# Patient Record
Sex: MALE | Race: WHITE | NOT HISPANIC OR LATINO | URBAN - METROPOLITAN AREA
[De-identification: names, ages, dates, MRNs, and addresses within clinical notes are randomized per-mention and may not be internally consistent; named-entity substitution may affect disease eponyms.]

---

## 2023-01-01 ENCOUNTER — EMERGENCY (EMERGENCY)
Facility: HOSPITAL | Age: 68
LOS: 1 days | Discharge: HOME | End: 2023-01-01
Attending: EMERGENCY MEDICINE | Admitting: EMERGENCY MEDICINE
Payer: MEDICAID

## 2023-01-01 ENCOUNTER — INPATIENT (INPATIENT)
Facility: HOSPITAL | Age: 68
LOS: 12 days | Discharge: NURSING, REHAB OR LONG-TERM CARE | End: 2023-12-27
Attending: INTERNAL MEDICINE | Admitting: INTERNAL MEDICINE
Payer: COMMERCIAL

## 2023-01-01 VITALS
HEART RATE: 83 BPM | TEMPERATURE: 98 F | SYSTOLIC BLOOD PRESSURE: 113 MMHG | OXYGEN SATURATION: 100 % | DIASTOLIC BLOOD PRESSURE: 67 MMHG | RESPIRATION RATE: 18 BRPM

## 2023-01-01 VITALS
DIASTOLIC BLOOD PRESSURE: 67 MMHG | HEART RATE: 71 BPM | OXYGEN SATURATION: 99 % | SYSTOLIC BLOOD PRESSURE: 103 MMHG | TEMPERATURE: 99 F | RESPIRATION RATE: 18 BRPM

## 2023-01-01 VITALS
HEART RATE: 86 BPM | DIASTOLIC BLOOD PRESSURE: 64 MMHG | RESPIRATION RATE: 17 BRPM | OXYGEN SATURATION: 100 % | TEMPERATURE: 98 F | SYSTOLIC BLOOD PRESSURE: 107 MMHG

## 2023-01-01 VITALS
HEIGHT: 66 IN | SYSTOLIC BLOOD PRESSURE: 109 MMHG | RESPIRATION RATE: 19 BRPM | TEMPERATURE: 99 F | OXYGEN SATURATION: 95 % | DIASTOLIC BLOOD PRESSURE: 60 MMHG | HEART RATE: 89 BPM

## 2023-01-01 DIAGNOSIS — Z93.1 GASTROSTOMY STATUS: Chronic | ICD-10-CM

## 2023-01-01 DIAGNOSIS — Z29.9 ENCOUNTER FOR PROPHYLACTIC MEASURES, UNSPECIFIED: ICD-10-CM

## 2023-01-01 DIAGNOSIS — A46 ERYSIPELAS: ICD-10-CM

## 2023-01-01 DIAGNOSIS — R45.1 RESTLESSNESS AND AGITATION: ICD-10-CM

## 2023-01-01 DIAGNOSIS — N40.0 BENIGN PROSTATIC HYPERPLASIA WITHOUT LOWER URINARY TRACT SYMPTOMS: ICD-10-CM

## 2023-01-01 DIAGNOSIS — I95.9 HYPOTENSION, UNSPECIFIED: ICD-10-CM

## 2023-01-01 DIAGNOSIS — R13.10 DYSPHAGIA, UNSPECIFIED: ICD-10-CM

## 2023-01-01 DIAGNOSIS — G93.40 ENCEPHALOPATHY, UNSPECIFIED: ICD-10-CM

## 2023-01-01 DIAGNOSIS — I50.9 HEART FAILURE, UNSPECIFIED: ICD-10-CM

## 2023-01-01 LAB
A1C WITH ESTIMATED AVERAGE GLUCOSE RESULT: 4.7 % — SIGNIFICANT CHANGE UP (ref 4–5.6)
A1C WITH ESTIMATED AVERAGE GLUCOSE RESULT: 4.7 % — SIGNIFICANT CHANGE UP (ref 4–5.6)
ALBUMIN SERPL ELPH-MCNC: 3.1 G/DL — LOW (ref 3.3–5)
ALBUMIN SERPL ELPH-MCNC: 3.3 G/DL — SIGNIFICANT CHANGE UP (ref 3.3–5)
ALBUMIN SERPL ELPH-MCNC: 3.4 G/DL — SIGNIFICANT CHANGE UP (ref 3.3–5)
ALBUMIN SERPL ELPH-MCNC: 3.6 G/DL — SIGNIFICANT CHANGE UP (ref 3.3–5)
ALP SERPL-CCNC: 72 U/L — SIGNIFICANT CHANGE UP (ref 40–120)
ALP SERPL-CCNC: 73 U/L — SIGNIFICANT CHANGE UP (ref 40–120)
ALP SERPL-CCNC: 73 U/L — SIGNIFICANT CHANGE UP (ref 40–120)
ALP SERPL-CCNC: 75 U/L — SIGNIFICANT CHANGE UP (ref 40–120)
ALP SERPL-CCNC: 75 U/L — SIGNIFICANT CHANGE UP (ref 40–120)
ALP SERPL-CCNC: 76 U/L — SIGNIFICANT CHANGE UP (ref 40–120)
ALP SERPL-CCNC: 80 U/L — SIGNIFICANT CHANGE UP (ref 40–120)
ALP SERPL-CCNC: 80 U/L — SIGNIFICANT CHANGE UP (ref 40–120)
ALP SERPL-CCNC: 81 U/L — SIGNIFICANT CHANGE UP (ref 40–120)
ALP SERPL-CCNC: 81 U/L — SIGNIFICANT CHANGE UP (ref 40–120)
ALP SERPL-CCNC: 87 U/L — SIGNIFICANT CHANGE UP (ref 40–120)
ALP SERPL-CCNC: 87 U/L — SIGNIFICANT CHANGE UP (ref 40–120)
ALT FLD-CCNC: 12 U/L — SIGNIFICANT CHANGE UP (ref 4–41)
ALT FLD-CCNC: 12 U/L — SIGNIFICANT CHANGE UP (ref 4–41)
ALT FLD-CCNC: 6 U/L — SIGNIFICANT CHANGE UP (ref 4–41)
ALT FLD-CCNC: 7 U/L — SIGNIFICANT CHANGE UP (ref 4–41)
ALT FLD-CCNC: <5 U/L — SIGNIFICANT CHANGE UP (ref 4–41)
ANION GAP SERPL CALC-SCNC: 10 MMOL/L — SIGNIFICANT CHANGE UP (ref 7–14)
ANION GAP SERPL CALC-SCNC: 11 MMOL/L — SIGNIFICANT CHANGE UP (ref 7–14)
ANION GAP SERPL CALC-SCNC: 12 MMOL/L — SIGNIFICANT CHANGE UP (ref 7–14)
ANION GAP SERPL CALC-SCNC: 13 MMOL/L — SIGNIFICANT CHANGE UP (ref 7–14)
ANION GAP SERPL CALC-SCNC: 9 MMOL/L — SIGNIFICANT CHANGE UP (ref 7–14)
APPEARANCE UR: CLEAR — SIGNIFICANT CHANGE UP
APPEARANCE UR: CLEAR — SIGNIFICANT CHANGE UP
AST SERPL-CCNC: 11 U/L — SIGNIFICANT CHANGE UP (ref 4–40)
AST SERPL-CCNC: 11 U/L — SIGNIFICANT CHANGE UP (ref 4–40)
AST SERPL-CCNC: 12 U/L — SIGNIFICANT CHANGE UP (ref 4–40)
AST SERPL-CCNC: 12 U/L — SIGNIFICANT CHANGE UP (ref 4–40)
AST SERPL-CCNC: 13 U/L — SIGNIFICANT CHANGE UP (ref 4–40)
AST SERPL-CCNC: 13 U/L — SIGNIFICANT CHANGE UP (ref 4–40)
AST SERPL-CCNC: 15 U/L — SIGNIFICANT CHANGE UP (ref 4–40)
AST SERPL-CCNC: 15 U/L — SIGNIFICANT CHANGE UP (ref 4–40)
AST SERPL-CCNC: 18 U/L — SIGNIFICANT CHANGE UP (ref 4–40)
AST SERPL-CCNC: 20 U/L — SIGNIFICANT CHANGE UP (ref 4–40)
AST SERPL-CCNC: 25 U/L — SIGNIFICANT CHANGE UP (ref 4–40)
AST SERPL-CCNC: 25 U/L — SIGNIFICANT CHANGE UP (ref 4–40)
B PERT DNA SPEC QL NAA+PROBE: SIGNIFICANT CHANGE UP
B PERT DNA SPEC QL NAA+PROBE: SIGNIFICANT CHANGE UP
B PERT+PARAPERT DNA PNL SPEC NAA+PROBE: SIGNIFICANT CHANGE UP
B PERT+PARAPERT DNA PNL SPEC NAA+PROBE: SIGNIFICANT CHANGE UP
BACTERIA # UR AUTO: NEGATIVE /HPF — SIGNIFICANT CHANGE UP
BACTERIA # UR AUTO: NEGATIVE /HPF — SIGNIFICANT CHANGE UP
BASOPHILS # BLD AUTO: 0.04 K/UL — SIGNIFICANT CHANGE UP (ref 0–0.2)
BASOPHILS # BLD AUTO: 0.04 K/UL — SIGNIFICANT CHANGE UP (ref 0–0.2)
BASOPHILS NFR BLD AUTO: 0.4 % — SIGNIFICANT CHANGE UP (ref 0–2)
BASOPHILS NFR BLD AUTO: 0.4 % — SIGNIFICANT CHANGE UP (ref 0–2)
BILIRUB DIRECT SERPL-MCNC: <0.2 MG/DL — SIGNIFICANT CHANGE UP (ref 0–0.3)
BILIRUB DIRECT SERPL-MCNC: <0.2 MG/DL — SIGNIFICANT CHANGE UP (ref 0–0.3)
BILIRUB INDIRECT FLD-MCNC: >0.1 MG/DL — SIGNIFICANT CHANGE UP (ref 0–1)
BILIRUB INDIRECT FLD-MCNC: >0.1 MG/DL — SIGNIFICANT CHANGE UP (ref 0–1)
BILIRUB SERPL-MCNC: 0.2 MG/DL — SIGNIFICANT CHANGE UP (ref 0.2–1.2)
BILIRUB SERPL-MCNC: 0.2 MG/DL — SIGNIFICANT CHANGE UP (ref 0.2–1.2)
BILIRUB SERPL-MCNC: 0.3 MG/DL — SIGNIFICANT CHANGE UP (ref 0.2–1.2)
BILIRUB SERPL-MCNC: 0.4 MG/DL — SIGNIFICANT CHANGE UP (ref 0.2–1.2)
BILIRUB UR-MCNC: NEGATIVE — SIGNIFICANT CHANGE UP
BILIRUB UR-MCNC: NEGATIVE — SIGNIFICANT CHANGE UP
BORDETELLA PARAPERTUSSIS (RAPRVP): SIGNIFICANT CHANGE UP
BORDETELLA PARAPERTUSSIS (RAPRVP): SIGNIFICANT CHANGE UP
BUN SERPL-MCNC: 10 MG/DL — SIGNIFICANT CHANGE UP (ref 7–23)
BUN SERPL-MCNC: 10 MG/DL — SIGNIFICANT CHANGE UP (ref 7–23)
BUN SERPL-MCNC: 11 MG/DL — SIGNIFICANT CHANGE UP (ref 7–23)
BUN SERPL-MCNC: 11 MG/DL — SIGNIFICANT CHANGE UP (ref 7–23)
BUN SERPL-MCNC: 12 MG/DL — SIGNIFICANT CHANGE UP (ref 7–23)
BUN SERPL-MCNC: 13 MG/DL — SIGNIFICANT CHANGE UP (ref 7–23)
BUN SERPL-MCNC: 15 MG/DL — SIGNIFICANT CHANGE UP (ref 7–23)
BUN SERPL-MCNC: 15 MG/DL — SIGNIFICANT CHANGE UP (ref 7–23)
BUN SERPL-MCNC: 16 MG/DL — SIGNIFICANT CHANGE UP (ref 7–23)
BUN SERPL-MCNC: 16 MG/DL — SIGNIFICANT CHANGE UP (ref 7–23)
BUN SERPL-MCNC: 17 MG/DL — SIGNIFICANT CHANGE UP (ref 7–23)
BUN SERPL-MCNC: 17 MG/DL — SIGNIFICANT CHANGE UP (ref 7–23)
BUN SERPL-MCNC: 9 MG/DL — SIGNIFICANT CHANGE UP (ref 7–23)
C PNEUM DNA SPEC QL NAA+PROBE: SIGNIFICANT CHANGE UP
C PNEUM DNA SPEC QL NAA+PROBE: SIGNIFICANT CHANGE UP
CALCIUM SERPL-MCNC: 8.4 MG/DL — SIGNIFICANT CHANGE UP (ref 8.4–10.5)
CALCIUM SERPL-MCNC: 8.4 MG/DL — SIGNIFICANT CHANGE UP (ref 8.4–10.5)
CALCIUM SERPL-MCNC: 8.5 MG/DL — SIGNIFICANT CHANGE UP (ref 8.4–10.5)
CALCIUM SERPL-MCNC: 8.6 MG/DL — SIGNIFICANT CHANGE UP (ref 8.4–10.5)
CALCIUM SERPL-MCNC: 8.6 MG/DL — SIGNIFICANT CHANGE UP (ref 8.4–10.5)
CALCIUM SERPL-MCNC: 8.7 MG/DL — SIGNIFICANT CHANGE UP (ref 8.4–10.5)
CALCIUM SERPL-MCNC: 8.8 MG/DL — SIGNIFICANT CHANGE UP (ref 8.4–10.5)
CALCIUM SERPL-MCNC: 8.9 MG/DL — SIGNIFICANT CHANGE UP (ref 8.4–10.5)
CALCIUM SERPL-MCNC: 8.9 MG/DL — SIGNIFICANT CHANGE UP (ref 8.4–10.5)
CALCIUM SERPL-MCNC: 9 MG/DL — SIGNIFICANT CHANGE UP (ref 8.4–10.5)
CALCIUM SERPL-MCNC: 9 MG/DL — SIGNIFICANT CHANGE UP (ref 8.4–10.5)
CAST: 1 /LPF — SIGNIFICANT CHANGE UP (ref 0–4)
CAST: 1 /LPF — SIGNIFICANT CHANGE UP (ref 0–4)
CHLORIDE SERPL-SCNC: 101 MMOL/L — SIGNIFICANT CHANGE UP (ref 98–107)
CHLORIDE SERPL-SCNC: 102 MMOL/L — SIGNIFICANT CHANGE UP (ref 98–107)
CHLORIDE SERPL-SCNC: 103 MMOL/L — SIGNIFICANT CHANGE UP (ref 98–107)
CHLORIDE SERPL-SCNC: 104 MMOL/L — SIGNIFICANT CHANGE UP (ref 98–107)
CLOSURE TME COLL+EPINEP BLD: 122 K/UL — LOW (ref 150–400)
CLOSURE TME COLL+EPINEP BLD: 122 K/UL — LOW (ref 150–400)
CLOSURE TME COLL+EPINEP BLD: 128 K/UL — LOW (ref 150–400)
CLOSURE TME COLL+EPINEP BLD: 128 K/UL — LOW (ref 150–400)
CO2 SERPL-SCNC: 21 MMOL/L — LOW (ref 22–31)
CO2 SERPL-SCNC: 21 MMOL/L — LOW (ref 22–31)
CO2 SERPL-SCNC: 22 MMOL/L — SIGNIFICANT CHANGE UP (ref 22–31)
CO2 SERPL-SCNC: 22 MMOL/L — SIGNIFICANT CHANGE UP (ref 22–31)
CO2 SERPL-SCNC: 24 MMOL/L — SIGNIFICANT CHANGE UP (ref 22–31)
CO2 SERPL-SCNC: 24 MMOL/L — SIGNIFICANT CHANGE UP (ref 22–31)
CO2 SERPL-SCNC: 25 MMOL/L — SIGNIFICANT CHANGE UP (ref 22–31)
CO2 SERPL-SCNC: 26 MMOL/L — SIGNIFICANT CHANGE UP (ref 22–31)
CO2 SERPL-SCNC: 27 MMOL/L — SIGNIFICANT CHANGE UP (ref 22–31)
CO2 SERPL-SCNC: 27 MMOL/L — SIGNIFICANT CHANGE UP (ref 22–31)
CO2 SERPL-SCNC: 28 MMOL/L — SIGNIFICANT CHANGE UP (ref 22–31)
CO2 SERPL-SCNC: 29 MMOL/L — SIGNIFICANT CHANGE UP (ref 22–31)
CO2 SERPL-SCNC: 29 MMOL/L — SIGNIFICANT CHANGE UP (ref 22–31)
CO2 SERPL-SCNC: 30 MMOL/L — SIGNIFICANT CHANGE UP (ref 22–31)
CO2 SERPL-SCNC: 30 MMOL/L — SIGNIFICANT CHANGE UP (ref 22–31)
COLOR SPEC: YELLOW — SIGNIFICANT CHANGE UP
COLOR SPEC: YELLOW — SIGNIFICANT CHANGE UP
CREAT SERPL-MCNC: 0.63 MG/DL — SIGNIFICANT CHANGE UP (ref 0.5–1.3)
CREAT SERPL-MCNC: 0.63 MG/DL — SIGNIFICANT CHANGE UP (ref 0.5–1.3)
CREAT SERPL-MCNC: 0.64 MG/DL — SIGNIFICANT CHANGE UP (ref 0.5–1.3)
CREAT SERPL-MCNC: 0.64 MG/DL — SIGNIFICANT CHANGE UP (ref 0.5–1.3)
CREAT SERPL-MCNC: 0.66 MG/DL — SIGNIFICANT CHANGE UP (ref 0.5–1.3)
CREAT SERPL-MCNC: 0.67 MG/DL — SIGNIFICANT CHANGE UP (ref 0.5–1.3)
CREAT SERPL-MCNC: 0.67 MG/DL — SIGNIFICANT CHANGE UP (ref 0.5–1.3)
CREAT SERPL-MCNC: 0.7 MG/DL — SIGNIFICANT CHANGE UP (ref 0.5–1.3)
CREAT SERPL-MCNC: 0.7 MG/DL — SIGNIFICANT CHANGE UP (ref 0.5–1.3)
CREAT SERPL-MCNC: 0.71 MG/DL — SIGNIFICANT CHANGE UP (ref 0.5–1.3)
CREAT SERPL-MCNC: 0.74 MG/DL — SIGNIFICANT CHANGE UP (ref 0.5–1.3)
CREAT SERPL-MCNC: 0.74 MG/DL — SIGNIFICANT CHANGE UP (ref 0.5–1.3)
CREAT SERPL-MCNC: 0.75 MG/DL — SIGNIFICANT CHANGE UP (ref 0.5–1.3)
CREAT SERPL-MCNC: 0.75 MG/DL — SIGNIFICANT CHANGE UP (ref 0.5–1.3)
CREAT SERPL-MCNC: 0.76 MG/DL — SIGNIFICANT CHANGE UP (ref 0.5–1.3)
CREAT SERPL-MCNC: 0.76 MG/DL — SIGNIFICANT CHANGE UP (ref 0.5–1.3)
CREAT SERPL-MCNC: 0.8 MG/DL — SIGNIFICANT CHANGE UP (ref 0.5–1.3)
CREAT SERPL-MCNC: 0.8 MG/DL — SIGNIFICANT CHANGE UP (ref 0.5–1.3)
CULTURE RESULTS: NO GROWTH — SIGNIFICANT CHANGE UP
CULTURE RESULTS: NO GROWTH — SIGNIFICANT CHANGE UP
CULTURE RESULTS: SIGNIFICANT CHANGE UP
DIFF PNL FLD: NEGATIVE — SIGNIFICANT CHANGE UP
DIFF PNL FLD: NEGATIVE — SIGNIFICANT CHANGE UP
EGFR: 100 ML/MIN/1.73M2 — SIGNIFICANT CHANGE UP
EGFR: 102 ML/MIN/1.73M2 — SIGNIFICANT CHANGE UP
EGFR: 103 ML/MIN/1.73M2 — SIGNIFICANT CHANGE UP
EGFR: 103 ML/MIN/1.73M2 — SIGNIFICANT CHANGE UP
EGFR: 104 ML/MIN/1.73M2 — SIGNIFICANT CHANGE UP
EGFR: 104 ML/MIN/1.73M2 — SIGNIFICANT CHANGE UP
EGFR: 96 ML/MIN/1.73M2 — SIGNIFICANT CHANGE UP
EGFR: 96 ML/MIN/1.73M2 — SIGNIFICANT CHANGE UP
EGFR: 98 ML/MIN/1.73M2 — SIGNIFICANT CHANGE UP
EGFR: 99 ML/MIN/1.73M2 — SIGNIFICANT CHANGE UP
EGFR: 99 ML/MIN/1.73M2 — SIGNIFICANT CHANGE UP
EOSINOPHIL # BLD AUTO: 0.14 K/UL — SIGNIFICANT CHANGE UP (ref 0–0.5)
EOSINOPHIL # BLD AUTO: 0.14 K/UL — SIGNIFICANT CHANGE UP (ref 0–0.5)
EOSINOPHIL NFR BLD AUTO: 1.5 % — SIGNIFICANT CHANGE UP (ref 0–6)
EOSINOPHIL NFR BLD AUTO: 1.5 % — SIGNIFICANT CHANGE UP (ref 0–6)
ESTIMATED AVERAGE GLUCOSE: 88 — SIGNIFICANT CHANGE UP
ESTIMATED AVERAGE GLUCOSE: 88 — SIGNIFICANT CHANGE UP
FLUAV SUBTYP SPEC NAA+PROBE: SIGNIFICANT CHANGE UP
FLUAV SUBTYP SPEC NAA+PROBE: SIGNIFICANT CHANGE UP
FLUBV RNA SPEC QL NAA+PROBE: SIGNIFICANT CHANGE UP
FLUBV RNA SPEC QL NAA+PROBE: SIGNIFICANT CHANGE UP
FOLATE SERPL-MCNC: 7.8 NG/ML — SIGNIFICANT CHANGE UP (ref 3.1–17.5)
FOLATE SERPL-MCNC: 7.8 NG/ML — SIGNIFICANT CHANGE UP (ref 3.1–17.5)
GLUCOSE BLDC GLUCOMTR-MCNC: 100 MG/DL — HIGH (ref 70–99)
GLUCOSE BLDC GLUCOMTR-MCNC: 100 MG/DL — HIGH (ref 70–99)
GLUCOSE BLDC GLUCOMTR-MCNC: 104 MG/DL — HIGH (ref 70–99)
GLUCOSE BLDC GLUCOMTR-MCNC: 104 MG/DL — HIGH (ref 70–99)
GLUCOSE BLDC GLUCOMTR-MCNC: 106 MG/DL — HIGH (ref 70–99)
GLUCOSE BLDC GLUCOMTR-MCNC: 106 MG/DL — HIGH (ref 70–99)
GLUCOSE BLDC GLUCOMTR-MCNC: 130 MG/DL — HIGH (ref 70–99)
GLUCOSE BLDC GLUCOMTR-MCNC: 130 MG/DL — HIGH (ref 70–99)
GLUCOSE BLDC GLUCOMTR-MCNC: 82 MG/DL — SIGNIFICANT CHANGE UP (ref 70–99)
GLUCOSE BLDC GLUCOMTR-MCNC: 82 MG/DL — SIGNIFICANT CHANGE UP (ref 70–99)
GLUCOSE BLDC GLUCOMTR-MCNC: 87 MG/DL — SIGNIFICANT CHANGE UP (ref 70–99)
GLUCOSE BLDC GLUCOMTR-MCNC: 87 MG/DL — SIGNIFICANT CHANGE UP (ref 70–99)
GLUCOSE BLDC GLUCOMTR-MCNC: 92 MG/DL — SIGNIFICANT CHANGE UP (ref 70–99)
GLUCOSE BLDC GLUCOMTR-MCNC: 92 MG/DL — SIGNIFICANT CHANGE UP (ref 70–99)
GLUCOSE BLDC GLUCOMTR-MCNC: 93 MG/DL — SIGNIFICANT CHANGE UP (ref 70–99)
GLUCOSE BLDC GLUCOMTR-MCNC: 93 MG/DL — SIGNIFICANT CHANGE UP (ref 70–99)
GLUCOSE SERPL-MCNC: 114 MG/DL — HIGH (ref 70–99)
GLUCOSE SERPL-MCNC: 114 MG/DL — HIGH (ref 70–99)
GLUCOSE SERPL-MCNC: 71 MG/DL — SIGNIFICANT CHANGE UP (ref 70–99)
GLUCOSE SERPL-MCNC: 73 MG/DL — SIGNIFICANT CHANGE UP (ref 70–99)
GLUCOSE SERPL-MCNC: 73 MG/DL — SIGNIFICANT CHANGE UP (ref 70–99)
GLUCOSE SERPL-MCNC: 78 MG/DL — SIGNIFICANT CHANGE UP (ref 70–99)
GLUCOSE SERPL-MCNC: 78 MG/DL — SIGNIFICANT CHANGE UP (ref 70–99)
GLUCOSE SERPL-MCNC: 83 MG/DL — SIGNIFICANT CHANGE UP (ref 70–99)
GLUCOSE SERPL-MCNC: 83 MG/DL — SIGNIFICANT CHANGE UP (ref 70–99)
GLUCOSE SERPL-MCNC: 84 MG/DL — SIGNIFICANT CHANGE UP (ref 70–99)
GLUCOSE SERPL-MCNC: 85 MG/DL — SIGNIFICANT CHANGE UP (ref 70–99)
GLUCOSE SERPL-MCNC: 85 MG/DL — SIGNIFICANT CHANGE UP (ref 70–99)
GLUCOSE SERPL-MCNC: 88 MG/DL — SIGNIFICANT CHANGE UP (ref 70–99)
GLUCOSE SERPL-MCNC: 88 MG/DL — SIGNIFICANT CHANGE UP (ref 70–99)
GLUCOSE SERPL-MCNC: 89 MG/DL — SIGNIFICANT CHANGE UP (ref 70–99)
GLUCOSE SERPL-MCNC: 89 MG/DL — SIGNIFICANT CHANGE UP (ref 70–99)
GLUCOSE UR QL: NEGATIVE MG/DL — SIGNIFICANT CHANGE UP
GLUCOSE UR QL: NEGATIVE MG/DL — SIGNIFICANT CHANGE UP
HADV DNA SPEC QL NAA+PROBE: SIGNIFICANT CHANGE UP
HADV DNA SPEC QL NAA+PROBE: SIGNIFICANT CHANGE UP
HCOV 229E RNA SPEC QL NAA+PROBE: SIGNIFICANT CHANGE UP
HCOV 229E RNA SPEC QL NAA+PROBE: SIGNIFICANT CHANGE UP
HCOV HKU1 RNA SPEC QL NAA+PROBE: SIGNIFICANT CHANGE UP
HCOV HKU1 RNA SPEC QL NAA+PROBE: SIGNIFICANT CHANGE UP
HCOV NL63 RNA SPEC QL NAA+PROBE: SIGNIFICANT CHANGE UP
HCOV NL63 RNA SPEC QL NAA+PROBE: SIGNIFICANT CHANGE UP
HCOV OC43 RNA SPEC QL NAA+PROBE: SIGNIFICANT CHANGE UP
HCOV OC43 RNA SPEC QL NAA+PROBE: SIGNIFICANT CHANGE UP
HCT VFR BLD CALC: 39 % — SIGNIFICANT CHANGE UP (ref 39–50)
HCT VFR BLD CALC: 39 % — SIGNIFICANT CHANGE UP (ref 39–50)
HCT VFR BLD CALC: 40.5 % — SIGNIFICANT CHANGE UP (ref 39–50)
HCT VFR BLD CALC: 40.5 % — SIGNIFICANT CHANGE UP (ref 39–50)
HCT VFR BLD CALC: 40.9 % — SIGNIFICANT CHANGE UP (ref 39–50)
HCT VFR BLD CALC: 40.9 % — SIGNIFICANT CHANGE UP (ref 39–50)
HCT VFR BLD CALC: 41.1 % — SIGNIFICANT CHANGE UP (ref 39–50)
HCT VFR BLD CALC: 41.1 % — SIGNIFICANT CHANGE UP (ref 39–50)
HCT VFR BLD CALC: 42.2 % — SIGNIFICANT CHANGE UP (ref 39–50)
HCT VFR BLD CALC: 42.2 % — SIGNIFICANT CHANGE UP (ref 39–50)
HCT VFR BLD CALC: 42.6 % — SIGNIFICANT CHANGE UP (ref 39–50)
HCT VFR BLD CALC: 42.7 % — SIGNIFICANT CHANGE UP (ref 39–50)
HCT VFR BLD CALC: 42.7 % — SIGNIFICANT CHANGE UP (ref 39–50)
HCT VFR BLD CALC: 44.1 % — SIGNIFICANT CHANGE UP (ref 39–50)
HCT VFR BLD CALC: 44.1 % — SIGNIFICANT CHANGE UP (ref 39–50)
HCT VFR BLD CALC: 44.8 % — SIGNIFICANT CHANGE UP (ref 39–50)
HCT VFR BLD CALC: 44.8 % — SIGNIFICANT CHANGE UP (ref 39–50)
HCT VFR BLD CALC: 45.6 % — SIGNIFICANT CHANGE UP (ref 39–50)
HCT VFR BLD CALC: 45.6 % — SIGNIFICANT CHANGE UP (ref 39–50)
HCT VFR BLD CALC: 45.7 % — SIGNIFICANT CHANGE UP (ref 39–50)
HCT VFR BLD CALC: 45.7 % — SIGNIFICANT CHANGE UP (ref 39–50)
HCT VFR BLD CALC: 46.3 % — SIGNIFICANT CHANGE UP (ref 39–50)
HCT VFR BLD CALC: 46.3 % — SIGNIFICANT CHANGE UP (ref 39–50)
HCV AB S/CO SERPL IA: 0.3 S/CO — SIGNIFICANT CHANGE UP (ref 0–0.99)
HCV AB S/CO SERPL IA: 0.3 S/CO — SIGNIFICANT CHANGE UP (ref 0–0.99)
HCV AB SERPL-IMP: SIGNIFICANT CHANGE UP
HCV AB SERPL-IMP: SIGNIFICANT CHANGE UP
HGB BLD-MCNC: 13.1 G/DL — SIGNIFICANT CHANGE UP (ref 13–17)
HGB BLD-MCNC: 13.1 G/DL — SIGNIFICANT CHANGE UP (ref 13–17)
HGB BLD-MCNC: 13.4 G/DL — SIGNIFICANT CHANGE UP (ref 13–17)
HGB BLD-MCNC: 14 G/DL — SIGNIFICANT CHANGE UP (ref 13–17)
HGB BLD-MCNC: 14.1 G/DL — SIGNIFICANT CHANGE UP (ref 13–17)
HGB BLD-MCNC: 14.1 G/DL — SIGNIFICANT CHANGE UP (ref 13–17)
HGB BLD-MCNC: 14.2 G/DL — SIGNIFICANT CHANGE UP (ref 13–17)
HGB BLD-MCNC: 14.2 G/DL — SIGNIFICANT CHANGE UP (ref 13–17)
HGB BLD-MCNC: 14.3 G/DL — SIGNIFICANT CHANGE UP (ref 13–17)
HGB BLD-MCNC: 14.3 G/DL — SIGNIFICANT CHANGE UP (ref 13–17)
HGB BLD-MCNC: 14.4 G/DL — SIGNIFICANT CHANGE UP (ref 13–17)
HGB BLD-MCNC: 14.4 G/DL — SIGNIFICANT CHANGE UP (ref 13–17)
HGB BLD-MCNC: 14.9 G/DL — SIGNIFICANT CHANGE UP (ref 13–17)
HGB BLD-MCNC: 14.9 G/DL — SIGNIFICANT CHANGE UP (ref 13–17)
HGB BLD-MCNC: 15.1 G/DL — SIGNIFICANT CHANGE UP (ref 13–17)
HGB BLD-MCNC: 15.2 G/DL — SIGNIFICANT CHANGE UP (ref 13–17)
HGB BLD-MCNC: 15.2 G/DL — SIGNIFICANT CHANGE UP (ref 13–17)
HMPV RNA SPEC QL NAA+PROBE: SIGNIFICANT CHANGE UP
HMPV RNA SPEC QL NAA+PROBE: SIGNIFICANT CHANGE UP
HPIV1 RNA SPEC QL NAA+PROBE: SIGNIFICANT CHANGE UP
HPIV1 RNA SPEC QL NAA+PROBE: SIGNIFICANT CHANGE UP
HPIV2 RNA SPEC QL NAA+PROBE: SIGNIFICANT CHANGE UP
HPIV2 RNA SPEC QL NAA+PROBE: SIGNIFICANT CHANGE UP
HPIV3 RNA SPEC QL NAA+PROBE: SIGNIFICANT CHANGE UP
HPIV3 RNA SPEC QL NAA+PROBE: SIGNIFICANT CHANGE UP
HPIV4 RNA SPEC QL NAA+PROBE: SIGNIFICANT CHANGE UP
HPIV4 RNA SPEC QL NAA+PROBE: SIGNIFICANT CHANGE UP
IANC: 6.9 K/UL — SIGNIFICANT CHANGE UP (ref 1.8–7.4)
IANC: 6.9 K/UL — SIGNIFICANT CHANGE UP (ref 1.8–7.4)
IMM GRANULOCYTES NFR BLD AUTO: 0.3 % — SIGNIFICANT CHANGE UP (ref 0–0.9)
IMM GRANULOCYTES NFR BLD AUTO: 0.3 % — SIGNIFICANT CHANGE UP (ref 0–0.9)
KETONES UR-MCNC: NEGATIVE MG/DL — SIGNIFICANT CHANGE UP
KETONES UR-MCNC: NEGATIVE MG/DL — SIGNIFICANT CHANGE UP
LEUKOCYTE ESTERASE UR-ACNC: NEGATIVE — SIGNIFICANT CHANGE UP
LEUKOCYTE ESTERASE UR-ACNC: NEGATIVE — SIGNIFICANT CHANGE UP
LYMPHOCYTES # BLD AUTO: 1.46 K/UL — SIGNIFICANT CHANGE UP (ref 1–3.3)
LYMPHOCYTES # BLD AUTO: 1.46 K/UL — SIGNIFICANT CHANGE UP (ref 1–3.3)
LYMPHOCYTES # BLD AUTO: 15.9 % — SIGNIFICANT CHANGE UP (ref 13–44)
LYMPHOCYTES # BLD AUTO: 15.9 % — SIGNIFICANT CHANGE UP (ref 13–44)
M PNEUMO DNA SPEC QL NAA+PROBE: SIGNIFICANT CHANGE UP
M PNEUMO DNA SPEC QL NAA+PROBE: SIGNIFICANT CHANGE UP
MAGNESIUM SERPL-MCNC: 2 MG/DL — SIGNIFICANT CHANGE UP (ref 1.6–2.6)
MAGNESIUM SERPL-MCNC: 2 MG/DL — SIGNIFICANT CHANGE UP (ref 1.6–2.6)
MAGNESIUM SERPL-MCNC: 2.1 MG/DL — SIGNIFICANT CHANGE UP (ref 1.6–2.6)
MCHC RBC-ENTMCNC: 32.6 GM/DL — SIGNIFICANT CHANGE UP (ref 32–36)
MCHC RBC-ENTMCNC: 32.6 PG — SIGNIFICANT CHANGE UP (ref 27–34)
MCHC RBC-ENTMCNC: 32.6 PG — SIGNIFICANT CHANGE UP (ref 27–34)
MCHC RBC-ENTMCNC: 32.7 GM/DL — SIGNIFICANT CHANGE UP (ref 32–36)
MCHC RBC-ENTMCNC: 32.7 GM/DL — SIGNIFICANT CHANGE UP (ref 32–36)
MCHC RBC-ENTMCNC: 32.8 GM/DL — SIGNIFICANT CHANGE UP (ref 32–36)
MCHC RBC-ENTMCNC: 32.8 GM/DL — SIGNIFICANT CHANGE UP (ref 32–36)
MCHC RBC-ENTMCNC: 32.8 PG — SIGNIFICANT CHANGE UP (ref 27–34)
MCHC RBC-ENTMCNC: 32.9 GM/DL — SIGNIFICANT CHANGE UP (ref 32–36)
MCHC RBC-ENTMCNC: 32.9 GM/DL — SIGNIFICANT CHANGE UP (ref 32–36)
MCHC RBC-ENTMCNC: 32.9 PG — SIGNIFICANT CHANGE UP (ref 27–34)
MCHC RBC-ENTMCNC: 32.9 PG — SIGNIFICANT CHANGE UP (ref 27–34)
MCHC RBC-ENTMCNC: 33 PG — SIGNIFICANT CHANGE UP (ref 27–34)
MCHC RBC-ENTMCNC: 33.1 GM/DL — SIGNIFICANT CHANGE UP (ref 32–36)
MCHC RBC-ENTMCNC: 33.1 PG — SIGNIFICANT CHANGE UP (ref 27–34)
MCHC RBC-ENTMCNC: 33.2 GM/DL — SIGNIFICANT CHANGE UP (ref 32–36)
MCHC RBC-ENTMCNC: 33.2 GM/DL — SIGNIFICANT CHANGE UP (ref 32–36)
MCHC RBC-ENTMCNC: 33.2 PG — SIGNIFICANT CHANGE UP (ref 27–34)
MCHC RBC-ENTMCNC: 33.2 PG — SIGNIFICANT CHANGE UP (ref 27–34)
MCHC RBC-ENTMCNC: 33.3 GM/DL — SIGNIFICANT CHANGE UP (ref 32–36)
MCHC RBC-ENTMCNC: 33.3 GM/DL — SIGNIFICANT CHANGE UP (ref 32–36)
MCHC RBC-ENTMCNC: 33.4 PG — SIGNIFICANT CHANGE UP (ref 27–34)
MCHC RBC-ENTMCNC: 33.5 GM/DL — SIGNIFICANT CHANGE UP (ref 32–36)
MCHC RBC-ENTMCNC: 33.5 GM/DL — SIGNIFICANT CHANGE UP (ref 32–36)
MCHC RBC-ENTMCNC: 33.6 GM/DL — SIGNIFICANT CHANGE UP (ref 32–36)
MCHC RBC-ENTMCNC: 33.6 GM/DL — SIGNIFICANT CHANGE UP (ref 32–36)
MCHC RBC-ENTMCNC: 33.7 GM/DL — SIGNIFICANT CHANGE UP (ref 32–36)
MCHC RBC-ENTMCNC: 33.7 GM/DL — SIGNIFICANT CHANGE UP (ref 32–36)
MCHC RBC-ENTMCNC: 34.5 GM/DL — SIGNIFICANT CHANGE UP (ref 32–36)
MCHC RBC-ENTMCNC: 34.5 GM/DL — SIGNIFICANT CHANGE UP (ref 32–36)
MCV RBC AUTO: 100 FL — SIGNIFICANT CHANGE UP (ref 80–100)
MCV RBC AUTO: 100 FL — SIGNIFICANT CHANGE UP (ref 80–100)
MCV RBC AUTO: 101.5 FL — HIGH (ref 80–100)
MCV RBC AUTO: 101.5 FL — HIGH (ref 80–100)
MCV RBC AUTO: 102.3 FL — HIGH (ref 80–100)
MCV RBC AUTO: 102.3 FL — HIGH (ref 80–100)
MCV RBC AUTO: 102.5 FL — HIGH (ref 80–100)
MCV RBC AUTO: 102.5 FL — HIGH (ref 80–100)
MCV RBC AUTO: 96.9 FL — SIGNIFICANT CHANGE UP (ref 80–100)
MCV RBC AUTO: 96.9 FL — SIGNIFICANT CHANGE UP (ref 80–100)
MCV RBC AUTO: 97.7 FL — SIGNIFICANT CHANGE UP (ref 80–100)
MCV RBC AUTO: 97.7 FL — SIGNIFICANT CHANGE UP (ref 80–100)
MCV RBC AUTO: 97.8 FL — SIGNIFICANT CHANGE UP (ref 80–100)
MCV RBC AUTO: 97.8 FL — SIGNIFICANT CHANGE UP (ref 80–100)
MCV RBC AUTO: 98.4 FL — SIGNIFICANT CHANGE UP (ref 80–100)
MCV RBC AUTO: 98.4 FL — SIGNIFICANT CHANGE UP (ref 80–100)
MCV RBC AUTO: 99.1 FL — SIGNIFICANT CHANGE UP (ref 80–100)
MCV RBC AUTO: 99.5 FL — SIGNIFICANT CHANGE UP (ref 80–100)
MCV RBC AUTO: 99.5 FL — SIGNIFICANT CHANGE UP (ref 80–100)
MCV RBC AUTO: 99.8 FL — SIGNIFICANT CHANGE UP (ref 80–100)
MONOCYTES # BLD AUTO: 0.64 K/UL — SIGNIFICANT CHANGE UP (ref 0–0.9)
MONOCYTES # BLD AUTO: 0.64 K/UL — SIGNIFICANT CHANGE UP (ref 0–0.9)
MONOCYTES NFR BLD AUTO: 6.9 % — SIGNIFICANT CHANGE UP (ref 2–14)
MONOCYTES NFR BLD AUTO: 6.9 % — SIGNIFICANT CHANGE UP (ref 2–14)
NEUTROPHILS # BLD AUTO: 6.9 K/UL — SIGNIFICANT CHANGE UP (ref 1.8–7.4)
NEUTROPHILS # BLD AUTO: 6.9 K/UL — SIGNIFICANT CHANGE UP (ref 1.8–7.4)
NEUTROPHILS NFR BLD AUTO: 75 % — SIGNIFICANT CHANGE UP (ref 43–77)
NEUTROPHILS NFR BLD AUTO: 75 % — SIGNIFICANT CHANGE UP (ref 43–77)
NITRITE UR-MCNC: NEGATIVE — SIGNIFICANT CHANGE UP
NITRITE UR-MCNC: NEGATIVE — SIGNIFICANT CHANGE UP
NRBC # BLD: 0 /100 WBCS — SIGNIFICANT CHANGE UP (ref 0–0)
NRBC # FLD: 0 K/UL — SIGNIFICANT CHANGE UP (ref 0–0)
NRBC # FLD: 0.02 K/UL — HIGH (ref 0–0)
NRBC # FLD: 0.02 K/UL — HIGH (ref 0–0)
PH UR: 5 — SIGNIFICANT CHANGE UP (ref 5–8)
PH UR: 5 — SIGNIFICANT CHANGE UP (ref 5–8)
PHOSPHATE SERPL-MCNC: 3.5 MG/DL — SIGNIFICANT CHANGE UP (ref 2.5–4.5)
PHOSPHATE SERPL-MCNC: 3.8 MG/DL — SIGNIFICANT CHANGE UP (ref 2.5–4.5)
PHOSPHATE SERPL-MCNC: 3.8 MG/DL — SIGNIFICANT CHANGE UP (ref 2.5–4.5)
PHOSPHATE SERPL-MCNC: 4 MG/DL — SIGNIFICANT CHANGE UP (ref 2.5–4.5)
PHOSPHATE SERPL-MCNC: 4 MG/DL — SIGNIFICANT CHANGE UP (ref 2.5–4.5)
PLATELET # BLD AUTO: 118 K/UL — LOW (ref 150–400)
PLATELET # BLD AUTO: 118 K/UL — LOW (ref 150–400)
PLATELET # BLD AUTO: 138 K/UL — LOW (ref 150–400)
PLATELET # BLD AUTO: 138 K/UL — LOW (ref 150–400)
PLATELET # BLD AUTO: 142 K/UL — LOW (ref 150–400)
PLATELET # BLD AUTO: 142 K/UL — LOW (ref 150–400)
PLATELET # BLD AUTO: 146 K/UL — LOW (ref 150–400)
PLATELET # BLD AUTO: 147 K/UL — LOW (ref 150–400)
PLATELET # BLD AUTO: 147 K/UL — LOW (ref 150–400)
PLATELET # BLD AUTO: 151 K/UL — SIGNIFICANT CHANGE UP (ref 150–400)
PLATELET # BLD AUTO: 151 K/UL — SIGNIFICANT CHANGE UP (ref 150–400)
PLATELET # BLD AUTO: 154 K/UL — SIGNIFICANT CHANGE UP (ref 150–400)
PLATELET # BLD AUTO: 154 K/UL — SIGNIFICANT CHANGE UP (ref 150–400)
PLATELET # BLD AUTO: 160 K/UL — SIGNIFICANT CHANGE UP (ref 150–400)
PLATELET # BLD AUTO: 160 K/UL — SIGNIFICANT CHANGE UP (ref 150–400)
PLATELET # BLD AUTO: 164 K/UL — SIGNIFICANT CHANGE UP (ref 150–400)
PLATELET # BLD AUTO: 164 K/UL — SIGNIFICANT CHANGE UP (ref 150–400)
PLATELET # BLD AUTO: 165 K/UL — SIGNIFICANT CHANGE UP (ref 150–400)
PLATELET # BLD AUTO: 165 K/UL — SIGNIFICANT CHANGE UP (ref 150–400)
PLATELET # BLD AUTO: 167 K/UL — SIGNIFICANT CHANGE UP (ref 150–400)
PLATELET # BLD AUTO: 167 K/UL — SIGNIFICANT CHANGE UP (ref 150–400)
PLATELET # BLD AUTO: 73 K/UL — LOW (ref 150–400)
PLATELET # BLD AUTO: 73 K/UL — LOW (ref 150–400)
POTASSIUM SERPL-MCNC: 3.9 MMOL/L — SIGNIFICANT CHANGE UP (ref 3.5–5.3)
POTASSIUM SERPL-MCNC: 3.9 MMOL/L — SIGNIFICANT CHANGE UP (ref 3.5–5.3)
POTASSIUM SERPL-MCNC: 4 MMOL/L — SIGNIFICANT CHANGE UP (ref 3.5–5.3)
POTASSIUM SERPL-MCNC: 4 MMOL/L — SIGNIFICANT CHANGE UP (ref 3.5–5.3)
POTASSIUM SERPL-MCNC: 4.1 MMOL/L — SIGNIFICANT CHANGE UP (ref 3.5–5.3)
POTASSIUM SERPL-MCNC: 4.1 MMOL/L — SIGNIFICANT CHANGE UP (ref 3.5–5.3)
POTASSIUM SERPL-MCNC: 4.3 MMOL/L — SIGNIFICANT CHANGE UP (ref 3.5–5.3)
POTASSIUM SERPL-MCNC: 4.3 MMOL/L — SIGNIFICANT CHANGE UP (ref 3.5–5.3)
POTASSIUM SERPL-MCNC: 4.4 MMOL/L — SIGNIFICANT CHANGE UP (ref 3.5–5.3)
POTASSIUM SERPL-MCNC: 4.6 MMOL/L — SIGNIFICANT CHANGE UP (ref 3.5–5.3)
POTASSIUM SERPL-MCNC: 4.6 MMOL/L — SIGNIFICANT CHANGE UP (ref 3.5–5.3)
POTASSIUM SERPL-MCNC: 4.7 MMOL/L — SIGNIFICANT CHANGE UP (ref 3.5–5.3)
POTASSIUM SERPL-MCNC: 4.8 MMOL/L — SIGNIFICANT CHANGE UP (ref 3.5–5.3)
POTASSIUM SERPL-MCNC: 4.8 MMOL/L — SIGNIFICANT CHANGE UP (ref 3.5–5.3)
POTASSIUM SERPL-MCNC: 5 MMOL/L — SIGNIFICANT CHANGE UP (ref 3.5–5.3)
POTASSIUM SERPL-MCNC: 5 MMOL/L — SIGNIFICANT CHANGE UP (ref 3.5–5.3)
POTASSIUM SERPL-SCNC: 3.9 MMOL/L — SIGNIFICANT CHANGE UP (ref 3.5–5.3)
POTASSIUM SERPL-SCNC: 3.9 MMOL/L — SIGNIFICANT CHANGE UP (ref 3.5–5.3)
POTASSIUM SERPL-SCNC: 4 MMOL/L — SIGNIFICANT CHANGE UP (ref 3.5–5.3)
POTASSIUM SERPL-SCNC: 4 MMOL/L — SIGNIFICANT CHANGE UP (ref 3.5–5.3)
POTASSIUM SERPL-SCNC: 4.1 MMOL/L — SIGNIFICANT CHANGE UP (ref 3.5–5.3)
POTASSIUM SERPL-SCNC: 4.1 MMOL/L — SIGNIFICANT CHANGE UP (ref 3.5–5.3)
POTASSIUM SERPL-SCNC: 4.3 MMOL/L — SIGNIFICANT CHANGE UP (ref 3.5–5.3)
POTASSIUM SERPL-SCNC: 4.3 MMOL/L — SIGNIFICANT CHANGE UP (ref 3.5–5.3)
POTASSIUM SERPL-SCNC: 4.4 MMOL/L — SIGNIFICANT CHANGE UP (ref 3.5–5.3)
POTASSIUM SERPL-SCNC: 4.6 MMOL/L — SIGNIFICANT CHANGE UP (ref 3.5–5.3)
POTASSIUM SERPL-SCNC: 4.6 MMOL/L — SIGNIFICANT CHANGE UP (ref 3.5–5.3)
POTASSIUM SERPL-SCNC: 4.7 MMOL/L — SIGNIFICANT CHANGE UP (ref 3.5–5.3)
POTASSIUM SERPL-SCNC: 4.8 MMOL/L — SIGNIFICANT CHANGE UP (ref 3.5–5.3)
POTASSIUM SERPL-SCNC: 4.8 MMOL/L — SIGNIFICANT CHANGE UP (ref 3.5–5.3)
POTASSIUM SERPL-SCNC: 5 MMOL/L — SIGNIFICANT CHANGE UP (ref 3.5–5.3)
POTASSIUM SERPL-SCNC: 5 MMOL/L — SIGNIFICANT CHANGE UP (ref 3.5–5.3)
PROT SERPL-MCNC: 6.1 G/DL — SIGNIFICANT CHANGE UP (ref 6–8.3)
PROT SERPL-MCNC: 6.1 G/DL — SIGNIFICANT CHANGE UP (ref 6–8.3)
PROT SERPL-MCNC: 6.3 G/DL — SIGNIFICANT CHANGE UP (ref 6–8.3)
PROT SERPL-MCNC: 6.3 G/DL — SIGNIFICANT CHANGE UP (ref 6–8.3)
PROT SERPL-MCNC: 6.5 G/DL — SIGNIFICANT CHANGE UP (ref 6–8.3)
PROT SERPL-MCNC: 6.5 G/DL — SIGNIFICANT CHANGE UP (ref 6–8.3)
PROT SERPL-MCNC: 6.6 G/DL — SIGNIFICANT CHANGE UP (ref 6–8.3)
PROT SERPL-MCNC: 6.6 G/DL — SIGNIFICANT CHANGE UP (ref 6–8.3)
PROT SERPL-MCNC: 6.8 G/DL — SIGNIFICANT CHANGE UP (ref 6–8.3)
PROT SERPL-MCNC: 6.8 G/DL — SIGNIFICANT CHANGE UP (ref 6–8.3)
PROT SERPL-MCNC: 6.9 G/DL — SIGNIFICANT CHANGE UP (ref 6–8.3)
PROT SERPL-MCNC: 6.9 G/DL — SIGNIFICANT CHANGE UP (ref 6–8.3)
PROT SERPL-MCNC: 7 G/DL — SIGNIFICANT CHANGE UP (ref 6–8.3)
PROT SERPL-MCNC: 7 G/DL — SIGNIFICANT CHANGE UP (ref 6–8.3)
PROT SERPL-MCNC: 7.2 G/DL — SIGNIFICANT CHANGE UP (ref 6–8.3)
PROT SERPL-MCNC: 7.4 G/DL — SIGNIFICANT CHANGE UP (ref 6–8.3)
PROT SERPL-MCNC: 7.4 G/DL — SIGNIFICANT CHANGE UP (ref 6–8.3)
PROT UR-MCNC: NEGATIVE MG/DL — SIGNIFICANT CHANGE UP
PROT UR-MCNC: NEGATIVE MG/DL — SIGNIFICANT CHANGE UP
RAPID RVP RESULT: SIGNIFICANT CHANGE UP
RAPID RVP RESULT: SIGNIFICANT CHANGE UP
RBC # BLD: 3.99 M/UL — LOW (ref 4.2–5.8)
RBC # BLD: 3.99 M/UL — LOW (ref 4.2–5.8)
RBC # BLD: 4.05 M/UL — LOW (ref 4.2–5.8)
RBC # BLD: 4.05 M/UL — LOW (ref 4.2–5.8)
RBC # BLD: 4.07 M/UL — LOW (ref 4.2–5.8)
RBC # BLD: 4.07 M/UL — LOW (ref 4.2–5.8)
RBC # BLD: 4.22 M/UL — SIGNIFICANT CHANGE UP (ref 4.2–5.8)
RBC # BLD: 4.22 M/UL — SIGNIFICANT CHANGE UP (ref 4.2–5.8)
RBC # BLD: 4.23 M/UL — SIGNIFICANT CHANGE UP (ref 4.2–5.8)
RBC # BLD: 4.23 M/UL — SIGNIFICANT CHANGE UP (ref 4.2–5.8)
RBC # BLD: 4.3 M/UL — SIGNIFICANT CHANGE UP (ref 4.2–5.8)
RBC # BLD: 4.3 M/UL — SIGNIFICANT CHANGE UP (ref 4.2–5.8)
RBC # BLD: 4.31 M/UL — SIGNIFICANT CHANGE UP (ref 4.2–5.8)
RBC # BLD: 4.33 M/UL — SIGNIFICANT CHANGE UP (ref 4.2–5.8)
RBC # BLD: 4.33 M/UL — SIGNIFICANT CHANGE UP (ref 4.2–5.8)
RBC # BLD: 4.46 M/UL — SIGNIFICANT CHANGE UP (ref 4.2–5.8)
RBC # BLD: 4.46 M/UL — SIGNIFICANT CHANGE UP (ref 4.2–5.8)
RBC # BLD: 4.57 M/UL — SIGNIFICANT CHANGE UP (ref 4.2–5.8)
RBC # BLD: 4.57 M/UL — SIGNIFICANT CHANGE UP (ref 4.2–5.8)
RBC # BLD: 4.58 M/UL — SIGNIFICANT CHANGE UP (ref 4.2–5.8)
RBC # BLD: 4.58 M/UL — SIGNIFICANT CHANGE UP (ref 4.2–5.8)
RBC # BLD: 4.63 M/UL — SIGNIFICANT CHANGE UP (ref 4.2–5.8)
RBC # BLD: 4.63 M/UL — SIGNIFICANT CHANGE UP (ref 4.2–5.8)
RBC # FLD: 12.5 % — SIGNIFICANT CHANGE UP (ref 10.3–14.5)
RBC # FLD: 12.6 % — SIGNIFICANT CHANGE UP (ref 10.3–14.5)
RBC # FLD: 12.7 % — SIGNIFICANT CHANGE UP (ref 10.3–14.5)
RBC # FLD: 12.7 % — SIGNIFICANT CHANGE UP (ref 10.3–14.5)
RBC # FLD: 12.8 % — SIGNIFICANT CHANGE UP (ref 10.3–14.5)
RBC # FLD: 12.9 % — SIGNIFICANT CHANGE UP (ref 10.3–14.5)
RBC # FLD: 12.9 % — SIGNIFICANT CHANGE UP (ref 10.3–14.5)
RBC # FLD: 13.2 % — SIGNIFICANT CHANGE UP (ref 10.3–14.5)
RBC # FLD: 13.3 % — SIGNIFICANT CHANGE UP (ref 10.3–14.5)
RBC # FLD: 13.3 % — SIGNIFICANT CHANGE UP (ref 10.3–14.5)
RBC CASTS # UR COMP ASSIST: 0 /HPF — SIGNIFICANT CHANGE UP (ref 0–4)
RBC CASTS # UR COMP ASSIST: 0 /HPF — SIGNIFICANT CHANGE UP (ref 0–4)
RSV RNA SPEC QL NAA+PROBE: SIGNIFICANT CHANGE UP
RSV RNA SPEC QL NAA+PROBE: SIGNIFICANT CHANGE UP
RV+EV RNA SPEC QL NAA+PROBE: SIGNIFICANT CHANGE UP
RV+EV RNA SPEC QL NAA+PROBE: SIGNIFICANT CHANGE UP
SARS-COV-2 RNA SPEC QL NAA+PROBE: SIGNIFICANT CHANGE UP
SARS-COV-2 RNA SPEC QL NAA+PROBE: SIGNIFICANT CHANGE UP
SODIUM SERPL-SCNC: 136 MMOL/L — SIGNIFICANT CHANGE UP (ref 135–145)
SODIUM SERPL-SCNC: 136 MMOL/L — SIGNIFICANT CHANGE UP (ref 135–145)
SODIUM SERPL-SCNC: 137 MMOL/L — SIGNIFICANT CHANGE UP (ref 135–145)
SODIUM SERPL-SCNC: 137 MMOL/L — SIGNIFICANT CHANGE UP (ref 135–145)
SODIUM SERPL-SCNC: 138 MMOL/L — SIGNIFICANT CHANGE UP (ref 135–145)
SODIUM SERPL-SCNC: 139 MMOL/L — SIGNIFICANT CHANGE UP (ref 135–145)
SODIUM SERPL-SCNC: 140 MMOL/L — SIGNIFICANT CHANGE UP (ref 135–145)
SODIUM SERPL-SCNC: 141 MMOL/L — SIGNIFICANT CHANGE UP (ref 135–145)
SODIUM SERPL-SCNC: 142 MMOL/L — SIGNIFICANT CHANGE UP (ref 135–145)
SODIUM SERPL-SCNC: 142 MMOL/L — SIGNIFICANT CHANGE UP (ref 135–145)
SP GR SPEC: 1.02 — SIGNIFICANT CHANGE UP (ref 1–1.03)
SP GR SPEC: 1.02 — SIGNIFICANT CHANGE UP (ref 1–1.03)
SPECIMEN SOURCE: SIGNIFICANT CHANGE UP
SQUAMOUS # UR AUTO: 0 /HPF — SIGNIFICANT CHANGE UP (ref 0–5)
SQUAMOUS # UR AUTO: 0 /HPF — SIGNIFICANT CHANGE UP (ref 0–5)
T3 SERPL-MCNC: 82 NG/DL — SIGNIFICANT CHANGE UP (ref 80–200)
T3 SERPL-MCNC: 82 NG/DL — SIGNIFICANT CHANGE UP (ref 80–200)
T4 AB SER-ACNC: 6.05 UG/DL — SIGNIFICANT CHANGE UP (ref 5.1–13)
T4 AB SER-ACNC: 6.05 UG/DL — SIGNIFICANT CHANGE UP (ref 5.1–13)
TSH SERPL-MCNC: 2.47 UIU/ML — SIGNIFICANT CHANGE UP (ref 0.27–4.2)
TSH SERPL-MCNC: 2.47 UIU/ML — SIGNIFICANT CHANGE UP (ref 0.27–4.2)
TSH SERPL-MCNC: 4.82 UIU/ML — HIGH (ref 0.27–4.2)
TSH SERPL-MCNC: 4.82 UIU/ML — HIGH (ref 0.27–4.2)
UROBILINOGEN FLD QL: 0.2 MG/DL — SIGNIFICANT CHANGE UP (ref 0.2–1)
UROBILINOGEN FLD QL: 0.2 MG/DL — SIGNIFICANT CHANGE UP (ref 0.2–1)
VALPROATE SERPL-MCNC: 23.9 UG/ML — LOW (ref 50–100)
VALPROATE SERPL-MCNC: 23.9 UG/ML — LOW (ref 50–100)
VALPROATE SERPL-MCNC: 37 UG/ML — LOW (ref 50–100)
VALPROATE SERPL-MCNC: 37 UG/ML — LOW (ref 50–100)
VALPROATE SERPL-MCNC: 41.5 UG/ML — LOW (ref 50–100)
VALPROATE SERPL-MCNC: 41.5 UG/ML — LOW (ref 50–100)
VALPROATE SERPL-MCNC: 45.6 UG/ML — LOW (ref 50–100)
VALPROATE SERPL-MCNC: 45.6 UG/ML — LOW (ref 50–100)
VALPROATE SERPL-MCNC: 51 UG/ML — SIGNIFICANT CHANGE UP (ref 50–100)
VALPROATE SERPL-MCNC: 51 UG/ML — SIGNIFICANT CHANGE UP (ref 50–100)
VIT B1 SERPL-MCNC: 167.3 NMOL/L — SIGNIFICANT CHANGE UP (ref 66.5–200)
VIT B1 SERPL-MCNC: 167.3 NMOL/L — SIGNIFICANT CHANGE UP (ref 66.5–200)
VIT B12 SERPL-MCNC: 797 PG/ML — SIGNIFICANT CHANGE UP (ref 200–900)
VIT B12 SERPL-MCNC: 797 PG/ML — SIGNIFICANT CHANGE UP (ref 200–900)
WBC # BLD: 4.37 K/UL — SIGNIFICANT CHANGE UP (ref 3.8–10.5)
WBC # BLD: 4.37 K/UL — SIGNIFICANT CHANGE UP (ref 3.8–10.5)
WBC # BLD: 5.17 K/UL — SIGNIFICANT CHANGE UP (ref 3.8–10.5)
WBC # BLD: 5.17 K/UL — SIGNIFICANT CHANGE UP (ref 3.8–10.5)
WBC # BLD: 5.52 K/UL — SIGNIFICANT CHANGE UP (ref 3.8–10.5)
WBC # BLD: 5.52 K/UL — SIGNIFICANT CHANGE UP (ref 3.8–10.5)
WBC # BLD: 6.21 K/UL — SIGNIFICANT CHANGE UP (ref 3.8–10.5)
WBC # BLD: 6.21 K/UL — SIGNIFICANT CHANGE UP (ref 3.8–10.5)
WBC # BLD: 6.36 K/UL — SIGNIFICANT CHANGE UP (ref 3.8–10.5)
WBC # BLD: 6.36 K/UL — SIGNIFICANT CHANGE UP (ref 3.8–10.5)
WBC # BLD: 6.52 K/UL — SIGNIFICANT CHANGE UP (ref 3.8–10.5)
WBC # BLD: 6.52 K/UL — SIGNIFICANT CHANGE UP (ref 3.8–10.5)
WBC # BLD: 6.84 K/UL — SIGNIFICANT CHANGE UP (ref 3.8–10.5)
WBC # BLD: 6.84 K/UL — SIGNIFICANT CHANGE UP (ref 3.8–10.5)
WBC # BLD: 7.27 K/UL — SIGNIFICANT CHANGE UP (ref 3.8–10.5)
WBC # BLD: 7.27 K/UL — SIGNIFICANT CHANGE UP (ref 3.8–10.5)
WBC # BLD: 7.41 K/UL — SIGNIFICANT CHANGE UP (ref 3.8–10.5)
WBC # BLD: 7.41 K/UL — SIGNIFICANT CHANGE UP (ref 3.8–10.5)
WBC # BLD: 7.65 K/UL — SIGNIFICANT CHANGE UP (ref 3.8–10.5)
WBC # BLD: 7.65 K/UL — SIGNIFICANT CHANGE UP (ref 3.8–10.5)
WBC # BLD: 7.96 K/UL — SIGNIFICANT CHANGE UP (ref 3.8–10.5)
WBC # BLD: 7.96 K/UL — SIGNIFICANT CHANGE UP (ref 3.8–10.5)
WBC # BLD: 9.04 K/UL — SIGNIFICANT CHANGE UP (ref 3.8–10.5)
WBC # BLD: 9.04 K/UL — SIGNIFICANT CHANGE UP (ref 3.8–10.5)
WBC # BLD: 9.21 K/UL — SIGNIFICANT CHANGE UP (ref 3.8–10.5)
WBC # BLD: 9.21 K/UL — SIGNIFICANT CHANGE UP (ref 3.8–10.5)
WBC # FLD AUTO: 4.37 K/UL — SIGNIFICANT CHANGE UP (ref 3.8–10.5)
WBC # FLD AUTO: 4.37 K/UL — SIGNIFICANT CHANGE UP (ref 3.8–10.5)
WBC # FLD AUTO: 5.17 K/UL — SIGNIFICANT CHANGE UP (ref 3.8–10.5)
WBC # FLD AUTO: 5.17 K/UL — SIGNIFICANT CHANGE UP (ref 3.8–10.5)
WBC # FLD AUTO: 5.52 K/UL — SIGNIFICANT CHANGE UP (ref 3.8–10.5)
WBC # FLD AUTO: 5.52 K/UL — SIGNIFICANT CHANGE UP (ref 3.8–10.5)
WBC # FLD AUTO: 6.21 K/UL — SIGNIFICANT CHANGE UP (ref 3.8–10.5)
WBC # FLD AUTO: 6.21 K/UL — SIGNIFICANT CHANGE UP (ref 3.8–10.5)
WBC # FLD AUTO: 6.36 K/UL — SIGNIFICANT CHANGE UP (ref 3.8–10.5)
WBC # FLD AUTO: 6.36 K/UL — SIGNIFICANT CHANGE UP (ref 3.8–10.5)
WBC # FLD AUTO: 6.52 K/UL — SIGNIFICANT CHANGE UP (ref 3.8–10.5)
WBC # FLD AUTO: 6.52 K/UL — SIGNIFICANT CHANGE UP (ref 3.8–10.5)
WBC # FLD AUTO: 6.84 K/UL — SIGNIFICANT CHANGE UP (ref 3.8–10.5)
WBC # FLD AUTO: 6.84 K/UL — SIGNIFICANT CHANGE UP (ref 3.8–10.5)
WBC # FLD AUTO: 7.27 K/UL — SIGNIFICANT CHANGE UP (ref 3.8–10.5)
WBC # FLD AUTO: 7.27 K/UL — SIGNIFICANT CHANGE UP (ref 3.8–10.5)
WBC # FLD AUTO: 7.41 K/UL — SIGNIFICANT CHANGE UP (ref 3.8–10.5)
WBC # FLD AUTO: 7.41 K/UL — SIGNIFICANT CHANGE UP (ref 3.8–10.5)
WBC # FLD AUTO: 7.65 K/UL — SIGNIFICANT CHANGE UP (ref 3.8–10.5)
WBC # FLD AUTO: 7.65 K/UL — SIGNIFICANT CHANGE UP (ref 3.8–10.5)
WBC # FLD AUTO: 7.96 K/UL — SIGNIFICANT CHANGE UP (ref 3.8–10.5)
WBC # FLD AUTO: 7.96 K/UL — SIGNIFICANT CHANGE UP (ref 3.8–10.5)
WBC # FLD AUTO: 9.04 K/UL — SIGNIFICANT CHANGE UP (ref 3.8–10.5)
WBC # FLD AUTO: 9.04 K/UL — SIGNIFICANT CHANGE UP (ref 3.8–10.5)
WBC # FLD AUTO: 9.21 K/UL — SIGNIFICANT CHANGE UP (ref 3.8–10.5)
WBC # FLD AUTO: 9.21 K/UL — SIGNIFICANT CHANGE UP (ref 3.8–10.5)
WBC UR QL: 0 /HPF — SIGNIFICANT CHANGE UP (ref 0–5)
WBC UR QL: 0 /HPF — SIGNIFICANT CHANGE UP (ref 0–5)

## 2023-01-01 PROCEDURE — 99233 SBSQ HOSP IP/OBS HIGH 50: CPT

## 2023-01-01 PROCEDURE — 99232 SBSQ HOSP IP/OBS MODERATE 35: CPT

## 2023-01-01 PROCEDURE — 99223 1ST HOSP IP/OBS HIGH 75: CPT

## 2023-01-01 PROCEDURE — 90792 PSYCH DIAG EVAL W/MED SRVCS: CPT

## 2023-01-01 PROCEDURE — 99285 EMERGENCY DEPT VISIT HI MDM: CPT | Mod: GC

## 2023-01-01 PROCEDURE — 71045 X-RAY EXAM CHEST 1 VIEW: CPT | Mod: 26

## 2023-01-01 PROCEDURE — 70450 CT HEAD/BRAIN W/O DYE: CPT | Mod: 26

## 2023-01-01 PROCEDURE — 99239 HOSP IP/OBS DSCHRG MGMT >30: CPT

## 2023-01-01 PROCEDURE — 99283 EMERGENCY DEPT VISIT LOW MDM: CPT

## 2023-01-01 PROCEDURE — 70450 CT HEAD/BRAIN W/O DYE: CPT | Mod: 26,MA

## 2023-01-01 RX ORDER — CLONAZEPAM 1 MG
1 TABLET ORAL AT BEDTIME
Refills: 0 | Status: DISCONTINUED | OUTPATIENT
Start: 2023-01-01 | End: 2023-01-01

## 2023-01-01 RX ORDER — HALOPERIDOL DECANOATE 100 MG/ML
2.5 INJECTION INTRAMUSCULAR ONCE
Refills: 0 | Status: COMPLETED | OUTPATIENT
Start: 2023-01-01 | End: 2023-01-01

## 2023-01-01 RX ORDER — DIVALPROEX SODIUM 500 MG/1
1 TABLET, DELAYED RELEASE ORAL
Refills: 0 | DISCHARGE

## 2023-01-01 RX ORDER — IPRATROPIUM/ALBUTEROL SULFATE 18-103MCG
3 AEROSOL WITH ADAPTER (GRAM) INHALATION EVERY 6 HOURS
Refills: 0 | Status: DISCONTINUED | OUTPATIENT
Start: 2023-01-01 | End: 2023-01-01

## 2023-01-01 RX ORDER — DIVALPROEX SODIUM 500 MG/1
125 TABLET, DELAYED RELEASE ORAL
Refills: 0 | Status: DISCONTINUED | OUTPATIENT
Start: 2023-01-01 | End: 2023-01-01

## 2023-01-01 RX ORDER — SENNA PLUS 8.6 MG/1
1 TABLET ORAL
Refills: 0 | DISCHARGE

## 2023-01-01 RX ORDER — CEFAZOLIN SODIUM 1 G
VIAL (EA) INJECTION
Refills: 0 | Status: DISCONTINUED | OUTPATIENT
Start: 2023-01-01 | End: 2023-01-01

## 2023-01-01 RX ORDER — TAMSULOSIN HYDROCHLORIDE 0.4 MG/1
0.4 CAPSULE ORAL AT BEDTIME
Refills: 0 | Status: DISCONTINUED | OUTPATIENT
Start: 2023-01-01 | End: 2023-01-01

## 2023-01-01 RX ORDER — MIDAZOLAM HYDROCHLORIDE 1 MG/ML
5 INJECTION, SOLUTION INTRAMUSCULAR; INTRAVENOUS ONCE
Refills: 0 | Status: DISCONTINUED | OUTPATIENT
Start: 2023-01-01 | End: 2023-01-01

## 2023-01-01 RX ORDER — QUETIAPINE FUMARATE 200 MG/1
25 TABLET, FILM COATED ORAL ONCE
Refills: 0 | Status: COMPLETED | OUTPATIENT
Start: 2023-01-01 | End: 2023-01-01

## 2023-01-01 RX ORDER — SODIUM CHLORIDE 9 MG/ML
1000 INJECTION, SOLUTION INTRAVENOUS
Refills: 0 | Status: DISCONTINUED | OUTPATIENT
Start: 2023-01-01 | End: 2023-01-01

## 2023-01-01 RX ORDER — CLOTRIMAZOLE AND BETAMETHASONE DIPROPIONATE 10; .5 MG/G; MG/G
1 CREAM TOPICAL
Refills: 0 | DISCHARGE

## 2023-01-01 RX ORDER — MIDODRINE HYDROCHLORIDE 2.5 MG/1
10 TABLET ORAL EVERY 8 HOURS
Refills: 0 | Status: DISCONTINUED | OUTPATIENT
Start: 2023-01-01 | End: 2023-01-01

## 2023-01-01 RX ORDER — ENOXAPARIN SODIUM 100 MG/ML
40 INJECTION SUBCUTANEOUS EVERY 24 HOURS
Refills: 0 | Status: DISCONTINUED | OUTPATIENT
Start: 2023-01-01 | End: 2023-01-01

## 2023-01-01 RX ORDER — CLONAZEPAM 1 MG
1 TABLET ORAL
Refills: 0 | DISCHARGE

## 2023-01-01 RX ORDER — OLANZAPINE 15 MG/1
2.5 TABLET, FILM COATED ORAL EVERY 6 HOURS
Refills: 0 | Status: DISCONTINUED | OUTPATIENT
Start: 2023-01-01 | End: 2023-01-01

## 2023-01-01 RX ORDER — ACETAMINOPHEN 500 MG
650 TABLET ORAL EVERY 6 HOURS
Refills: 0 | Status: DISCONTINUED | OUTPATIENT
Start: 2023-01-01 | End: 2023-01-01

## 2023-01-01 RX ORDER — DIPHENHYDRAMINE HCL 50 MG
50 CAPSULE ORAL ONCE
Refills: 0 | Status: COMPLETED | OUTPATIENT
Start: 2023-01-01 | End: 2023-01-01

## 2023-01-01 RX ORDER — IPRATROPIUM/ALBUTEROL SULFATE 18-103MCG
3 AEROSOL WITH ADAPTER (GRAM) INHALATION
Refills: 0 | DISCHARGE

## 2023-01-01 RX ORDER — QUETIAPINE FUMARATE 200 MG/1
1 TABLET, FILM COATED ORAL
Refills: 0 | DISCHARGE

## 2023-01-01 RX ORDER — LANOLIN ALCOHOL/MO/W.PET/CERES
1 CREAM (GRAM) TOPICAL
Refills: 0 | DISCHARGE

## 2023-01-01 RX ORDER — DIVALPROEX SODIUM 500 MG/1
500 TABLET, DELAYED RELEASE ORAL
Refills: 0 | Status: DISCONTINUED | OUTPATIENT
Start: 2023-01-01 | End: 2023-01-01

## 2023-01-01 RX ORDER — ACETAMINOPHEN 500 MG
2 TABLET ORAL
Refills: 0 | DISCHARGE

## 2023-01-01 RX ORDER — LANOLIN ALCOHOL/MO/W.PET/CERES
3 CREAM (GRAM) TOPICAL AT BEDTIME
Refills: 0 | Status: DISCONTINUED | OUTPATIENT
Start: 2023-01-01 | End: 2023-01-01

## 2023-01-01 RX ORDER — QUETIAPINE FUMARATE 200 MG/1
25 TABLET, FILM COATED ORAL ONCE
Refills: 0 | Status: DISCONTINUED | OUTPATIENT
Start: 2023-01-01 | End: 2023-01-01

## 2023-01-01 RX ORDER — LACTULOSE 10 G/15ML
30 SOLUTION ORAL
Refills: 0 | DISCHARGE

## 2023-01-01 RX ORDER — CEFAZOLIN SODIUM 1 G
2000 VIAL (EA) INJECTION EVERY 8 HOURS
Refills: 0 | Status: DISCONTINUED | OUTPATIENT
Start: 2023-01-01 | End: 2023-01-01

## 2023-01-01 RX ORDER — DIVALPROEX SODIUM 500 MG/1
250 TABLET, DELAYED RELEASE ORAL
Refills: 0 | Status: DISCONTINUED | OUTPATIENT
Start: 2023-01-01 | End: 2023-01-01

## 2023-01-01 RX ORDER — VALPROIC ACID (AS SODIUM SALT) 250 MG/5ML
125 SOLUTION, ORAL ORAL
Refills: 0 | Status: DISCONTINUED | OUTPATIENT
Start: 2023-01-01 | End: 2023-01-01

## 2023-01-01 RX ORDER — SENNA PLUS 8.6 MG/1
2 TABLET ORAL AT BEDTIME
Refills: 0 | Status: DISCONTINUED | OUTPATIENT
Start: 2023-01-01 | End: 2023-01-01

## 2023-01-01 RX ORDER — MIDAZOLAM HYDROCHLORIDE 1 MG/ML
2 INJECTION, SOLUTION INTRAMUSCULAR; INTRAVENOUS ONCE
Refills: 0 | Status: DISCONTINUED | OUTPATIENT
Start: 2023-01-01 | End: 2023-01-01

## 2023-01-01 RX ORDER — DIVALPROEX SODIUM 500 MG/1
2 TABLET, DELAYED RELEASE ORAL
Qty: 120 | Refills: 0
Start: 2023-01-01 | End: 2024-01-01

## 2023-01-01 RX ORDER — INFLUENZA VIRUS VACCINE 15; 15; 15; 15 UG/.5ML; UG/.5ML; UG/.5ML; UG/.5ML
0.7 SUSPENSION INTRAMUSCULAR ONCE
Refills: 0 | Status: DISCONTINUED | OUTPATIENT
Start: 2023-01-01 | End: 2023-01-01

## 2023-01-01 RX ORDER — QUETIAPINE FUMARATE 200 MG/1
225 TABLET, FILM COATED ORAL
Refills: 0 | Status: DISCONTINUED | OUTPATIENT
Start: 2023-01-01 | End: 2023-01-01

## 2023-01-01 RX ORDER — OLANZAPINE 15 MG/1
2.5 TABLET, FILM COATED ORAL ONCE
Refills: 0 | Status: COMPLETED | OUTPATIENT
Start: 2023-01-01 | End: 2023-01-01

## 2023-01-01 RX ORDER — LACTULOSE 10 G/15ML
20 SOLUTION ORAL
Refills: 0 | Status: DISCONTINUED | OUTPATIENT
Start: 2023-01-01 | End: 2023-01-01

## 2023-01-01 RX ORDER — CEPHALEXIN 500 MG
500 CAPSULE ORAL EVERY 6 HOURS
Refills: 0 | Status: COMPLETED | OUTPATIENT
Start: 2023-01-01 | End: 2023-01-01

## 2023-01-01 RX ORDER — CLONAZEPAM 1 MG
1 TABLET ORAL
Qty: 0 | Refills: 0 | DISCHARGE
Start: 2023-01-01

## 2023-01-01 RX ORDER — MIDODRINE HYDROCHLORIDE 2.5 MG/1
1 TABLET ORAL
Refills: 0 | DISCHARGE

## 2023-01-01 RX ORDER — TAMSULOSIN HYDROCHLORIDE 0.4 MG/1
1 CAPSULE ORAL
Refills: 0 | DISCHARGE

## 2023-01-01 RX ORDER — CEFAZOLIN SODIUM 1 G
2000 VIAL (EA) INJECTION ONCE
Refills: 0 | Status: COMPLETED | OUTPATIENT
Start: 2023-01-01 | End: 2023-01-01

## 2023-01-01 RX ORDER — DIVALPROEX SODIUM 500 MG/1
1 TABLET, DELAYED RELEASE ORAL
Qty: 30 | Refills: 0
Start: 2023-01-01 | End: 2024-01-01

## 2023-01-01 RX ADMIN — QUETIAPINE FUMARATE 225 MILLIGRAM(S): 200 TABLET, FILM COATED ORAL at 10:55

## 2023-01-01 RX ADMIN — QUETIAPINE FUMARATE 225 MILLIGRAM(S): 200 TABLET, FILM COATED ORAL at 08:59

## 2023-01-01 RX ADMIN — LACTULOSE 20 GRAM(S): 10 SOLUTION ORAL at 18:37

## 2023-01-01 RX ADMIN — Medication 3 MILLILITER(S): at 10:22

## 2023-01-01 RX ADMIN — QUETIAPINE FUMARATE 225 MILLIGRAM(S): 200 TABLET, FILM COATED ORAL at 12:41

## 2023-01-01 RX ADMIN — Medication 1 MILLIGRAM(S): at 21:34

## 2023-01-01 RX ADMIN — LACTULOSE 20 GRAM(S): 10 SOLUTION ORAL at 06:45

## 2023-01-01 RX ADMIN — MIDODRINE HYDROCHLORIDE 10 MILLIGRAM(S): 2.5 TABLET ORAL at 14:03

## 2023-01-01 RX ADMIN — MIDODRINE HYDROCHLORIDE 10 MILLIGRAM(S): 2.5 TABLET ORAL at 22:08

## 2023-01-01 RX ADMIN — QUETIAPINE FUMARATE 225 MILLIGRAM(S): 200 TABLET, FILM COATED ORAL at 18:35

## 2023-01-01 RX ADMIN — MIDODRINE HYDROCHLORIDE 10 MILLIGRAM(S): 2.5 TABLET ORAL at 23:06

## 2023-01-01 RX ADMIN — MIDODRINE HYDROCHLORIDE 10 MILLIGRAM(S): 2.5 TABLET ORAL at 06:01

## 2023-01-01 RX ADMIN — MIDODRINE HYDROCHLORIDE 10 MILLIGRAM(S): 2.5 TABLET ORAL at 14:56

## 2023-01-01 RX ADMIN — DIVALPROEX SODIUM 250 MILLIGRAM(S): 500 TABLET, DELAYED RELEASE ORAL at 21:54

## 2023-01-01 RX ADMIN — Medication 650 MILLIGRAM(S): at 11:21

## 2023-01-01 RX ADMIN — Medication 3 MILLILITER(S): at 23:26

## 2023-01-01 RX ADMIN — Medication 3 MILLILITER(S): at 20:07

## 2023-01-01 RX ADMIN — QUETIAPINE FUMARATE 225 MILLIGRAM(S): 200 TABLET, FILM COATED ORAL at 19:39

## 2023-01-01 RX ADMIN — MIDODRINE HYDROCHLORIDE 10 MILLIGRAM(S): 2.5 TABLET ORAL at 05:14

## 2023-01-01 RX ADMIN — Medication 3 MILLILITER(S): at 10:24

## 2023-01-01 RX ADMIN — DIVALPROEX SODIUM 125 MILLIGRAM(S): 500 TABLET, DELAYED RELEASE ORAL at 14:56

## 2023-01-01 RX ADMIN — Medication 3 MILLILITER(S): at 22:13

## 2023-01-01 RX ADMIN — MIDODRINE HYDROCHLORIDE 10 MILLIGRAM(S): 2.5 TABLET ORAL at 12:32

## 2023-01-01 RX ADMIN — MIDODRINE HYDROCHLORIDE 10 MILLIGRAM(S): 2.5 TABLET ORAL at 21:01

## 2023-01-01 RX ADMIN — MIDODRINE HYDROCHLORIDE 10 MILLIGRAM(S): 2.5 TABLET ORAL at 06:42

## 2023-01-01 RX ADMIN — DIVALPROEX SODIUM 250 MILLIGRAM(S): 500 TABLET, DELAYED RELEASE ORAL at 18:41

## 2023-01-01 RX ADMIN — MIDODRINE HYDROCHLORIDE 10 MILLIGRAM(S): 2.5 TABLET ORAL at 05:18

## 2023-01-01 RX ADMIN — DIVALPROEX SODIUM 125 MILLIGRAM(S): 500 TABLET, DELAYED RELEASE ORAL at 13:01

## 2023-01-01 RX ADMIN — Medication 3 MILLILITER(S): at 16:19

## 2023-01-01 RX ADMIN — Medication 3 MILLILITER(S): at 21:55

## 2023-01-01 RX ADMIN — Medication 1 MILLIGRAM(S): at 23:07

## 2023-01-01 RX ADMIN — HALOPERIDOL DECANOATE 2.5 MILLIGRAM(S): 100 INJECTION INTRAMUSCULAR at 14:54

## 2023-01-01 RX ADMIN — TAMSULOSIN HYDROCHLORIDE 0.4 MILLIGRAM(S): 0.4 CAPSULE ORAL at 22:08

## 2023-01-01 RX ADMIN — Medication 500 MILLIGRAM(S): at 05:13

## 2023-01-01 RX ADMIN — DIVALPROEX SODIUM 500 MILLIGRAM(S): 500 TABLET, DELAYED RELEASE ORAL at 17:33

## 2023-01-01 RX ADMIN — Medication 3 MILLIGRAM(S): at 23:07

## 2023-01-01 RX ADMIN — LACTULOSE 20 GRAM(S): 10 SOLUTION ORAL at 17:36

## 2023-01-01 RX ADMIN — Medication 500 MILLIGRAM(S): at 07:07

## 2023-01-01 RX ADMIN — MIDODRINE HYDROCHLORIDE 10 MILLIGRAM(S): 2.5 TABLET ORAL at 06:57

## 2023-01-01 RX ADMIN — MIDODRINE HYDROCHLORIDE 10 MILLIGRAM(S): 2.5 TABLET ORAL at 06:56

## 2023-01-01 RX ADMIN — OLANZAPINE 2.5 MILLIGRAM(S): 15 TABLET, FILM COATED ORAL at 00:35

## 2023-01-01 RX ADMIN — Medication 3 MILLILITER(S): at 08:37

## 2023-01-01 RX ADMIN — MIDODRINE HYDROCHLORIDE 10 MILLIGRAM(S): 2.5 TABLET ORAL at 06:40

## 2023-01-01 RX ADMIN — MIDODRINE HYDROCHLORIDE 10 MILLIGRAM(S): 2.5 TABLET ORAL at 13:13

## 2023-01-01 RX ADMIN — Medication 100 MILLIGRAM(S): at 14:58

## 2023-01-01 RX ADMIN — Medication 3 MILLILITER(S): at 10:38

## 2023-01-01 RX ADMIN — Medication 500 MILLIGRAM(S): at 18:58

## 2023-01-01 RX ADMIN — QUETIAPINE FUMARATE 225 MILLIGRAM(S): 200 TABLET, FILM COATED ORAL at 18:39

## 2023-01-01 RX ADMIN — Medication 3 MILLILITER(S): at 03:38

## 2023-01-01 RX ADMIN — Medication 650 MILLIGRAM(S): at 10:21

## 2023-01-01 RX ADMIN — DIVALPROEX SODIUM 500 MILLIGRAM(S): 500 TABLET, DELAYED RELEASE ORAL at 19:37

## 2023-01-01 RX ADMIN — ENOXAPARIN SODIUM 40 MILLIGRAM(S): 100 INJECTION SUBCUTANEOUS at 17:12

## 2023-01-01 RX ADMIN — DIVALPROEX SODIUM 125 MILLIGRAM(S): 500 TABLET, DELAYED RELEASE ORAL at 07:58

## 2023-01-01 RX ADMIN — QUETIAPINE FUMARATE 225 MILLIGRAM(S): 200 TABLET, FILM COATED ORAL at 17:13

## 2023-01-01 RX ADMIN — Medication 500 MILLIGRAM(S): at 05:18

## 2023-01-01 RX ADMIN — QUETIAPINE FUMARATE 225 MILLIGRAM(S): 200 TABLET, FILM COATED ORAL at 19:38

## 2023-01-01 RX ADMIN — Medication 3 MILLILITER(S): at 03:23

## 2023-01-01 RX ADMIN — Medication 1 MILLIGRAM(S): at 21:49

## 2023-01-01 RX ADMIN — LACTULOSE 20 GRAM(S): 10 SOLUTION ORAL at 05:18

## 2023-01-01 RX ADMIN — Medication 500 MILLIGRAM(S): at 18:39

## 2023-01-01 RX ADMIN — LACTULOSE 20 GRAM(S): 10 SOLUTION ORAL at 18:58

## 2023-01-01 RX ADMIN — Medication 500 MILLIGRAM(S): at 19:42

## 2023-01-01 RX ADMIN — MIDODRINE HYDROCHLORIDE 10 MILLIGRAM(S): 2.5 TABLET ORAL at 21:50

## 2023-01-01 RX ADMIN — ENOXAPARIN SODIUM 40 MILLIGRAM(S): 100 INJECTION SUBCUTANEOUS at 18:34

## 2023-01-01 RX ADMIN — Medication 500 MILLIGRAM(S): at 05:55

## 2023-01-01 RX ADMIN — LACTULOSE 20 GRAM(S): 10 SOLUTION ORAL at 17:13

## 2023-01-01 RX ADMIN — ENOXAPARIN SODIUM 40 MILLIGRAM(S): 100 INJECTION SUBCUTANEOUS at 16:05

## 2023-01-01 RX ADMIN — ENOXAPARIN SODIUM 40 MILLIGRAM(S): 100 INJECTION SUBCUTANEOUS at 17:37

## 2023-01-01 RX ADMIN — MIDODRINE HYDROCHLORIDE 10 MILLIGRAM(S): 2.5 TABLET ORAL at 06:46

## 2023-01-01 RX ADMIN — Medication 3 MILLILITER(S): at 04:35

## 2023-01-01 RX ADMIN — QUETIAPINE FUMARATE 225 MILLIGRAM(S): 200 TABLET, FILM COATED ORAL at 18:57

## 2023-01-01 RX ADMIN — Medication 500 MILLIGRAM(S): at 23:29

## 2023-01-01 RX ADMIN — Medication 3 MILLILITER(S): at 22:11

## 2023-01-01 RX ADMIN — MIDODRINE HYDROCHLORIDE 10 MILLIGRAM(S): 2.5 TABLET ORAL at 13:18

## 2023-01-01 RX ADMIN — Medication 100 MILLIGRAM(S): at 21:39

## 2023-01-01 RX ADMIN — DIVALPROEX SODIUM 250 MILLIGRAM(S): 500 TABLET, DELAYED RELEASE ORAL at 07:22

## 2023-01-01 RX ADMIN — Medication 500 MILLIGRAM(S): at 00:29

## 2023-01-01 RX ADMIN — Medication 3 MILLIGRAM(S): at 21:34

## 2023-01-01 RX ADMIN — Medication 650 MILLIGRAM(S): at 13:14

## 2023-01-01 RX ADMIN — QUETIAPINE FUMARATE 225 MILLIGRAM(S): 200 TABLET, FILM COATED ORAL at 08:45

## 2023-01-01 RX ADMIN — LACTULOSE 20 GRAM(S): 10 SOLUTION ORAL at 17:33

## 2023-01-01 RX ADMIN — Medication 1 MILLIGRAM(S): at 21:37

## 2023-01-01 RX ADMIN — Medication 500 MILLIGRAM(S): at 12:32

## 2023-01-01 RX ADMIN — DIVALPROEX SODIUM 250 MILLIGRAM(S): 500 TABLET, DELAYED RELEASE ORAL at 09:32

## 2023-01-01 RX ADMIN — Medication 3 MILLIGRAM(S): at 21:50

## 2023-01-01 RX ADMIN — QUETIAPINE FUMARATE 225 MILLIGRAM(S): 200 TABLET, FILM COATED ORAL at 09:34

## 2023-01-01 RX ADMIN — Medication 500 MILLIGRAM(S): at 01:48

## 2023-01-01 RX ADMIN — Medication 3 MILLILITER(S): at 03:06

## 2023-01-01 RX ADMIN — LACTULOSE 20 GRAM(S): 10 SOLUTION ORAL at 05:14

## 2023-01-01 RX ADMIN — QUETIAPINE FUMARATE 225 MILLIGRAM(S): 200 TABLET, FILM COATED ORAL at 06:01

## 2023-01-01 RX ADMIN — Medication 3 MILLIGRAM(S): at 22:05

## 2023-01-01 RX ADMIN — DIVALPROEX SODIUM 250 MILLIGRAM(S): 500 TABLET, DELAYED RELEASE ORAL at 06:11

## 2023-01-01 RX ADMIN — Medication 100 MILLIGRAM(S): at 06:11

## 2023-01-01 RX ADMIN — Medication 3 MILLILITER(S): at 15:43

## 2023-01-01 RX ADMIN — TAMSULOSIN HYDROCHLORIDE 0.4 MILLIGRAM(S): 0.4 CAPSULE ORAL at 21:46

## 2023-01-01 RX ADMIN — OLANZAPINE 2.5 MILLIGRAM(S): 15 TABLET, FILM COATED ORAL at 18:35

## 2023-01-01 RX ADMIN — OLANZAPINE 2.5 MILLIGRAM(S): 15 TABLET, FILM COATED ORAL at 09:44

## 2023-01-01 RX ADMIN — Medication 3 MILLILITER(S): at 11:08

## 2023-01-01 RX ADMIN — TAMSULOSIN HYDROCHLORIDE 0.4 MILLIGRAM(S): 0.4 CAPSULE ORAL at 21:59

## 2023-01-01 RX ADMIN — LACTULOSE 20 GRAM(S): 10 SOLUTION ORAL at 06:41

## 2023-01-01 RX ADMIN — OLANZAPINE 2.5 MILLIGRAM(S): 15 TABLET, FILM COATED ORAL at 14:27

## 2023-01-01 RX ADMIN — MIDODRINE HYDROCHLORIDE 10 MILLIGRAM(S): 2.5 TABLET ORAL at 21:34

## 2023-01-01 RX ADMIN — TAMSULOSIN HYDROCHLORIDE 0.4 MILLIGRAM(S): 0.4 CAPSULE ORAL at 21:26

## 2023-01-01 RX ADMIN — Medication 100 MILLIGRAM(S): at 14:03

## 2023-01-01 RX ADMIN — Medication 3 MILLIGRAM(S): at 21:46

## 2023-01-01 RX ADMIN — LACTULOSE 20 GRAM(S): 10 SOLUTION ORAL at 06:11

## 2023-01-01 RX ADMIN — Medication 3 MILLIGRAM(S): at 21:37

## 2023-01-01 RX ADMIN — TAMSULOSIN HYDROCHLORIDE 0.4 MILLIGRAM(S): 0.4 CAPSULE ORAL at 21:34

## 2023-01-01 RX ADMIN — ENOXAPARIN SODIUM 40 MILLIGRAM(S): 100 INJECTION SUBCUTANEOUS at 19:38

## 2023-01-01 RX ADMIN — OLANZAPINE 2.5 MILLIGRAM(S): 15 TABLET, FILM COATED ORAL at 19:37

## 2023-01-01 RX ADMIN — MIDODRINE HYDROCHLORIDE 10 MILLIGRAM(S): 2.5 TABLET ORAL at 07:05

## 2023-01-01 RX ADMIN — Medication 100 MILLIGRAM(S): at 06:02

## 2023-01-01 RX ADMIN — Medication 1 MILLIGRAM(S): at 22:08

## 2023-01-01 RX ADMIN — LACTULOSE 20 GRAM(S): 10 SOLUTION ORAL at 17:22

## 2023-01-01 RX ADMIN — Medication 3 MILLILITER(S): at 16:47

## 2023-01-01 RX ADMIN — MIDAZOLAM HYDROCHLORIDE 2 MILLIGRAM(S): 1 INJECTION, SOLUTION INTRAMUSCULAR; INTRAVENOUS at 16:53

## 2023-01-01 RX ADMIN — OLANZAPINE 2.5 MILLIGRAM(S): 15 TABLET, FILM COATED ORAL at 00:05

## 2023-01-01 RX ADMIN — Medication 500 MILLIGRAM(S): at 18:35

## 2023-01-01 RX ADMIN — LACTULOSE 20 GRAM(S): 10 SOLUTION ORAL at 18:38

## 2023-01-01 RX ADMIN — MIDODRINE HYDROCHLORIDE 10 MILLIGRAM(S): 2.5 TABLET ORAL at 05:54

## 2023-01-01 RX ADMIN — DIVALPROEX SODIUM 250 MILLIGRAM(S): 500 TABLET, DELAYED RELEASE ORAL at 09:24

## 2023-01-01 RX ADMIN — DIVALPROEX SODIUM 125 MILLIGRAM(S): 500 TABLET, DELAYED RELEASE ORAL at 11:14

## 2023-01-01 RX ADMIN — LACTULOSE 20 GRAM(S): 10 SOLUTION ORAL at 06:02

## 2023-01-01 RX ADMIN — DIVALPROEX SODIUM 250 MILLIGRAM(S): 500 TABLET, DELAYED RELEASE ORAL at 19:18

## 2023-01-01 RX ADMIN — MIDODRINE HYDROCHLORIDE 10 MILLIGRAM(S): 2.5 TABLET ORAL at 13:23

## 2023-01-01 RX ADMIN — Medication 3 MILLILITER(S): at 22:09

## 2023-01-01 RX ADMIN — Medication 3 MILLIGRAM(S): at 22:26

## 2023-01-01 RX ADMIN — Medication 650 MILLIGRAM(S): at 16:50

## 2023-01-01 RX ADMIN — LACTULOSE 20 GRAM(S): 10 SOLUTION ORAL at 17:51

## 2023-01-01 RX ADMIN — OLANZAPINE 2.5 MILLIGRAM(S): 15 TABLET, FILM COATED ORAL at 04:45

## 2023-01-01 RX ADMIN — Medication 500 MILLIGRAM(S): at 13:01

## 2023-01-01 RX ADMIN — DIVALPROEX SODIUM 125 MILLIGRAM(S): 500 TABLET, DELAYED RELEASE ORAL at 13:18

## 2023-01-01 RX ADMIN — MIDODRINE HYDROCHLORIDE 10 MILLIGRAM(S): 2.5 TABLET ORAL at 22:26

## 2023-01-01 RX ADMIN — Medication 3 MILLIGRAM(S): at 21:49

## 2023-01-01 RX ADMIN — DIVALPROEX SODIUM 250 MILLIGRAM(S): 500 TABLET, DELAYED RELEASE ORAL at 05:14

## 2023-01-01 RX ADMIN — DIVALPROEX SODIUM 250 MILLIGRAM(S): 500 TABLET, DELAYED RELEASE ORAL at 08:59

## 2023-01-01 RX ADMIN — QUETIAPINE FUMARATE 25 MILLIGRAM(S): 200 TABLET, FILM COATED ORAL at 02:44

## 2023-01-01 RX ADMIN — MIDODRINE HYDROCHLORIDE 10 MILLIGRAM(S): 2.5 TABLET ORAL at 21:26

## 2023-01-01 RX ADMIN — DIVALPROEX SODIUM 250 MILLIGRAM(S): 500 TABLET, DELAYED RELEASE ORAL at 17:13

## 2023-01-01 RX ADMIN — QUETIAPINE FUMARATE 225 MILLIGRAM(S): 200 TABLET, FILM COATED ORAL at 11:16

## 2023-01-01 RX ADMIN — Medication 1 MILLIGRAM(S): at 21:01

## 2023-01-01 RX ADMIN — MIDODRINE HYDROCHLORIDE 10 MILLIGRAM(S): 2.5 TABLET ORAL at 21:38

## 2023-01-01 RX ADMIN — Medication 500 MILLIGRAM(S): at 02:10

## 2023-01-01 RX ADMIN — DIVALPROEX SODIUM 125 MILLIGRAM(S): 500 TABLET, DELAYED RELEASE ORAL at 13:27

## 2023-01-01 RX ADMIN — Medication 3 MILLILITER(S): at 09:45

## 2023-01-01 RX ADMIN — DIVALPROEX SODIUM 250 MILLIGRAM(S): 500 TABLET, DELAYED RELEASE ORAL at 07:05

## 2023-01-01 RX ADMIN — MIDODRINE HYDROCHLORIDE 10 MILLIGRAM(S): 2.5 TABLET ORAL at 16:00

## 2023-01-01 RX ADMIN — Medication 3 MILLILITER(S): at 00:56

## 2023-01-01 RX ADMIN — Medication 1 MILLIGRAM(S): at 21:26

## 2023-01-01 RX ADMIN — MIDODRINE HYDROCHLORIDE 10 MILLIGRAM(S): 2.5 TABLET ORAL at 14:58

## 2023-01-01 RX ADMIN — LACTULOSE 20 GRAM(S): 10 SOLUTION ORAL at 17:00

## 2023-01-01 RX ADMIN — Medication 3 MILLILITER(S): at 11:22

## 2023-01-01 RX ADMIN — TAMSULOSIN HYDROCHLORIDE 0.4 MILLIGRAM(S): 0.4 CAPSULE ORAL at 21:50

## 2023-01-01 RX ADMIN — LACTULOSE 20 GRAM(S): 10 SOLUTION ORAL at 06:43

## 2023-01-01 RX ADMIN — Medication 1 MILLIGRAM(S): at 21:50

## 2023-01-01 RX ADMIN — TAMSULOSIN HYDROCHLORIDE 0.4 MILLIGRAM(S): 0.4 CAPSULE ORAL at 21:37

## 2023-01-01 RX ADMIN — Medication 3 MILLILITER(S): at 04:25

## 2023-01-01 RX ADMIN — ENOXAPARIN SODIUM 40 MILLIGRAM(S): 100 INJECTION SUBCUTANEOUS at 18:57

## 2023-01-01 RX ADMIN — Medication 650 MILLIGRAM(S): at 16:05

## 2023-01-01 RX ADMIN — Medication 3 MILLILITER(S): at 09:39

## 2023-01-01 RX ADMIN — Medication 3 MILLILITER(S): at 15:20

## 2023-01-01 RX ADMIN — QUETIAPINE FUMARATE 225 MILLIGRAM(S): 200 TABLET, FILM COATED ORAL at 18:40

## 2023-01-01 RX ADMIN — MIDODRINE HYDROCHLORIDE 10 MILLIGRAM(S): 2.5 TABLET ORAL at 07:58

## 2023-01-01 RX ADMIN — Medication 50 MILLIGRAM(S): at 15:26

## 2023-01-01 RX ADMIN — Medication 3 MILLILITER(S): at 15:00

## 2023-01-01 RX ADMIN — MIDODRINE HYDROCHLORIDE 10 MILLIGRAM(S): 2.5 TABLET ORAL at 14:32

## 2023-01-01 RX ADMIN — Medication 650 MILLIGRAM(S): at 22:26

## 2023-01-01 RX ADMIN — Medication 3 MILLILITER(S): at 15:05

## 2023-01-01 RX ADMIN — LACTULOSE 20 GRAM(S): 10 SOLUTION ORAL at 18:08

## 2023-01-01 RX ADMIN — QUETIAPINE FUMARATE 225 MILLIGRAM(S): 200 TABLET, FILM COATED ORAL at 16:05

## 2023-01-01 RX ADMIN — TAMSULOSIN HYDROCHLORIDE 0.4 MILLIGRAM(S): 0.4 CAPSULE ORAL at 22:26

## 2023-01-01 RX ADMIN — QUETIAPINE FUMARATE 225 MILLIGRAM(S): 200 TABLET, FILM COATED ORAL at 17:22

## 2023-01-01 RX ADMIN — LACTULOSE 20 GRAM(S): 10 SOLUTION ORAL at 07:05

## 2023-01-01 RX ADMIN — LACTULOSE 20 GRAM(S): 10 SOLUTION ORAL at 06:56

## 2023-01-01 RX ADMIN — Medication 3 MILLIGRAM(S): at 21:26

## 2023-01-01 RX ADMIN — Medication 3 MILLIGRAM(S): at 21:58

## 2023-01-01 RX ADMIN — OLANZAPINE 2.5 MILLIGRAM(S): 15 TABLET, FILM COATED ORAL at 00:38

## 2023-01-01 RX ADMIN — Medication 650 MILLIGRAM(S): at 14:32

## 2023-01-01 RX ADMIN — MIDODRINE HYDROCHLORIDE 10 MILLIGRAM(S): 2.5 TABLET ORAL at 12:56

## 2023-01-01 RX ADMIN — Medication 3 MILLILITER(S): at 03:44

## 2023-01-01 RX ADMIN — DIVALPROEX SODIUM 250 MILLIGRAM(S): 500 TABLET, DELAYED RELEASE ORAL at 06:40

## 2023-01-01 RX ADMIN — LACTULOSE 20 GRAM(S): 10 SOLUTION ORAL at 05:54

## 2023-01-01 RX ADMIN — ENOXAPARIN SODIUM 40 MILLIGRAM(S): 100 INJECTION SUBCUTANEOUS at 18:38

## 2023-01-01 RX ADMIN — Medication 1 MILLIGRAM(S): at 21:45

## 2023-01-01 RX ADMIN — DIVALPROEX SODIUM 250 MILLIGRAM(S): 500 TABLET, DELAYED RELEASE ORAL at 18:39

## 2023-01-01 RX ADMIN — OLANZAPINE 2.5 MILLIGRAM(S): 15 TABLET, FILM COATED ORAL at 14:49

## 2023-01-01 RX ADMIN — DIVALPROEX SODIUM 250 MILLIGRAM(S): 500 TABLET, DELAYED RELEASE ORAL at 07:44

## 2023-01-01 RX ADMIN — Medication 1 MILLIGRAM(S): at 22:26

## 2023-01-01 RX ADMIN — Medication 2 MILLIGRAM(S): at 15:27

## 2023-01-01 RX ADMIN — Medication 1 MILLIGRAM(S): at 21:59

## 2023-01-01 RX ADMIN — DIVALPROEX SODIUM 250 MILLIGRAM(S): 500 TABLET, DELAYED RELEASE ORAL at 19:41

## 2023-01-01 RX ADMIN — QUETIAPINE FUMARATE 225 MILLIGRAM(S): 200 TABLET, FILM COATED ORAL at 07:23

## 2023-01-01 RX ADMIN — QUETIAPINE FUMARATE 225 MILLIGRAM(S): 200 TABLET, FILM COATED ORAL at 06:41

## 2023-01-01 RX ADMIN — Medication 3 MILLIGRAM(S): at 21:01

## 2023-01-01 RX ADMIN — DIVALPROEX SODIUM 250 MILLIGRAM(S): 500 TABLET, DELAYED RELEASE ORAL at 07:07

## 2023-01-01 RX ADMIN — MIDODRINE HYDROCHLORIDE 10 MILLIGRAM(S): 2.5 TABLET ORAL at 21:49

## 2023-01-01 RX ADMIN — DIVALPROEX SODIUM 250 MILLIGRAM(S): 500 TABLET, DELAYED RELEASE ORAL at 07:46

## 2023-01-01 RX ADMIN — QUETIAPINE FUMARATE 225 MILLIGRAM(S): 200 TABLET, FILM COATED ORAL at 17:45

## 2023-01-01 RX ADMIN — Medication 100 MILLIGRAM(S): at 22:27

## 2023-01-01 RX ADMIN — Medication 500 MILLIGRAM(S): at 07:44

## 2023-01-01 RX ADMIN — Medication 3 MILLILITER(S): at 22:25

## 2023-01-01 RX ADMIN — QUETIAPINE FUMARATE 225 MILLIGRAM(S): 200 TABLET, FILM COATED ORAL at 09:25

## 2023-01-01 RX ADMIN — QUETIAPINE FUMARATE 225 MILLIGRAM(S): 200 TABLET, FILM COATED ORAL at 12:55

## 2023-01-01 RX ADMIN — OLANZAPINE 2.5 MILLIGRAM(S): 15 TABLET, FILM COATED ORAL at 12:19

## 2023-01-01 RX ADMIN — DIVALPROEX SODIUM 250 MILLIGRAM(S): 500 TABLET, DELAYED RELEASE ORAL at 17:00

## 2023-01-01 RX ADMIN — LACTULOSE 20 GRAM(S): 10 SOLUTION ORAL at 18:40

## 2023-01-01 RX ADMIN — Medication 500 MILLIGRAM(S): at 19:18

## 2023-01-01 RX ADMIN — LACTULOSE 20 GRAM(S): 10 SOLUTION ORAL at 19:39

## 2023-01-01 RX ADMIN — TAMSULOSIN HYDROCHLORIDE 0.4 MILLIGRAM(S): 0.4 CAPSULE ORAL at 21:49

## 2023-01-01 RX ADMIN — OLANZAPINE 2.5 MILLIGRAM(S): 15 TABLET, FILM COATED ORAL at 11:18

## 2023-01-01 RX ADMIN — Medication 1 MILLIGRAM(S): at 22:06

## 2023-01-01 RX ADMIN — Medication 3 MILLILITER(S): at 03:16

## 2023-01-01 RX ADMIN — OLANZAPINE 2.5 MILLIGRAM(S): 15 TABLET, FILM COATED ORAL at 21:20

## 2023-01-01 RX ADMIN — Medication 3 MILLILITER(S): at 03:48

## 2023-01-01 RX ADMIN — QUETIAPINE FUMARATE 225 MILLIGRAM(S): 200 TABLET, FILM COATED ORAL at 08:53

## 2023-01-01 RX ADMIN — MIDODRINE HYDROCHLORIDE 10 MILLIGRAM(S): 2.5 TABLET ORAL at 21:46

## 2023-01-01 RX ADMIN — Medication 650 MILLIGRAM(S): at 21:26

## 2023-01-01 RX ADMIN — DIVALPROEX SODIUM 250 MILLIGRAM(S): 500 TABLET, DELAYED RELEASE ORAL at 19:46

## 2023-01-01 RX ADMIN — LACTULOSE 20 GRAM(S): 10 SOLUTION ORAL at 06:42

## 2023-01-01 RX ADMIN — QUETIAPINE FUMARATE 225 MILLIGRAM(S): 200 TABLET, FILM COATED ORAL at 17:33

## 2023-01-01 RX ADMIN — Medication 2 MILLIGRAM(S): at 16:10

## 2023-01-01 RX ADMIN — OLANZAPINE 2.5 MILLIGRAM(S): 15 TABLET, FILM COATED ORAL at 19:12

## 2023-01-01 RX ADMIN — LACTULOSE 20 GRAM(S): 10 SOLUTION ORAL at 07:59

## 2023-01-01 RX ADMIN — HALOPERIDOL DECANOATE 2.5 MILLIGRAM(S): 100 INJECTION INTRAMUSCULAR at 15:45

## 2023-01-01 RX ADMIN — Medication 3 MILLILITER(S): at 09:56

## 2023-01-01 RX ADMIN — DIVALPROEX SODIUM 500 MILLIGRAM(S): 500 TABLET, DELAYED RELEASE ORAL at 17:37

## 2023-01-01 RX ADMIN — ENOXAPARIN SODIUM 40 MILLIGRAM(S): 100 INJECTION SUBCUTANEOUS at 17:00

## 2023-01-01 RX ADMIN — QUETIAPINE FUMARATE 225 MILLIGRAM(S): 200 TABLET, FILM COATED ORAL at 11:13

## 2023-01-01 RX ADMIN — QUETIAPINE FUMARATE 225 MILLIGRAM(S): 200 TABLET, FILM COATED ORAL at 17:01

## 2023-01-01 RX ADMIN — MIDODRINE HYDROCHLORIDE 10 MILLIGRAM(S): 2.5 TABLET ORAL at 06:11

## 2023-01-01 RX ADMIN — Medication 3 MILLIGRAM(S): at 22:08

## 2023-01-01 RX ADMIN — MIDODRINE HYDROCHLORIDE 10 MILLIGRAM(S): 2.5 TABLET ORAL at 13:01

## 2023-01-01 RX ADMIN — MIDODRINE HYDROCHLORIDE 10 MILLIGRAM(S): 2.5 TABLET ORAL at 13:27

## 2023-01-01 RX ADMIN — Medication 3 MILLILITER(S): at 15:48

## 2023-01-01 RX ADMIN — Medication 3 MILLILITER(S): at 21:36

## 2023-01-01 RX ADMIN — Medication 500 MILLIGRAM(S): at 13:18

## 2023-01-01 RX ADMIN — DIVALPROEX SODIUM 125 MILLIGRAM(S): 500 TABLET, DELAYED RELEASE ORAL at 12:56

## 2023-01-01 RX ADMIN — MIDODRINE HYDROCHLORIDE 10 MILLIGRAM(S): 2.5 TABLET ORAL at 22:04

## 2023-01-01 RX ADMIN — DIVALPROEX SODIUM 250 MILLIGRAM(S): 500 TABLET, DELAYED RELEASE ORAL at 18:59

## 2023-01-01 RX ADMIN — MIDODRINE HYDROCHLORIDE 10 MILLIGRAM(S): 2.5 TABLET ORAL at 06:43

## 2023-01-01 RX ADMIN — DIVALPROEX SODIUM 125 MILLIGRAM(S): 500 TABLET, DELAYED RELEASE ORAL at 13:14

## 2023-01-01 RX ADMIN — Medication 500 MILLIGRAM(S): at 00:58

## 2023-01-01 RX ADMIN — DIVALPROEX SODIUM 250 MILLIGRAM(S): 500 TABLET, DELAYED RELEASE ORAL at 06:01

## 2023-01-01 RX ADMIN — DIVALPROEX SODIUM 125 MILLIGRAM(S): 500 TABLET, DELAYED RELEASE ORAL at 12:32

## 2023-01-01 RX ADMIN — Medication 100 MILLIGRAM(S): at 07:59

## 2023-01-01 RX ADMIN — Medication 650 MILLIGRAM(S): at 23:06

## 2023-01-01 RX ADMIN — Medication 500 MILLIGRAM(S): at 13:22

## 2023-01-01 RX ADMIN — TAMSULOSIN HYDROCHLORIDE 0.4 MILLIGRAM(S): 0.4 CAPSULE ORAL at 21:01

## 2023-01-01 RX ADMIN — Medication 3 MILLILITER(S): at 15:47

## 2023-01-01 RX ADMIN — MIDAZOLAM HYDROCHLORIDE 5 MILLIGRAM(S): 1 INJECTION, SOLUTION INTRAMUSCULAR; INTRAVENOUS at 17:46

## 2023-12-14 NOTE — H&P ADULT - HISTORY OF PRESENT ILLNESS
Patient is a 68 year-old male with history of BPH, agitation, ?dementia, dysphagia after CVA now s/p PEG tube, and ?CHF presenting from [facility] for worsening agitation over the past few weeks. Most of HPI obtained from paper chart sent with patient as patient currently received sedatives. Called patient's brother at [phone] who requested to be called another day due to being currently engaged in another activity. Information obtained from his brother prior to call end was that he is unaware of patient having a history of dementia or any psychiatric conditions. Per call with [facility] at [phone], reached the supervisor for further collateral. She describes that patient has been having worsening agitation ever since he arrived, with aggression towards staff and nurses. She is unaware of any dementia or psychosis diagnosis for the patient, but does note that he originally arrived to the facility with Quetiapine and Clonazepam in his medications. At baseline, she does not think he ever answers questions appropriately and is noted to be relatively agitated normally, which worsened recently.     In the ED, patient with vitals T 98.3F, , /66, and O2 saturation of 96% on RA with RR 18. Admission labs relatively unremarkable. CTH exam was degraded by motion artifact. CXR without acute consolidations. Received Benadryl 50mg IV, Haldol 2.5 IVP x2, Ativan 2mg IVP x2, Versed 2mg IVP x1, 5mg IVP x1, and Zyprexa 2.5mg IM in the ED.  Patient is a 68 year-old male with history of BPH, agitation, ?dementia, dysphagia after CVA now s/p PEG tube, and ?CHF presenting from [facility] for worsening agitation over the past few weeks. Most of HPI obtained from paper chart sent with patient as patient currently received sedatives. Called patient's brother at [phone] who requested to be called another day due to being currently engaged in another activity. Information obtained from his brother prior to call end was that he is unaware of patient having a history of dementia or any psychiatric conditions. Per call with [facility] at [phone], reached the supervisor for further collateral. She describes that patient has been having worsening agitation ever since he arrived, with aggression towards staff and nurses. She is unaware of any dementia or psychosis diagnosis for the patient, but does note that he originally arrived to the facility with Quetiapine and Clonazepam in his medications. At baseline, she does not think he ever answers questions appropriately and is noted to be relatively agitated normally, which worsened recently.     In the ED, patient with vitals T 98.3F, , /66, and O2 saturation of 96% on RA with RR 18. Admission labs relatively unremarkable. CTH exam was degraded by motion artifact. CXR without acute consolidations. Received Benadryl 50mg IV, Haldol 2.5 IVP x2, Ativan 2mg IVP x2, Versed 2mg IVP x1, 5mg IVP x1, and Zyprexa 2.5mg IM in the ED.  Patient is a 68 year-old male with history of BPH, agitation, ?dementia, dysphagia after CVA now s/p PEG tube, and ?CHF presenting from [facility] for worsening agitation over the past few weeks. Most of HPI obtained from paper chart sent with patient as patient currently received sedatives. Called patient's brother at [phone] who requested to be called another day due to being currently engaged in another activity. Information obtained from his brother prior to call end was that he is unaware of patient having a history of dementia or any psychiatric conditions. Per call with [facility] at [phone], reached the supervisor for further collateral. She describes that patient has been having worsening agitation ever since he arrived, with aggression towards staff and nurses. She is unaware of any dementia or psychosis diagnosis for the patient, but does note that he originally arrived to the facility with Quetiapine and Clonazepam in his medications. At baseline, she does not think he ever answers questions appropriately and is noted to be relatively agitated normally, which worsened recently. Per ED note, triage notes hypoxia but discussion with nursing at NH and on arrival patient was not hypoxic.     In the ED, patient with vitals T 98.3F, , /66, and O2 saturation of 96% on RA with RR 18. Admission labs relatively unremarkable. CTH exam was degraded by motion artifact. CXR without acute consolidations. Received Benadryl 50mg IV, Haldol 2.5 IVP x2, Ativan 2mg IVP x2, Versed 2mg IVP x1, 5mg IVP x1, and Zyprexa 2.5mg IM in the ED.  Patient is a 68 year-old male with history of BPH, agitation, ?dementia, dysphagia after CVA now s/p PEG tube, and ?CHF presenting from [facility] for worsening agitation over the past few weeks. Most of HPI obtained from paper chart sent with patient as patient currently received sedatives. Called patient's brother at [phone] who requested to be called another day due to being currently engaged in another activity. Information obtained from his brother prior to call end was that he is unaware of patient having a history of dementia or any psychiatric conditions. Per call with [facility] at [phone], reached the supervisor for further collateral. She describes that patient has been having worsening agitation ever since he arrived, with aggression towards staff and nurses. She is unaware of any dementia or psychosis diagnosis for the patient, but does note that he originally arrived to the facility with Quetiapine and Clonazepam in his medications. At baseline, she does not think he ever answers questions appropriately and is noted to be relatively agitated normally, which worsened recently. Per ED note, triage notes hypoxia but discussion with nursing at NH and on arrival patient was not hypoxic.     In the ED, patient with vitals T 98.3F, , /66, and O2 saturation of 96% on RA with RR 18. Admission labs relatively unremarkable. CTH exam was degraded by motion artifact. CXR without acute consolidations. Received Benadryl 50mg IV, Haldol 2.5 IVP x2, Ativan 2mg IVP x2, Versed 2mg IVP x1, 5mg IVP x1, and Zyprexa 2.5mg IM in the ED.

## 2023-12-14 NOTE — ED PROVIDER NOTE - ATTENDING CONTRIBUTION TO CARE
Isak Morgan DO:  patient seen and evaluated with the resident.  I was present for key portions of the History & Physical, and I agree with the Impression & Plan. 69 yo m pmh dementia, pw agitation. Sent from nursing home due to worsening agitation, being aggressive with staff.  There was a concern that patient was hypoxic however upon my evaluation patient has no hypoxia, sustaining good waveform in upper 90s SpO2.  Patient denies all acute complaints.  Denies falls.  Patient arrives HDS well-appearing, afebrile rectally, will check labs, CT head, chest x-ray, likely DC patient home back to nursing home.  Case discussed with nursing home.

## 2023-12-14 NOTE — H&P ADULT - PROBLEM SELECTOR PLAN 8
DVT ppx: Lovenox  Diet: NPO until speech and swallow (though paper-work states he was on regular low sodium diet with thin liquids)    GOC: MOLST form copy from NH with patient signature for full code status signed in 2022.

## 2023-12-14 NOTE — ED ADULT NURSE NOTE - OBJECTIVE STATEMENT
Received patient to room 1 for reported hypoxia. Pt is a&o2 person and time, unknown ambulatory status, sent from NH for hypoxia. Received patient on NRB, O2 on RA at 98% at this time, RR even and unlabored, NSR on monitor. Pt is poor historian, pt yelling for staff but wont verbalize what is wrong. PEG tube noted, clean and intact. Skin intact. Pending orders at this time.

## 2023-12-14 NOTE — H&P ADULT - ASSESSMENT
68 year-old male with history of BPH, agitation, ?dementia, dysphagia after CVA now s/p PEG tube, and ?CHF presenting from [facility] for worsening agitation over the past few weeks, admitted for further work-up and management 68 year-old male with history of BPH, agitation, ?dementia, dysphagia after CVA now s/p PEG tube, and ?CHF presenting from [facility] for worsening agitation over the past few weeks, admitted for further work-up and management

## 2023-12-14 NOTE — H&P ADULT - NSHPLABSRESULTS_GEN_ALL_CORE
LABS:                          14.4   9.21  )-----------( 167      ( 14 Dec 2023 15:00 )             44.1     12-14    140  |  103  |  17  ----------------------------<  84  4.8   |  28  |  0.80    Ca    9.0      14 Dec 2023 15:00    TPro  7.2  /  Alb  3.4  /  TBili  0.4  /  DBili  x   /  AST  20  /  ALT  12  /  AlkPhos  87  12-14    LIVER FUNCTIONS - ( 14 Dec 2023 15:00 )  Alb: 3.4 g/dL / Pro: 7.2 g/dL / ALK PHOS: 87 U/L / ALT: 12 U/L / AST: 20 U/L / GGT: x             Urinalysis Basic - ( 14 Dec 2023 15:00 )    Color: Yellow / Appearance: Clear / S.023 / pH: x  Gluc: 84 mg/dL / Ketone: Negative mg/dL  / Bili: Negative / Urobili: 0.2 mg/dL   Blood: x / Protein: Negative mg/dL / Nitrite: Negative   Leuk Esterase: Negative / RBC: 0 /HPF / WBC 0 /HPF   Sq Epi: x / Non Sq Epi: 0 /HPF / Bacteria: Negative /HPF      IMAGING:      < from: Xray Chest 1 View AP/PA (23 @ 17:23) >    IMPRESSION:  No acute pulmonary disease.  Questionable age indeterminate left rib fractures and old right   clavicular fracture.  --- End of Report ---    < end of copied text >  Appears clear on my read.      < from: CT Head No Cont (23 @ 18:11) >    IMPRESSION:  1.  Nondiagnostic examination despite multiple attempts. The images that   were obtained are severely degraded by motion artifact and lack   incomplete coverage. Consider repeat examination.    --- End of Report ---    < end of copied text >

## 2023-12-14 NOTE — H&P ADULT - PROBLEM SELECTOR PLAN 7
DVT ppx: Lovenox  Diet: NPO until speech and swallow (though paper-work states he was on regular low sodium diet with thin liquids)    GOC: MOLST form copy from NH with patient signature for full code status signed in 2022. - cont tamsulosin

## 2023-12-14 NOTE — ED ADULT NURSE NOTE - CHIEF COMPLAINT QUOTE
pt form 5 [facility] nursing home for low O2. pt 83% on room air. pt combative and aggressive with EMS. pt place don 100% NRB  pt with peg tube. non verbal. pt form 5 [facility] nursing home for low O2. pt 83% on room air. pt combative and aggressive with EMS. pt place don 100% NRB  pt with peg tube. non verbal.

## 2023-12-14 NOTE — H&P ADULT - PROBLEM SELECTOR PLAN 6
- cont tamsulosin Paper-work mentions possible history of chronic systolic CHF though patient not on any GDMT regarding medications list provided, unclear if true PMH  - proBNP 124  - breath sounds appear more from secretions than crackles, no pitting edema nor JVD noted on patient, does not appear volume-overloaded  - f/u collateral with family in AM

## 2023-12-14 NOTE — H&P ADULT - NSICDXPASTMEDICALHX_GEN_ALL_CORE_FT
PAST MEDICAL HISTORY:  Agitation     BPH (benign prostatic hyperplasia)     CVA (cerebrovascular accident)     Dysphagia

## 2023-12-14 NOTE — ED ADULT NURSE REASSESSMENT NOTE - NS ED NURSE REASSESS COMMENT FT1
pt remains restless, medicated by MD orders.  Vitals as noted, RR even and unlabored. Pending ct scan

## 2023-12-14 NOTE — ED PROVIDER NOTE - PROGRESS NOTE DETAILS
Patient on signout from Dr. Morgan.  Patient was agitated, requiring Haldol and Ativan during the day.  Patient still yelling trying to put legs over the railing.  Will give a small dose of Versed and put on a cardiac monitor. Patient trying to sit up, moving legs over the railing.  Attempted CAT scan but patient was moving too much.  Will give an additional dose of Versed while on the cardiac monitor.  Spoke to CT tech. Patient still agitated trying to move.  Spoke to psychiatry recommended Zyprexa 2.5 mg IM.  Will admit

## 2023-12-14 NOTE — ED PROVIDER NOTE - CLINICAL SUMMARY MEDICAL DECISION MAKING FREE TEXT BOX
68. year-old male past medical history dementia coming from nursing home  presents with agitation.  Labs noted spoke with nursing home, verified by nursing reporting patient has been having about 1 month of worsening agitation.  Being combative with staff verbal and physical aggression. contrary to triage note patient was not hypoxic at any time by nursing home or in transport.  Staff did not notice any other concerning symptoms.   Staff would like patient evaluated for causes of aggression. Did not have any fevers cough.  On arrival vital signs stable.  In the room patient 98% on room air.  Moist mucous membranes lungs clear bilaterally regular rate and rhythm abdomen soft nontender no lower extremity swelling or tenderness.  Moving all extremities ANO x 1-2.   Likely progression of dementia.  Will evaluate for electrolyte abnormality versus infectious versus metabolic derangement versus ICH.  Plan Labs EKG chest x-ray UA CT head and reassess.

## 2023-12-14 NOTE — H&P ADULT - PROBLEM SELECTOR PLAN 1
Patient presents with agitation which appears to have been a chronic problem, acutely worsening lately with more aggression than usual, possibly progression of potential dementia with delirium vs. acute encephalopathy with unclear etiology  - agitated in ED now s/p sedatives, current encephalopathy likely due to antipsychotics  - CTH with too much motion degradation for evaluation, will hold off repeat CTH until less agitated  - folate, B12, A1c wnl  - no significant electrolyte abnormalities, RVP negative, CXR appears clear, UA negative  - possibly erysipelas on face, treatment as below  - TSH mildly elevated, f/u free T4   - f/u BCx, UCx Patient presents with agitation which appears to have been a chronic problem, acutely worsening lately with more aggression than usual, likely progression of potential dementia with delirium vs. acute encephalopathy with unclear etiology  - agitated in ED now s/p sedatives, current encephalopathy likely due to antipsychotics  - CTH with too much motion degradation for evaluation, will hold off repeat CTH until less agitated  - folate, B12, A1c wnl  - no significant electrolyte abnormalities, RVP negative, CXR appears clear, UA negative  - possibly erysipelas on face, treatment as below  - TSH mildly elevated, f/u free T4   - f/u BCx, UCx Patient presents with agitation which appears to have been a chronic problem, acutely worsening lately with more aggression than usual, likely progression of potential dementia with delirium vs. acute encephalopathy with unclear etiology  - agitated in ED now s/p sedatives, current encephalopathy likely due to antipsychotics  - CTH with too much motion degradation for evaluation, will hold off repeat CTH until less agitated  - folate, B12, A1c wnl  - no significant electrolyte abnormalities, RVP negative, CXR appears clear, UA negative  - possibly erysipelas on face, treatment as below  - TSH mildly elevated, f/u free T4   - f/u BCx, UCx for infectious work-up

## 2023-12-14 NOTE — H&P ADULT - NSHPSOCIALHISTORY_GEN_ALL_CORE
Per brother, no substance use history  From [facility] Per brother, no substance use history  From [facility]

## 2023-12-14 NOTE — ED ADULT NURSE NOTE - NSFALLHARMRISKINTERV_ED_ALL_ED
Assistance OOB with selected safe patient handling equipment if applicable/Assistance with ambulation/Communicate risk of Fall with Harm to all staff, patient, and family/Monitor gait and stability/Monitor for mental status changes and reorient to person, place, and time, as needed/Move patient closer to nursing station/within visual sight of ED staff/Provide visual cue: red socks, yellow wristband, yellow gown, etc/Reinforce activity limits and safety measures with patient and family/Toileting schedule using arm’s reach rule for commode and bathroom/Use of alarms - bed, stretcher, chair and/or video monitoring/Bed in lowest position, wheels locked, appropriate side rails in place/Call bell, personal items and telephone in reach/Instruct patient to call for assistance before getting out of bed/chair/stretcher/Non-slip footwear applied when patient is off stretcher/[place] to call system/Physically safe environment - no spills, clutter or unnecessary equipment/Purposeful Proactive Rounding/Room/bathroom lighting operational, light cord in reach Assistance OOB with selected safe patient handling equipment if applicable/Assistance with ambulation/Communicate risk of Fall with Harm to all staff, patient, and family/Monitor gait and stability/Monitor for mental status changes and reorient to person, place, and time, as needed/Move patient closer to nursing station/within visual sight of ED staff/Provide visual cue: red socks, yellow wristband, yellow gown, etc/Reinforce activity limits and safety measures with patient and family/Toileting schedule using arm’s reach rule for commode and bathroom/Use of alarms - bed, stretcher, chair and/or video monitoring/Bed in lowest position, wheels locked, appropriate side rails in place/Call bell, personal items and telephone in reach/Instruct patient to call for assistance before getting out of bed/chair/stretcher/Non-slip footwear applied when patient is off stretcher/[place] to call system/Physically safe environment - no spills, clutter or unnecessary equipment/Purposeful Proactive Rounding/Room/bathroom lighting operational, light cord in reach

## 2023-12-14 NOTE — H&P ADULT - NSHPPHYSICALEXAM_GEN_ALL_CORE
VITALS:   T(C): 36.4 (12-14-23 @ 22:23), Max: 37.2 (12-14-23 @ 14:56)  HR: 62 (12-14-23 @ 22:23) (62 - 108)  BP: 112/62 (12-14-23 @ 22:23) (106/65 - 133/66)  RR: 18 (12-14-23 @ 22:23) (17 - 18)  SpO2: 97% (12-14-23 @ 22:23) (96% - 100%)    GENERAL: no acute distress, sedated  EYES: EOMI, conjunctiva clear  ENMT: Notable secretions seen and suctioned with nursing  NECK: supple, no palpable masses  RESPIRATORY: lungs clear to ascultation b/l, unlabored respirations  CARDIOVASCULAR: regular rate and rhythm, normal S1 and S2, no murmurs/rubs/gallops  ABDOMEN: soft, normal bowel sounds, nondistended, nontender, +PEG tube  EXTREMITIES: no joint swelling or tenderness to palpation  PSYCH: sedated  NEUROLOGY: AAOx0, moving all extremities spontaneously  SKIN: Blanching erythematous rash on R hand; anthony complexion of face, no crusty lesions noted VITALS:   T(C): 36.4 (12-14-23 @ 22:23), Max: 37.2 (12-14-23 @ 14:56)  HR: 62 (12-14-23 @ 22:23) (62 - 108)  BP: 112/62 (12-14-23 @ 22:23) (106/65 - 133/66)  RR: 18 (12-14-23 @ 22:23) (17 - 18)  SpO2: 97% (12-14-23 @ 22:23) (96% - 100%)    GENERAL: no acute distress, sedated  EYES: EOMI, conjunctiva clear  ENMT: Notable secretions seen and suctioned with nursing  NECK: supple, no palpable masses  RESPIRATORY: Unlabored respirations, congested breath sounds  CARDIOVASCULAR: regular rate and rhythm, normal S1 and S2, no murmurs/rubs/gallops  ABDOMEN: soft, normal bowel sounds, nondistended, nontender, +PEG tube  EXTREMITIES: no joint swelling or tenderness to palpation  PSYCH: sedated  NEUROLOGY: AAOx0, moving all extremities spontaneously  SKIN: Blanching erythematous rash on R hand; anthony complexion of face, no crusty lesions noted VITALS:   T(C): 36.4 (12-14-23 @ 22:23), Max: 37.2 (12-14-23 @ 14:56)  HR: 62 (12-14-23 @ 22:23) (62 - 108)  BP: 112/62 (12-14-23 @ 22:23) (106/65 - 133/66)  RR: 18 (12-14-23 @ 22:23) (17 - 18)  SpO2: 97% (12-14-23 @ 22:23) (96% - 100%)    GENERAL: no acute distress, sedated  EYES: EOMI, conjunctiva clear  ENMT: Notable secretions seen and suctioned with nursing  NECK: supple, no palpable masses  RESPIRATORY: Unlabored respirations, congested breath sounds  CARDIOVASCULAR: regular rate and rhythm, normal S1 and S2, no murmurs/rubs/gallops  ABDOMEN: soft, normal bowel sounds, nondistended, nontender, +PEG tube with dry dressing  EXTREMITIES: no joint swelling or tenderness to palpation  PSYCH: sedated  NEUROLOGY: AAOx0, moving all extremities spontaneously  SKIN: Blanching erythematous rash on R hand; anthony complexion of face, no crusty lesions noted

## 2023-12-14 NOTE — H&P ADULT - PROBLEM SELECTOR PLAN 4
On midodrine Q8H at Nursing Home  - will cont rehab midodrine, hold SBP>130 History of dysphagia s/p PEG tube, unclear if original etiology from progressive dementia vs. acute CVA (appears to be CVA from paper-work)  - also with notable secretions noted on exam, suctioned at bedside  - likely high aspiration and micro-aspirational risk from his own secretions, CXR clear currently  - on NH paper-work, was on regular low sodium diet with thin liquids  - aspiration precautions  - f/u speech and swallow eval

## 2023-12-14 NOTE — H&P ADULT - PROBLEM SELECTOR PLAN 3
History of dysphagia s/p PEG tube, unclear if original etiology from progressive dementia vs. acute CVA (appears to be CVA from paper-work)  - also with notable secretions noted on exam, suctioned at bedside  - likely high aspiration and micro-aspirational risk from his own secretions, CXR clear currently  - on NH paper-work, was on regular low sodium diet with thin liquids  - aspiration precautions  - f/u speech and swallow eval Presents from nursing home for worsening agitation and aggression to staff. Unclear if worsening dementia vs. mood disorder vs encephalopathy contributing to agitation  - will work up for medical issues as above  - also likely exacerbated by component of delirium, delirium precautions and cont melatonin  - QTc 444 on admission, cont monitoring EKGs for QTc after antipsychotics  - cont with home seroquel 225mg BID  - cont with home depakote 125mg BID  - Zyprexa IM for severe agitation when pt refusing oral medications  - will hold off home Clonazepam for now given Zyprexa administration  - Behavioral health c/s in AM for worsening agitation and medication management

## 2023-12-14 NOTE — H&P ADULT - PROBLEM SELECTOR PLAN 2
Presents from nursing home for worsening agitation and aggression to staff. Unclear if worsening dementia vs. mood disorder vs encephalopathy contributing to agitation  - will work up for medical issues as above  - also likely exacerbated by component of delirium, delirium precautions and cont melatonin  - cont with home seroquel 225mg BID  - cont with home depakote 125mg BID  - Zyprexa IM for severe agitation when pt refusing oral medications  - will hold off home Clonazepam for now given Zyprexa administration  - Behavioral health c/s in AM for worsening agitation and medication management Presents from nursing home for worsening agitation and aggression to staff. Unclear if worsening dementia vs. mood disorder vs encephalopathy contributing to agitation  - will work up for medical issues as above  - also likely exacerbated by component of delirium, delirium precautions and cont melatonin  - QTc 444 on admission, cont monitoring EKGs for QTc after antipsychotics  - cont with home seroquel 225mg BID  - cont with home depakote 125mg BID  - Zyprexa IM for severe agitation when pt refusing oral medications  - will hold off home Clonazepam for now given Zyprexa administration  - Behavioral health c/s in AM for worsening agitation and medication management Patent with facial redness non-sparing nasolabial folds, possibly asymmetric and warm compared to rest of body, possibly new for patient ([facility] supervisor did not notice redness but only sees patient intermittently)  - will start Cefazolin  - assess response to abx Patent with facial redness non-sparing nasolabial folds, possibly asymmetric and warm compared to rest of body, possibly new for patient ([facility] supervisor did not notice redness but only sees patient intermittently)  - will start Cefazolin  - assess response to abx

## 2023-12-14 NOTE — ED ADULT TRIAGE NOTE - CHIEF COMPLAINT QUOTE
pt form 5 [facility] nursing home for low O2. pt 83% on room air. pt combative and aggressive with EMS. pt place don 100% NRB pt form 5 [facility] nursing home for low O2. pt 83% on room air. pt combative and aggressive with EMS. pt place don 100% NRB pt form 5 [facility] nursing home for low O2. pt 83% on room air. pt combative and aggressive with EMS. pt place don 100% NRB  pt with peg tube. non verbal. pt form 5 [facility] nursing home for low O2. pt 83% on room air. pt combative and aggressive with EMS. pt place don 100% NRB  pt with peg tube. non verbal.

## 2023-12-14 NOTE — H&P ADULT - PROBLEM SELECTOR PLAN 5
Paper-work mentions possible history of chronic systolic CHF though patient not on any GDMT regarding medications list provided, unclear if true PMH  - breath sounds appear more from secretions than crackles, no pitting edema nor JVD noted on patient  - f/u proBNP  - f/u TTE Paper-work mentions possible history of chronic systolic CHF though patient not on any GDMT regarding medications list provided, unclear if true PMH  - proBNP 124  - breath sounds appear more from secretions than crackles, no pitting edema nor JVD noted on patient, does not appear volume-overloaded  - f/u collateral with family in AM On midodrine Q8H at Nursing Home  - will cont rehab midodrine, hold SBP>130

## 2023-12-14 NOTE — ED ADULT NURSE REASSESSMENT NOTE - NS ED NURSE REASSESS COMMENT FT1
Report received from day RN, patient A&Ox1 restless in stretcher, PCA remains at bedside for constant observation. Breathing even and unlabored. No s/s of acute distress. Safety maintained Report received from day RN, patient A&Ox1 restless in stretcher, PCA remains at bedside for constant observation. Breathing even and unlabored. No s/s of acute distress. Safety maintained. Report given to Tory GARLAND 1.

## 2023-12-15 NOTE — BH CONSULTATION LIAISON ASSESSMENT NOTE - RISK ASSESSMENT
LOW risk for suicide ( no SI, no SA, no past psych hx, patient is in safe environment of the hospital)

## 2023-12-15 NOTE — PROGRESS NOTE ADULT - PROBLEM SELECTOR PLAN 3
Presents from nursing home for worsening agitation and aggression to staff. Unclear if worsening dementia vs. mood disorder vs encephalopathy contributing to agitation  - will work up for medical issues as above  - also likely exacerbated by component of delirium, delirium precautions and cont melatonin  - QTc 444 on admission, cont monitoring EKGs for QTc after antipsychotics  - cont with home seroquel 225mg BID  - cont with home depakote 125mg BID  - Zyprexa IM for severe agitation when pt refusing oral medications  - will hold off home Clonazepam for now given Zyprexa administration  - Behavioral health c/s in AM for worsening agitation and medication management

## 2023-12-15 NOTE — PROGRESS NOTE ADULT - PROBLEM SELECTOR PLAN 2
Patent with facial redness non-sparing nasolabial folds, possibly asymmetric and warm compared to rest of body, possibly new for patient ([facility] supervisor did not notice redness but only sees patient intermittently)  - will start Cefazolin  - assess response to abx Patent with facial redness non-sparing nasolabial folds, possibly asymmetric and warm compared to rest of body, possibly new for patient ([facility] supervisor did not notice redness but only sees patient intermittently)  - will start Cefazolin  - assess response to abx

## 2023-12-15 NOTE — BH CONSULTATION LIAISON ASSESSMENT NOTE - NSBHATTESTCOMMENTATTENDFT_PSY_A_CORE
Chart reviewed, pt. seen/evaluated with Dr. Mcgill, I agree with the above assessment/plan. Patient alert, grossly oriented, seems restless, confused/inattentive with overall disorganized/perseverative thought process, denies AVH, denies si and hi. Plan as above, will follow

## 2023-12-15 NOTE — PATIENT PROFILE ADULT - FALL HARM RISK - RISK INTERVENTIONS
Assistance OOB with selected safe patient handling equipment/Communicate Fall Risk and Risk Factors to all staff, patient, and family/Discuss with provider need for PT consult/Monitor gait and stability/Provide patient with walking aids - walker, cane, crutches/Reinforce activity limits and safety measures with patient and family/Visual Cue: Yellow wristband/Bed in lowest position, wheels locked, appropriate side rails in place/Call bell, personal items and telephone in reach/Instruct patient to call for assistance before getting out of bed or chair/Non-slip footwear when patient is out of bed/[place] to call system/Physically safe environment - no spills, clutter or unnecessary equipment/Purposeful Proactive Rounding/Room/bathroom lighting operational, light cord in reach Assistance OOB with selected safe patient handling equipment/Communicate Fall Risk and Risk Factors to all staff, patient, and family/Discuss with provider need for PT consult/Monitor gait and stability/Provide patient with walking aids - walker, cane, crutches/Reinforce activity limits and safety measures with patient and family/Visual Cue: Yellow wristband/Bed in lowest position, wheels locked, appropriate side rails in place/Call bell, personal items and telephone in reach/Instruct patient to call for assistance before getting out of bed or chair/Non-slip footwear when patient is out of bed/[place] to call system/Physically safe environment - no spills, clutter or unnecessary equipment/Purposeful Proactive Rounding/Room/bathroom lighting operational, light cord in reach

## 2023-12-15 NOTE — BH CONSULTATION LIAISON ASSESSMENT NOTE - NSBHCHARTREVIEWLAB_PSY_A_CORE FT
14.4   9.21  )-----------( 167      ( 14 Dec 2023 15:00 )             44.1     12-14    140  |  103  |  17  ----------------------------<  84  4.8   |  28  |  0.80    Ca    9.0      14 Dec 2023 15:00    TPro  7.2  /  Alb  3.4  /  TBili  0.4  /  DBili  x   /  AST  20  /  ALT  12  /  AlkPhos  87  12-14

## 2023-12-15 NOTE — BH CONSULTATION LIAISON ASSESSMENT NOTE - NSBHATTESTBILLING_PSY_A_CORE
[id number] diagnostic evaluation with medical services [id number] diagnostic evaluation with medical services

## 2023-12-15 NOTE — PROGRESS NOTE ADULT - PROBLEM SELECTOR PLAN 4
History of dysphagia s/p PEG tube, unclear if original etiology from progressive dementia vs. acute CVA (appears to be CVA from paper-work)  - also with notable secretions noted on exam, suctioned at bedside  - likely high aspiration and micro-aspirational risk from his own secretions, CXR clear currently  - on NH paper-work, was on regular low sodium diet with thin liquids  - aspiration precautions  - f/u speech and swallow eval  - can use PEG for tube feed and meds for now, nutrition consult.

## 2023-12-15 NOTE — PROGRESS NOTE ADULT - ASSESSMENT
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

## 2023-12-15 NOTE — BH CONSULTATION LIAISON ASSESSMENT NOTE - NSBHCHARTREVIEWVS_PSY_A_CORE FT
Vital Signs Last 24 Hrs  T(C): 36.9 (15 Dec 2023 07:46), Max: 37.2 (14 Dec 2023 14:56)  T(F): 98.5 (15 Dec 2023 07:46), Max: 98.9 (14 Dec 2023 14:56)  HR: 105 (15 Dec 2023 08:40) (62 - 108)  BP: 140/78 (15 Dec 2023 08:40) (103/66 - 140/78)  BP(mean): --  RR: 18 (15 Dec 2023 08:40) (17 - 18)  SpO2: 95% (15 Dec 2023 08:40) (95% - 100%)    Parameters below as of 15 Dec 2023 08:40  Patient On (Oxygen Delivery Method): room air

## 2023-12-15 NOTE — SWALLOW BEDSIDE ASSESSMENT ADULT - COMMENTS
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     Patient visited at bedside for clinical swallow evaluation with RN and ACP present. Patient presents as awake and alert, able to follow 1-step directives and make basic wants/needs known. Patient noted with wet/congested breath sounds, cough and wet vocal quality at baseline. 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     Patient visited at bedside for clinical swallow evaluation with RN and ACP present. Patient presents as awake and alert, able to follow 1-step directives and make basic wants/needs known. Patient noted with wet/congested breath sounds, cough and wet vocal quality at baseline.
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
## 2023-12-15 NOTE — PROGRESS NOTE ADULT - PROBLEM SELECTOR PLAN 1
Patient presents with agitation which appears to have been a chronic problem, acutely worsening lately with more aggression than usual, likely progression of potential dementia with delirium vs. acute encephalopathy with unclear etiology  - agitated in ED now s/p sedatives, current encephalopathy likely due to antipsychotics  - CTH with too much motion degradation for evaluation, will hold off repeat CTH until less agitated  - folate, B12, A1c wnl  - no significant electrolyte abnormalities, RVP negative, CXR appears clear, UA negative  - possibly erysipelas on face, treatment as below  - TSH mildly elevated,  free T4 wnl  - f/u BCx, UCx for infectious work-up

## 2023-12-15 NOTE — BH CONSULTATION LIAISON ASSESSMENT NOTE - DESCRIPTION
Consent: The patient's consent was obtained including but not limited to risks of crusting, scabbing, blistering, scarring, darker or lighter pigmentary change, recurrence, incomplete removal and infection. Duration Of Freeze Thaw-Cycle (Seconds): 0 Post-Care Instructions: I reviewed with the patient in detail post-care instructions. Patient is to wear sunprotection, and avoid picking at any of the treated lesions. Pt may apply Vaseline to crusted or scabbing areas. Detail Level: Zone Render Post-Care Instructions In Note?: no lives in [facility] lives in [facility]

## 2023-12-15 NOTE — PROGRESS NOTE ADULT - SUBJECTIVE AND OBJECTIVE BOX
[facility] Division of Hospital Medicine  Manas Samuel MD  In House Pager [phone]    Patient is a 68y old  Male who presents with a chief complaint of Agitation, aggression (14 Dec 2023 20:40)    SUBJECTIVE / OVERNIGHT EVENTS: agitated this morning, received PRN. patient seen at bedside, sedated. eyes open to verbal stimuli.     ROS: unable to obtain due to mental status.     MEDICATIONS  (STANDING):  ceFAZolin   IVPB      ceFAZolin   IVPB 2000 milliGRAM(s) IV Intermittent every 8 hours  divalproex Sprinkle 125 milliGRAM(s) Oral two times a day  enoxaparin Injectable 40 milliGRAM(s) SubCutaneous every 24 hours  influenza  Vaccine (HIGH DOSE) 0.7 milliLiter(s) IntraMuscular once  lactulose Syrup 20 Gram(s) Oral two times a day  melatonin 3 milliGRAM(s) Oral at bedtime  midodrine 10 milliGRAM(s) Oral every 8 hours  QUEtiapine 225 milliGRAM(s) Oral two times a day  tamsulosin 0.4 milliGRAM(s) Oral at bedtime    MEDICATIONS  (PRN):  acetaminophen     Tablet .. 650 milliGRAM(s) Oral every 6 hours PRN Temp greater or equal to 38C (100.4F), Mild Pain (1 - 3)  albuterol/ipratropium for Nebulization 3 milliLiter(s) Nebulizer every 6 hours PRN Shortness of Breath and/or Wheezing  OLANZapine Injectable 2.5 milliGRAM(s) IntraMuscular every 6 hours PRN severe agitation  senna 2 Tablet(s) Oral at bedtime PRN Constipation    CAPILLARY BLOOD GLUCOSE        I&O's Summary      Vital Signs Last 24 Hrs  T(C): 36.9 (15 Dec 2023 07:46), Max: 37.2 (14 Dec 2023 14:56)  T(F): 98.5 (15 Dec 2023 07:46), Max: 98.9 (14 Dec 2023 14:56)  HR: 105 (15 Dec 2023 08:40) (62 - 108)  BP: 140/78 (15 Dec 2023 08:40) (103/66 - 140/78)  BP(mean): --  RR: 18 (15 Dec 2023 08:40) (17 - 18)  SpO2: 95% (15 Dec 2023 08:40) (95% - 100%)    Parameters below as of 15 Dec 2023 08:40  Patient On (Oxygen Delivery Method): room air    Physical Exam:  GENERAL: no apparent distress  CHEST/LUNG: on RA; Clear to auscultation bilaterally; No wheezing; No crackles  HEART: Regular rate and rhythm; S1/S2 wnl; no obvious murmurs  ABDOMEN: Soft, Nontender, Nondistended; Bowel sounds present; +PEG, site is c/d/i.   EXTREMITIES:  2+ Peripheral Pulses, No edema  skin: erythema, keratitic rash over the entire face.     LABS:                        14.4   9.21  )-----------( 167      ( 14 Dec 2023 15:00 )             44.1     12-14    140  |  103  |  17  ----------------------------<  84  4.8   |  28  |  0.80    Ca    9.0      14 Dec 2023 15:00    TPro  7.2  /  Alb  3.4  /  TBili  0.4  /  DBili  x   /  AST  20  /  ALT  12  /  AlkPhos  87  12-14          Urinalysis Basic - ( 14 Dec 2023 15:00 )    Color: Yellow / Appearance: Clear / S.023 / pH: x  Gluc: 84 mg/dL / Ketone: Negative mg/dL  / Bili: Negative / Urobili: 0.2 mg/dL   Blood: x / Protein: Negative mg/dL / Nitrite: Negative   Leuk Esterase: Negative / RBC: 0 /HPF / WBC 0 /HPF   Sq Epi: x / Non Sq Epi: 0 /HPF / Bacteria: Negative /HPF        RADIOLOGY & ADDITIONAL TESTS:  Results Reviewed: Y  Imaging Personally Reviewed: Y  Electrocardiogram Personally Reviewed: Y    COORDINATION OF CARE:  Care Discussed with Consultants/Other Providers [Y/N]: Y  Prior or Outpatient Records Reviewed [Y/N]: Y   [facility] Division of Hospital Medicine  Manas Samuel MD  In House Pager [phone]    Patient is a 68y old  Male who presents with a chief complaint of Agitation, aggression (14 Dec 2023 20:40)    SUBJECTIVE / OVERNIGHT EVENTS: agitated this morning, received PRN. patient seen at bedside, sedated. eyes open to verbal stimuli.     ROS: unable to obtain due to mental status.     MEDICATIONS  (STANDING):  ceFAZolin   IVPB      ceFAZolin   IVPB 2000 milliGRAM(s) IV Intermittent every 8 hours  divalproex Sprinkle 125 milliGRAM(s) Oral two times a day  enoxaparin Injectable 40 milliGRAM(s) SubCutaneous every 24 hours  influenza  Vaccine (HIGH DOSE) 0.7 milliLiter(s) IntraMuscular once  lactulose Syrup 20 Gram(s) Oral two times a day  melatonin 3 milliGRAM(s) Oral at bedtime  midodrine 10 milliGRAM(s) Oral every 8 hours  QUEtiapine 225 milliGRAM(s) Oral two times a day  tamsulosin 0.4 milliGRAM(s) Oral at bedtime    MEDICATIONS  (PRN):  acetaminophen     Tablet .. 650 milliGRAM(s) Oral every 6 hours PRN Temp greater or equal to 38C (100.4F), Mild Pain (1 - 3)  albuterol/ipratropium for Nebulization 3 milliLiter(s) Nebulizer every 6 hours PRN Shortness of Breath and/or Wheezing  OLANZapine Injectable 2.5 milliGRAM(s) IntraMuscular every 6 hours PRN severe agitation  senna 2 Tablet(s) Oral at bedtime PRN Constipation    CAPILLARY BLOOD GLUCOSE        I&O's Summary      Vital Signs Last 24 Hrs  T(C): 36.9 (15 Dec 2023 07:46), Max: 37.2 (14 Dec 2023 14:56)  T(F): 98.5 (15 Dec 2023 07:46), Max: 98.9 (14 Dec 2023 14:56)  HR: 105 (15 Dec 2023 08:40) (62 - 108)  BP: 140/78 (15 Dec 2023 08:40) (103/66 - 140/78)  BP(mean): --  RR: 18 (15 Dec 2023 08:40) (17 - 18)  SpO2: 95% (15 Dec 2023 08:40) (95% - 100%)    Parameters below as of 15 Dec 2023 08:40  Patient On (Oxygen Delivery Method): room air    Physical Exam:  GENERAL: no apparent distress  CHEST/LUNG: on RA; Clear to auscultation bilaterally; No wheezing; No crackles  HEART: Regular rate and rhythm; S1/S2 wnl; no obvious murmurs  ABDOMEN: Soft, Nontender, Nondistended; Bowel sounds present; +PEG, site is c/d/i.   EXTREMITIES:  2+ Peripheral Pulses, No edema  skin: erythema, keratitic rash over the entire face.     LABS:                        14.4   9.21  )-----------( 167      ( 14 Dec 2023 15:00 )             44.1     12-14    140  |  103  |  17  ----------------------------<  84  4.8   |  28  |  0.80    Ca    9.0      14 Dec 2023 15:00    TPro  7.2  /  Alb  3.4  /  TBili  0.4  /  DBili  x   /  AST  20  /  ALT  12  /  AlkPhos  87  12-14          Urinalysis Basic - ( 14 Dec 2023 15:00 )    Color: Yellow / Appearance: Clear / S.023 / pH: x  Gluc: 84 mg/dL / Ketone: Negative mg/dL  / Bili: Negative / Urobili: 0.2 mg/dL   Blood: x / Protein: Negative mg/dL / Nitrite: Negative   Leuk Esterase: Negative / RBC: 0 /HPF / WBC 0 /HPF   Sq Epi: x / Non Sq Epi: 0 /HPF / Bacteria: Negative /HPF        RADIOLOGY & ADDITIONAL TESTS:  Results Reviewed: Y  Imaging Personally Reviewed: Y  Electrocardiogram Personally Reviewed: Y    COORDINATION OF CARE:  Care Discussed with Consultants/Other Providers [Y/N]: Y  Prior or Outpatient Records Reviewed [Y/N]: Y

## 2023-12-15 NOTE — BH CONSULTATION LIAISON ASSESSMENT NOTE - SUMMARY
Patient is a 68 years old male with the past hx of  BPH, agitation, ?dementia, dysphagia after CVA now s/p PEG tube, and ?CHF presenting from [facility] for worsening agitation over the past few weeks. As per Ed note "Per call with [facility] at [phone], reached the supervisor for further collateral. She describes that patient has been having worsening agitation ever since he arrived, with aggression towards staff and nurses. She is unaware of any dementia or psychosis diagnosis for the patient, but does note that he originally arrived to the facility with Quetiapine and Clonazepam in his medications. At baseline, she does not think he ever answers questions appropriately and is noted to be relatively agitated normally, which worsened recently. Per ED note, triage notes hypoxia but discussion with nursing at NH and on arrival patient was not hypoxic."  Impression: patient without past psych hx with recently started agitation and aggressive behavior, executive and memory disfunction, confusion, poor attention span raises concern for delirium vs new case of early dementia.  Recommendations:  -Observation: as per primary team  - MRI brain   - Supportive treatment of delirium: 1) Minimize use of benzodiazepines, opioids, anticholinergics, and other deliriogenic agents whenever possible.  2) Maintain sleep wake cycle.  3) Provide frequent reorientation and redirection.  4) Family member at bedside if possible. 5) Provide corrective lenses/hearing aids if required   - Medications: Continue Quetiapine 225mg BID, Hold if QTC >500,  		START Clonazepam 1 mg at bed time to prevent benzos withdrawal  		INCREASE Depakote from 125 mg BID to 250 mg BID for management of agitation, GERMAINE in 3 days ( hold morning dose prior to blood draw)  -PRN for agitation: Zyprexa 2.5 mg Q 6 hours IM. Can add zyprexa 2.5 mg for refractory agitation, combativeness	. 	 Patient is a 68 years old male with the past hx of  BPH, agitation, ?dementia, dysphagia after CVA now s/p PEG tube, and ?CHF presenting from [facility] for worsening agitation over the past few weeks. As per Ed note "Per call with [facility] at [phone], reached the supervisor for further collateral. She describes that patient has been having worsening agitation ever since he arrived, with aggression towards staff and nurses. She is unaware of any dementia or psychosis diagnosis for the patient, but does note that he originally arrived to the facility with Quetiapine and Clonazepam in his medications. At baseline, she does not think he ever answers questions appropriately and is noted to be relatively agitated normally, which worsened recently. Per ED note, triage notes hypoxia but discussion with nursing at NH and on arrival patient was not hypoxic."  Impression: patient without past psych hx with recently started agitation and aggressive behavior, executive and memory disfunction, confusion, poor attention span raises concern for delirium vs new case of early dementia.  Recommendations:  -Observation: as per primary team  - MRI brain   - Supportive treatment of delirium: 1) Minimize use of benzodiazepines, opioids, anticholinergics, and other deliriogenic agents whenever possible.  2) Maintain sleep wake cycle.  3) Provide frequent reorientation and redirection.  4) Family member at bedside if possible. 5) Provide corrective lenses/hearing aids if required   - Medications: Continue Quetiapine 225mg BID, Hold if QTC >500,  		START Clonazepam 1 mg at bed time to prevent benzos withdrawal  		INCREASE Depakote from 125 mg BID to 250 mg BID for management of agitation, GERMAINE in 3 days ( hold morning dose prior to blood draw)  -PRN for agitation: Zyprexa 2.5 mg Q 6 hours IM. Can add zyprexa 2.5 mg for refractory agitation, combativeness	. 	 Patient is a 68 years old male with the past hx of  BPH, agitation, as per chart ?dementia, dysphagia after CVA now s/p PEG tube, and ?CHF presenting from [facility] for worsening agitation over the past few weeks. Patient received  Benadryl 50mg IV, Haldol 2.5 IVP x2, Ativan 2mg IVP x2, Versed 2mg IVP x1, 5mg IVP x1, and Zyprexa 2.5mg IM for agitation. Psychiatry consulted for agitation/medication management.    Impression: patient without past psych hx with recently started agitation and aggressive behavior, possible executive and memory dysfunction, confusion, poor attention span raises concern for delirium vs r/o new case of early dementia.    Recommendations:  - Observation: as per primary team  - Delirium work up as per team, check TSH, B12, Folate, UA, MRI brain   - Medications:   Continue Quetiapine 225mg BID, Hold if QTC >500,  		Decrease Clonazepam 1 mg at bed time to prevent benzos withdrawal (as per med rec in chart, outpatient dose is Klonopin 1mg BID)  		INCREASE Depakote from 125 mg BID to 250 mg BID for management of agitation, VPA in 3 days ( hold morning dose prior to blood draw)  -PRN for agitation: Zyprexa 2.5 mg Q 6 hours PO/IM. Can give addiotional Zyprexa 2.5 mg for refractory agitation, combativeness (ensure qtc <500)  - Supportive treatment of delirium: 1) Minimize use of benzodiazepines, opioids, anticholinergics, and other deliriogenic agents whenever possible.  2) Maintain sleep wake cycle.  3) Provide frequent reorientation and redirection.  4) Family member at bedside if possible. 5) Provide corrective lenses/hearing aids if required  Patient is a 68 years old male with the past hx of  BPH, agitation, as per chart ?dementia, dysphagia after CVA now s/p PEG tube, and ?CHF presenting from [facility] for worsening agitation over the past few weeks. Patient received  Benadryl 50mg IV, Haldol 2.5 IVP x2, Ativan 2mg IVP x2, Versed 2mg IVP x1, 5mg IVP x1, and Zyprexa 2.5mg IM for agitation. Psychiatry consulted for agitation/medication management.    Impression: patient without past psych hx with recently started agitation and aggressive behavior, possible executive and memory dysfunction, confusion, poor attention span raises concern for delirium vs r/o new case of early dementia.    Recommendations:  - Observation: as per primary team  - Delirium work up as per team, check TSH, B12, Folate, UA, MRI brain   - Medications:   Continue Quetiapine 225mg BID, Hold if QTC >500,  		Decrease Clonazepam 1 mg at bed time to prevent benzos withdrawal (as per med rec in chart, outpatient dose is Klonopin 1mg BID)  		INCREASE Depakote from 125 mg BID to 250 mg BID for management of agitation, VPA in 3 days ( hold morning dose prior to blood draw)  -PRN for agitation: Zyprexa 2.5 mg Q 6 hours PO/IM. Can give addiotional Zyprexa 2.5 mg for refractory agitation, combativeness (ensure qtc <500)  - Supportive treatment of delirium: 1) Minimize use of benzodiazepines, opioids, anticholinergics, and other deliriogenic agents whenever possible.  2) Maintain sleep wake cycle.  3) Provide frequent reorientation and redirection.  4) Family member at bedside if possible. 5) Provide corrective lenses/hearing aids if required  Patient is a 68 years old male with the past hx of  BPH, agitation, as per chart ?dementia, dysphagia after CVA now s/p PEG tube, and ?CHF presenting from [facility] for worsening agitation over the past few weeks. Patient received  Benadryl 50mg IV, Haldol 2.5 IVP x2, Ativan 2mg IVP x2, Versed 2mg IVP x1, 5mg IVP x1, and Zyprexa 2.5mg IM for agitation. Psychiatry consulted for agitation/medication management.    Impression: patient without past psych hx with recently started agitation and aggressive behavior, possible executive and memory dysfunction, confusion, poor attention span raises concern for delirium vs r/o new case of early dementia.    Recommendations:  - Observation: as per primary team  - Delirium work up as per team, check TSH, B12, Folate, UA, MRI brain   - Medications:   Continue Quetiapine 225mg BID, Hold if QTC >500,  		Decrease Clonazepam 1 mg at bed time to prevent benzos withdrawal (as per med rec in chart, outpatient dose is Klonopin 1mg BID)  		INCREASE Depakote from 125 mg BID to 250 mg BID for management of agitation, monitor LFTs and platelets, VPA in 3 days ( hold morning dose prior to blood draw)  -PRN for agitation: Zyprexa 2.5 mg Q 6 hours PO/IM. Can give addiotional Zyprexa 2.5 mg for refractory agitation, combativeness (ensure qtc <500)  - Supportive treatment of delirium: 1) Minimize use of benzodiazepines, opioids, anticholinergics, and other deliriogenic agents whenever possible.  2) Maintain sleep wake cycle.  3) Provide frequent reorientation and redirection.  4) Family member at bedside if possible. 5) Provide corrective lenses/hearing aids if required  Patient is a 68 years old male with the past hx of  BPH, agitation, as per chart ?dementia, dysphagia after CVA now s/p PEG tube, and ?CHF presenting from [facility] for worsening agitation over the past few weeks. Patient received  Benadryl 50mg IV, Haldol 2.5 IVP x2, Ativan 2mg IVP x2, Versed 2mg IVP x1, 5mg IVP x1, and Zyprexa 2.5mg IM for agitation. Psychiatry consulted for agitation/medication management.    Impression: patient without past psych hx with recently started agitation and aggressive behavior, possible executive and memory dysfunction, confusion, poor attention span raises concern for delirium vs r/o new case of early dementia.    Recommendations:  - Observation: as per primary team  - Delirium work up as per team, check TSH, B12, Folate, UA, MRI brain   - Medications:   Continue Quetiapine 225mg BID, Hold if QTC >500,  		Decrease Clonazepam 1 mg at bed time to prevent benzos withdrawal (as per med rec in chart, outpatient dose is Klonopin 1mg BID)  		INCREASE Depakote from 125 mg BID to 250 mg BID for management of agitation, monitor LFTs and platelets, VPA in 3 days ( hold morning dose prior to blood draw)  -PRN for agitation: Zyprexa 2.5 mg Q 6 hours PO/IM. Can give addiotional Zyprexa 2.5 mg for refractory agitation, combativeness (ensure qtc <500)  - Supportive treatment of delirium: 1) Minimize use of benzodiazepines, opioids, anticholinergics, and other deliriogenic agents whenever possible.  2) Maintain sleep wake cycle.  3) Provide frequent reorientation and redirection.  4) Family member at bedside if possible. 5) Provide corrective lenses/hearing aids if required

## 2023-12-16 NOTE — DIETITIAN INITIAL EVALUATION ADULT - PERTINENT MEDS FT
MEDICATIONS  (STANDING):  albuterol/ipratropium for Nebulization 3 milliLiter(s) Nebulizer every 6 hours  ceFAZolin   IVPB      ceFAZolin   IVPB 2000 milliGRAM(s) IV Intermittent every 8 hours  clonazePAM  Tablet 1 milliGRAM(s) Oral at bedtime  dextrose 5% + sodium chloride 0.9%. 1000 milliLiter(s) (75 mL/Hr) IV Continuous <Continuous>  divalproex Sprinkle 250 milliGRAM(s) Oral two times a day  enoxaparin Injectable 40 milliGRAM(s) SubCutaneous every 24 hours  influenza  Vaccine (HIGH DOSE) 0.7 milliLiter(s) IntraMuscular once  lactulose Syrup 20 Gram(s) Oral two times a day  melatonin 3 milliGRAM(s) Oral at bedtime  midodrine 10 milliGRAM(s) Oral every 8 hours  QUEtiapine 225 milliGRAM(s) Oral two times a day  tamsulosin 0.4 milliGRAM(s) Oral at bedtime    MEDICATIONS  (PRN):  acetaminophen     Tablet .. 650 milliGRAM(s) Oral every 6 hours PRN Temp greater or equal to 38C (100.4F), Mild Pain (1 - 3)  OLANZapine Injectable 2.5 milliGRAM(s) IntraMuscular every 6 hours PRN severe agitation  senna 2 Tablet(s) Oral at bedtime PRN Constipation

## 2023-12-16 NOTE — DIETITIAN INITIAL EVALUATION ADULT - MALNUTRITION
Severe protein calorie malnutrition in context of chronic illness  Moderate protein calorie malnutrition in context of chronic illness

## 2023-12-16 NOTE — DIETITIAN INITIAL EVALUATION ADULT - NS FNS DIET ORDER
Diet, NPO with Tube Feed:   Tube Feeding Modality: Gastro-Jejunostomy  Jevity 1.5 Reid (JEVITY1.5RTH)  Total Volume for 24 Hours (mL): 1422  Bolus  Total Volume of Bolus (mL):  237  Total # of Feeds: 5  Tube Feed Frequency: Every 4 hours   Tube Feed Start Time: 18:00  Bolus Feed Rate (mL per Hour): 237   Bolus Feed Duration (in Hours): 0  Bolus Feed Instructions:  Fedding time at: 6AM, 10AM, 2PM, 6PM, 10PM  Free Water Flush  Bolus   Total Volume per Flush (mL): 150   Frequency: Every 8 Hours (12-15-23 @ 12:36)

## 2023-12-16 NOTE — DIETITIAN INITIAL EVALUATION ADULT - NAME AND PHONE
Kamala Espinal, DANTE #[id number], also available on [employer].  Kamala Espinal, DANTE #[id number], also available on [employer].

## 2023-12-16 NOTE — DIETITIAN INITIAL EVALUATION ADULT - PROBLEM SELECTOR PLAN 6
Paper-work mentions possible history of chronic systolic CHF though patient not on any GDMT regarding medications list provided, unclear if true PMH  - proBNP 124  - breath sounds appear more from secretions than crackles, no pitting edema nor JVD noted on patient, does not appear volume-overloaded  - f/u collateral with family in AM

## 2023-12-16 NOTE — DIETITIAN INITIAL EVALUATION ADULT - ORAL INTAKE PTA/DIET HISTORY
Pt AxOx1 with limited nutrition information obtained. Pt reported he was able to tolerate food orally PTA. [facility] As pre [facility]hab documentation, pt noted was on RUBEN ground with thin liquids. Pt was supplemented with LPS 4x daily (400kcal, 60gm pro) PTA. Pt noted with PMH of dysphagia, s/p PEG placement.

## 2023-12-16 NOTE — DIETITIAN INITIAL EVALUATION ADULT - PROBLEM SELECTOR PLAN 4
History of dysphagia s/p PEG tube, unclear if original etiology from progressive dementia vs. acute CVA (appears to be CVA from paper-work)  - also with notable secretions noted on exam, suctioned at bedside  - likely high aspiration and micro-aspirational risk from his own secretions, CXR clear currently  - on NH paper-work, was on regular low sodium diet with thin liquids  - aspiration precautions  - f/u speech and swallow eval

## 2023-12-16 NOTE — DIETITIAN INITIAL EVALUATION ADULT - ADD RECOMMEND
1) Recommend increase TF bolus to 6x daily with 237ml/each time, total volume 1422ml/day This TF regimen provides total 2133kcal,  91gm pro, 1081ml of free water.  2) Recommend continue monitor TF tolerance, and monitor BMs. Cont. to provide bowel regimen as needed.   3) RD to remain available for further nutritional interventions as indicated.  1) When medically feasible, recommend Jevity 1.5 bolus feeding via GJ tube 237ml/each feeding x 6 times to provided total volume 1422ml/day This TF regimen provides total 2133kcal,  91gm pro, 1081ml of free water.  2) Recommend continue monitor TF tolerance, and monitor BMs. Cont. to provide bowel regimen as needed.   3) Recommend when medically feasible consider reconsult SLP for swallow Eval.   4) RD to remain available for further nutritional interventions as indicated.

## 2023-12-16 NOTE — BH CONSULTATION LIAISON PROGRESS NOTE - NSBHASSESSMENTFT_PSY_ALL_CORE
Patient is a 68 years old male with the past hx of  BPH, agitation, as per chart ?dementia, dysphagia after CVA now s/p PEG tube, and ?CHF presenting from [facility] for worsening agitation over the past few weeks. Patient received  Benadryl 50mg IV, Haldol 2.5 IVP x2, Ativan 2mg IVP x2, Versed 2mg IVP x1, 5mg IVP x1, and Zyprexa 2.5mg IM for agitation. Psychiatry consulted for agitation/medication management.    Impression: patient without past psych hx with recently started agitation and aggressive behavior, possible executive and memory dysfunction, confusion, poor attention span raises concern for delirium vs r/o new case of early dementia.    12/16 Pt today irritable and uncooperative on exam, limiting evaluation. Altho pt refuses to participate in orientation qs, he appears confused given his persistent requests for PO breakfast despite being reminded of PEG. Req'd 2x PRN yd, none sine 1800 yd, no significant agitation overnight.    Recommendations:  - Observation: as per primary team  - Delirium work up as per team, check TSH, B12, Folate, UA, MRI brain   - Medications:   Continue Quetiapine 225mg BID, Hold if QTC >500,  		continue Clonazepam 1 mg at bed time to prevent benzos withdrawal (as per med rec in chart, outpatient dose is Klonopin 1mg BID)  		continue Depakote 250 mg BID for management of agitation, monitor LFTs and platelets, VPA in 2 days ( hold morning dose prior to blood draw)  -PRN for agitation: Zyprexa 2.5 mg Q 6 hours PO/IM. Can give addiotional Zyprexa 2.5 mg for refractory agitation, combativeness (ensure qtc <500)  - Supportive treatment of delirium: 1) Minimize use of benzodiazepines, opioids, anticholinergics, and other deliriogenic agents whenever possible.  2) Maintain sleep wake cycle.  3) Provide frequent reorientation and redirection.  4) Family member at bedside if possible. 5) Provide corrective lenses/hearing aids if required   Patient is a 68 years old male with the past hx of  BPH, agitation, as per chart ?dementia, dysphagia after CVA now s/p PEG tube, and ?CHF presenting from [facility] for worsening agitation over the past few weeks. Patient received  Benadryl 50mg IV, Haldol 2.5 IVP x2, Ativan 2mg IVP x2, Versed 2mg IVP x1, 5mg IVP x1, and Zyprexa 2.5mg IM for agitation. Psychiatry consulted for agitation/medication management.    Impression: patient without past psych hx with recently started agitation and aggressive behavior, possible executive and memory dysfunction, confusion, poor attention span raises concern for delirium vs r/o new case of early dementia.    12/16 Pt today irritable and uncooperative on exam, limiting evaluation. Altho pt refuses to participate in orientation qs, he appears confused given his persistent requests for PO breakfast despite being reminded of PEG. Req'd 2x PRN yd, none sine 1800 yd, no significant agitation overnight.    Recommendations:  - Observation: as per primary team  - Delirium work up as per team, check TSH, B12, Folate, UA, MRI brain   - Medications:   Continue Quetiapine 225mg BID, Hold if QTC >500,  		continue Clonazepam 1 mg at bed time to prevent benzos withdrawal (as per med rec in chart, outpatient dose is Klonopin 1mg BID)  		continue Depakote 250 mg BID for management of agitation, monitor LFTs and platelets, VPA in 2 days ( hold morning dose prior to blood draw)  -PRN for agitation: Zyprexa 2.5 mg Q 6 hours PO/IM. Can give addiotional Zyprexa 2.5 mg for refractory agitation, combativeness (ensure qtc <500)  - Supportive treatment of delirium: 1) Minimize use of benzodiazepines, opioids, anticholinergics, and other deliriogenic agents whenever possible.  2) Maintain sleep wake cycle.  3) Provide frequent reorientation and redirection.  4) Family member at bedside if possible. 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 Patient received  Benadryl 50mg IV, Haldol 2.5 IVP x2, Ativan 2mg IVP x2, Versed 2mg IVP x1, 5mg IVP x1, and Zyprexa 2.5mg IM for agitation. Psychiatry consulted for agitation/medication management.    Impression: patient without past psych hx with recently started agitation and aggressive behavior, possible executive and memory dysfunction, confusion, poor attention span raises concern for delirium vs r/o new case of early dementia.    12/16 Pt today irritable and uncooperative on exam, limiting evaluation. Although pt refuses to participate in orientation questions, he appears confused given his persistent requests for PO breakfast despite being reminded of PEG. Req'd 2x PRN yesterday, no significant agitation overnight.    Recommendations:  - Observation: as per primary team  - Delirium work up as per team, check TSH, B12, Folate, UA, MRI brain   - Medications:   Continue Quetiapine 225mg BID, Hold if QTC >500,  		continue Clonazepam 1 mg at bed time to prevent benzos withdrawal (as per med rec in chart, outpatient dose is Klonopin 1mg BID)  		continue Depakote 250 mg BID for management of agitation, monitor LFTs and platelets, VPA level in 2 days ( hold morning dose prior to blood draw)  -PRN for agitation: Zyprexa 2.5 mg Q 6 hours PO/IM. Can give addiotional Zyprexa 2.5 mg for refractory agitation, combativeness (ensure qtc <500)    - Supportive treatment of delirium: 1) Minimize use of benzodiazepines, opioids, anticholinergics, and other deliriogenic agents whenever possible.  2) Maintain sleep wake cycle.  3) Provide frequent reorientation and redirection.  4) Family member at bedside if possible. 5) Provide corrective lenses/hearing aids if required   Patient is a 68 years old male with the past hx of  BPH, agitation, as per chart ?dementia, dysphagia after CVA now s/p PEG tube, and ?CHF presenting from [facility] for worsening agitation over the past few weeks. Patient received  Benadryl 50mg IV, Haldol 2.5 IVP x2, Ativan 2mg IVP x2, Versed 2mg IVP x1, 5mg IVP x1, and Zyprexa 2.5mg IM for agitation. Psychiatry consulted for agitation/medication management.    Impression: patient without past psych hx with recently started agitation and aggressive behavior, possible executive and memory dysfunction, confusion, poor attention span raises concern for delirium vs r/o new case of early dementia.    12/16 Pt today irritable and uncooperative on exam, limiting evaluation. Although pt refuses to participate in orientation questions, he appears confused given his persistent requests for PO breakfast despite being reminded of PEG. Req'd 2x PRN yesterday, no significant agitation overnight.    Recommendations:  - Observation: as per primary team  - Delirium work up as per team, check TSH, B12, Folate, UA, MRI brain   - Medications:   Continue Quetiapine 225mg BID, Hold if QTC >500,  		continue Clonazepam 1 mg at bed time to prevent benzos withdrawal (as per med rec in chart, outpatient dose is Klonopin 1mg BID)  		continue Depakote 250 mg BID for management of agitation, monitor LFTs and platelets, VPA level in 2 days ( hold morning dose prior to blood draw)  -PRN for agitation: Zyprexa 2.5 mg Q 6 hours PO/IM. Can give addiotional Zyprexa 2.5 mg for refractory agitation, combativeness (ensure qtc <500)    - Supportive treatment of delirium: 1) Minimize use of benzodiazepines, opioids, anticholinergics, and other deliriogenic agents whenever possible.  2) Maintain sleep wake cycle.  3) Provide frequent reorientation and redirection.  4) Family member at bedside if possible. 5) Provide corrective lenses/hearing aids if required

## 2023-12-16 NOTE — BH CONSULTATION LIAISON PROGRESS NOTE - NSBHFUPINTERVALHXFT_PSY_A_CORE
Increased depakote to 250 BID. Required PRN Zyprexa 2.5mg IM 12/15 at 0900 and 1800 for agitation.    Chart reviewed. Met w patient. Patient is irritable and uncooperative. He refuses to answer any psych-related questions including refusal to answer orientation, mood, sleep, AVH, SHIIP. He asks when he will return to NH. Requests breakfast and becomes irritable when informed that he requires tube feeding and states writer is "full of baloney" and demonstrates playing "the tiniest violin" and dismisses writer. He remains in bed throughout interview w/o physical agitation or disorganized behaviors.    Per discussion w 1:1, pt has presented similarly irritable throughout shift. When being washed pt was verbally irritable, not physically aggressive, and multiple times attempted to step out of bed during bathing. Increased depakote to 250 BID. Required PRN Zyprexa 2.5mg IM 12/15 at 9:44 and 18:35 for agitation.    Chart reviewed. Met w patient. Patient is irritable and uncooperative. He refuses to answer any psych-related questions including refusal to answer orientation, mood, sleep, AVH, SHIIP. He asks when he will return to NH. Requests breakfast and becomes irritable when informed that he requires tube feeding and states writer is "full of baloney" and demonstrates playing "the tiniest violin" and dismisses writer. He remains in bed throughout interview w/o physical agitation or disorganized behaviors.    Per discussion w 1:1, pt has presented similarly irritable throughout shift. When being washed pt was verbally irritable, not physically aggressive, and multiple times attempted to step out of bed during bathing.

## 2023-12-16 NOTE — PROGRESS NOTE ADULT - PROBLEM SELECTOR PLAN 3
Presents from nursing home for worsening agitation and aggression to staff. Unclear if worsening dementia vs. mood disorder vs encephalopathy contributing to agitation  - will work up for medical issues as above  - also likely exacerbated by component of delirium, delirium precautions and cont melatonin  - QTc 444 on admission, cont monitoring EKGs for QTc after antipsychotics  - cont with home seroquel 225mg BID  - cont with home depakote 125mg BID  - Zyprexa IM for severe agitation when pt refusing oral medications  - will hold off home Clonazepam for now given Zyprexa administration  - Behavioral health c/s in AM for worsening agitation and medication management Presents from nursing home for worsening agitation and aggression to staff. Unclear if worsening dementia vs. mood disorder vs encephalopathy contributing to agitation  - will work up for medical issues as above  - also likely exacerbated by component of delirium, delirium precautions and cont melatonin  - QTc 444 on admission, cont monitoring EKGs for QTc after antipsychotics  - cont with home seroquel 225mg BID  - cont with home depakote 125mg BID  - Zyprexa IM for severe agitation when pt refusing oral medications  - will hold off home Clonazepam for now given Zyprexa administration  - Behavioral health input appreciated.

## 2023-12-16 NOTE — DIETITIAN INITIAL EVALUATION ADULT - PROBLEM SELECTOR PLAN 2
Patent with facial redness non-sparing nasolabial folds, possibly asymmetric and warm compared to rest of body, possibly new for patient ([facility] supervisor did not notice redness but only sees patient intermittently)  - will start Cefazolin  - assess response to abx Patent with facial redness non-sparing nasolabial folds, possibly asymmetric and warm compared to rest of body, possibly new for patient ([facility] supervisor did not notice redness but only sees patient intermittently)  - will start Cefazolin  - assess response to abx

## 2023-12-16 NOTE — PROGRESS NOTE ADULT - PROBLEM SELECTOR PLAN 4
History of dysphagia s/p PEG tube, unclear if original etiology from progressive dementia vs. acute CVA (appears to be CVA from paper-work)  - also with notable secretions noted on exam, suctioned at bedside  - likely high aspiration and micro-aspirational risk from his own secretions, CXR clear currently  - on NH paper-work, was on regular low sodium diet with thin liquids  - aspiration precautions  - f/u speech and swallow eval - unable to participate in eval, and there was gurgling during evaluation.   - can use PEG for tube feed and meds for now, nutrition consult. History of dysphagia s/p PEG tube, unclear if original etiology from progressive dementia vs. acute CVA (appears to be CVA from paper-work)  - also with notable secretions noted on exam, suctioned at bedside  - likely high aspiration and micro-aspirational risk from his own secretions, CXR clear currently  - on NH paper-work, was on regular low sodium diet with thin liquids  - aspiration precautions  - f/u speech and swallow eval - unable to participate in eval, and there was gurgling during evaluation.   - can use PEG for tube feed and meds for now, nutrition consult.  - re-evaluate with SLP given improvement in his mental status.

## 2023-12-16 NOTE — DIETITIAN NUTRITION RISK NOTIFICATION - TREATMENT: THE FOLLOWING DIET HAS BEEN RECOMMENDED
Diet, NPO with Tube Feed:   Tube Feeding Modality: Gastro-Jejunostomy  Jevity 1.5 Reid (JEVITY1.5RTH)  Total Volume for 24 Hours (mL): 1422  Bolus  Total Volume of Bolus (mL):  237  Total # of Feeds: 5  Tube Feed Frequency: Every 4 hours   Tube Feed Start Time: 18:00  Bolus Feed Rate (mL per Hour): 237   Bolus Feed Duration (in Hours): 0  Bolus Feed Instructions:  Fedding time at: 6AM, 10AM, 2PM, 6PM, 10PM  Free Water Flush  Bolus   Total Volume per Flush (mL): 150   Frequency: Every 8 Hours (12-15-23 @ 12:36) [Active]       DISPLAY PLAN FREE TEXT

## 2023-12-16 NOTE — PROGRESS NOTE ADULT - SUBJECTIVE AND OBJECTIVE BOX
[facility]  Manas Samuel MD  In House Pager [phone]    Patient is a 68y old  Male who presents with a chief complaint of Agitation, aggression (15 Dec 2023 11:56)    SUBJECTIVE / OVERNIGHT EVENTS: no acute events. received PRN meds afternoon for agitation.     ROS: Denied Fever, Chill, CP, SOB, Abd pain, N/V/D, LE swelling or pain.     MEDICATIONS  (STANDING):  albuterol/ipratropium for Nebulization 3 milliLiter(s) Nebulizer every 6 hours  ceFAZolin   IVPB      ceFAZolin   IVPB 2000 milliGRAM(s) IV Intermittent every 8 hours  clonazePAM  Tablet 1 milliGRAM(s) Oral at bedtime  dextrose 5% + sodium chloride 0.9%. 1000 milliLiter(s) (75 mL/Hr) IV Continuous <Continuous>  divalproex Sprinkle 250 milliGRAM(s) Oral two times a day  enoxaparin Injectable 40 milliGRAM(s) SubCutaneous every 24 hours  influenza  Vaccine (HIGH DOSE) 0.7 milliLiter(s) IntraMuscular once  lactulose Syrup 20 Gram(s) Oral two times a day  melatonin 3 milliGRAM(s) Oral at bedtime  midodrine 10 milliGRAM(s) Oral every 8 hours  QUEtiapine 225 milliGRAM(s) Oral two times a day  tamsulosin 0.4 milliGRAM(s) Oral at bedtime    MEDICATIONS  (PRN):  acetaminophen     Tablet .. 650 milliGRAM(s) Oral every 6 hours PRN Temp greater or equal to 38C (100.4F), Mild Pain (1 - 3)  OLANZapine Injectable 2.5 milliGRAM(s) IntraMuscular every 6 hours PRN severe agitation  senna 2 Tablet(s) Oral at bedtime PRN Constipation    CAPILLARY BLOOD GLUCOSE      POCT Blood Glucose.: 87 mg/dL (16 Dec 2023 06:47)  POCT Blood Glucose.: 104 mg/dL (16 Dec 2023 00:56)  POCT Blood Glucose.: 100 mg/dL (15 Dec 2023 18:12)  POCT Blood Glucose.: 93 mg/dL (15 Dec 2023 12:42)    I&O's Summary    15 Dec 2023 07:01  -  16 Dec 2023 07:00  --------------------------------------------------------  IN: 237 mL / OUT: 0 mL / NET: 237 mL        Vital Signs Last 24 Hrs  T(C): 36.6 (16 Dec 2023 05:57), Max: 37.1 (15 Dec 2023 17:30)  T(F): 97.8 (16 Dec 2023 05:57), Max: 98.7 (15 Dec 2023 17:30)  HR: 81 (16 Dec 2023 05:57) (72 - 109)  BP: 112/53 (16 Dec 2023 05:57) (95/64 - 140/78)  BP(mean): --  RR: 18 (16 Dec 2023 05:57) (17 - 20)  SpO2: 95% (16 Dec 2023 05:57) (95% - 98%)    Parameters below as of 16 Dec 2023 05:57  Patient On (Oxygen Delivery Method): room air    Physical Exam:  GENERAL: no apparent distress  CHEST/LUNG: on RA; Clear to auscultation bilaterally; No wheezing; No crackles  HEART: Regular rate and rhythm; S1/S2 wnl; no obvious murmurs  ABDOMEN: Soft, ?tender on palpation, no guarding; Nondistended; Bowel sounds present  EXTREMITIES:  2+ Peripheral Pulses, No edema  PSYCH: normal affect, calm demeanor    LABS:                        14.0   9.04  )-----------( 151      ( 16 Dec 2023 03:05 )             42.6     12-16    141  |  104  |  13  ----------------------------<  114<H>  4.4   |  27  |  0.75    Ca    8.8      16 Dec 2023 03:05  Phos  3.5     12-16  Mg     2.10     12-16    TPro  7.2  /  Alb  3.4  /  TBili  0.4  /  DBili  x   /  AST  20  /  ALT  12  /  AlkPhos  87  12-14          Urinalysis Basic - ( 16 Dec 2023 03:05 )    Color: x / Appearance: x / SG: x / pH: x  Gluc: 114 mg/dL / Ketone: x  / Bili: x / Urobili: x   Blood: x / Protein: x / Nitrite: x   Leuk Esterase: x / RBC: x / WBC x   Sq Epi: x / Non Sq Epi: x / Bacteria: x        Culture - Blood (collected 14 Dec 2023 23:45)  Source: .Blood Blood-Peripheral  Preliminary Report (16 Dec 2023 05:01):    No growth at 24 hours    Culture - Blood (collected 14 Dec 2023 23:30)  Source: .Blood Blood-Venous  Preliminary Report (16 Dec 2023 05:01):    No growth at 24 hours    Culture - Urine (collected 14 Dec 2023 15:00)  Source: Catheterized Catheterized  Final Report (15 Dec 2023 19:28):    No growth      RADIOLOGY & ADDITIONAL TESTS:  Results Reviewed: Y  Imaging Personally Reviewed: Y  Electrocardiogram Personally Reviewed: Y    COORDINATION OF CARE:  Care Discussed with Consultants/Other Providers [Y/N]: Y  Prior or Outpatient Records Reviewed [Y/N]: Y   [facility]  Manas Samuel MD  In House Pager [phone]    Patient is a 68y old  Male who presents with a chief complaint of Agitation, aggression (15 Dec 2023 11:56)    SUBJECTIVE / OVERNIGHT EVENTS: no acute events. received PRN meds afternoon for agitation.     ROS: Denied Fever, Chill, CP, SOB, Abd pain, N/V/D, LE swelling or pain.     MEDICATIONS  (STANDING):  albuterol/ipratropium for Nebulization 3 milliLiter(s) Nebulizer every 6 hours  ceFAZolin   IVPB      ceFAZolin   IVPB 2000 milliGRAM(s) IV Intermittent every 8 hours  clonazePAM  Tablet 1 milliGRAM(s) Oral at bedtime  dextrose 5% + sodium chloride 0.9%. 1000 milliLiter(s) (75 mL/Hr) IV Continuous <Continuous>  divalproex Sprinkle 250 milliGRAM(s) Oral two times a day  enoxaparin Injectable 40 milliGRAM(s) SubCutaneous every 24 hours  influenza  Vaccine (HIGH DOSE) 0.7 milliLiter(s) IntraMuscular once  lactulose Syrup 20 Gram(s) Oral two times a day  melatonin 3 milliGRAM(s) Oral at bedtime  midodrine 10 milliGRAM(s) Oral every 8 hours  QUEtiapine 225 milliGRAM(s) Oral two times a day  tamsulosin 0.4 milliGRAM(s) Oral at bedtime    MEDICATIONS  (PRN):  acetaminophen     Tablet .. 650 milliGRAM(s) Oral every 6 hours PRN Temp greater or equal to 38C (100.4F), Mild Pain (1 - 3)  OLANZapine Injectable 2.5 milliGRAM(s) IntraMuscular every 6 hours PRN severe agitation  senna 2 Tablet(s) Oral at bedtime PRN Constipation    CAPILLARY BLOOD GLUCOSE      POCT Blood Glucose.: 87 mg/dL (16 Dec 2023 06:47)  POCT Blood Glucose.: 104 mg/dL (16 Dec 2023 00:56)  POCT Blood Glucose.: 100 mg/dL (15 Dec 2023 18:12)  POCT Blood Glucose.: 93 mg/dL (15 Dec 2023 12:42)    I&O's Summary    15 Dec 2023 07:01  -  16 Dec 2023 07:00  --------------------------------------------------------  IN: 237 mL / OUT: 0 mL / NET: 237 mL        Vital Signs Last 24 Hrs  T(C): 36.6 (16 Dec 2023 05:57), Max: 37.1 (15 Dec 2023 17:30)  T(F): 97.8 (16 Dec 2023 05:57), Max: 98.7 (15 Dec 2023 17:30)  HR: 81 (16 Dec 2023 05:57) (72 - 109)  BP: 112/53 (16 Dec 2023 05:57) (95/64 - 140/78)  BP(mean): --  RR: 18 (16 Dec 2023 05:57) (17 - 20)  SpO2: 95% (16 Dec 2023 05:57) (95% - 98%)    Parameters below as of 16 Dec 2023 05:57  Patient On (Oxygen Delivery Method): room air    Physical Exam:  GENERAL: no apparent distress  CHEST/LUNG: on RA; Clear to auscultation bilaterally; No wheezing; No crackles  HEART: Regular rate and rhythm; S1/S2 wnl; no obvious murmurs  ABDOMEN: Soft, ?tender on palpation, no guarding; Nondistended; Bowel sounds present  EXTREMITIES:  2+ Peripheral Pulses, No edema  PSYCH: normal affect, calm demeanor    LABS:                        14.0   9.04  )-----------( 151      ( 16 Dec 2023 03:05 )             42.6     12-16    141  |  104  |  13  ----------------------------<  114<H>  4.4   |  27  |  0.75    Ca    8.8      16 Dec 2023 03:05  Phos  3.5     12-16  Mg     2.10     12-16    TPro  7.2  /  Alb  3.4  /  TBili  0.4  /  DBili  x   /  AST  20  /  ALT  12  /  AlkPhos  87  12-14          Urinalysis Basic - ( 16 Dec 2023 03:05 )    Color: x / Appearance: x / SG: x / pH: x  Gluc: 114 mg/dL / Ketone: x  / Bili: x / Urobili: x   Blood: x / Protein: x / Nitrite: x   Leuk Esterase: x / RBC: x / WBC x   Sq Epi: x / Non Sq Epi: x / Bacteria: x        Culture - Blood (collected 14 Dec 2023 23:45)  Source: .Blood Blood-Peripheral  Preliminary Report (16 Dec 2023 05:01):    No growth at 24 hours    Culture - Blood (collected 14 Dec 2023 23:30)  Source: .Blood Blood-Venous  Preliminary Report (16 Dec 2023 05:01):    No growth at 24 hours    Culture - Urine (collected 14 Dec 2023 15:00)  Source: Catheterized Catheterized  Final Report (15 Dec 2023 19:28):    No growth      RADIOLOGY & ADDITIONAL TESTS:  Results Reviewed: Y  Imaging Personally Reviewed: Y  Electrocardiogram Personally Reviewed: Y    COORDINATION OF CARE:  Care Discussed with Consultants/Other Providers [Y/N]: Y  Prior or Outpatient Records Reviewed [Y/N]: Y   [facility] Division of Hospital Medicine  Manas Samuel MD  In House Pager [phone]    Patient is a 68y old  Male who presents with a chief complaint of Agitation, aggression (15 Dec 2023 11:56)    SUBJECTIVE / OVERNIGHT EVENTS: no acute events. received PRN meds afternoon for agitation. seen this morning, he appears to be confused and asking how he got to the hospital. His last recognition was being at Rehab. He is asking for food as he was eating at Rehab.     ROS: Denied Fever, Chill, CP, SOB, Abd pain, N/V/D, LE swelling or pain.     MEDICATIONS  (STANDING):  albuterol/ipratropium for Nebulization 3 milliLiter(s) Nebulizer every 6 hours  ceFAZolin   IVPB      ceFAZolin   IVPB 2000 milliGRAM(s) IV Intermittent every 8 hours  clonazePAM  Tablet 1 milliGRAM(s) Oral at bedtime  dextrose 5% + sodium chloride 0.9%. 1000 milliLiter(s) (75 mL/Hr) IV Continuous <Continuous>  divalproex Sprinkle 250 milliGRAM(s) Oral two times a day  enoxaparin Injectable 40 milliGRAM(s) SubCutaneous every 24 hours  influenza  Vaccine (HIGH DOSE) 0.7 milliLiter(s) IntraMuscular once  lactulose Syrup 20 Gram(s) Oral two times a day  melatonin 3 milliGRAM(s) Oral at bedtime  midodrine 10 milliGRAM(s) Oral every 8 hours  QUEtiapine 225 milliGRAM(s) Oral two times a day  tamsulosin 0.4 milliGRAM(s) Oral at bedtime    MEDICATIONS  (PRN):  acetaminophen     Tablet .. 650 milliGRAM(s) Oral every 6 hours PRN Temp greater or equal to 38C (100.4F), Mild Pain (1 - 3)  OLANZapine Injectable 2.5 milliGRAM(s) IntraMuscular every 6 hours PRN severe agitation  senna 2 Tablet(s) Oral at bedtime PRN Constipation    CAPILLARY BLOOD GLUCOSE      POCT Blood Glucose.: 87 mg/dL (16 Dec 2023 06:47)  POCT Blood Glucose.: 104 mg/dL (16 Dec 2023 00:56)  POCT Blood Glucose.: 100 mg/dL (15 Dec 2023 18:12)  POCT Blood Glucose.: 93 mg/dL (15 Dec 2023 12:42)    I&O's Summary    15 Dec 2023 07:01  -  16 Dec 2023 07:00  --------------------------------------------------------  IN: 237 mL / OUT: 0 mL / NET: 237 mL        Vital Signs Last 24 Hrs  T(C): 36.6 (16 Dec 2023 05:57), Max: 37.1 (15 Dec 2023 17:30)  T(F): 97.8 (16 Dec 2023 05:57), Max: 98.7 (15 Dec 2023 17:30)  HR: 81 (16 Dec 2023 05:57) (72 - 109)  BP: 112/53 (16 Dec 2023 05:57) (95/64 - 140/78)  BP(mean): --  RR: 18 (16 Dec 2023 05:57) (17 - 20)  SpO2: 95% (16 Dec 2023 05:57) (95% - 98%)    Parameters below as of 16 Dec 2023 05:57  Patient On (Oxygen Delivery Method): room air    Physical Exam:  GENERAL: no apparent distress  CHEST/LUNG: on RA; Clear to auscultation bilaterally; No wheezing; No crackles  HEART: Regular rate and rhythm; S1/S2 wnl; no obvious murmurs  ABDOMEN: Soft, non-tender; no guarding; Nondistended; Bowel sounds present; +PEG.   EXTREMITIES:  2+ Peripheral Pulses, No edema  PSYCH: normal affect, calm demeanor    LABS:                        14.0   9.04  )-----------( 151      ( 16 Dec 2023 03:05 )             42.6     12-16    141  |  104  |  13  ----------------------------<  114<H>  4.4   |  27  |  0.75    Ca    8.8      16 Dec 2023 03:05  Phos  3.5     12-16  Mg     2.10     12-16    TPro  7.2  /  Alb  3.4  /  TBili  0.4  /  DBili  x   /  AST  20  /  ALT  12  /  AlkPhos  87  12-14          Urinalysis Basic - ( 16 Dec 2023 03:05 )    Color: x / Appearance: x / SG: x / pH: x  Gluc: 114 mg/dL / Ketone: x  / Bili: x / Urobili: x   Blood: x / Protein: x / Nitrite: x   Leuk Esterase: x / RBC: x / WBC x   Sq Epi: x / Non Sq Epi: x / Bacteria: x        Culture - Blood (collected 14 Dec 2023 23:45)  Source: .Blood Blood-Peripheral  Preliminary Report (16 Dec 2023 05:01):    No growth at 24 hours    Culture - Blood (collected 14 Dec 2023 23:30)  Source: .Blood Blood-Venous  Preliminary Report (16 Dec 2023 05:01):    No growth at 24 hours    Culture - Urine (collected 14 Dec 2023 15:00)  Source: Catheterized Catheterized  Final Report (15 Dec 2023 19:28):    No growth      RADIOLOGY & ADDITIONAL TESTS:  Results Reviewed: Y  Imaging Personally Reviewed: Y  Electrocardiogram Personally Reviewed: Y    COORDINATION OF CARE:  Care Discussed with Consultants/Other Providers [Y/N]: Y  Prior or Outpatient Records Reviewed [Y/N]: Y   [facility] Division of Hospital Medicine  Manas Samuel MD  In House Pager [phone]    Patient is a 68y old  Male who presents with a chief complaint of Agitation, aggression (15 Dec 2023 11:56)    SUBJECTIVE / OVERNIGHT EVENTS: no acute events. received PRN meds afternoon for agitation. seen this morning, he appears to be confused and asking how he got to the hospital. His last recognition was being at Rehab. He is asking for food as he was eating at Rehab.     ROS: Denied Fever, Chill, CP, SOB, Abd pain, N/V/D, LE swelling or pain.     MEDICATIONS  (STANDING):  albuterol/ipratropium for Nebulization 3 milliLiter(s) Nebulizer every 6 hours  ceFAZolin   IVPB      ceFAZolin   IVPB 2000 milliGRAM(s) IV Intermittent every 8 hours  clonazePAM  Tablet 1 milliGRAM(s) Oral at bedtime  dextrose 5% + sodium chloride 0.9%. 1000 milliLiter(s) (75 mL/Hr) IV Continuous <Continuous>  divalproex Sprinkle 250 milliGRAM(s) Oral two times a day  enoxaparin Injectable 40 milliGRAM(s) SubCutaneous every 24 hours  influenza  Vaccine (HIGH DOSE) 0.7 milliLiter(s) IntraMuscular once  lactulose Syrup 20 Gram(s) Oral two times a day  melatonin 3 milliGRAM(s) Oral at bedtime  midodrine 10 milliGRAM(s) Oral every 8 hours  QUEtiapine 225 milliGRAM(s) Oral two times a day  tamsulosin 0.4 milliGRAM(s) Oral at bedtime    MEDICATIONS  (PRN):  acetaminophen     Tablet .. 650 milliGRAM(s) Oral every 6 hours PRN Temp greater or equal to 38C (100.4F), Mild Pain (1 - 3)  OLANZapine Injectable 2.5 milliGRAM(s) IntraMuscular every 6 hours PRN severe agitation  senna 2 Tablet(s) Oral at bedtime PRN Constipation    CAPILLARY BLOOD GLUCOSE      POCT Blood Glucose.: 87 mg/dL (16 Dec 2023 06:47)  POCT Blood Glucose.: 104 mg/dL (16 Dec 2023 00:56)  POCT Blood Glucose.: 100 mg/dL (15 Dec 2023 18:12)  POCT Blood Glucose.: 93 mg/dL (15 Dec 2023 12:42)    I&O's Summary    15 Dec 2023 07:01  -  16 Dec 2023 07:00  --------------------------------------------------------  IN: 237 mL / OUT: 0 mL / NET: 237 mL        Vital Signs Last 24 Hrs  T(C): 36.6 (16 Dec 2023 05:57), Max: 37.1 (15 Dec 2023 17:30)  T(F): 97.8 (16 Dec 2023 05:57), Max: 98.7 (15 Dec 2023 17:30)  HR: 81 (16 Dec 2023 05:57) (72 - 109)  BP: 112/53 (16 Dec 2023 05:57) (95/64 - 140/78)  BP(mean): --  RR: 18 (16 Dec 2023 05:57) (17 - 20)  SpO2: 95% (16 Dec 2023 05:57) (95% - 98%)    Parameters below as of 16 Dec 2023 05:57  Patient On (Oxygen Delivery Method): room air    Physical Exam:  GENERAL: no apparent distress  CHEST/LUNG: on RA; Clear to auscultation bilaterally; No wheezing; No crackles  HEART: Regular rate and rhythm; S1/S2 wnl; no obvious murmurs  ABDOMEN: Soft, non-tender; no guarding; Nondistended; Bowel sounds present; +PEG.   EXTREMITIES:  2+ Peripheral Pulses, No edema  PSYCH: normal affect, calm demeanor    LABS:                        14.0   9.04  )-----------( 151      ( 16 Dec 2023 03:05 )             42.6     12-16    141  |  104  |  13  ----------------------------<  114<H>  4.4   |  27  |  0.75    Ca    8.8      16 Dec 2023 03:05  Phos  3.5     12-16  Mg     2.10     12-16    TPro  7.2  /  Alb  3.4  /  TBili  0.4  /  DBili  x   /  AST  20  /  ALT  12  /  AlkPhos  87  12-14          Urinalysis Basic - ( 16 Dec 2023 03:05 )    Color: x / Appearance: x / SG: x / pH: x  Gluc: 114 mg/dL / Ketone: x  / Bili: x / Urobili: x   Blood: x / Protein: x / Nitrite: x   Leuk Esterase: x / RBC: x / WBC x   Sq Epi: x / Non Sq Epi: x / Bacteria: x        Culture - Blood (collected 14 Dec 2023 23:45)  Source: .Blood Blood-Peripheral  Preliminary Report (16 Dec 2023 05:01):    No growth at 24 hours    Culture - Blood (collected 14 Dec 2023 23:30)  Source: .Blood Blood-Venous  Preliminary Report (16 Dec 2023 05:01):    No growth at 24 hours    Culture - Urine (collected 14 Dec 2023 15:00)  Source: Catheterized Catheterized  Final Report (15 Dec 2023 19:28):    No growth      RADIOLOGY & ADDITIONAL TESTS:  Results Reviewed: Y  Imaging Personally Reviewed: Y  Electrocardiogram Personally Reviewed: Y    COORDINATION OF CARE:  Care Discussed with Consultants/Other Providers [Y/N]: Y  Prior or Outpatient Records Reviewed [Y/N]: Y

## 2023-12-16 NOTE — PROGRESS NOTE ADULT - PROBLEM SELECTOR PLAN 2
Patent with facial redness non-sparing nasolabial folds, possibly asymmetric and warm compared to rest of body, possibly new for patient ([facility] [place] [place] supervisor did not notice redness but only sees patient intermittently)  - will start Cefazolin  - assess response to abx Patent with facial redness non-sparing nasolabial folds, possibly asymmetric and warm compared to rest of body, possibly new for patient ([facility] supervisor did not notice redness but only sees patient intermittently)  - will start Cefazolin  - assess response to abx Patent with facial redness non-sparing nasolabial folds, possibly asymmetric and warm compared to rest of body, possibly new for patient ([facility] supervisor did not notice redness but only sees patient intermittently)  -c/w Cefazolin  - facial rash seems to be improving. Patent with facial redness non-sparing nasolabial folds, possibly asymmetric and warm compared to rest of body, possibly new for patient ([facility] supervisor did not notice redness but only sees patient intermittently)  -c/w Cefazolin  - facial rash seems to be improving.

## 2023-12-16 NOTE — PROGRESS NOTE ADULT - PROBLEM SELECTOR PLAN 1
Patient presents with agitation which appears to have been a chronic problem, acutely worsening lately with more aggression than usual, likely progression of potential dementia with delirium vs. acute encephalopathy with unclear etiology  - agitated in ED now s/p sedatives, current encephalopathy likely due to antipsychotics  - CTH with too much motion degradation for evaluation, will hold off repeat CTH until less agitated  - folate, B12, A1c wnl  - no significant electrolyte abnormalities, RVP negative, CXR appears clear, UA negative  - possibly erysipelas on face, treatment as below  - TSH mildly elevated,  free T4 wnl  - f/u BCx, UCx for infectious work-up - cultures negative thus far. Patient presents with agitation which appears to have been a chronic problem, acutely worsening lately with more aggression than usual, likely progression of potential dementia with delirium vs. acute encephalopathy with unclear etiology  - agitated in ED now s/p sedatives, current encephalopathy likely due to antipsychotics  - CTH with too much motion degradation for evaluation, will hold off repeat CTH until less agitated  - folate, B12, A1c wnl  - no significant electrolyte abnormalities, RVP negative, CXR appears clear, UA negative  - possibly erysipelas on face, treatment as below  - TSH mildly elevated,  free T4 wnl  - f/u BCx, UCx for infectious work-up - cultures negative thus far.  - appears to have improved now. ?dehydration ?infection.   - bedside Dysphagia screen, place on puree diet if patient passess. SLP re-eval.

## 2023-12-16 NOTE — DIETITIAN INITIAL EVALUATION ADULT - OTHER INFO
Per chart review, Patient is a 68 year-old male with history of BPH, agitation, ?dementia, dysphagia after CVA now s/p PEG tube, and ?CHF presenting from [facility] for worsening agitation over the past few weeks.    Patient seen at bedside, appears to be confused. Patient requesting food at time of visit. States " I want eat, I can eat food w/o any problems". Pt noted s/p Swallow eval on 12/15 with recommendation of short term non-oral means of nutrition. Pt adm with agitation, noted BH is currently following. Patient noted with GJ tube, currently on bolus feeding with Jevity 1.5 Reid via JG tube 237ml x 5 times daily to provide total volume of 1185ml. PCA provided collateral who reports pt is tolerating TF. Denies any GI distress (nausea/vomiting/diarrhea/constipation.) Last BM today as per pt. Bowel regimen in use. As per diet order in EMR pt noted with total volume of 1422ml/24 hrs. Clarified with RN that pt is currently receiving 5 feeds (1185ml/day) daily instead of 6 feeds (1422ml/day). Recommend increase TF bolus to 6x daily with 237ml/each time. This TF regimen provides total 2133kcal,  91gm pro, 1081ml of free water, which is meeting pt's upper nutrition needs (30kcal/kg of CBW). IV fluid provided for hydration. RD to remain available for further nutritional interventions as indicated.  Per chart review, Patient is a 68 year-old male with history of BPH, agitation, ?dementia, dysphagia after CVA now s/p PEG tube, and ?CHF presenting from [facility] for worsening agitation over the past few weeks.    Patient seen at bedside, appears to be confused. Patient requesting food at time of visit. States " I want eat, I can eat food w/o any problems". Pt noted s/p Swallow eval on 12/15 with recommendation of short term non-oral means of nutrition. Pt adm with agitation, noted BH is currently following. Patient noted with GJ tube, currently on bolus feeding with Jevity 1.5 Reid via JG tube 237ml x 5 times daily to provide total volume of 1185ml. PCA provided collateral who reports pt is tolerating TF. Denies any GI distress (nausea/vomiting/diarrhea/constipation.) Last BM today as per pt. Bowel regimen in use. As per diet order in EMR pt noted with total volume of 1422ml/24 hrs. Clarified with RN that pt is currently receiving 5 feeds (1185ml/day) daily instead of 6 feeds (1422ml/day). Recommend increase TF bolus to 6x daily with 237ml/each time. This TF regimen provides total 2133kcal,  91gm pro, 1081ml of free water, which is meeting pt's upper nutrition needs (30kcal/kg of CBW). IV fluid provided for hydration. RD to remain available for further nutritional interventions as indicated.  Per chart review, Patient is a 68 year-old male with history of BPH, agitation, ?dementia, dysphagia after CVA now s/p PEG tube, and ?CHF presenting from [facility] for worsening agitation over the past few weeks.    Patient seen at bedside, appears to be confused. Patient requesting food at time of visit. Explained to the patient that currently he's receiving nutrient completed formula via feeding tube as a sole nutrition source. Patient states " I want food. I can eat w/o any problems". Pt noted s/p Swallow eval on 12/15 with recommendation of short term non-oral means of nutrition. Recommend when medically feasible consider reconsult SLP for swallow Eval. Pt adm with agitation, noted BH is currently following. Patient noted with GJ tube, currently on bolus feeding with Jevity 1.5 Reid via JG tube 237ml x 5 times daily to provide total volume of 1185ml. PCA provided collateral who reports pt is tolerating TF. Denies any GI distress (nausea/vomiting/diarrhea/constipation.) Last BM today as per pt. Bowel regimen in use. As per diet order in EMR pt noted with total volume of 1422ml/24 hrs. Clarified with RN that pt is currently receiving 5 feeds (1185ml/day) daily instead of 6 feeds (1422ml/day). Recommend increase TF bolus to 6x daily with 237ml/each time. This TF regimen provides total 2133kcal,  91gm pro, 1081ml of free water, which is meeting pt's upper nutrition needs (30kcal/kg of CBW). IV fluid provided for hydration. RD to remain available for further nutritional interventions as indicated.  Per chart review, Patient is a 68 year-old male with history of BPH, agitation, ?dementia, dysphagia after CVA now s/p PEG tube, and ?CHF presenting from [facility] for worsening agitation over the past few weeks.    Patient seen at bedside, appears to be confused. Patient requesting food at time of visit. Explained to the patient that currently he's receiving nutrient completed formula via feeding tube as a sole nutrition source. Patient states " I want food. I can eat w/o any problems". Pt noted s/p Swallow eval on 12/15 with recommendation of short term non-oral means of nutrition. Recommend when medically feasible consider reconsult SLP for swallow Eval. Pt adm with agitation, noted BH is currently following. Patient noted with GJ tube, currently on bolus feeding with Jevity 1.5 Reid via JG tube 237ml x 5 times daily to provide total volume of 1185ml. PCA provided collateral who reports pt is tolerating TF. Denies any GI distress (nausea/vomiting/diarrhea/constipation.) Last BM today as per pt. Bowel regimen in use. As per diet order in EMR pt noted with total volume of 1422ml/24 hrs. Clarified with RN that pt is currently receiving 5 feeds (1185ml/day) daily instead of 6 feeds (1422ml/day). Recommend increase TF bolus to 6x daily with 237ml/each time. This TF regimen provides total 2133kcal,  91gm pro, 1081ml of free water, which is meeting pt's upper nutrition needs (30kcal/kg of CBW). IV fluid provided for hydration. RD to remain available for further nutritional interventions as indicated.

## 2023-12-16 NOTE — PROGRESS NOTE ADULT - ASSESSMENT
68 year-old male with history of BPH, agitation, ?dementia, dysphagia after CVA now s/p PEG tube, and ?CHF presenting from [facility] for worsening agitation over the past few weeks, admitted for further work-up and management 68 year-old male with history of BPH, agitation, ?dementia, dysphagia after CVA now s/p PEG tube, and ?CHF presenting from [facility] for worsening agitation over the past few weeks, admitted for further work-up and management

## 2023-12-16 NOTE — DIETITIAN INITIAL EVALUATION ADULT - PROBLEM SELECTOR PLAN 1
Patient presents with agitation which appears to have been a chronic problem, acutely worsening lately with more aggression than usual, likely progression of potential dementia with delirium vs. acute encephalopathy with unclear etiology  - agitated in ED now s/p sedatives, current encephalopathy likely due to antipsychotics  - CTH with too much motion degradation for evaluation, will hold off repeat CTH until less agitated  - folate, B12, A1c wnl  - no significant electrolyte abnormalities, RVP negative, CXR appears clear, UA negative  - possibly erysipelas on face, treatment as below  - TSH mildly elevated, f/u free T4   - f/u BCx, UCx for infectious work-up

## 2023-12-16 NOTE — DIETITIAN INITIAL EVALUATION ADULT - PERTINENT LABORATORY DATA
12-16    141  |  104  |  13  ----------------------------<  114<H>  4.4   |  27  |  0.75    Ca    8.8      16 Dec 2023 03:05  Phos  3.5     12-16  Mg     2.10     12-16    TPro  7.2  /  Alb  3.4  /  TBili  0.4  /  DBili  x   /  AST  20  /  ALT  12  /  AlkPhos  87  12-14  POCT Blood Glucose.: 106 mg/dL (12-16-23 @ 13:08)  A1C with Estimated Average Glucose Result: 4.7 % (12-14-23 @ 23:30)

## 2023-12-16 NOTE — DIETITIAN INITIAL EVALUATION ADULT - NSFNSGIIOFT_GEN_A_CORE
12-15-23 @ 07:01  -  12-16-23 @ 07:00  --------------------------------------------------------  OUT:  Total OUT: 0 mL    Total NET: 237 mL

## 2023-12-16 NOTE — DIETITIAN INITIAL EVALUATION ADULT - BODY MASS INDEX
[FreeTextEntry1] : Here for annual physical. Overall feeling well. [de-identified] : Overall feeling well.\par Requests flu vaccine.\par Doesn't smoke.  Social alcohol.\par Exercises sporadically.\par Last colonoscopy about 10 years ago. 25

## 2023-12-17 NOTE — DISCHARGE NOTE PROVIDER - NSDCCPCAREPLAN_GEN_ALL_CORE_FT
PRINCIPAL DISCHARGE DIAGNOSIS  Diagnosis: Agitation  Assessment and Plan of Treatment: due to delirium now improved. d/c back to [facility].      SECONDARY DISCHARGE DIAGNOSES  Diagnosis: Erysipelas  Assessment and Plan of Treatment: treated with antibiotics with improvement. will complete total 7days course.     PRINCIPAL DISCHARGE DIAGNOSIS  Diagnosis: Agitation  Assessment and Plan of Treatment: due to delirium now improved. d/c back to [facility].      SECONDARY DISCHARGE DIAGNOSES  Diagnosis: Erysipelas  Assessment and Plan of Treatment: treated with antibiotics with improvement. will complete total 7days course.     PRINCIPAL DISCHARGE DIAGNOSIS  Diagnosis: Agitation  Assessment and Plan of Treatment: You were seen by psychiatry, your medications were adjusted. Please take as prescribed.      SECONDARY DISCHARGE DIAGNOSES  Diagnosis: Erysipelas  Assessment and Plan of Treatment: treated with antibiotics with improvement. will complete total 7days course.     PRINCIPAL DISCHARGE DIAGNOSIS  Diagnosis: Agitation  Assessment and Plan of Treatment: You were seen by psychiatry, your medications were adjusted. Please take as prescribed.      SECONDARY DISCHARGE DIAGNOSES  Diagnosis: Erysipelas  Assessment and Plan of Treatment: treated with antibiotics with improvement.  completed total 7days course.

## 2023-12-17 NOTE — PROGRESS NOTE ADULT - PROBLEM SELECTOR PLAN 1
-- DO NOT REPLY / DO NOT REPLY ALL --  -- Message is from the [facility]--    COVID-19 Universal Screening: Negative    General Patient Message      Reason for Call: pt will like to schedule appointment for his 2nd shingles shot please contact pt regarding appointment     Caller Information       Type Contact Phone    04/30/2020 03:42 PM Phone (Incoming) Francisco Spencer (Self) [phone] (H)          Alternative phone number:n/a     Turnaround time given to caller:   \"This message will be sent to [state Provider's name]. The clinical team will fulfill your request as soon as they review your message when the office opens tomorrow.\"     Patient presents with agitation which appears to have been a chronic problem, acutely worsening lately with more aggression than usual, likely progression of potential dementia with delirium vs. acute encephalopathy with unclear etiology  - agitated in ED now s/p sedatives, current encephalopathy likely due to antipsychotics  - CTH with too much motion degradation for evaluation, will hold off repeat CTH until less agitated  - folate, B12, A1c wnl  - no significant electrolyte abnormalities, RVP negative, CXR appears clear, UA negative  - possibly erysipelas on face, treatment as below  - TSH mildly elevated,  free T4 wnl  - f/u BCx, UCx for infectious work-up - cultures negative thus far.  - appears to have improved now. ?dehydration ?infection.   - bedside Dysphagia screen, place on puree diet if patient passess. SLP re-eval.

## 2023-12-17 NOTE — DISCHARGE NOTE PROVIDER - DETAILS OF MALNUTRITION DIAGNOSIS/DIAGNOSES
This patient has been assessed with a concern for Malnutrition and was treated during this hospitalization for the following Nutrition diagnosis/diagnoses:     -  12/16/2023: Moderate protein-calorie malnutrition

## 2023-12-17 NOTE — SWALLOW BEDSIDE ASSESSMENT ADULT - SWALLOW EVAL: DIAGNOSIS
Patient noted with wet/gurgly vocal quality at baseline. SLP cued patient to utilize throat clear/hard cough to clear secretions, however unsuccessful. Patient noted with thick sticky secretions on posterior pharyngeal wall. RN attempted oral suctioning with minimal return of secretions and wet vocal quality remained. Discussed with ACP at bedside. PO trials deferred at this time given poor secretion management which may be indicative of diminished swallow function and places patient at an increased risk of aspiration.
1- Functional stage for puree and thin liquids marked by adequate oral containment, adequate bolus manipulation, adequate mastication, adequate anterior to posterior transfer, adequate oral clearance noted. 2- Functional pharyngeal stage for puree and thin liquids marked by initiation of pharyngeal swallow trigger and hyolaryngeal excursion upon palpation without evidence of impaired airway protection.

## 2023-12-17 NOTE — DISCHARGE NOTE PROVIDER - NSDCMRMEDTOKEN_GEN_ALL_CORE_FT
acetaminophen 325 mg oral tablet: 2 tab(s) orally every 6 hours as needed for Pain Through PEG, give prior to dressing change  clonazePAM 1 mg oral tablet: 1 tab(s) orally 2 times a day At 9AM and 5PM  clotrimazole-betamethasone dipropionate 1%-0.05% topical cream: Apply topically to affected area 2 times a day Apply to affected area (Face, back, perineum) BID  Depakote Sprinkles 125 mg oral delayed release capsule: 1 cap(s) orally 2 times a day  DuoNeb 0.5 mg-2.5 mg/3 mL inhalation solution: 3 milliliter(s) by nebulizer every 6 hours as needed for  shortness of breath and/or wheezing  lactulose 10 g/15 mL oral syrup: 30 milliliter(s) orally 2 times a day Through PEG tube  melatonin 10 mg oral capsule: 1 cap(s) orally once a day (at bedtime) Through PEG tube  midodrine 10 mg oral tablet: 1 tab(s) orally every 8 hours Through PEG tube (6AM, 2PM, 10PM). Hold for SBP&gt;130  QUEtiapine 200 mg oral tablet: 1 tab(s) orally 2 times a day At 9AM and 5PM  QUEtiapine 25 mg oral tablet: 1 tab(s) orally 2 times a day Give together with 200 mg tablet for 225mg dose.  Senna 8.6 mg oral tablet: 1 tab(s) orally 2 times a day Through PEG tube  tamsulosin 0.4 mg oral capsule: 1 cap(s) orally once a day (at bedtime) Through PEG tube   acetaminophen 325 mg oral tablet: 2 tab(s) orally every 6 hours as needed for Pain Through PEG, give prior to dressing change  clonazePAM 1 mg oral tablet: 1 tab(s) orally once a day (at bedtime)  Depakote Sprinkles 125 mg oral delayed release capsule: 1 cap(s) orally once a day AT 1400HRS  Depakote Sprinkles 125 mg oral delayed release capsule: 2 cap(s) orally 2 times a day AT 0800 (8AM) HRS AND 2000 HRS (8PM)  DuoNeb 0.5 mg-2.5 mg/3 mL inhalation solution: 3 milliliter(s) by nebulizer every 6 hours as needed for  shortness of breath and/or wheezing  lactulose 10 g/15 mL oral syrup: 30 milliliter(s) orally 2 times a day Through PEG tube  melatonin 10 mg oral capsule: 1 cap(s) orally once a day (at bedtime) Through PEG tube  midodrine 10 mg oral tablet: 1 tab(s) orally every 8 hours Through PEG tube (6AM, 2PM, 10PM). Hold for SBP&gt;130  QUEtiapine 200 mg oral tablet: 1 tab(s) orally 2 times a day At 9AM and 5PM  QUEtiapine 25 mg oral tablet: 1 tab(s) orally 2 times a day Give together with 200 mg tablet for 225mg dose.  Senna 8.6 mg oral tablet: 1 tab(s) orally 2 times a day Through PEG tube  tamsulosin 0.4 mg oral capsule: 1 cap(s) orally once a day (at bedtime) Through PEG tube

## 2023-12-17 NOTE — SWALLOW BEDSIDE ASSESSMENT ADULT - SWALLOW EVAL: RECOMMENDED DIET
1. Continue NPO with consideration for short-term non-oral means of nutrition/hydration
Continue Puree with Thin Liquids

## 2023-12-17 NOTE — DISCHARGE NOTE PROVIDER - HOSPITAL COURSE
68 year-old male with history of BPH, agitation, ?dementia, dysphagia after CVA now s/p PEG tube, and ?CHF presenting from [facility] for worsening agitation over the past few weeks, admitted for further work-up and management. Mental status improved spontaneously. receivied antibiotics for erysipelas of the face, seems to responded to treatment, will complete course. PT eval and pending discharge back to Rehab. 68 year-old male with history of BPH, agitation, ?dementia, dysphagia after CVA now s/p PEG tube, and ?CHF presenting from [facility] for worsening agitation over the past few weeks, admitted for further work-up and management. Mental status improved spontaneously. receivied antibiotics for erysipelas of the face, seems to responded to treatment, will complete course. PT eval and pending discharge back to Rehab. 68 year-old male with history of BPH, agitation, ?dementia, dysphagia after CVA now s/p PEG tube, and ?CHF presenting from [facility] for worsening agitation over the past few weeks, admitted for further work-up and management. Mental status improved spontaneously. Received antibiotics for erysipelas of the face, seems to responded to treatment, will complete course. BH was consulted - adjusted medications. PT eval and pending discharge back to Rehab. 68 year-old male with history of BPH, agitation, ?dementia, dysphagia after CVA now s/p PEG tube, and ?CHF presenting from [facility] for worsening agitation over the past few weeks, admitted for further work-up and management. Mental status improved spontaneously. Received antibiotics for erysipelas of the face, seems to responded to treatment, will complete course. BH was consulted - adjusted medications. PT eval and pending discharge back to Rehab. 68 year-old male with history of BPH, agitation, dementia, dysphagia after CVA now s/p PEG tube, and CHF presenting from [facility] for worsening agitation over the past few weeks, admitted for further work-up and management. CTH showed no acute pathology. Infectious w/u CXR, UA negative. Completed antibiotics for erysipelas of the face. BH was consulted - adjusted medications (depakote dose increased). Pt's behavior much improved, no prn medication required >48hr prior to discharge.     Pt is  discharged back to NH in stable condition 68 year-old male with history of BPH, agitation, dementia, dysphagia after CVA now s/p PEG tube, and CHF presenting from [facility] for worsening agitation over the past few weeks, admitted for further work-up and management. CTH showed no acute pathology. Infectious w/u CXR, UA negative. Completed antibiotics for erysipelas of the face. BH was consulted - adjusted medications (depakote dose increased). Pt's behavior much improved, no prn medication required >48hr prior to discharge.     Pt is  discharged back to NH in stable condition

## 2023-12-17 NOTE — PROGRESS NOTE ADULT - ASSESSMENT
68 year-old male with history of BPH, agitation, ?dementia, dysphagia after CVA now s/p PEG tube, and ?CHF presenting from [facility] for worsening agitation over the past few weeks, admitted for further work-up and management. Mental status improved spontaneously.  68 year-old male with history of BPH, agitation, ?dementia, dysphagia after CVA now s/p PEG tube, and ?CHF presenting from [facility] for worsening agitation over the past few weeks, admitted for further work-up and management. Mental status improved spontaneously.

## 2023-12-17 NOTE — PROGRESS NOTE ADULT - SUBJECTIVE AND OBJECTIVE BOX
[facility] Division of Hospital Medicine  Manas Samuel MD  In House Pager [phone]    Patient is a 68y old  Male who presents with a chief complaint of Restlessness and agitation     (16 Dec 2023 09:43)    SUBJECTIVE / OVERNIGHT EVENTS: no acute events. no complaints tolerating puree diet.     ROS: Denied Fever, Chill, CP, SOB, Abd pain, N/V/D, LE swelling or pain.     MEDICATIONS  (STANDING):  albuterol/ipratropium for Nebulization 3 milliLiter(s) Nebulizer every 6 hours  ceFAZolin   IVPB      ceFAZolin   IVPB 2000 milliGRAM(s) IV Intermittent every 8 hours  clonazePAM  Tablet 1 milliGRAM(s) Oral at bedtime  dextrose 5% + sodium chloride 0.9%. 1000 milliLiter(s) (75 mL/Hr) IV Continuous <Continuous>  divalproex Sprinkle 250 milliGRAM(s) Oral two times a day  enoxaparin Injectable 40 milliGRAM(s) SubCutaneous every 24 hours  influenza  Vaccine (HIGH DOSE) 0.7 milliLiter(s) IntraMuscular once  lactulose Syrup 20 Gram(s) Oral two times a day  melatonin 3 milliGRAM(s) Oral at bedtime  midodrine 10 milliGRAM(s) Oral every 8 hours  QUEtiapine 225 milliGRAM(s) Oral two times a day  tamsulosin 0.4 milliGRAM(s) Oral at bedtime    MEDICATIONS  (PRN):  acetaminophen     Tablet .. 650 milliGRAM(s) Oral every 6 hours PRN Temp greater or equal to 38C (100.4F), Mild Pain (1 - 3)  OLANZapine Injectable 2.5 milliGRAM(s) IntraMuscular every 6 hours PRN severe agitation  senna 2 Tablet(s) Oral at bedtime PRN Constipation    CAPILLARY BLOOD GLUCOSE      POCT Blood Glucose.: 82 mg/dL (17 Dec 2023 06:01)  POCT Blood Glucose.: 92 mg/dL (17 Dec 2023 00:22)  POCT Blood Glucose.: 130 mg/dL (16 Dec 2023 18:39)  POCT Blood Glucose.: 106 mg/dL (16 Dec 2023 13:08)    I&O's Summary      Vital Signs Last 24 Hrs  T(C): 36.9 (17 Dec 2023 05:35), Max: 36.9 (16 Dec 2023 16:52)  T(F): 98.4 (17 Dec 2023 05:35), Max: 98.5 (16 Dec 2023 16:52)  HR: 78 (17 Dec 2023 05:35) (71 - 97)  BP: 96/58 (17 Dec 2023 05:35) (95/61 - 111/65)  BP(mean): --  RR: 17 (17 Dec 2023 05:35) (17 - 18)  SpO2: 95% (17 Dec 2023 05:35) (95% - 100%)    Parameters below as of 17 Dec 2023 05:35  Patient On (Oxygen Delivery Method): room air    GENERAL: no apparent distress  CHEST/LUNG: on RA; Clear to auscultation bilaterally; No wheezing; No crackles  HEART: Regular rate and rhythm; S1/S2 wnl; no obvious murmurs  ABDOMEN: Soft, non-tender; no guarding; Nondistended; Bowel sounds present; +PEG.   EXTREMITIES:  2+ Peripheral Pulses, No edema  PSYCH: normal affect, calm demeanor    LABS:                        13.4   7.27  )-----------( 154      ( 17 Dec 2023 05:35 )             40.5     12-17    142  |  104  |  9   ----------------------------<  83  4.4   |  26  |  0.64    Ca    8.8      17 Dec 2023 05:35  Phos  3.8     12-17  Mg     2.10     12-17            Urinalysis Basic - ( 17 Dec 2023 05:35 )    Color: x / Appearance: x / SG: x / pH: x  Gluc: 83 mg/dL / Ketone: x  / Bili: x / Urobili: x   Blood: x / Protein: x / Nitrite: x   Leuk Esterase: x / RBC: x / WBC x   Sq Epi: x / Non Sq Epi: x / Bacteria: x        Culture - Blood (collected 14 Dec 2023 23:45)  Source: .Blood Blood-Peripheral  Preliminary Report (17 Dec 2023 05:01):    No growth at 48 Hours    Culture - Blood (collected 14 Dec 2023 23:30)  Source: .Blood Blood-Venous  Preliminary Report (17 Dec 2023 05:01):    No growth at 48 Hours    Culture - Urine (collected 14 Dec 2023 15:00)  Source: Catheterized Catheterized  Final Report (15 Dec 2023 19:28):    No growth      RADIOLOGY & ADDITIONAL TESTS:  Results Reviewed: Y  Imaging Personally Reviewed: Y  Electrocardiogram Personally Reviewed: Y    COORDINATION OF CARE:  Care Discussed with Consultants/Other Providers [Y/N]: Y  Prior or Outpatient Records Reviewed [Y/N]: Y   [facility] Division of Hospital Medicine  Manas Samuel MD  In House Pager [phone]    Patient is a 68y old  Male who presents with a chief complaint of Restlessness and agitation     (16 Dec 2023 09:43)    SUBJECTIVE / OVERNIGHT EVENTS: no acute events. no complaints tolerating puree diet.     ROS: Denied Fever, Chill, CP, SOB, Abd pain, N/V/D, LE swelling or pain.     MEDICATIONS  (STANDING):  albuterol/ipratropium for Nebulization 3 milliLiter(s) Nebulizer every 6 hours  ceFAZolin   IVPB      ceFAZolin   IVPB 2000 milliGRAM(s) IV Intermittent every 8 hours  clonazePAM  Tablet 1 milliGRAM(s) Oral at bedtime  dextrose 5% + sodium chloride 0.9%. 1000 milliLiter(s) (75 mL/Hr) IV Continuous <Continuous>  divalproex Sprinkle 250 milliGRAM(s) Oral two times a day  enoxaparin Injectable 40 milliGRAM(s) SubCutaneous every 24 hours  influenza  Vaccine (HIGH DOSE) 0.7 milliLiter(s) IntraMuscular once  lactulose Syrup 20 Gram(s) Oral two times a day  melatonin 3 milliGRAM(s) Oral at bedtime  midodrine 10 milliGRAM(s) Oral every 8 hours  QUEtiapine 225 milliGRAM(s) Oral two times a day  tamsulosin 0.4 milliGRAM(s) Oral at bedtime    MEDICATIONS  (PRN):  acetaminophen     Tablet .. 650 milliGRAM(s) Oral every 6 hours PRN Temp greater or equal to 38C (100.4F), Mild Pain (1 - 3)  OLANZapine Injectable 2.5 milliGRAM(s) IntraMuscular every 6 hours PRN severe agitation  senna 2 Tablet(s) Oral at bedtime PRN Constipation    CAPILLARY BLOOD GLUCOSE      POCT Blood Glucose.: 82 mg/dL (17 Dec 2023 06:01)  POCT Blood Glucose.: 92 mg/dL (17 Dec 2023 00:22)  POCT Blood Glucose.: 130 mg/dL (16 Dec 2023 18:39)  POCT Blood Glucose.: 106 mg/dL (16 Dec 2023 13:08)    I&O's Summary      Vital Signs Last 24 Hrs  T(C): 36.9 (17 Dec 2023 05:35), Max: 36.9 (16 Dec 2023 16:52)  T(F): 98.4 (17 Dec 2023 05:35), Max: 98.5 (16 Dec 2023 16:52)  HR: 78 (17 Dec 2023 05:35) (71 - 97)  BP: 96/58 (17 Dec 2023 05:35) (95/61 - 111/65)  BP(mean): --  RR: 17 (17 Dec 2023 05:35) (17 - 18)  SpO2: 95% (17 Dec 2023 05:35) (95% - 100%)    Parameters below as of 17 Dec 2023 05:35  Patient On (Oxygen Delivery Method): room air    GENERAL: no apparent distress  CHEST/LUNG: on RA; Clear to auscultation bilaterally; No wheezing; No crackles  HEART: Regular rate and rhythm; S1/S2 wnl; no obvious murmurs  ABDOMEN: Soft, non-tender; no guarding; Nondistended; Bowel sounds present; +PEG.   EXTREMITIES:  2+ Peripheral Pulses, No edema  PSYCH: normal affect, calm demeanor    LABS:                        13.4   7.27  )-----------( 154      ( 17 Dec 2023 05:35 )             40.5     12-17    142  |  104  |  9   ----------------------------<  83  4.4   |  26  |  0.64    Ca    8.8      17 Dec 2023 05:35  Phos  3.8     12-17  Mg     2.10     12-17            Urinalysis Basic - ( 17 Dec 2023 05:35 )    Color: x / Appearance: x / SG: x / pH: x  Gluc: 83 mg/dL / Ketone: x  / Bili: x / Urobili: x   Blood: x / Protein: x / Nitrite: x   Leuk Esterase: x / RBC: x / WBC x   Sq Epi: x / Non Sq Epi: x / Bacteria: x        Culture - Blood (collected 14 Dec 2023 23:45)  Source: .Blood Blood-Peripheral  Preliminary Report (17 Dec 2023 05:01):    No growth at 48 Hours    Culture - Blood (collected 14 Dec 2023 23:30)  Source: .Blood Blood-Venous  Preliminary Report (17 Dec 2023 05:01):    No growth at 48 Hours    Culture - Urine (collected 14 Dec 2023 15:00)  Source: Catheterized Catheterized  Final Report (15 Dec 2023 19:28):    No growth      RADIOLOGY & ADDITIONAL TESTS:  Results Reviewed: Y  Imaging Personally Reviewed: Y  Electrocardiogram Personally Reviewed: Y    COORDINATION OF CARE:  Care Discussed with Consultants/Other Providers [Y/N]: Y  Prior or Outpatient Records Reviewed [Y/N]: Y

## 2023-12-17 NOTE — PROGRESS NOTE ADULT - NSPROGADDITIONALINFOA_GEN_ALL_CORE
per chart review from NH, patient carries a diagnosis of schizophrenia, asperger, bipolar d/o. awaiting psych eval.
- given improvement in mental status with abx, suspecting encephalopathy due to infection/erysipelas. Will plan for return to rehab.

## 2023-12-17 NOTE — SWALLOW BEDSIDE ASSESSMENT ADULT - ADDITIONAL RECOMMENDATIONS
2. This service to follow-up as schedule permits to determine candidacy for oral intake. 3. Medical team further advised to reconsult this department with any change in medical status and/or as patient becomes medically optimized.
This department to follow up as schedule permits to assess for diet tolerance. Medical team further advised to reconsult if there is a change in medical status and/or observed change in patient's tolerance of recommended PO diet.

## 2023-12-17 NOTE — PROGRESS NOTE ADULT - PROBLEM SELECTOR PLAN 4
History of dysphagia s/p PEG tube, unclear if original etiology from progressive dementia vs. acute CVA (appears to be CVA from paper-work)  - also with notable secretions noted on exam, suctioned at bedside  - likely high aspiration and micro-aspirational risk from his own secretions, CXR clear currently  - on NH paper-work, was on regular low sodium diet with thin liquids  - aspiration precautions  - f/u speech and swallow eval - unable to participate in eval, and there was gurgling during evaluation.   - can use PEG for tube feed and meds for now, nutrition consult.  - re-evaluate with SLP given improvement in his mental status.

## 2023-12-17 NOTE — SWALLOW BEDSIDE ASSESSMENT ADULT - ASR SWALLOW LINGUAL MOBILITY
within functional limits Complex Repair And Double Advancement Flap Text: The defect edges were debeveled with a #15 scalpel blade.  The primary defect was closed partially with a complex linear closure.  Given the location of the remaining defect, shape of the defect and the proximity to free margins a double advancement flap was deemed most appropriate for complete closure of the defect.  Using a sterile surgical marker, an appropriate advancement flap was drawn incorporating the defect and placing the expected incisions within the relaxed skin tension lines where possible.    The area thus outlined was incised deep to adipose tissue with a #15 scalpel blade.  The skin margins were undermined to an appropriate distance in all directions utilizing iris scissors.

## 2023-12-17 NOTE — SWALLOW BEDSIDE ASSESSMENT ADULT - ASR SWALLOW RECOMMEND DIAG
Objective testing is NOT indicated at this time given patient is not medically optimized for PO trials
Objective testing not warranted at this time given no overt signs of impaired airway protection and CXR 12/14 without indication for infection/pulmonary disease

## 2023-12-17 NOTE — SWALLOW BEDSIDE ASSESSMENT ADULT - SWALLOW EVAL: RECOMMENDED FEEDING/EATING TECHNIQUES
Swallowing Guidelines: FEED WHEN ALERT STATE ONLY; Seated Upright during Mealtimes; Slow Pacing; Allow for Swallow Prior to Next Presentation; Maintain Adequate Oral Hygiene

## 2023-12-17 NOTE — PROGRESS NOTE ADULT - PROBLEM SELECTOR PLAN 3
Presents from nursing home for worsening agitation and aggression to staff. Unclear if worsening dementia vs. mood disorder vs encephalopathy contributing to agitation  - will work up for medical issues as above  - also likely exacerbated by component of delirium, delirium precautions and cont melatonin  - QTc 444 on admission, cont monitoring EKGs for QTc after antipsychotics  - cont with home seroquel 225mg BID  - cont with home depakote 125mg BID  - Zyprexa IM for severe agitation when pt refusing oral medications  - will hold off home Clonazepam for now given Zyprexa administration  - Behavioral health input appreciated.

## 2023-12-17 NOTE — PROGRESS NOTE ADULT - TIME BILLING
- Ordering, reviewing, and interpreting labs, testing, and imaging.  - Independently obtaining a review of systems and performing a physical exam  - Reviewing prior hospitalization and where necessary, outpatient records.  - Reviewing consultant recommendations/communicating with consultants  - Counselling and educating patient and family regarding interpretation of aforementioned items and plan of care.

## 2023-12-17 NOTE — PROGRESS NOTE ADULT - PROBLEM SELECTOR PLAN 2
Patent with facial redness non-sparing nasolabial folds, possibly asymmetric and warm compared to rest of body, possibly new for patient ([facility] supervisor did not notice redness but only sees patient intermittently)  -c/w Cefazolin complete 7 days course total.   - facial rash seems to be improving. Patent with facial redness non-sparing nasolabial folds, possibly asymmetric and warm compared to rest of body, possibly new for patient ([facility] supervisor did not notice redness but only sees patient intermittently)  -c/w Cefazolin complete 7 days course total.   - facial rash seems to be improving.

## 2023-12-18 NOTE — BH CONSULTATION LIAISON PROGRESS NOTE - NSBHFUPINTERVALHXFT_PSY_A_CORE
Chart, labs, imagine reviewed. Case discussed with the primary team. Patient seen at bedside.  Required PRN Zyprexa 2.5mg IM 12/16 once, and 12/17 once    Patient is calm and cooperative. He reported that he does not feel comfortable because he needs to be changed. He presented with good attention, however, not fully oriented. Patient said that he is located "at [facility]" and the date was " December 31". There are some perseverations in his speech and increased tone in his muscles, no cog-wheel. Denied auditory or visual hallucinations. Able to follow commands.     Chart, labs, imagine reviewed. Case discussed with the primary team. Patient seen at bedside.  Required PRN Zyprexa 2.5mg IM 12/16 once, and 12/17 once    Patient is calm and cooperative. He reported that he does not feel comfortable because he needs to be changed. He presented with good attention, however, not fully oriented. Patient said that he is located "at [facility]" and the date was " December 31". There are some perseverations in his speech and increased tone in his muscles, no cog-wheel. Denied auditory or visual hallucinations. Able to follow commands.

## 2023-12-18 NOTE — PHYSICAL THERAPY INITIAL EVALUATION ADULT - PATIENT/FAMILY/SIGNIFICANT OTHER GOALS STATEMENT, PT EVAL
[FreeTextEntry1] : 58 yo female with h/o LCIS\par due for MRI SEP 2021\par plan mg/sono MAR 2022\par We reviewed risk reduction strategies including maintaining a BMI <25, limiting red meat intake and alcoholic beverages to 3 per week and exercise (150 min/ week low intensity or 75 min/week high intensity). And maintaining a normal vitamin D level.\par \par saw Dr. Karimi JUN 2019 and HRT was not advised\par f/u me 6 months \par She knows to call or return sooner should any concerns or questions arise.\par 
pt did not state goals at this time.

## 2023-12-18 NOTE — PHYSICAL THERAPY INITIAL EVALUATION ADULT - ACTIVE RANGE OF MOTION EXAMINATION, REHAB EVAL
lulu. upper extremity Active ROM was WNL (within normal limits)/bilateral lower extremity Active ROM was WNL (within normal limits)

## 2023-12-18 NOTE — PHYSICAL THERAPY INITIAL EVALUATION ADULT - PERTINENT HX OF CURRENT PROBLEM, REHAB EVAL
Patient is a 81 year old male, PMH stated below, presents with agitation/aggression; now s/p PEG tube placement.

## 2023-12-18 NOTE — PHYSICAL THERAPY INITIAL EVALUATION ADULT - ADDITIONAL COMMENTS
Pt admitted from [facility]. Pt states he required assistance with ambulation, getting out of bed, and performing ADLs. Pt ambulated with rolling walker at baseline.     Pt left semisupine in bed in NAD, all lines intact, call bell in reach, bed alarm on, and NAREN Vickers aware. Pt admitted from [facility]. Pt states he required assistance with ambulation, getting out of bed, and performing ADLs. Pt ambulated with rolling walker at baseline.     Pt left semisupine in bed in NAD, all lines intact, call bell in reach, bed alarm on, and NAREN Vickers aware.

## 2023-12-18 NOTE — PROGRESS NOTE ADULT - PROBLEM SELECTOR PLAN 4
history of dysphagia s/p PEG tube, unclear if original etiology from progressive dementia vs. acute CVA (appears to be CVA from paper-work)  - also with notable secretions noted on exam, suctioned at bedside  - likely high aspiration and micro-aspirational risk from his own secretions, CXR clear currently  - on NH paper-work, was on regular low sodium diet with thin liquids  - appreciate SLP - pureed diet as tolerated   - aspiration precautions

## 2023-12-18 NOTE — PHYSICAL THERAPY INITIAL EVALUATION ADULT - LEVEL OF INDEPENDENCE: SIT/STAND, REHAB EVAL
To be assessed: Pt deferred sit to stand transfer secondary to discomfort at PEG tube site; pt requested to return to semisupine position. NAREN Vickers made aware.

## 2023-12-18 NOTE — PROGRESS NOTE ADULT - PROBLEM SELECTOR PLAN 3
Presents from nursing home for worsening agitation and aggression to staff. Unclear if worsening dementia vs. mood disorder vs encephalopathy contributing to agitation  - also likely exacerbated by component of delirium, delirium precautions and cont melatonin  - QTc 444 on admission, cont monitoring EKGs for QTc after antipsychotics  - appreciate BH: Seroquel 225mg BID, Depakote 250mg BID + 125mg in afternoon, Klonopin 1mg qhs

## 2023-12-18 NOTE — PROGRESS NOTE ADULT - PROBLEM SELECTOR PLAN 2
Patent with facial redness non-sparing nasolabial folds, possibly asymmetric and warm compared to rest of body, possibly new for patient ([facility] supervisor did not notice redness but only sees patient intermittently)  - s/p cefazolin, c/w Keflex for total 7 day course Patent with facial redness non-sparing nasolabial folds, possibly asymmetric and warm compared to rest of body, possibly new for patient ([facility] supervisor did not notice redness but only sees patient intermittently)  - s/p cefazolin, c/w Keflex for total 7 day course

## 2023-12-18 NOTE — BH CONSULTATION LIAISON PROGRESS NOTE - NSBHATTESTBILLING_PSY_A_CORE
40331-[id number] OBS or IP - moderate complexity OR 35-49 mins [id number] OBS or IP - moderate complexity OR 35-49 mins

## 2023-12-18 NOTE — BH CONSULTATION LIAISON PROGRESS NOTE - NSBHASSESSMENTFT_PSY_ALL_CORE
Patient is a 68 years old male with the past hx of  BPH, agitation, as per chart ?dementia, dysphagia after CVA now s/p PEG tube, and ?CHF presenting from [facility] for worsening agitation over the past few weeks. Patient received  Benadryl 50mg IV, Haldol 2.5 IVP x2, Ativan 2mg IVP x2, Versed 2mg IVP x1, 5mg IVP x1, and Zyprexa 2.5mg IM for agitation. Psychiatry consulted for agitation/medication management.    Impression: patient without past psych hx with recently started agitation and aggressive behavior, possible executive and memory dysfunction, confusion, poor attention span raises concern for delirium vs r/o new case of early dementia/ consequences of TBI.    12/16 Pt today irritable and uncooperative on exam, limiting evaluation. Although pt refuses to participate in orientation questions, he appears confused given his persistent requests for PO breakfast despite being reminded of PEG. Req'd 2x PRN yesterday, no significant agitation overnight.  12/18: Patient receiving less PRNs: once on 12/16 and once 12/17. Currently calm, cooperative with the interview.    Recommendations:  - Observation: as per primary team  - Delirium work up as per team, check TSH- increased, 4.82, , Folate- 1.0, UA-wnl, MRI brain not done, CT head: There are periventricular and subcortical white matter hypodensities, which are nonspecific and likely represent microvascular ischemic changes.  - Medications:   Continue Quetiapine 225mg BID, Hold if QTC >500,  		continue Clonazepam 1 mg at bed time to prevent benzos withdrawal (as per med rec in chart, outpatient dose is Klonopin 1mg BID)  		INCREASE Depakote 250 mg  at 8 am, 125 mg at 2 pm and 250 mg at 8 pm for management of agitation, monitor LFTs and platelets, VPA level in 2 days ( hold morning dose prior to blood draw)  -PRN for agitation: Zyprexa 2.5 mg Q 6 hours PO/IM. Can give additional Zyprexa 2.5 mg for refractory agitation, combativeness (ensure qtc <500)    - Supportive treatment of delirium: 1) Minimize use of benzodiazepines, opioids, anticholinergics, and other deliriogenic agents whenever possible.  2) Maintain sleep wake cycle.  3) Provide frequent reorientation and redirection.  4) Family member at bedside if possible. 5) Provide corrective lenses/hearing aids if required   Patient is a 68 years old male with the past hx of  BPH, agitation, as per chart ?dementia, dysphagia after CVA now s/p PEG tube, and ?CHF presenting from [facility] for worsening agitation over the past few weeks. Patient received  Benadryl 50mg IV, Haldol 2.5 IVP x2, Ativan 2mg IVP x2, Versed 2mg IVP x1, 5mg IVP x1, and Zyprexa 2.5mg IM for agitation. Psychiatry consulted for agitation/medication management.    Impression: patient without past psych hx with recently started agitation and aggressive behavior, possible executive and memory dysfunction, confusion, poor attention span raises concern for delirium vs r/o new case of early dementia/ consequences of TBI.    12/16 Pt today irritable and uncooperative on exam, limiting evaluation. Although pt refuses to participate in orientation questions, he appears confused given his persistent requests for PO breakfast despite being reminded of PEG. Req'd 2x PRN yesterday, no significant agitation overnight.  12/18: Patient receiving less PRNs: once on 12/16 and once 12/17. Currently calm, cooperative with the interview.    Recommendations:  - Observation: as per primary team  - Delirium work up as per team, check TSH- increased, 4.82, , Folate- 1.0, UA-wnl, MRI brain not done, CT head: There are periventricular and subcortical white matter hypodensities, which are nonspecific and likely represent microvascular ischemic changes.  - Medications:   Continue Quetiapine 225mg BID, Hold if QTC >500,  		continue Clonazepam 1 mg at bed time to prevent benzos withdrawal (as per med rec in chart, outpatient dose is Klonopin 1mg BID)  		INCREASE Depakote 250 mg  at 8 am, 125 mg at 2 pm and 250 mg at 8 pm for management of agitation, monitor LFTs and platelets, VPA level in 2 days ( hold morning dose prior to blood draw)  -PRN for agitation: Zyprexa 2.5 mg Q 6 hours PO/IM. Can give additional Zyprexa 2.5 mg for refractory agitation, combativeness (ensure qtc <500)    - Supportive treatment of delirium: 1) Minimize use of benzodiazepines, opioids, anticholinergics, and other deliriogenic agents whenever possible.  2) Maintain sleep wake cycle.  3) Provide frequent reorientation and redirection.  4) Family member at bedside if possible. 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 Patient received  Benadryl 50mg IV, Haldol 2.5 IVP x2, Ativan 2mg IVP x2, Versed 2mg IVP x1, 5mg IVP x1, and Zyprexa 2.5mg IM for agitation. Psychiatry consulted for agitation/medication management.    Impression: patient without past psych hx with recently started agitation and aggressive behavior, possible executive and memory dysfunction, confusion, poor attention span raises concern for delirium vs r/o new case of early dementia/ consequences of TBI.    12/16 Pt today irritable and uncooperative on exam, limiting evaluation. Although pt refuses to participate in orientation questions, he appears confused given his persistent requests for PO breakfast despite being reminded of PEG. Req'd 2x PRN yesterday, no significant agitation overnight.  12/18: Patient receiving less PRNs: once on 12/16 and once 12/17. Currently calm, cooperative with the interview.    Recommendations:  - Observation: as per primary team  - Delirium work up as per team, check TSH- increased, 4.82, , Folate- 1.0, UA-wnl, MRI brain not done, CT head: There are periventricular and subcortical white matter hypodensities, which are nonspecific and likely represent microvascular ischemic changes.  - Follow up with TSH level, repeat CMP, Consider MRI brain.  - Medications:   Continue Quetiapine 225mg BID, Hold if QTC >500,  		continue Clonazepam 1 mg at bed time to prevent benzos withdrawal (as per med rec in chart, outpatient dose is Klonopin 1mg BID)  		INCREASE Depakote 250 mg  at 8 am, 125 mg at 2 pm and 250 mg at 8 pm for management of agitation, monitor LFTs and platelets, VPA level in 2 days ( hold morning dose prior to blood draw)  -PRN for agitation: Zyprexa 2.5 mg Q 6 hours PO/IM. Can give additional Zyprexa 2.5 mg for refractory agitation, combativeness (ensure qtc <500)    - Supportive treatment of delirium: 1) Minimize use of benzodiazepines, opioids, anticholinergics, and other deliriogenic agents whenever possible.  2) Maintain sleep wake cycle.  3) Provide frequent reorientation and redirection.  4) Family member at bedside if possible. 5) Provide corrective lenses/hearing aids if required   Patient is a 68 years old male with the past hx of  BPH, agitation, as per chart ?dementia, dysphagia after CVA now s/p PEG tube, and ?CHF presenting from [facility] for worsening agitation over the past few weeks. Patient received  Benadryl 50mg IV, Haldol 2.5 IVP x2, Ativan 2mg IVP x2, Versed 2mg IVP x1, 5mg IVP x1, and Zyprexa 2.5mg IM for agitation. Psychiatry consulted for agitation/medication management.    Impression: patient without past psych hx with recently started agitation and aggressive behavior, possible executive and memory dysfunction, confusion, poor attention span raises concern for delirium vs r/o new case of early dementia/ consequences of TBI.    12/16 Pt today irritable and uncooperative on exam, limiting evaluation. Although pt refuses to participate in orientation questions, he appears confused given his persistent requests for PO breakfast despite being reminded of PEG. Req'd 2x PRN yesterday, no significant agitation overnight.  12/18: Patient receiving less PRNs: once on 12/16 and once 12/17. Currently calm, cooperative with the interview.    Recommendations:  - Observation: as per primary team  - Delirium work up as per team, check TSH- increased, 4.82, , Folate- 1.0, UA-wnl, MRI brain not done, CT head: There are periventricular and subcortical white matter hypodensities, which are nonspecific and likely represent microvascular ischemic changes.  - Follow up with TSH level, repeat CMP, Consider MRI brain.  - Medications:   Continue Quetiapine 225mg BID, Hold if QTC >500,  		continue Clonazepam 1 mg at bed time to prevent benzos withdrawal (as per med rec in chart, outpatient dose is Klonopin 1mg BID)  		INCREASE Depakote 250 mg  at 8 am, 125 mg at 2 pm and 250 mg at 8 pm for management of agitation, monitor LFTs and platelets, VPA level in 2 days ( hold morning dose prior to blood draw)  -PRN for agitation: Zyprexa 2.5 mg Q 6 hours PO/IM. Can give additional Zyprexa 2.5 mg for refractory agitation, combativeness (ensure qtc <500)    - Supportive treatment of delirium: 1) Minimize use of benzodiazepines, opioids, anticholinergics, and other deliriogenic agents whenever possible.  2) Maintain sleep wake cycle.  3) Provide frequent reorientation and redirection.  4) Family member at bedside if possible. 5) Provide corrective lenses/hearing aids if required   Patient is a 68 years old male with the past hx of  BPH, agitation, as per chart ?dementia, dysphagia after CVA now s/p PEG tube, and ?CHF presenting from [facility] for worsening agitation over the past few weeks. Patient received  Benadryl 50mg IV, Haldol 2.5 IVP x2, Ativan 2mg IVP x2, Versed 2mg IVP x1, 5mg IVP x1, and Zyprexa 2.5mg IM for agitation. Psychiatry consulted for agitation/medication management.    Impression: patient without past psych hx with recently started agitation and aggressive behavior, possible executive and memory dysfunction, confusion, poor attention span raises concern for delirium vs r/o new case of early dementia/ consequences of TBI.    12/16 Pt today irritable and uncooperative on exam, limiting evaluation. Although pt refuses to participate in orientation questions, he appears confused given his persistent requests for PO breakfast despite being reminded of PEG. Req'd 2x PRN yesterday, no significant agitation overnight.  12/18: Patient receiving less PRNs: once on 12/16 and once 12/17. Currently calm, cooperative with the interview.    Recommendations:  - Observation: as per primary team; likely needs enhanced  - Delirium work up as per team, check TSH- increased, 4.82, , Folate- 1.0, UA-wnl, MRI brain not done, CT head: There are periventricular and subcortical white matter hypodensities, which are nonspecific and likely represent microvascular ischemic changes.  - Follow up with TSH level, repeat CMP, Consider MRI brain.  - Medications:   Continue Quetiapine 225mg BID, Hold if QTC >500,  		continue Clonazepam 1 mg at bed time to prevent benzos withdrawal (as per med rec in chart, outpatient dose is Klonopin 1mg BID)  		INCREASE Depakote 250 mg  at 8 am, 125 mg at 2 pm and 250 mg at 8 pm for management of agitation, monitor LFTs and platelets, VPA level in 2 days ( hold morning dose prior to blood draw)  -PRN for agitation: Zyprexa 2.5 mg Q 6 hours PO/IM. Can give additional Zyprexa 2.5 mg for refractory agitation, combativeness (ensure qtc <500)    - Supportive treatment of delirium: 1) Minimize use of benzodiazepines, opioids, anticholinergics, and other deliriogenic agents whenever possible.  2) Maintain sleep wake cycle.  3) Provide frequent reorientation and redirection.  4) Family member at bedside if possible. 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 Patient received  Benadryl 50mg IV, Haldol 2.5 IVP x2, Ativan 2mg IVP x2, Versed 2mg IVP x1, 5mg IVP x1, and Zyprexa 2.5mg IM for agitation. Psychiatry consulted for agitation/medication management.    Impression: patient without past psych hx with recently started agitation and aggressive behavior, possible executive and memory dysfunction, confusion, poor attention span raises concern for delirium vs r/o new case of early dementia/ consequences of TBI.    12/16 Pt today irritable and uncooperative on exam, limiting evaluation. Although pt refuses to participate in orientation questions, he appears confused given his persistent requests for PO breakfast despite being reminded of PEG. Req'd 2x PRN yesterday, no significant agitation overnight.  12/18: Patient receiving less PRNs: once on 12/16 and once 12/17. Currently calm, cooperative with the interview.    Recommendations:  - Observation: as per primary team; likely needs enhanced  - Delirium work up as per team, check TSH- increased, 4.82, , Folate- 1.0, UA-wnl, MRI brain not done, CT head: There are periventricular and subcortical white matter hypodensities, which are nonspecific and likely represent microvascular ischemic changes.  - Follow up with TSH level, repeat CMP, Consider MRI brain.  - Medications:   Continue Quetiapine 225mg BID, Hold if QTC >500,  		continue Clonazepam 1 mg at bed time to prevent benzos withdrawal (as per med rec in chart, outpatient dose is Klonopin 1mg BID)  		INCREASE Depakote 250 mg  at 8 am, 125 mg at 2 pm and 250 mg at 8 pm for management of agitation, monitor LFTs and platelets, VPA level in 2 days ( hold morning dose prior to blood draw)  -PRN for agitation: Zyprexa 2.5 mg Q 6 hours PO/IM. Can give additional Zyprexa 2.5 mg for refractory agitation, combativeness (ensure qtc <500)    - Supportive treatment of delirium: 1) Minimize use of benzodiazepines, opioids, anticholinergics, and other deliriogenic agents whenever possible.  2) Maintain sleep wake cycle.  3) Provide frequent reorientation and redirection.  4) Family member at bedside if possible. 5) Provide corrective lenses/hearing aids if required

## 2023-12-18 NOTE — PROGRESS NOTE ADULT - PROBLEM SELECTOR PLAN 8
DVT ppx: lovenox  DIET: Pureed per SLP  DISPO: NH  GOC: MOLST form copy from NH with patient signature for full code status signed in 2022

## 2023-12-18 NOTE — PROGRESS NOTE ADULT - PROBLEM SELECTOR PLAN 1
Patient presents with agitation which appears to have been a chronic problem, acutely worsening lately with more aggression than usual, likely progression of potential dementia with delirium vs. acute encephalopathy with unclear etiology, now improved   - agitated in ED now s/p sedatives, current encephalopathy likely due to antipsychotics  - CTH with too much motion degradation for evaluation, will hold off repeat CTH until less agitated  - no significant electrolyte abnormalities, RVP negative, CXR appears clear, UA negative, BCx NTD,   - TSH mildly elevated, free T4 wnl, folate wnl  - possibly erysipelas on face, treatment as below

## 2023-12-18 NOTE — PROGRESS NOTE ADULT - SUBJECTIVE AND OBJECTIVE BOX
Kendal Watt MD  [facility] Division of Hospital Medicine  Pager [phone] (M-F 8AM-5PM)  Other Times: [id number]    Patient is a 68y old  Male who presents with a chief complaint of Agitation, aggression (17 Dec 2023 12:01)    SUBJECTIVE / OVERNIGHT EVENTS: no acute events overnight     MEDICATIONS  (STANDING):  albuterol/ipratropium for Nebulization 3 milliLiter(s) Nebulizer every 6 hours  cephalexin 500 milliGRAM(s) Oral every 6 hours  clonazePAM  Tablet 1 milliGRAM(s) Oral at bedtime  dextrose 5% + sodium chloride 0.9%. 1000 milliLiter(s) (75 mL/Hr) IV Continuous <Continuous>  divalproex Sprinkle 125 milliGRAM(s) Oral <User Schedule>  divalproex Sprinkle 250 milliGRAM(s) Oral <User Schedule>  enoxaparin Injectable 40 milliGRAM(s) SubCutaneous every 24 hours  influenza  Vaccine (HIGH DOSE) 0.7 milliLiter(s) IntraMuscular once  lactulose Syrup 20 Gram(s) Oral two times a day  melatonin 3 milliGRAM(s) Oral at bedtime  midodrine 10 milliGRAM(s) Oral every 8 hours  QUEtiapine 225 milliGRAM(s) Oral two times a day  tamsulosin 0.4 milliGRAM(s) Oral at bedtime    MEDICATIONS  (PRN):  acetaminophen     Tablet .. 650 milliGRAM(s) Oral every 6 hours PRN Temp greater or equal to 38C (100.4F), Mild Pain (1 - 3)  OLANZapine Injectable 2.5 milliGRAM(s) IntraMuscular every 6 hours PRN severe agitation  senna 2 Tablet(s) Oral at bedtime PRN Constipation      PHYSICAL EXAM:  Vital Signs Last 24 Hrs  T(C): 36.4 (18 Dec 2023 10:20), Max: 37 (18 Dec 2023 05:10)  T(F): 97.6 (18 Dec 2023 10:20), Max: 98.6 (18 Dec 2023 05:10)  HR: 59 (18 Dec 2023 10:20) (59 - 88)  BP: 98/62 (18 Dec 2023 10:20) (94/57 - 108/65)  BP(mean): --  RR: 17 (18 Dec 2023 10:20) (17 - 19)  SpO2: 100% (18 Dec 2023 10:20) (95% - 100%)    Parameters below as of 18 Dec 2023 10:20  Patient On (Oxygen Delivery Method): room air        CONSTITUTIONAL: NAD, well-developed, well-groomed  RESPIRATORY: Normal respiratory effort; lungs are clear to auscultation bilaterally  CARDIOVASCULAR: Regular rate and rhythm, normal S1 and S2, no murmur/rub/gallop; No lower extremity edema  GASTROINTESTINAL: Nontender to palpation, normoactive bowel sounds, no rebound/guarding; No hepatosplenomegaly  MUSCULOSKELETAL:  no clubbing or cyanosis of digits; no joint swelling or tenderness to palpation  NEUROLOGY: non-focal; no gross sensory deficits   PSYCH: A+O to person, place, and time; affect appropriate  SKIN: No rashes; warm     LABS:                        13.4   5.52  )-----------( 160      ( 18 Dec 2023 06:58 )             41.1     12-18    140  |  103  |  9   ----------------------------<  84  3.9   |  26  |  0.71    Ca    8.7      18 Dec 2023 06:58  Phos  3.5     12-18  Mg     2.00     12-18            Urinalysis Basic - ( 18 Dec 2023 06:58 )    Color: x / Appearance: x / SG: x / pH: x  Gluc: 84 mg/dL / Ketone: x  / Bili: x / Urobili: x   Blood: x / Protein: x / Nitrite: x   Leuk Esterase: x / RBC: x / WBC x   Sq Epi: x / Non Sq Epi: x / Bacteria: x          RADIOLOGY & ADDITIONAL TESTS:  Results Reviewed:   Imaging Personally Reviewed:  Electrocardiogram Personally Reviewed:    COORDINATION OF CARE:  Care Discussed with Consultants/Other Providers [Y/N]:  Prior or Outpatient Records Reviewed [Y/N]:   Kendal Watt MD  [facility] Division of Hospital Medicine  Pager [phone] (M-F 8AM-5PM)  Other Times: [id number]    Patient is a 68y old  Male who presents with a chief complaint of Agitation, aggression (17 Dec 2023 12:01)    SUBJECTIVE / OVERNIGHT EVENTS: no acute events overnight     MEDICATIONS  (STANDING):  albuterol/ipratropium for Nebulization 3 milliLiter(s) Nebulizer every 6 hours  cephalexin 500 milliGRAM(s) Oral every 6 hours  clonazePAM  Tablet 1 milliGRAM(s) Oral at bedtime  dextrose 5% + sodium chloride 0.9%. 1000 milliLiter(s) (75 mL/Hr) IV Continuous <Continuous>  divalproex Sprinkle 125 milliGRAM(s) Oral <User Schedule>  divalproex Sprinkle 250 milliGRAM(s) Oral <User Schedule>  enoxaparin Injectable 40 milliGRAM(s) SubCutaneous every 24 hours  influenza  Vaccine (HIGH DOSE) 0.7 milliLiter(s) IntraMuscular once  lactulose Syrup 20 Gram(s) Oral two times a day  melatonin 3 milliGRAM(s) Oral at bedtime  midodrine 10 milliGRAM(s) Oral every 8 hours  QUEtiapine 225 milliGRAM(s) Oral two times a day  tamsulosin 0.4 milliGRAM(s) Oral at bedtime    MEDICATIONS  (PRN):  acetaminophen     Tablet .. 650 milliGRAM(s) Oral every 6 hours PRN Temp greater or equal to 38C (100.4F), Mild Pain (1 - 3)  OLANZapine Injectable 2.5 milliGRAM(s) IntraMuscular every 6 hours PRN severe agitation  senna 2 Tablet(s) Oral at bedtime PRN Constipation      PHYSICAL EXAM:  Vital Signs Last 24 Hrs  T(C): 36.4 (18 Dec 2023 10:20), Max: 37 (18 Dec 2023 05:10)  T(F): 97.6 (18 Dec 2023 10:20), Max: 98.6 (18 Dec 2023 05:10)  HR: 59 (18 Dec 2023 10:20) (59 - 88)  BP: 98/62 (18 Dec 2023 10:20) (94/57 - 108/65)  BP(mean): --  RR: 17 (18 Dec 2023 10:20) (17 - 19)  SpO2: 100% (18 Dec 2023 10:20) (95% - 100%)    Parameters below as of 18 Dec 2023 10:20  Patient On (Oxygen Delivery Method): room air        CONSTITUTIONAL: NAD, well-developed, well-groomed  RESPIRATORY: Normal respiratory effort; lungs are clear to auscultation bilaterally  CARDIOVASCULAR: Regular rate and rhythm, normal S1 and S2, no murmur/rub/gallop; No lower extremity edema  GASTROINTESTINAL: Nontender to palpation, normoactive bowel sounds, no rebound/guarding; No hepatosplenomegaly  MUSCULOSKELETAL:  no clubbing or cyanosis of digits; no joint swelling or tenderness to palpation  NEUROLOGY: non-focal; no gross sensory deficits   PSYCH: A+O to person, place, and time; affect appropriate  SKIN: No rashes; warm     LABS:                        13.4   5.52  )-----------( 160      ( 18 Dec 2023 06:58 )             41.1     12-18    140  |  103  |  9   ----------------------------<  84  3.9   |  26  |  0.71    Ca    8.7      18 Dec 2023 06:58  Phos  3.5     12-18  Mg     2.00     12-18            Urinalysis Basic - ( 18 Dec 2023 06:58 )    Color: x / Appearance: x / SG: x / pH: x  Gluc: 84 mg/dL / Ketone: x  / Bili: x / Urobili: x   Blood: x / Protein: x / Nitrite: x   Leuk Esterase: x / RBC: x / WBC x   Sq Epi: x / Non Sq Epi: x / Bacteria: x          RADIOLOGY & ADDITIONAL TESTS:  Results Reviewed:   Imaging Personally Reviewed:  Electrocardiogram Personally Reviewed:    COORDINATION OF CARE:  Care Discussed with Consultants/Other Providers [Y/N]:  Prior or Outpatient Records Reviewed [Y/N]:

## 2023-12-18 NOTE — PROGRESS NOTE ADULT - ASSESSMENT
68M with hx of ?dementia, CVA c/b dysphagia s/p PEG (still tolerates pureed diet), ?CHF, BPH, agitation sent from NH for worsening agitation.

## 2023-12-18 NOTE — PHYSICAL THERAPY INITIAL EVALUATION ADULT - GENERAL OBSERVATIONS, REHAB EVAL
Pt encountered in semi-supine position in NAD, all lines intact, a&ox1, SPO2: 98%, and NAREN Vickers aware.

## 2023-12-19 NOTE — BH CONSULTATION LIAISON PROGRESS NOTE - NSBHATTESTBILLING_PSY_A_CORE
[id number] OBS or IP - moderate complexity OR 35-49 mins [id number] OBS or IP - moderate complexity OR 35-49 mins

## 2023-12-19 NOTE — PROGRESS NOTE ADULT - PROBLEM SELECTOR PLAN 2
Patent with facial redness non-sparing nasolabial folds, possibly asymmetric and warm compared to rest of body, possibly new for patient ([facility] supervisor did not notice redness but only sees patient intermittently)  - s/p cefazolin, c/w Keflex for total 7 day course

## 2023-12-19 NOTE — PROGRESS NOTE ADULT - SUBJECTIVE AND OBJECTIVE BOX
Kendal Watt MD  [facility] Division of Hospital Medicine  Pager [phone] (M-F 8AM-5PM)  Other Times: [id number]    Patient is a 68y old  Male who presents with a chief complaint of Agitation, aggression (18 Dec 2023 14:50)    SUBJECTIVE / OVERNIGHT EVENTS: no acute events overnight     MEDICATIONS  (STANDING):  albuterol/ipratropium for Nebulization 3 milliLiter(s) Nebulizer every 6 hours  cephalexin 500 milliGRAM(s) Oral every 6 hours  clonazePAM  Tablet 1 milliGRAM(s) Oral at bedtime  divalproex Sprinkle 250 milliGRAM(s) Oral <User Schedule>  divalproex Sprinkle 125 milliGRAM(s) Oral <User Schedule>  enoxaparin Injectable 40 milliGRAM(s) SubCutaneous every 24 hours  influenza  Vaccine (HIGH DOSE) 0.7 milliLiter(s) IntraMuscular once  lactulose Syrup 20 Gram(s) Oral two times a day  melatonin 3 milliGRAM(s) Oral at bedtime  midodrine 10 milliGRAM(s) Oral every 8 hours  QUEtiapine 225 milliGRAM(s) Oral two times a day  tamsulosin 0.4 milliGRAM(s) Oral at bedtime    MEDICATIONS  (PRN):  acetaminophen     Tablet .. 650 milliGRAM(s) Oral every 6 hours PRN Temp greater or equal to 38C (100.4F), Mild Pain (1 - 3)  OLANZapine Injectable 2.5 milliGRAM(s) IntraMuscular every 6 hours PRN severe agitation  senna 2 Tablet(s) Oral at bedtime PRN Constipation      PHYSICAL EXAM:  Vital Signs Last 24 Hrs  T(C): 36.5 (19 Dec 2023 10:13), Max: 36.6 (19 Dec 2023 05:10)  T(F): 97.7 (19 Dec 2023 10:13), Max: 97.8 (19 Dec 2023 05:10)  HR: 85 (19 Dec 2023 10:13) (77 - 100)  BP: 98/60 (19 Dec 2023 10:13) (98/60 - 116/58)  BP(mean): --  RR: 18 (19 Dec 2023 10:13) (17 - 18)  SpO2: 98% (19 Dec 2023 10:13) (96% - 99%)    Parameters below as of 19 Dec 2023 10:13  Patient On (Oxygen Delivery Method): room air        CONSTITUTIONAL: NAD, well-developed, well-groomed  RESPIRATORY: Normal respiratory effort; lungs are clear to auscultation bilaterally  CARDIOVASCULAR: Regular rate and rhythm, normal S1 and S2, no murmur/rub/gallop; No lower extremity edema  GASTROINTESTINAL: Nontender to palpation, normoactive bowel sounds, no rebound/guarding; No hepatosplenomegaly  MUSCULOSKELETAL:  no clubbing or cyanosis of digits; no joint swelling or tenderness to palpation  NEUROLOGY: non-focal; no gross sensory deficits   PSYCH: A+O to person, place, and time; affect appropriate  SKIN: No rashes; warm     LABS:                        14.0   4.37  )-----------( 142      ( 19 Dec 2023 05:30 )             42.2     12-19    138  |  101  |  9   ----------------------------<  84  4.1   |  28  |  0.67    Ca    8.7      19 Dec 2023 05:30  Phos  3.5     12-18  Mg     2.00     12-18    TPro  7.0  /  Alb  3.4  /  TBili  0.4  /  DBili  x   /  AST  25  /  ALT  7   /  AlkPhos  76  12-19          Urinalysis Basic - ( 19 Dec 2023 05:30 )    Color: x / Appearance: x / SG: x / pH: x  Gluc: 84 mg/dL / Ketone: x  / Bili: x / Urobili: x   Blood: x / Protein: x / Nitrite: x   Leuk Esterase: x / RBC: x / WBC x   Sq Epi: x / Non Sq Epi: x / Bacteria: x          RADIOLOGY & ADDITIONAL TESTS:  Results Reviewed:   Imaging Personally Reviewed:  Electrocardiogram Personally Reviewed:    COORDINATION OF CARE:  Care Discussed with Consultants/Other Providers [Y/N]:  Prior or Outpatient Records Reviewed [Y/N]:   Kendal Watt MD  [facility] Division of Hospital Medicine  Pager [phone] (M-F 8AM-5PM)  Other Times: [id number]    Patient is a 68y old  Male who presents with a chief complaint of Agitation, aggression (18 Dec 2023 14:50)    SUBJECTIVE / OVERNIGHT EVENTS: no acute events overnight     MEDICATIONS  (STANDING):  albuterol/ipratropium for Nebulization 3 milliLiter(s) Nebulizer every 6 hours  cephalexin 500 milliGRAM(s) Oral every 6 hours  clonazePAM  Tablet 1 milliGRAM(s) Oral at bedtime  divalproex Sprinkle 250 milliGRAM(s) Oral <User Schedule>  divalproex Sprinkle 125 milliGRAM(s) Oral <User Schedule>  enoxaparin Injectable 40 milliGRAM(s) SubCutaneous every 24 hours  influenza  Vaccine (HIGH DOSE) 0.7 milliLiter(s) IntraMuscular once  lactulose Syrup 20 Gram(s) Oral two times a day  melatonin 3 milliGRAM(s) Oral at bedtime  midodrine 10 milliGRAM(s) Oral every 8 hours  QUEtiapine 225 milliGRAM(s) Oral two times a day  tamsulosin 0.4 milliGRAM(s) Oral at bedtime    MEDICATIONS  (PRN):  acetaminophen     Tablet .. 650 milliGRAM(s) Oral every 6 hours PRN Temp greater or equal to 38C (100.4F), Mild Pain (1 - 3)  OLANZapine Injectable 2.5 milliGRAM(s) IntraMuscular every 6 hours PRN severe agitation  senna 2 Tablet(s) Oral at bedtime PRN Constipation      PHYSICAL EXAM:  Vital Signs Last 24 Hrs  T(C): 36.5 (19 Dec 2023 10:13), Max: 36.6 (19 Dec 2023 05:10)  T(F): 97.7 (19 Dec 2023 10:13), Max: 97.8 (19 Dec 2023 05:10)  HR: 85 (19 Dec 2023 10:13) (77 - 100)  BP: 98/60 (19 Dec 2023 10:13) (98/60 - 116/58)  BP(mean): --  RR: 18 (19 Dec 2023 10:13) (17 - 18)  SpO2: 98% (19 Dec 2023 10:13) (96% - 99%)    Parameters below as of 19 Dec 2023 10:13  Patient On (Oxygen Delivery Method): room air        CONSTITUTIONAL: NAD, well-developed, well-groomed  RESPIRATORY: Normal respiratory effort; lungs are clear to auscultation bilaterally  CARDIOVASCULAR: Regular rate and rhythm, normal S1 and S2, no murmur/rub/gallop; No lower extremity edema  GASTROINTESTINAL: Nontender to palpation, normoactive bowel sounds, no rebound/guarding; No hepatosplenomegaly  MUSCULOSKELETAL:  no clubbing or cyanosis of digits; no joint swelling or tenderness to palpation  NEUROLOGY: non-focal; no gross sensory deficits   PSYCH: A+O to person, place, and time; affect appropriate  SKIN: No rashes; warm     LABS:                        14.0   4.37  )-----------( 142      ( 19 Dec 2023 05:30 )             42.2     12-19    138  |  101  |  9   ----------------------------<  84  4.1   |  28  |  0.67    Ca    8.7      19 Dec 2023 05:30  Phos  3.5     12-18  Mg     2.00     12-18    TPro  7.0  /  Alb  3.4  /  TBili  0.4  /  DBili  x   /  AST  25  /  ALT  7   /  AlkPhos  76  12-19          Urinalysis Basic - ( 19 Dec 2023 05:30 )    Color: x / Appearance: x / SG: x / pH: x  Gluc: 84 mg/dL / Ketone: x  / Bili: x / Urobili: x   Blood: x / Protein: x / Nitrite: x   Leuk Esterase: x / RBC: x / WBC x   Sq Epi: x / Non Sq Epi: x / Bacteria: x          RADIOLOGY & ADDITIONAL TESTS:  Results Reviewed:   Imaging Personally Reviewed:  Electrocardiogram Personally Reviewed:    COORDINATION OF CARE:  Care Discussed with Consultants/Other Providers [Y/N]:  Prior or Outpatient Records Reviewed [Y/N]:

## 2023-12-19 NOTE — BH CONSULTATION LIAISON PROGRESS NOTE - NSBHFUPINTERVALHXFT_PSY_A_CORE
Chart, labs, imagine reviewed. Case discussed with the primary team. Patient seen at bedside. Received 3 PRNs: on 12/18 at 2:30 pm, and on 12/19 at 4:45 am and 2:45 pm.  Patient was disoriented in time and place. He said that " it looks like an office". Patient denied suicidal or homicidal ideations, intent or a plan. Denied auditory or visual hallucinations. Said he was hungry and was interested in eating his food.  GERMAINE 23 on 12/17

## 2023-12-19 NOTE — BH CONSULTATION LIAISON PROGRESS NOTE - NSBHASSESSMENTFT_PSY_ALL_CORE
Patient is a 68 years old male with the past hx of  BPH, agitation, as per chart ?dementia, dysphagia after CVA now s/p PEG tube, and ?CHF presenting from [facility] for worsening agitation over the past few weeks. Patient received  Benadryl 50mg IV, Haldol 2.5 IVP x2, Ativan 2mg IVP x2, Versed 2mg IVP x1, 5mg IVP x1, and Zyprexa 2.5mg IM for agitation. Psychiatry consulted for agitation/medication management.    Impression: patient without past psych hx with recently started agitation and aggressive behavior, possible executive and memory dysfunction, confusion, poor attention span raises concern for delirium vs r/o new case of early dementia/ consequences of TBI.    12/16 Pt today irritable and uncooperative on exam, limiting evaluation. Although pt refuses to participate in orientation questions, he appears confused given his persistent requests for PO breakfast despite being reminded of PEG. Req'd 2x PRN yesterday, no significant agitation overnight.  12/18: Patient receiving less PRNs: once on 12/16 and once 12/17. Currently calm, cooperative with the interview.  12/19: received 3 PRNs since yesterday. Currently calm, no safety issues.    Recommendations:  - Observation: as per primary team; likely needs enhanced  - Delirium work up as per team, check TSH- increased, 4.82, , Folate- 1.0, UA-wnl, MRI brain not done, CT head: There are periventricular and subcortical white matter hypodensities, which are nonspecific and likely represent microvascular ischemic changes.  - Follow up with TSH level, repeat CMP, Consider MRI brain.  - Medications:   Continue Quetiapine 225mg BID, Hold if QTC >500,  		continue Clonazepam 1 mg at bed time to prevent benzos withdrawal (as per med rec in chart, outpatient dose is Klonopin 1mg BID)  		continue Depakote 250 mg  at 8 am, 125 mg at 2 pm and 250 mg at 8 pm for management of agitation, monitor LFTs and platelets, VPA level in 2 days ( hold morning dose prior to blood draw)  -PRN for agitation: Zyprexa 2.5 mg Q 6 hours PO/IM. Can give additional Zyprexa 2.5 mg for refractory agitation, combativeness (ensure qtc <500)    - Supportive treatment of delirium: 1) Minimize use of benzodiazepines, opioids, anticholinergics, and other deliriogenic agents whenever possible.  2) Maintain sleep wake cycle.  3) Provide frequent reorientation and redirection.  4) Family member at bedside if possible. 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 Patient received  Benadryl 50mg IV, Haldol 2.5 IVP x2, Ativan 2mg IVP x2, Versed 2mg IVP x1, 5mg IVP x1, and Zyprexa 2.5mg IM for agitation. Psychiatry consulted for agitation/medication management.    Impression: patient without past psych hx with recently started agitation and aggressive behavior, possible executive and memory dysfunction, confusion, poor attention span raises concern for delirium vs r/o new case of early dementia/ consequences of TBI.    12/16 Pt today irritable and uncooperative on exam, limiting evaluation. Although pt refuses to participate in orientation questions, he appears confused given his persistent requests for PO breakfast despite being reminded of PEG. Req'd 2x PRN yesterday, no significant agitation overnight.  12/18: Patient receiving less PRNs: once on 12/16 and once 12/17. Currently calm, cooperative with the interview.  12/19: received 3 PRNs since yesterday. Currently calm, no safety issues.    Recommendations:  - Observation: as per primary team; likely needs enhanced  - Delirium work up as per team, check TSH- increased, 4.82, , Folate- 1.0, UA-wnl, MRI brain not done, CT head: There are periventricular and subcortical white matter hypodensities, which are nonspecific and likely represent microvascular ischemic changes.  - Follow up with TSH level, repeat CMP, Consider MRI brain.  - Medications:   Continue Quetiapine 225mg BID, Hold if QTC >500,  		continue Clonazepam 1 mg at bed time to prevent benzos withdrawal (as per med rec in chart, outpatient dose is Klonopin 1mg BID)  		continue Depakote 250 mg  at 8 am, 125 mg at 2 pm and 250 mg at 8 pm for management of agitation, monitor LFTs and platelets, VPA level in 2 days ( hold morning dose prior to blood draw)  -PRN for agitation: Zyprexa 2.5 mg Q 6 hours PO/IM. Can give additional Zyprexa 2.5 mg for refractory agitation, combativeness (ensure qtc <500)    - Supportive treatment of delirium: 1) Minimize use of benzodiazepines, opioids, anticholinergics, and other deliriogenic agents whenever possible.  2) Maintain sleep wake cycle.  3) Provide frequent reorientation and redirection.  4) Family member at bedside if possible. 5) Provide corrective lenses/hearing aids if required

## 2023-12-20 NOTE — BH CONSULTATION LIAISON PROGRESS NOTE - NSBHMSEPERCEPT_PSY_A_CORE
Unable to assess
No abnormalities/Unable to assess

## 2023-12-20 NOTE — BH CONSULTATION LIAISON PROGRESS NOTE - NSBHFUPINTERVALHXFT_PSY_A_CORE
Chart, labs, imagine reviewed. Case discussed with the primary team. Patient seen at bedside. No PRNs since last assessment.  Patient was disoriented in time and place. He is perseverating on being at " [facility]". Think that he has been here for 2 years and he is not sure why. Patient denied suicidal or homicidal ideations, intent or a plan. Denied auditory or visual hallucinations.  GERMAINE 23 on 12/17 Chart, labs, imagine reviewed. Case discussed with the primary team. Patient seen at bedside. No PRNs since last assessment.  Patient was disoriented in time and place. He is perseverating on being at " [facility]". Think that he has been here for 2 years and he is not sure why. Patient denied suicidal or homicidal ideations, intent or a plan. Denied auditory or visual hallucinations.  GERMAINE 23 on 12/17

## 2023-12-20 NOTE — BH CONSULTATION LIAISON PROGRESS NOTE - NSBHASSESSMENTFT_PSY_ALL_CORE
Patient is a 68 years old male with the past hx of  BPH, agitation, as per chart ?dementia, dysphagia after CVA now s/p PEG tube, and ?CHF presenting from [facility] for worsening agitation over the past few weeks. Patient received  Benadryl 50mg IV, Haldol 2.5 IVP x2, Ativan 2mg IVP x2, Versed 2mg IVP x1, 5mg IVP x1, and Zyprexa 2.5mg IM for agitation. Psychiatry consulted for agitation/medication management.    Impression: patient without past psych hx with recently started agitation and aggressive behavior, possible executive and memory dysfunction, confusion, poor attention span raises concern for delirium vs r/o new case of early dementia/ consequences of TBI.    12/16 Pt today irritable and uncooperative on exam, limiting evaluation. Although pt refuses to participate in orientation questions, he appears confused given his persistent requests for PO breakfast despite being reminded of PEG. Req'd 2x PRN yesterday, no significant agitation overnight.  12/18: Patient receiving less PRNs: once on 12/16 and once 12/17. Currently calm, cooperative with the interview.  12/19: received 3 PRNs since yesterday. Currently calm, no safety issues.  12/20: No PRNs since yesterday. Calm on assessment, disoriented in time and place.    Recommendations:  - Observation: as per primary team; likely needs enhanced  - Delirium work up as per team, check TSH- increased, 4.82, , Folate- 1.0, UA-wnl, MRI brain not done, CT head: There are periventricular and subcortical white matter hypodensities, which are nonspecific and likely represent microvascular ischemic changes.  - Follow up with TSH level, repeat CMP, Consider MRI brain.  - Medications:   Continue Quetiapine 225mg BID, Hold if QTC >500,  		continue Clonazepam 1 mg at bed time to prevent benzos withdrawal (as per med rec in chart, outpatient dose is Klonopin 1mg BID)  		continue Depakote 250 mg  at 8 am, 125 mg at 2 pm and 250 mg at 8 pm for management of agitation, monitor LFTs and platelets, VPA level in 2 days ( hold morning dose prior to blood draw)  -PRN for agitation: Zyprexa 2.5 mg Q 6 hours PO/IM. Can give additional Zyprexa 2.5 mg for refractory agitation, combativeness (ensure qtc <500)    - Supportive treatment of delirium: 1) Minimize use of benzodiazepines, opioids, anticholinergics, and other deliriogenic agents whenever possible.  2) Maintain sleep wake cycle.  3) Provide frequent reorientation and redirection.  4) Family member at bedside if possible. 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 Patient received  Benadryl 50mg IV, Haldol 2.5 IVP x2, Ativan 2mg IVP x2, Versed 2mg IVP x1, 5mg IVP x1, and Zyprexa 2.5mg IM for agitation. Psychiatry consulted for agitation/medication management.    Impression: patient without past psych hx with recently started agitation and aggressive behavior, possible executive and memory dysfunction, confusion, poor attention span raises concern for delirium vs r/o new case of early dementia/ consequences of TBI.    12/16 Pt today irritable and uncooperative on exam, limiting evaluation. Although pt refuses to participate in orientation questions, he appears confused given his persistent requests for PO breakfast despite being reminded of PEG. Req'd 2x PRN yesterday, no significant agitation overnight.  12/18: Patient receiving less PRNs: once on 12/16 and once 12/17. Currently calm, cooperative with the interview.  12/19: received 3 PRNs since yesterday. Currently calm, no safety issues.  12/20: No PRNs since yesterday. Calm on assessment, disoriented in time and place.    Recommendations:  - Observation: as per primary team; likely needs enhanced  - Delirium work up as per team, check TSH- increased, 4.82, , Folate- 1.0, UA-wnl, MRI brain not done, CT head: There are periventricular and subcortical white matter hypodensities, which are nonspecific and likely represent microvascular ischemic changes.  - Follow up with TSH level, repeat CMP, Consider MRI brain.  - Medications:   Continue Quetiapine 225mg BID, Hold if QTC >500,  		continue Clonazepam 1 mg at bed time to prevent benzos withdrawal (as per med rec in chart, outpatient dose is Klonopin 1mg BID)  		continue Depakote 250 mg  at 8 am, 125 mg at 2 pm and 250 mg at 8 pm for management of agitation, monitor LFTs and platelets, VPA level in 2 days ( hold morning dose prior to blood draw)  -PRN for agitation: Zyprexa 2.5 mg Q 6 hours PO/IM. Can give additional Zyprexa 2.5 mg for refractory agitation, combativeness (ensure qtc <500)    - Supportive treatment of delirium: 1) Minimize use of benzodiazepines, opioids, anticholinergics, and other deliriogenic agents whenever possible.  2) Maintain sleep wake cycle.  3) Provide frequent reorientation and redirection.  4) Family member at bedside if possible. 5) Provide corrective lenses/hearing aids if required

## 2023-12-20 NOTE — BH CONSULTATION LIAISON PROGRESS NOTE - NSBHMSESPABN_PSY_A_CORE
Impaired articulation
Decreased productivity/Impaired articulation

## 2023-12-20 NOTE — BH CONSULTATION LIAISON PROGRESS NOTE - NSBHATTESTCOMMENTATTENDFT_PSY_A_CORE
Chart reviewed. I agree with Dr. Mcgill's history, MSE, A/P.  Patient is alert, responsive. Does not know where he is. Is paranoid. Denies SI/HI.  Increased tone bilaterally. Possible posturing, though no layla catalepsy.    Agree with increasing VPA by adding afternoon dose per above.  Please check CMP as will need to monitor albumin as patient is on VPA and VPA level will need to be adjusted for low albumin.    AVOID HALDOL
Chart reviewed. Discussed with fellow. Seen separately. Was oddly-related, confused, asking for nurse but then vague in his reasoning. Confused. Agree with above assessment/recs. Will continue to follow. 
Chart reviewed. Pt discussed with fellow. Seen separately in AM. Was awake but disoriented. Denied distress/pain, but was confused/limited in insight. No foal neuro deficits. Agree with above assessment/recs. Will continue to follow as needed.

## 2023-12-20 NOTE — BH CONSULTATION LIAISON PROGRESS NOTE - NSBHCHARTREVIEWLAB_PSY_A_CORE FT
13.4   5.52  )-----------( 160      ( 18 Dec 2023 06:58 )             41.1     12-18    140  |  103  |  9   ----------------------------<  84  3.9   |  26  |  0.71    Ca    8.7      18 Dec 2023 06:58  Phos  3.5     12-18  Mg     2.00     12-18    
                          14.0   4.37  )-----------( 142      ( 19 Dec 2023 05:30 )             42.2     12-19    138  |  101  |  9   ----------------------------<  84  4.1   |  28  |  0.67    Ca    8.7      19 Dec 2023 05:30  Phos  3.5     12-18  Mg     2.00     12-18    TPro  7.0  /  Alb  3.4  /  TBili  0.4  /  DBili  x   /  AST  25  /  ALT  7   /  AlkPhos  76  12-19  
12-16    141  |  104  |  13  ----------------------------<  114<H>  4.4   |  27  |  0.75    Ca    8.8      16 Dec 2023 03:05  Phos  3.5     12-16  Mg     2.10     12-16    TPro  7.2  /  Alb  3.4  /  TBili  0.4  /  DBili  x   /  AST  20  /  ALT  12  /  AlkPhos  87  12-14                          14.0   9.04  )-----------( 151      ( 16 Dec 2023 03:05 )             42.6   
                          14.0   4.37  )-----------( 142      ( 19 Dec 2023 05:30 )             42.2     12-19    138  |  101  |  9   ----------------------------<  84  4.1   |  28  |  0.67    Ca    8.7      19 Dec 2023 05:30  Phos  3.5     12-18  Mg     2.00     12-18    TPro  7.0  /  Alb  3.4  /  TBili  0.4  /  DBili  x   /  AST  25  /  ALT  7   /  AlkPhos  76  12-19

## 2023-12-20 NOTE — PROGRESS NOTE ADULT - SUBJECTIVE AND OBJECTIVE BOX
Kendal Watt MD  [facility] Division of Hospital Medicine  Pager [phone] (M-F 8AM-5PM)  Other Times: [id number]    Patient is a 68y old  Male who presents with a chief complaint of Agitation, aggression (19 Dec 2023 15:16)    SUBJECTIVE / OVERNIGHT EVENTS: no acute events overnight     MEDICATIONS  (STANDING):  albuterol/ipratropium for Nebulization 3 milliLiter(s) Nebulizer every 6 hours  cephalexin 500 milliGRAM(s) Oral every 6 hours  clonazePAM  Tablet 1 milliGRAM(s) Oral at bedtime  divalproex Sprinkle 250 milliGRAM(s) Oral <User Schedule>  divalproex Sprinkle 125 milliGRAM(s) Oral <User Schedule>  enoxaparin Injectable 40 milliGRAM(s) SubCutaneous every 24 hours  influenza  Vaccine (HIGH DOSE) 0.7 milliLiter(s) IntraMuscular once  lactulose Syrup 20 Gram(s) Oral two times a day  melatonin 3 milliGRAM(s) Oral at bedtime  midodrine 10 milliGRAM(s) Oral every 8 hours  QUEtiapine 225 milliGRAM(s) Oral two times a day  tamsulosin 0.4 milliGRAM(s) Oral at bedtime    MEDICATIONS  (PRN):  acetaminophen     Tablet .. 650 milliGRAM(s) Oral every 6 hours PRN Temp greater or equal to 38C (100.4F), Mild Pain (1 - 3)  OLANZapine Injectable 2.5 milliGRAM(s) IntraMuscular every 6 hours PRN severe agitation  senna 2 Tablet(s) Oral at bedtime PRN Constipation      PHYSICAL EXAM:  Vital Signs Last 24 Hrs  T(C): 36.4 (20 Dec 2023 05:51), Max: 36.7 (19 Dec 2023 19:02)  T(F): 97.5 (20 Dec 2023 05:51), Max: 98 (19 Dec 2023 19:02)  HR: 82 (20 Dec 2023 09:39) (70 - 90)  BP: 95/54 (20 Dec 2023 05:51) (95/54 - 115/70)  BP(mean): --  RR: 18 (20 Dec 2023 05:51) (18 - 18)  SpO2: 100% (20 Dec 2023 09:39) (93% - 100%)    Parameters below as of 20 Dec 2023 05:51  Patient On (Oxygen Delivery Method): room air        CONSTITUTIONAL: NAD, well-developed, well-groomed  RESPIRATORY: Normal respiratory effort; lungs are clear to auscultation bilaterally  CARDIOVASCULAR: Regular rate and rhythm, normal S1 and S2, no murmur/rub/gallop; No lower extremity edema  GASTROINTESTINAL: Nontender to palpation, normoactive bowel sounds, no rebound/guarding; No hepatosplenomegaly  MUSCULOSKELETAL:  no clubbing or cyanosis of digits; no joint swelling or tenderness to palpation  NEUROLOGY: non-focal; no gross sensory deficits   PSYCH: A+O to person, place, and time; affect appropriate  SKIN: No rashes; warm     LABS:                        15.1   6.36  )-----------( 164      ( 20 Dec 2023 13:23 )             44.8     12-19    138  |  101  |  9   ----------------------------<  84  4.1   |  28  |  0.67    Ca    8.7      19 Dec 2023 05:30    TPro  7.0  /  Alb  3.4  /  TBili  0.4  /  DBili  x   /  AST  25  /  ALT  7   /  AlkPhos  76  12-19          Urinalysis Basic - ( 19 Dec 2023 05:30 )    Color: x / Appearance: x / SG: x / pH: x  Gluc: 84 mg/dL / Ketone: x  / Bili: x / Urobili: x   Blood: x / Protein: x / Nitrite: x   Leuk Esterase: x / RBC: x / WBC x   Sq Epi: x / Non Sq Epi: x / Bacteria: x          RADIOLOGY & ADDITIONAL TESTS:  Results Reviewed:   Imaging Personally Reviewed:  Electrocardiogram Personally Reviewed:    COORDINATION OF CARE:  Care Discussed with Consultants/Other Providers [Y/N]:  Prior or Outpatient Records Reviewed [Y/N]:   Kendal Watt MD  [facility] Division of Hospital Medicine  Pager [phone] (M-F 8AM-5PM)  Other Times: [id number]    Patient is a 68y old  Male who presents with a chief complaint of Agitation, aggression (19 Dec 2023 15:16)    SUBJECTIVE / OVERNIGHT EVENTS: no acute events overnight     MEDICATIONS  (STANDING):  albuterol/ipratropium for Nebulization 3 milliLiter(s) Nebulizer every 6 hours  cephalexin 500 milliGRAM(s) Oral every 6 hours  clonazePAM  Tablet 1 milliGRAM(s) Oral at bedtime  divalproex Sprinkle 250 milliGRAM(s) Oral <User Schedule>  divalproex Sprinkle 125 milliGRAM(s) Oral <User Schedule>  enoxaparin Injectable 40 milliGRAM(s) SubCutaneous every 24 hours  influenza  Vaccine (HIGH DOSE) 0.7 milliLiter(s) IntraMuscular once  lactulose Syrup 20 Gram(s) Oral two times a day  melatonin 3 milliGRAM(s) Oral at bedtime  midodrine 10 milliGRAM(s) Oral every 8 hours  QUEtiapine 225 milliGRAM(s) Oral two times a day  tamsulosin 0.4 milliGRAM(s) Oral at bedtime    MEDICATIONS  (PRN):  acetaminophen     Tablet .. 650 milliGRAM(s) Oral every 6 hours PRN Temp greater or equal to 38C (100.4F), Mild Pain (1 - 3)  OLANZapine Injectable 2.5 milliGRAM(s) IntraMuscular every 6 hours PRN severe agitation  senna 2 Tablet(s) Oral at bedtime PRN Constipation      PHYSICAL EXAM:  Vital Signs Last 24 Hrs  T(C): 36.4 (20 Dec 2023 05:51), Max: 36.7 (19 Dec 2023 19:02)  T(F): 97.5 (20 Dec 2023 05:51), Max: 98 (19 Dec 2023 19:02)  HR: 82 (20 Dec 2023 09:39) (70 - 90)  BP: 95/54 (20 Dec 2023 05:51) (95/54 - 115/70)  BP(mean): --  RR: 18 (20 Dec 2023 05:51) (18 - 18)  SpO2: 100% (20 Dec 2023 09:39) (93% - 100%)    Parameters below as of 20 Dec 2023 05:51  Patient On (Oxygen Delivery Method): room air        CONSTITUTIONAL: NAD, well-developed, well-groomed  RESPIRATORY: Normal respiratory effort; lungs are clear to auscultation bilaterally  CARDIOVASCULAR: Regular rate and rhythm, normal S1 and S2, no murmur/rub/gallop; No lower extremity edema  GASTROINTESTINAL: Nontender to palpation, normoactive bowel sounds, no rebound/guarding; No hepatosplenomegaly  MUSCULOSKELETAL:  no clubbing or cyanosis of digits; no joint swelling or tenderness to palpation  NEUROLOGY: non-focal; no gross sensory deficits   PSYCH: A+O to person, place, and time; affect appropriate  SKIN: No rashes; warm     LABS:                        15.1   6.36  )-----------( 164      ( 20 Dec 2023 13:23 )             44.8     12-19    138  |  101  |  9   ----------------------------<  84  4.1   |  28  |  0.67    Ca    8.7      19 Dec 2023 05:30    TPro  7.0  /  Alb  3.4  /  TBili  0.4  /  DBili  x   /  AST  25  /  ALT  7   /  AlkPhos  76  12-19          Urinalysis Basic - ( 19 Dec 2023 05:30 )    Color: x / Appearance: x / SG: x / pH: x  Gluc: 84 mg/dL / Ketone: x  / Bili: x / Urobili: x   Blood: x / Protein: x / Nitrite: x   Leuk Esterase: x / RBC: x / WBC x   Sq Epi: x / Non Sq Epi: x / Bacteria: x          RADIOLOGY & ADDITIONAL TESTS:  Results Reviewed:   Imaging Personally Reviewed:  Electrocardiogram Personally Reviewed:    COORDINATION OF CARE:  Care Discussed with Consultants/Other Providers [Y/N]:  Prior or Outpatient Records Reviewed [Y/N]:

## 2023-12-20 NOTE — PROGRESS NOTE ADULT - PROBLEM SELECTOR PLAN 2
Patent with facial redness non-sparing nasolabial folds, possibly asymmetric and warm compared to rest of body, possibly new for patient ([facility] supervisor did not notice redness but only sees patient intermittently)  - s/p cefazolin, c/w Keflex for total 7 day course Patent with facial redness non-sparing nasolabial folds, possibly asymmetric and warm compared to rest of body, possibly new for patient ([facility] supervisor did not notice redness but only sees patient intermittently)  - s/p cefazolin, c/w Keflex for total 7 day course

## 2023-12-20 NOTE — BH CONSULTATION LIAISON PROGRESS NOTE - NSBHATTESTBILLING_PSY_A_CORE
29476-[id number] OBS or IP - moderate complexity OR 35-49 mins [id number] OBS or IP - moderate complexity OR 35-49 mins

## 2023-12-20 NOTE — BH CONSULTATION LIAISON PROGRESS NOTE - NSBHPSYCHOLCOGABN_PSY_A_CORE
disoriented to time/disoriented to place

## 2023-12-21 NOTE — PROGRESS NOTE ADULT - SUBJECTIVE AND OBJECTIVE BOX
Kendal Watt MD  [facility] Division of Hospital Medicine  Pager [phone] (M-F 8AM-5PM)  Other Times: [id number]    Patient is a 68y old  Male who presents with a chief complaint of Agitation, aggression (20 Dec 2023 14:24)    SUBJECTIVE / OVERNIGHT EVENTS: no acute events overnight     MEDICATIONS  (STANDING):  albuterol/ipratropium for Nebulization 3 milliLiter(s) Nebulizer every 6 hours  cephalexin 500 milliGRAM(s) Oral every 6 hours  clonazePAM  Tablet 1 milliGRAM(s) Oral at bedtime  divalproex Sprinkle 250 milliGRAM(s) Oral <User Schedule>  divalproex Sprinkle 125 milliGRAM(s) Oral <User Schedule>  enoxaparin Injectable 40 milliGRAM(s) SubCutaneous every 24 hours  influenza  Vaccine (HIGH DOSE) 0.7 milliLiter(s) IntraMuscular once  lactulose Syrup 20 Gram(s) Oral two times a day  melatonin 3 milliGRAM(s) Oral at bedtime  midodrine 10 milliGRAM(s) Oral every 8 hours  QUEtiapine 225 milliGRAM(s) Oral two times a day  tamsulosin 0.4 milliGRAM(s) Oral at bedtime    MEDICATIONS  (PRN):  acetaminophen     Tablet .. 650 milliGRAM(s) Oral every 6 hours PRN Temp greater or equal to 38C (100.4F), Mild Pain (1 - 3)  OLANZapine Injectable 2.5 milliGRAM(s) IntraMuscular every 6 hours PRN severe agitation  senna 2 Tablet(s) Oral at bedtime PRN Constipation      PHYSICAL EXAM:  Vital Signs Last 24 Hrs  T(C): 36.3 (21 Dec 2023 06:37), Max: 36.6 (20 Dec 2023 12:55)  T(F): 97.4 (21 Dec 2023 06:37), Max: 97.9 (20 Dec 2023 12:55)  HR: 72 (21 Dec 2023 06:37) (71 - 90)  BP: 98/60 (21 Dec 2023 06:37) (94/57 - 113/64)  BP(mean): --  RR: 17 (21 Dec 2023 06:37) (17 - 18)  SpO2: 95% (21 Dec 2023 06:37) (95% - 100%)    Parameters below as of 21 Dec 2023 06:37  Patient On (Oxygen Delivery Method): room air        CONSTITUTIONAL: NAD, well-developed, well-groomed  RESPIRATORY: Normal respiratory effort; lungs are clear to auscultation bilaterally  CARDIOVASCULAR: Regular rate and rhythm, normal S1 and S2, no murmur/rub/gallop; No lower extremity edema  GASTROINTESTINAL: Nontender to palpation, normoactive bowel sounds, no rebound/guarding; No hepatosplenomegaly  MUSCULOSKELETAL:  no clubbing or cyanosis of digits; no joint swelling or tenderness to palpation  NEUROLOGY: non-focal; no gross sensory deficits   PSYCH: A+O to person, place, and time; affect appropriate  SKIN: No rashes; warm     LABS:                        15.1   6.36  )-----------( 164      ( 20 Dec 2023 13:23 )             44.8     12-20    139  |  101  |  10  ----------------------------<  89  4.7   |  25  |  0.66    Ca    8.8      20 Dec 2023 13:23    TPro  7.4  /  Alb  3.6  /  TBili  0.4  /  DBili  x   /  AST  15  /  ALT  7   /  AlkPhos  81  12-20          Urinalysis Basic - ( 20 Dec 2023 13:23 )    Color: x / Appearance: x / SG: x / pH: x  Gluc: 89 mg/dL / Ketone: x  / Bili: x / Urobili: x   Blood: x / Protein: x / Nitrite: x   Leuk Esterase: x / RBC: x / WBC x   Sq Epi: x / Non Sq Epi: x / Bacteria: x          RADIOLOGY & ADDITIONAL TESTS:  Results Reviewed:   Imaging Personally Reviewed:  Electrocardiogram Personally Reviewed:    COORDINATION OF CARE:  Care Discussed with Consultants/Other Providers [Y/N]:  Prior or Outpatient Records Reviewed [Y/N]:   Kendal Watt MD  [facility] Division of Hospital Medicine  Pager [phone] (M-F 8AM-5PM)  Other Times: [id number]    Patient is a 68y old  Male who presents with a chief complaint of Agitation, aggression (20 Dec 2023 14:24)    SUBJECTIVE / OVERNIGHT EVENTS: no acute events overnight     MEDICATIONS  (STANDING):  albuterol/ipratropium for Nebulization 3 milliLiter(s) Nebulizer every 6 hours  cephalexin 500 milliGRAM(s) Oral every 6 hours  clonazePAM  Tablet 1 milliGRAM(s) Oral at bedtime  divalproex Sprinkle 250 milliGRAM(s) Oral <User Schedule>  divalproex Sprinkle 125 milliGRAM(s) Oral <User Schedule>  enoxaparin Injectable 40 milliGRAM(s) SubCutaneous every 24 hours  influenza  Vaccine (HIGH DOSE) 0.7 milliLiter(s) IntraMuscular once  lactulose Syrup 20 Gram(s) Oral two times a day  melatonin 3 milliGRAM(s) Oral at bedtime  midodrine 10 milliGRAM(s) Oral every 8 hours  QUEtiapine 225 milliGRAM(s) Oral two times a day  tamsulosin 0.4 milliGRAM(s) Oral at bedtime    MEDICATIONS  (PRN):  acetaminophen     Tablet .. 650 milliGRAM(s) Oral every 6 hours PRN Temp greater or equal to 38C (100.4F), Mild Pain (1 - 3)  OLANZapine Injectable 2.5 milliGRAM(s) IntraMuscular every 6 hours PRN severe agitation  senna 2 Tablet(s) Oral at bedtime PRN Constipation      PHYSICAL EXAM:  Vital Signs Last 24 Hrs  T(C): 36.3 (21 Dec 2023 06:37), Max: 36.6 (20 Dec 2023 12:55)  T(F): 97.4 (21 Dec 2023 06:37), Max: 97.9 (20 Dec 2023 12:55)  HR: 72 (21 Dec 2023 06:37) (71 - 90)  BP: 98/60 (21 Dec 2023 06:37) (94/57 - 113/64)  BP(mean): --  RR: 17 (21 Dec 2023 06:37) (17 - 18)  SpO2: 95% (21 Dec 2023 06:37) (95% - 100%)    Parameters below as of 21 Dec 2023 06:37  Patient On (Oxygen Delivery Method): room air        CONSTITUTIONAL: NAD, well-developed, well-groomed  RESPIRATORY: Normal respiratory effort; lungs are clear to auscultation bilaterally  CARDIOVASCULAR: Regular rate and rhythm, normal S1 and S2, no murmur/rub/gallop; No lower extremity edema  GASTROINTESTINAL: Nontender to palpation, normoactive bowel sounds, no rebound/guarding; No hepatosplenomegaly  MUSCULOSKELETAL:  no clubbing or cyanosis of digits; no joint swelling or tenderness to palpation  NEUROLOGY: non-focal; no gross sensory deficits   PSYCH: A+O to person, place, and time; affect appropriate  SKIN: No rashes; warm     LABS:                        15.1   6.36  )-----------( 164      ( 20 Dec 2023 13:23 )             44.8     12-20    139  |  101  |  10  ----------------------------<  89  4.7   |  25  |  0.66    Ca    8.8      20 Dec 2023 13:23    TPro  7.4  /  Alb  3.6  /  TBili  0.4  /  DBili  x   /  AST  15  /  ALT  7   /  AlkPhos  81  12-20          Urinalysis Basic - ( 20 Dec 2023 13:23 )    Color: x / Appearance: x / SG: x / pH: x  Gluc: 89 mg/dL / Ketone: x  / Bili: x / Urobili: x   Blood: x / Protein: x / Nitrite: x   Leuk Esterase: x / RBC: x / WBC x   Sq Epi: x / Non Sq Epi: x / Bacteria: x          RADIOLOGY & ADDITIONAL TESTS:  Results Reviewed:   Imaging Personally Reviewed:  Electrocardiogram Personally Reviewed:    COORDINATION OF CARE:  Care Discussed with Consultants/Other Providers [Y/N]:  Prior or Outpatient Records Reviewed [Y/N]:

## 2023-12-21 NOTE — PROGRESS NOTE ADULT - PROBLEM SELECTOR PLAN 3
Presents from nursing home for worsening agitation and aggression to staff. Unclear if worsening dementia vs. mood disorder vs encephalopathy contributing to agitation  - also likely exacerbated by component of delirium, delirium precautions and cont melatonin  - QTc 444 on admission, cont monitoring EKGs for QTc after antipsychotics  - appreciate BH: Seroquel 225mg BID, Depakote 250mg BID + 125mg in afternoon, Klonopin 1mg qhs Presents from nursing home for worsening agitation and aggression to staff. Unclear if worsening dementia vs. mood disorder vs encephalopathy contributing to agitation  - also likely exacerbated by component of delirium, delirium precautions and cont melatonin  - QTc 444 on admission, cont monitoring EKGs for QTc after antipsychotics  - appreciate BH: Seroquel 225mg BID, Depakote 250mg BID + 125mg in afternoon, Klonopin 1mg qhs  - VPA level low this morning - d/w psych re: dose adjustment

## 2023-12-21 NOTE — PROGRESS NOTE ADULT - PROBLEM SELECTOR PLAN 2
Patent with facial redness non-sparing nasolabial folds, possibly asymmetric and warm compared to rest of body, possibly new for patient ([facility] supervisor did not notice redness but only sees patient intermittently)  - s/p cefazolin, c/w Keflex for total 7 day course Patent with facial redness non-sparing nasolabial folds, possibly asymmetric and warm compared to rest of body, possibly new for patient ([facility] supervisor did not notice redness but only sees patient intermittently)  - s/p cefazolin, c/w Keflex for total 7 day course

## 2023-12-22 NOTE — BH CONSULTATION LIAISON PROGRESS NOTE - NSBHATTESTBILLING_PSY_A_CORE
[id number]-Rbekpiwnrt OBS or IP - moderate complexity OR 35-49 mins [id number]-Rmicoojzra OBS or IP - moderate complexity OR 35-49 mins

## 2023-12-22 NOTE — BH CONSULTATION LIAISON PROGRESS NOTE - MSE UNSTRUCTURED FT
On exam, pt is awake, alert, oriented to hospital but calls it [place]; thinks its "end ov November, almost December" and unaware of why he came to hospital. Despite this confusion, feels calm, denies distress, anxiety, panic, depression, SI, HI, or AVH. Poor insight. No focal neuro deficits noted.  On exam, pt is awake, alert, oriented to hospital but calls it [place]; thinks its "end ov November, almost December" and unaware of why he came to hospital. Despite this confusion, feels calm, denies distress, anxiety, panic, depression, SI, HI, or AVH. Poor insight. No focal neuro deficits noted.

## 2023-12-22 NOTE — PROGRESS NOTE ADULT - PROBLEM SELECTOR PLAN 2
Patent with facial redness non-sparing nasolabial folds, possibly asymmetric and warm compared to rest of body, possibly new for patient ([facility] supervisor did not notice redness but only sees patient intermittently)  - s/p cefazolin, c/w Keflex for total 7 day course

## 2023-12-22 NOTE — PROGRESS NOTE ADULT - PROBLEM SELECTOR PLAN 3
Presents from nursing home for worsening agitation and aggression to staff. Unclear if worsening dementia vs. mood disorder vs encephalopathy contributing to agitation  - also likely exacerbated by component of delirium, delirium precautions and cont melatonin  - QTc 444 on admission, cont monitoring EKGs for QTc after antipsychotics  - appreciate BH: Seroquel 225mg BID, Depakote 250mg BID + 125mg in afternoon, Klonopin 1mg qhs  - VPA level low this morning - d/w psych re: dose adjustment Presents from nursing home for worsening agitation and aggression to staff. Unclear if worsening dementia vs. mood disorder vs encephalopathy contributing to agitation  - also likely exacerbated by component of delirium, delirium precautions and cont melatonin  - QTc 444 on admission, cont monitoring EKGs for QTc after antipsychotics  - appreciate BH: Seroquel 225mg BID, Depakote 250mg AM + 125mg in afternoon + 500mg qhs, Klonopin 1mg qhs  - per psych no c/i to discharge, can trend CBC/CMP while admitted

## 2023-12-22 NOTE — PROGRESS NOTE ADULT - PROBLEM SELECTOR PLAN 8
DVT ppx: lovenox  DIET: Pureed per SLP  DISPO: NH  GOC: MOLST form copy from NH with patient signature for full code status signed in 2022 DVT ppx: lovenox  DIET: Pureed per SLP  DISPO: NH. 37 mins spent dc planning.   GOC: MOLST form copy from NH with patient signature for full code status signed in 2022

## 2023-12-22 NOTE — BH CONSULTATION LIAISON PROGRESS NOTE - NSBHFUPINTERVALHXFT_PSY_A_CORE
Chart reviewed. One Zyprexa PRN overnight. GERMAINE low. Discussed case with primary team MD.   On exam, pt is awake, alert, oriented to hospital but calls it [place]; thinks its "end ov November, almost December" and unaware of why he came to hospital. Despite this confusion, feels calm, denies distress, anxiety, panic, depression, SI, HI, or AVH. Poor insight. No focal neuro deficits noted.  Chart reviewed. One Zyprexa PRN overnight. GERMAINE low. Discussed case with primary team MD.   On exam, pt is awake, alert, oriented to hospital but calls it [place]; thinks its "end ov November, almost December" and unaware of why he came to hospital. Despite this confusion, feels calm, denies distress, anxiety, panic, depression, SI, HI, or AVH. Poor insight. No focal neuro deficits noted.

## 2023-12-22 NOTE — PROGRESS NOTE ADULT - SUBJECTIVE AND OBJECTIVE BOX
Kendal Watt MD  [facility] Division of Hospital Medicine  Pager [phone] (M-F 8AM-5PM)  Other Times: [phone]    Patient is a 68y old  Male who presents with a chief complaint of Agitation, aggression (21 Dec 2023 08:06    SUBJECTIVE / OVERNIGHT EVENTS: no acute events overnight     MEDICATIONS  (STANDING):  albuterol/ipratropium for Nebulization 3 milliLiter(s) Nebulizer every 6 hours  clonazePAM  Tablet 1 milliGRAM(s) Oral at bedtime  divalproex Sprinkle 125 milliGRAM(s) Oral <User Schedule>  divalproex Sprinkle 250 milliGRAM(s) Oral <User Schedule>  enoxaparin Injectable 40 milliGRAM(s) SubCutaneous every 24 hours  influenza  Vaccine (HIGH DOSE) 0.7 milliLiter(s) IntraMuscular once  lactulose Syrup 20 Gram(s) Oral two times a day  melatonin 3 milliGRAM(s) Oral at bedtime  midodrine 10 milliGRAM(s) Oral every 8 hours  QUEtiapine 225 milliGRAM(s) Oral two times a day  tamsulosin 0.4 milliGRAM(s) Oral at bedtime    MEDICATIONS  (PRN):  acetaminophen     Tablet .. 650 milliGRAM(s) Oral every 6 hours PRN Temp greater or equal to 38C (100.4F), Mild Pain (1 - 3)  OLANZapine Injectable 2.5 milliGRAM(s) IntraMuscular every 6 hours PRN severe agitation  senna 2 Tablet(s) Oral at bedtime PRN Constipation      PHYSICAL EXAM:  Vital Signs Last 24 Hrs  T(C): 36.3 (21 Dec 2023 23:00), Max: 37.1 (21 Dec 2023 09:40)  T(F): 97.4 (21 Dec 2023 23:00), Max: 98.8 (21 Dec 2023 09:40)  HR: 81 (21 Dec 2023 23:00) (81 - 104)  BP: 107/62 (21 Dec 2023 23:00) (101/62 - 107/62)  BP(mean): --  RR: 18 (21 Dec 2023 23:00) (18 - 18)  SpO2: 96% (21 Dec 2023 23:00) (95% - 100%)    Parameters below as of 21 Dec 2023 23:00  Patient On (Oxygen Delivery Method): room air        CONSTITUTIONAL: NAD, well-developed, well-groomed  RESPIRATORY: Normal respiratory effort; lungs are clear to auscultation bilaterally  CARDIOVASCULAR: Regular rate and rhythm, normal S1 and S2, no murmur/rub/gallop; No lower extremity edema  GASTROINTESTINAL: Nontender to palpation, normoactive bowel sounds, no rebound/guarding; No hepatosplenomegaly  MUSCULOSKELETAL:  no clubbing or cyanosis of digits; no joint swelling or tenderness to palpation  NEUROLOGY: non-focal; no gross sensory deficits   PSYCH: calm; affect appropriate  SKIN: No rashes; warm     LABS:                        14.3   6.21  )-----------( 146      ( 22 Dec 2023 03:09 )             42.7     12-22    138  |  101  |  12  ----------------------------<  78  4.3   |  24  |  0.66    Ca    8.4      22 Dec 2023 03:09  Phos  4.0     12-21  Mg     2.10     12-21    TPro  6.1  /  Alb  3.3  /  TBili  0.4  /  DBili  x   /  AST  18  /  ALT  <5  /  AlkPhos  72  12-22          Urinalysis Basic - ( 22 Dec 2023 03:09 )    Color: x / Appearance: x / SG: x / pH: x  Gluc: 78 mg/dL / Ketone: x  / Bili: x / Urobili: x   Blood: x / Protein: x / Nitrite: x   Leuk Esterase: x / RBC: x / WBC x   Sq Epi: x / Non Sq Epi: x / Bacteria: x          RADIOLOGY & ADDITIONAL TESTS:  Results Reviewed:   Imaging Personally Reviewed:  Electrocardiogram Personally Reviewed:    COORDINATION OF CARE:  Care Discussed with Consultants/Other Providers [Y/N]:  Prior or Outpatient Records Reviewed [Y/N]:   Kendal Watt MD  [facility] Division of Hospital Medicine  Pager [phone] (M-F 8AM-5PM)  Other Times: [phone]    Patient is a 68y old  Male who presents with a chief complaint of Agitation, aggression (21 Dec 2023 08:06    SUBJECTIVE / OVERNIGHT EVENTS: no acute events overnight     MEDICATIONS  (STANDING):  albuterol/ipratropium for Nebulization 3 milliLiter(s) Nebulizer every 6 hours  clonazePAM  Tablet 1 milliGRAM(s) Oral at bedtime  divalproex Sprinkle 125 milliGRAM(s) Oral <User Schedule>  divalproex Sprinkle 250 milliGRAM(s) Oral <User Schedule>  enoxaparin Injectable 40 milliGRAM(s) SubCutaneous every 24 hours  influenza  Vaccine (HIGH DOSE) 0.7 milliLiter(s) IntraMuscular once  lactulose Syrup 20 Gram(s) Oral two times a day  melatonin 3 milliGRAM(s) Oral at bedtime  midodrine 10 milliGRAM(s) Oral every 8 hours  QUEtiapine 225 milliGRAM(s) Oral two times a day  tamsulosin 0.4 milliGRAM(s) Oral at bedtime    MEDICATIONS  (PRN):  acetaminophen     Tablet .. 650 milliGRAM(s) Oral every 6 hours PRN Temp greater or equal to 38C (100.4F), Mild Pain (1 - 3)  OLANZapine Injectable 2.5 milliGRAM(s) IntraMuscular every 6 hours PRN severe agitation  senna 2 Tablet(s) Oral at bedtime PRN Constipation      PHYSICAL EXAM:  Vital Signs Last 24 Hrs  T(C): 36.3 (21 Dec 2023 23:00), Max: 37.1 (21 Dec 2023 09:40)  T(F): 97.4 (21 Dec 2023 23:00), Max: 98.8 (21 Dec 2023 09:40)  HR: 81 (21 Dec 2023 23:00) (81 - 104)  BP: 107/62 (21 Dec 2023 23:00) (101/62 - 107/62)  BP(mean): --  RR: 18 (21 Dec 2023 23:00) (18 - 18)  SpO2: 96% (21 Dec 2023 23:00) (95% - 100%)    Parameters below as of 21 Dec 2023 23:00  Patient On (Oxygen Delivery Method): room air        CONSTITUTIONAL: NAD, well-developed, well-groomed  RESPIRATORY: Normal respiratory effort; lungs are clear to auscultation bilaterally  CARDIOVASCULAR: Regular rate and rhythm, normal S1 and S2, no murmur/rub/gallop; No lower extremity edema  GASTROINTESTINAL: Nontender to palpation, normoactive bowel sounds, no rebound/guarding; No hepatosplenomegaly  MUSCULOSKELETAL:  no clubbing or cyanosis of digits; no joint swelling or tenderness to palpation  NEUROLOGY: non-focal; no gross sensory deficits   PSYCH: calm; affect appropriate  SKIN: No rashes; warm     LABS:                        14.3   6.21  )-----------( 146      ( 22 Dec 2023 03:09 )             42.7     12-22    138  |  101  |  12  ----------------------------<  78  4.3   |  24  |  0.66    Ca    8.4      22 Dec 2023 03:09  Phos  4.0     12-21  Mg     2.10     12-21    TPro  6.1  /  Alb  3.3  /  TBili  0.4  /  DBili  x   /  AST  18  /  ALT  <5  /  AlkPhos  72  12-22          Urinalysis Basic - ( 22 Dec 2023 03:09 )    Color: x / Appearance: x / SG: x / pH: x  Gluc: 78 mg/dL / Ketone: x  / Bili: x / Urobili: x   Blood: x / Protein: x / Nitrite: x   Leuk Esterase: x / RBC: x / WBC x   Sq Epi: x / Non Sq Epi: x / Bacteria: x          RADIOLOGY & ADDITIONAL TESTS:  Results Reviewed:   Imaging Personally Reviewed:  Electrocardiogram Personally Reviewed:    COORDINATION OF CARE:  Care Discussed with Consultants/Other Providers [Y/N]:  Prior or Outpatient Records Reviewed [Y/N]:

## 2023-12-22 NOTE — BH CONSULTATION LIAISON PROGRESS NOTE - NSBHASSESSMENTFT_PSY_ALL_CORE
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 Patient received  Benadryl 50mg IV, Haldol 2.5 IVP x2, Ativan 2mg IVP x2, Versed 2mg IVP x1, 5mg IVP x1, and Zyprexa 2.5mg IM for agitation. Psychiatry consulted for agitation/medication management.    Impression: patient without past psych hx with recently started agitation and aggressive behavior, possible executive and memory dysfunction, confusion, poor attention span raises concern for delirium vs r/o new case of early dementia/ consequences of TBI.    12/16 Pt today irritable and uncooperative on exam, limiting evaluation. Although pt refuses to participate in orientation questions, he appears confused given his persistent requests for PO breakfast despite being reminded of PEG. Req'd 2x PRN yesterday, no significant agitation overnight.  12/18: Patient receiving less PRNs: once on 12/16 and once 12/17. Currently calm, cooperative with the interview.  12/19: received 3 PRNs since yesterday. Currently calm, no safety issues.  12/20: No PRNs since yesterday. Calm on assessment, disoriented in time and place.  12/22: Zyprexa PRN overnight. Tolerating Depakote, with low level. Disoriented to time/place but calm.     Recommendations:  - Enhanced observation  - Medications:   Continue Quetiapine 225mg BID, Hold if QTC >500,  		continue Clonazepam 1 mg at bed time to prevent benzos withdrawal (as per med rec in chart, outpatient dose is Klonopin 1mg BID)  		INCREASE Depakote to 250 mg  at 8 am, 125 mg at 2 pm and 500 mg at 8 pm for management of agitation/impulsivity  -PRN for agitation: Zyprexa 2.5 mg Q 6 hours PO/IM. Can give additional Zyprexa 2.5 mg for refractory agitation, combativeness (ensure qtc <500)    - Supportive treatment of delirium: 1) Minimize use of benzodiazepines, opioids, anticholinergics, and other deliriogenic agents whenever possible.  2) Maintain sleep wake cycle.  3) Provide frequent reorientation and redirection.  4) Family member at bedside if possible. 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 Patient received  Benadryl 50mg IV, Haldol 2.5 IVP x2, Ativan 2mg IVP x2, Versed 2mg IVP x1, 5mg IVP x1, and Zyprexa 2.5mg IM for agitation. Psychiatry consulted for agitation/medication management.    Impression: patient without past psych hx with recently started agitation and aggressive behavior, possible executive and memory dysfunction, confusion, poor attention span raises concern for delirium vs r/o new case of early dementia/ consequences of TBI.    12/16 Pt today irritable and uncooperative on exam, limiting evaluation. Although pt refuses to participate in orientation questions, he appears confused given his persistent requests for PO breakfast despite being reminded of PEG. Req'd 2x PRN yesterday, no significant agitation overnight.  12/18: Patient receiving less PRNs: once on 12/16 and once 12/17. Currently calm, cooperative with the interview.  12/19: received 3 PRNs since yesterday. Currently calm, no safety issues.  12/20: No PRNs since yesterday. Calm on assessment, disoriented in time and place.  12/22: Zyprexa PRN overnight. Tolerating Depakote, with low level. Disoriented to time/place but calm.     Recommendations:  - Enhanced observation  - Medications:   Continue Quetiapine 225mg BID, Hold if QTC >500,  		continue Clonazepam 1 mg at bed time to prevent benzos withdrawal (as per med rec in chart, outpatient dose is Klonopin 1mg BID)  		INCREASE Depakote to 250 mg  at 8 am, 125 mg at 2 pm and 500 mg at 8 pm for management of agitation/impulsivity  -PRN for agitation: Zyprexa 2.5 mg Q 6 hours PO/IM. Can give additional Zyprexa 2.5 mg for refractory agitation, combativeness (ensure qtc <500)    - Supportive treatment of delirium: 1) Minimize use of benzodiazepines, opioids, anticholinergics, and other deliriogenic agents whenever possible.  2) Maintain sleep wake cycle.  3) Provide frequent reorientation and redirection.  4) Family member at bedside if possible. 5) Provide corrective lenses/hearing aids if required    Dispo: NH/rehab; no role for inpt psych

## 2023-12-23 NOTE — PROGRESS NOTE ADULT - PROBLEM SELECTOR PLAN 8
DVT ppx: lovenox  DIET: Pureed per SLP  DISPO: NH.   GOC: MOLST form copy from NH with patient signature for full code status signed in 2022

## 2023-12-23 NOTE — PROGRESS NOTE ADULT - PROBLEM SELECTOR PLAN 3
Presents from nursing home for worsening agitation and aggression to staff. Unclear if worsening dementia vs. mood disorder vs encephalopathy contributing to agitation  - also likely exacerbated by component of delirium, delirium precautions and cont melatonin  - QTc 444 on admission, cont monitoring EKGs for QTc after antipsychotics  - appreciate BH: Seroquel 225mg BID, Depakote 250mg AM + 125mg in afternoon + 500mg qhs, Klonopin 1mg qhs  - monitor CMP/CBC on depakote - platelets slight downtrend today will monitor

## 2023-12-23 NOTE — PROGRESS NOTE ADULT - SUBJECTIVE AND OBJECTIVE BOX
Kendal Watt MD  [facility] Division of Hospital Medicine  Pager [phone] (M-F 8AM-5PM)  Other Times: [phone]    Patient is a 68y old  Male who presents with a chief complaint of Agitation, aggression (22 Dec 2023 08:01)     SUBJECTIVE / OVERNIGHT EVENTS: no acute events overnight     MEDICATIONS  (STANDING):  albuterol/ipratropium for Nebulization 3 milliLiter(s) Nebulizer every 6 hours  clonazePAM  Tablet 1 milliGRAM(s) Oral at bedtime  divalproex Sprinkle 250 milliGRAM(s) Oral <User Schedule>  divalproex Sprinkle 125 milliGRAM(s) Oral <User Schedule>  divalproex Sprinkle 500 milliGRAM(s) Oral <User Schedule>  influenza  Vaccine (HIGH DOSE) 0.7 milliLiter(s) IntraMuscular once  lactulose Syrup 20 Gram(s) Oral two times a day  melatonin 3 milliGRAM(s) Oral at bedtime  midodrine 10 milliGRAM(s) Oral every 8 hours  QUEtiapine 225 milliGRAM(s) Oral two times a day  tamsulosin 0.4 milliGRAM(s) Oral at bedtime    MEDICATIONS  (PRN):  acetaminophen     Tablet .. 650 milliGRAM(s) Oral every 6 hours PRN Temp greater or equal to 38C (100.4F), Mild Pain (1 - 3)  OLANZapine Injectable 2.5 milliGRAM(s) IntraMuscular every 6 hours PRN severe agitation  senna 2 Tablet(s) Oral at bedtime PRN Constipation    PHYSICAL EXAM:  Vital Signs Last 24 Hrs  T(C): 36.8 (23 Dec 2023 10:21), Max: 37.1 (22 Dec 2023 18:06)  T(F): 98.2 (23 Dec 2023 10:21), Max: 98.7 (22 Dec 2023 18:06)  HR: 75 (23 Dec 2023 10:21) (65 - 96)  BP: 130/55 (23 Dec 2023 10:21) (96/51 - 130/55)  RR: 19 (23 Dec 2023 10:21) (17 - 19)  SpO2: 95% (23 Dec 2023 10:21) (95% - 99%)    Parameters below as of 23 Dec 2023 10:21  Patient On (Oxygen Delivery Method): room air    CONSTITUTIONAL: NAD, well-developed, well-groomed  RESPIRATORY: Normal respiratory effort; lungs are clear to auscultation bilaterally  CARDIOVASCULAR: Regular rate and rhythm, normal S1 and S2, no murmur/rub/gallop; No lower extremity edema  GASTROINTESTINAL: Nontender to palpation, normoactive bowel sounds, no rebound/guarding; No hepatosplenomegaly  MUSCULOSKELETAL:  no clubbing or cyanosis of digits; no joint swelling or tenderness to palpation  NEUROLOGY: non-focal; no gross sensory deficits   PSYCH: calm   SKIN: No rashes; warm     LABS:                        14.1   6.84  )-----------( 147      ( 23 Dec 2023 09:42 )             40.9     12-23    139  |  102  |  11  ----------------------------<  71  4.7   |  25  |  0.63    Ca    8.6      23 Dec 2023 08:02    TPro  6.9  /  Alb  3.3  /  TBili  0.3  /  DBili  x   /  AST  18  /  ALT  <5  /  AlkPhos  73  12-23          Urinalysis Basic - ( 23 Dec 2023 08:02 )    Color: x / Appearance: x / SG: x / pH: x  Gluc: 71 mg/dL / Ketone: x  / Bili: x / Urobili: x   Blood: x / Protein: x / Nitrite: x   Leuk Esterase: x / RBC: x / WBC x   Sq Epi: x / Non Sq Epi: x / Bacteria: x          RADIOLOGY & ADDITIONAL TESTS:  Results Reviewed:   Imaging Personally Reviewed:  Electrocardiogram Personally Reviewed:    COORDINATION OF CARE:  Care Discussed with Consultants/Other Providers [Y/N]:  Prior or Outpatient Records Reviewed [Y/N]:   Kendal Watt MD  [facility] Division of Hospital Medicine  Pager [phone] (M-F 8AM-5PM)  Other Times: [phone]    Patient is a 68y old  Male who presents with a chief complaint of Agitation, aggression (22 Dec 2023 08:01)     SUBJECTIVE / OVERNIGHT EVENTS: no acute events overnight     MEDICATIONS  (STANDING):  albuterol/ipratropium for Nebulization 3 milliLiter(s) Nebulizer every 6 hours  clonazePAM  Tablet 1 milliGRAM(s) Oral at bedtime  divalproex Sprinkle 250 milliGRAM(s) Oral <User Schedule>  divalproex Sprinkle 125 milliGRAM(s) Oral <User Schedule>  divalproex Sprinkle 500 milliGRAM(s) Oral <User Schedule>  influenza  Vaccine (HIGH DOSE) 0.7 milliLiter(s) IntraMuscular once  lactulose Syrup 20 Gram(s) Oral two times a day  melatonin 3 milliGRAM(s) Oral at bedtime  midodrine 10 milliGRAM(s) Oral every 8 hours  QUEtiapine 225 milliGRAM(s) Oral two times a day  tamsulosin 0.4 milliGRAM(s) Oral at bedtime    MEDICATIONS  (PRN):  acetaminophen     Tablet .. 650 milliGRAM(s) Oral every 6 hours PRN Temp greater or equal to 38C (100.4F), Mild Pain (1 - 3)  OLANZapine Injectable 2.5 milliGRAM(s) IntraMuscular every 6 hours PRN severe agitation  senna 2 Tablet(s) Oral at bedtime PRN Constipation    PHYSICAL EXAM:  Vital Signs Last 24 Hrs  T(C): 36.8 (23 Dec 2023 10:21), Max: 37.1 (22 Dec 2023 18:06)  T(F): 98.2 (23 Dec 2023 10:21), Max: 98.7 (22 Dec 2023 18:06)  HR: 75 (23 Dec 2023 10:21) (65 - 96)  BP: 130/55 (23 Dec 2023 10:21) (96/51 - 130/55)  RR: 19 (23 Dec 2023 10:21) (17 - 19)  SpO2: 95% (23 Dec 2023 10:21) (95% - 99%)    Parameters below as of 23 Dec 2023 10:21  Patient On (Oxygen Delivery Method): room air    CONSTITUTIONAL: NAD, well-developed, well-groomed  RESPIRATORY: Normal respiratory effort; lungs are clear to auscultation bilaterally  CARDIOVASCULAR: Regular rate and rhythm, normal S1 and S2, no murmur/rub/gallop; No lower extremity edema  GASTROINTESTINAL: Nontender to palpation, normoactive bowel sounds, no rebound/guarding; No hepatosplenomegaly  MUSCULOSKELETAL:  no clubbing or cyanosis of digits; no joint swelling or tenderness to palpation  NEUROLOGY: non-focal; no gross sensory deficits   PSYCH: calm   SKIN: No rashes; warm     LABS:                        14.1   6.84  )-----------( 147      ( 23 Dec 2023 09:42 )             40.9     12-23    139  |  102  |  11  ----------------------------<  71  4.7   |  25  |  0.63    Ca    8.6      23 Dec 2023 08:02    TPro  6.9  /  Alb  3.3  /  TBili  0.3  /  DBili  x   /  AST  18  /  ALT  <5  /  AlkPhos  73  12-23          Urinalysis Basic - ( 23 Dec 2023 08:02 )    Color: x / Appearance: x / SG: x / pH: x  Gluc: 71 mg/dL / Ketone: x  / Bili: x / Urobili: x   Blood: x / Protein: x / Nitrite: x   Leuk Esterase: x / RBC: x / WBC x   Sq Epi: x / Non Sq Epi: x / Bacteria: x          RADIOLOGY & ADDITIONAL TESTS:  Results Reviewed:   Imaging Personally Reviewed:  Electrocardiogram Personally Reviewed:    COORDINATION OF CARE:  Care Discussed with Consultants/Other Providers [Y/N]:  Prior or Outpatient Records Reviewed [Y/N]:

## 2023-12-23 NOTE — PROGRESS NOTE ADULT - PROBLEM SELECTOR PLAN 2
Patent with facial redness non-sparing nasolabial folds, possibly asymmetric and warm compared to rest of body, possibly new for patient ([facility] supervisor did not notice redness but only sees patient intermittently)  - s/p cefazolin then Keflex for total 7 day course Patent with facial redness non-sparing nasolabial folds, possibly asymmetric and warm compared to rest of body, possibly new for patient ([facility] supervisor did not notice redness but only sees patient intermittently)  - s/p cefazolin then Keflex for total 7 day course

## 2023-12-24 NOTE — PROGRESS NOTE ADULT - PROBLEM SELECTOR PLAN 2
Patent with facial redness non-sparing nasolabial folds, possibly asymmetric and warm compared to rest of body, possibly new for patient ([facility] supervisor did not notice redness but only sees patient intermittently)  - s/p cefazolin then Keflex for total 7 day course Patent with facial redness non-sparing nasolabial folds, possibly asymmetric and warm compared to rest of body, possibly new for patient ([facility] supervisor did not notice redness but only sees patient intermittently)  - s/p cefazolin then Keflex for total 7 day course

## 2023-12-24 NOTE — PROGRESS NOTE ADULT - SUBJECTIVE AND OBJECTIVE BOX
Kendal Watt MD  [facility] Division of Hospital Medicine  Pager [phone] (M-F 8AM-5PM)  Other Times: [phone]    Patient is a 68y old  Male who presents with a chief complaint of Agitation, aggression (23 Dec 2023 14:02)    SUBJECTIVE / OVERNIGHT EVENTS: no acute events overnight     MEDICATIONS  (STANDING):  albuterol/ipratropium for Nebulization 3 milliLiter(s) Nebulizer every 6 hours  clonazePAM  Tablet 1 milliGRAM(s) Oral at bedtime  divalproex Sprinkle 125 milliGRAM(s) Oral <User Schedule>  divalproex Sprinkle 250 milliGRAM(s) Oral <User Schedule>  divalproex Sprinkle 500 milliGRAM(s) Oral <User Schedule>  influenza  Vaccine (HIGH DOSE) 0.7 milliLiter(s) IntraMuscular once  lactulose Syrup 20 Gram(s) Oral two times a day  melatonin 3 milliGRAM(s) Oral at bedtime  midodrine 10 milliGRAM(s) Oral every 8 hours  QUEtiapine 225 milliGRAM(s) Oral two times a day  tamsulosin 0.4 milliGRAM(s) Oral at bedtime    MEDICATIONS  (PRN):  acetaminophen     Tablet .. 650 milliGRAM(s) Oral every 6 hours PRN Temp greater or equal to 38C (100.4F), Mild Pain (1 - 3)  OLANZapine Injectable 2.5 milliGRAM(s) IntraMuscular every 6 hours PRN severe agitation  senna 2 Tablet(s) Oral at bedtime PRN Constipation      PHYSICAL EXAM:  Vital Signs Last 24 Hrs  T(C): 36.8 (24 Dec 2023 10:00), Max: 36.9 (24 Dec 2023 06:56)  T(F): 98.3 (24 Dec 2023 10:00), Max: 98.5 (24 Dec 2023 06:56)  HR: 77 (24 Dec 2023 10:38) (76 - 99)  BP: 114/62 (24 Dec 2023 10:00) (104/56 - 117/74)  BP(mean): --  RR: 17 (24 Dec 2023 10:00) (17 - 18)  SpO2: 98% (24 Dec 2023 10:38) (96% - 100%)    Parameters below as of 24 Dec 2023 10:38  Patient On (Oxygen Delivery Method): room air        CONSTITUTIONAL: NAD, well-developed, well-groomed  RESPIRATORY: Normal respiratory effort; lungs are clear to auscultation bilaterally  CARDIOVASCULAR: Regular rate and rhythm, normal S1 and S2, no murmur/rub/gallop; No lower extremity edema  GASTROINTESTINAL: Nontender to palpation, normoactive bowel sounds, no rebound/guarding; No hepatosplenomegaly  MUSCULOSKELETAL:  no clubbing or cyanosis of digits; no joint swelling or tenderness to palpation  NEUROLOGY: non-focal; no gross sensory deficits   PSYCH: A+O to person, place, and time; affect appropriate  SKIN: No rashes; warm     LABS:                        15.1   5.17  )-----------( 146      ( 24 Dec 2023 07:40 )             45.6     12-24    141  |  102  |  12  ----------------------------<  85  4.4   |  30  |  0.71    Ca    8.9      24 Dec 2023 07:40    TPro  7.2  /  Alb  3.6  /  TBili  0.3  /  DBili  x   /  AST  13  /  ALT  <5  /  AlkPhos  80  12-24          Urinalysis Basic - ( 24 Dec 2023 07:40 )    Color: x / Appearance: x / SG: x / pH: x  Gluc: 85 mg/dL / Ketone: x  / Bili: x / Urobili: x   Blood: x / Protein: x / Nitrite: x   Leuk Esterase: x / RBC: x / WBC x   Sq Epi: x / Non Sq Epi: x / Bacteria: x          RADIOLOGY & ADDITIONAL TESTS:  Results Reviewed:   Imaging Personally Reviewed:  Electrocardiogram Personally Reviewed:    COORDINATION OF CARE:  Care Discussed with Consultants/Other Providers [Y/N]:  Prior or Outpatient Records Reviewed [Y/N]:   Kendal Watt MD  [facility] Division of Hospital Medicine  Pager [phone] (M-F 8AM-5PM)  Other Times: [phone]    Patient is a 68y old  Male who presents with a chief complaint of Agitation, aggression (23 Dec 2023 14:02)    SUBJECTIVE / OVERNIGHT EVENTS: no acute events overnight     MEDICATIONS  (STANDING):  albuterol/ipratropium for Nebulization 3 milliLiter(s) Nebulizer every 6 hours  clonazePAM  Tablet 1 milliGRAM(s) Oral at bedtime  divalproex Sprinkle 125 milliGRAM(s) Oral <User Schedule>  divalproex Sprinkle 250 milliGRAM(s) Oral <User Schedule>  divalproex Sprinkle 500 milliGRAM(s) Oral <User Schedule>  influenza  Vaccine (HIGH DOSE) 0.7 milliLiter(s) IntraMuscular once  lactulose Syrup 20 Gram(s) Oral two times a day  melatonin 3 milliGRAM(s) Oral at bedtime  midodrine 10 milliGRAM(s) Oral every 8 hours  QUEtiapine 225 milliGRAM(s) Oral two times a day  tamsulosin 0.4 milliGRAM(s) Oral at bedtime    MEDICATIONS  (PRN):  acetaminophen     Tablet .. 650 milliGRAM(s) Oral every 6 hours PRN Temp greater or equal to 38C (100.4F), Mild Pain (1 - 3)  OLANZapine Injectable 2.5 milliGRAM(s) IntraMuscular every 6 hours PRN severe agitation  senna 2 Tablet(s) Oral at bedtime PRN Constipation      PHYSICAL EXAM:  Vital Signs Last 24 Hrs  T(C): 36.8 (24 Dec 2023 10:00), Max: 36.9 (24 Dec 2023 06:56)  T(F): 98.3 (24 Dec 2023 10:00), Max: 98.5 (24 Dec 2023 06:56)  HR: 77 (24 Dec 2023 10:38) (76 - 99)  BP: 114/62 (24 Dec 2023 10:00) (104/56 - 117/74)  BP(mean): --  RR: 17 (24 Dec 2023 10:00) (17 - 18)  SpO2: 98% (24 Dec 2023 10:38) (96% - 100%)    Parameters below as of 24 Dec 2023 10:38  Patient On (Oxygen Delivery Method): room air        CONSTITUTIONAL: NAD, well-developed, well-groomed  RESPIRATORY: Normal respiratory effort; lungs are clear to auscultation bilaterally  CARDIOVASCULAR: Regular rate and rhythm, normal S1 and S2, no murmur/rub/gallop; No lower extremity edema  GASTROINTESTINAL: Nontender to palpation, normoactive bowel sounds, no rebound/guarding; No hepatosplenomegaly  MUSCULOSKELETAL:  no clubbing or cyanosis of digits; no joint swelling or tenderness to palpation  NEUROLOGY: non-focal; no gross sensory deficits   PSYCH: A+O to person, place, and time; affect appropriate  SKIN: No rashes; warm     LABS:                        15.1   5.17  )-----------( 146      ( 24 Dec 2023 07:40 )             45.6     12-24    141  |  102  |  12  ----------------------------<  85  4.4   |  30  |  0.71    Ca    8.9      24 Dec 2023 07:40    TPro  7.2  /  Alb  3.6  /  TBili  0.3  /  DBili  x   /  AST  13  /  ALT  <5  /  AlkPhos  80  12-24          Urinalysis Basic - ( 24 Dec 2023 07:40 )    Color: x / Appearance: x / SG: x / pH: x  Gluc: 85 mg/dL / Ketone: x  / Bili: x / Urobili: x   Blood: x / Protein: x / Nitrite: x   Leuk Esterase: x / RBC: x / WBC x   Sq Epi: x / Non Sq Epi: x / Bacteria: x          RADIOLOGY & ADDITIONAL TESTS:  Results Reviewed:   Imaging Personally Reviewed:  Electrocardiogram Personally Reviewed:    COORDINATION OF CARE:  Care Discussed with Consultants/Other Providers [Y/N]:  Prior or Outpatient Records Reviewed [Y/N]:

## 2023-12-24 NOTE — PROGRESS NOTE ADULT - PROBLEM SELECTOR PLAN 3
Presents from nursing home for worsening agitation and aggression to staff. Unclear if worsening dementia vs. mood disorder vs encephalopathy contributing to agitation  - also likely exacerbated by component of delirium, delirium precautions and cont melatonin  - QTc 444 on admission, cont monitoring EKGs for QTc after antipsychotics  - appreciate BH: Seroquel 225mg BID, Depakote 250mg AM + 125mg in afternoon + 500mg qhs, Klonopin 1mg qhs  - monitor CMP/CBC on depakote - platelets ~146 - will trend for now

## 2023-12-25 NOTE — PROGRESS NOTE ADULT - PROBLEM SELECTOR PLAN 8
DVT ppx: lovenox  DIET: Pureed per SLP  DISPO: NH.   GOC: MOLST form copy from NH with patient signature for full code status signed in 2022 show DVT ppx: holding given downtrend in platelets  DIET: Pureed per SLP  DISPO: NH.   GOC: MOLST form copy from NH with patient signature for full code status signed in 2022

## 2023-12-25 NOTE — PROGRESS NOTE ADULT - PROBLEM SELECTOR PLAN 7
- cont tamsulosin
chronic  - c/w tamsulosin
- cont tamsulosin
chronic  - c/w tamsulosin
- cont tamsulosin

## 2023-12-25 NOTE — PROGRESS NOTE ADULT - PROBLEM SELECTOR PLAN 6
paper-work mentions possible history of chronic systolic CHF though patient not on any GDMT regarding medications list provided, unclear if true PMH  - breath sounds appear more from secretions than crackles, no pitting edema nor JVD noted on patient, does not appear volume-overloaded  - continue to monitor
Paper-work mentions possible history of chronic systolic CHF though patient not on any GDMT regarding medications list provided, unclear if true PMH  - proBNP 124  - breath sounds appear more from secretions than crackles, no pitting edema nor JVD noted on patient, does not appear volume-overloaded  - f/u collateral with family in AM
paper-work mentions possible history of chronic systolic CHF though patient not on any GDMT regarding medications list provided, unclear if true PMH  - breath sounds appear more from secretions than crackles, no pitting edema nor JVD noted on patient, does not appear volume-overloaded  - continue to monitor
Paper-work mentions possible history of chronic systolic CHF though patient not on any GDMT regarding medications list provided, unclear if true PMH  - proBNP 124  - breath sounds appear more from secretions than crackles, no pitting edema nor JVD noted on patient, does not appear volume-overloaded  - f/u collateral with family in AM
paper-work mentions possible history of chronic systolic CHF though patient not on any GDMT regarding medications list provided, unclear if true PMH  - breath sounds appear more from secretions than crackles, no pitting edema nor JVD noted on patient, does not appear volume-overloaded  - continue to monitor
paper-work mentions possible history of chronic systolic CHF though patient not on any GDMT regarding medications list provided, unclear if true PMH  - breath sounds appear more from secretions than crackles, no pitting edema nor JVD noted on patient, does not appear volume-overloaded  - continue to monitor
Paper-work mentions possible history of chronic systolic CHF though patient not on any GDMT regarding medications list provided, unclear if true PMH  - proBNP 124  - breath sounds appear more from secretions than crackles, no pitting edema nor JVD noted on patient, does not appear volume-overloaded  - f/u collateral with family in AM

## 2023-12-25 NOTE — PROGRESS NOTE ADULT - PROBLEM SELECTOR PROBLEM 3
Agitation

## 2023-12-25 NOTE — PROGRESS NOTE ADULT - PROBLEM SELECTOR PLAN 3
Presents from nursing home for worsening agitation and aggression to staff. Unclear if worsening dementia vs. mood disorder vs encephalopathy contributing to agitation  - also likely exacerbated by component of delirium, delirium precautions and cont melatonin  - QTc 444 on admission, cont monitoring EKGs for QTc after antipsychotics  - appreciate BH: Seroquel 225mg BID, Klonopin 1mg qhs  - platelets downtrend today to 118 - d/w Dr. Marin - c/w Depakote 250mg 8AM, 125mg 2PM, decrease to 250mg at 8PM (from 500) - follow up with psychiatry tomorrow for further adjustments  - monitor platelets/LFTs on depakote

## 2023-12-25 NOTE — PROGRESS NOTE ADULT - PROBLEM SELECTOR PLAN 2
Patent with facial redness non-sparing nasolabial folds, possibly asymmetric and warm compared to rest of body, possibly new for patient ([facility] supervisor did not notice redness but only sees patient intermittently)  - s/p cefazolin then Keflex for total 7 day course Patent with facial redness non-sparing nasolabial folds, possibly asymmetric and warm compared to rest of body, possibly new for patient ([facility] supervisor did not notice redness but only sees patient intermittently)  - s/p cefazolin then Keflex for total 7 day course

## 2023-12-25 NOTE — PROGRESS NOTE ADULT - REASON FOR ADMISSION
Agitation, aggression

## 2023-12-25 NOTE — PROGRESS NOTE ADULT - PROBLEM SELECTOR PROBLEM 4
Dysphagia

## 2023-12-25 NOTE — PROGRESS NOTE ADULT - PROBLEM SELECTOR PLAN 5
BP stable  - c/w home midodrine   - monitor vital signs
On midodrine Q8H at Nursing Home  - will cont rehab midodrine, hold SBP>130
BP stable  - c/w home midodrine   - monitor vital signs

## 2023-12-25 NOTE — PROGRESS NOTE ADULT - NUTRITIONAL ASSESSMENT
This patient has been assessed with a concern for Malnutrition and has been determined to have a diagnosis/diagnoses of Moderate protein-calorie malnutrition.    This patient is being managed with:   Diet Pureed-  Entered: Dec 16 2023  5:43PM  

## 2023-12-25 NOTE — PROGRESS NOTE ADULT - SUBJECTIVE AND OBJECTIVE BOX
Kendal Watt MD  [facility] Division of Hospital Medicine  Pager [phone] (M-F 8AM-5PM)  Other Times: [phone]    Patient is a 68y old  Male who presents with a chief complaint of Agitation, aggression (24 Dec 2023 13:27)    SUBJECTIVE / OVERNIGHT EVENTS: no acute events overnight     MEDICATIONS  (STANDING):  albuterol/ipratropium for Nebulization 3 milliLiter(s) Nebulizer every 6 hours  clonazePAM  Tablet 1 milliGRAM(s) Oral at bedtime  divalproex Sprinkle 125 milliGRAM(s) Oral <User Schedule>  divalproex Sprinkle 250 milliGRAM(s) Oral <User Schedule>  influenza  Vaccine (HIGH DOSE) 0.7 milliLiter(s) IntraMuscular once  lactulose Syrup 20 Gram(s) Oral two times a day  melatonin 3 milliGRAM(s) Oral at bedtime  midodrine 10 milliGRAM(s) Oral every 8 hours  QUEtiapine 225 milliGRAM(s) Oral two times a day  tamsulosin 0.4 milliGRAM(s) Oral at bedtime    MEDICATIONS  (PRN):  acetaminophen     Tablet .. 650 milliGRAM(s) Oral every 6 hours PRN Temp greater or equal to 38C (100.4F), Mild Pain (1 - 3)  OLANZapine Injectable 2.5 milliGRAM(s) IntraMuscular every 6 hours PRN severe agitation  senna 2 Tablet(s) Oral at bedtime PRN Constipation      PHYSICAL EXAM:  Vital Signs Last 24 Hrs  T(C): 36.3 (25 Dec 2023 10:42), Max: 36.8 (24 Dec 2023 20:53)  T(F): 97.4 (25 Dec 2023 10:42), Max: 98.3 (24 Dec 2023 20:53)  HR: 95 (25 Dec 2023 10:42) (86 - 95)  BP: 102/52 (25 Dec 2023 10:42) (100/60 - 105/61)  RR: 17 (25 Dec 2023 10:42) (17 - 18)  SpO2: 100% (25 Dec 2023 10:42) (95% - 100%)    Parameters below as of 25 Dec 2023 10:42  Patient On (Oxygen Delivery Method): room air    CONSTITUTIONAL: NAD, well-developed, well-groomed  RESPIRATORY: Normal respiratory effort; lungs are clear to auscultation bilaterally  CARDIOVASCULAR: Regular rate and rhythm, normal S1 and S2, no murmur/rub/gallop; No lower extremity edema  GASTROINTESTINAL: Nontender to palpation, normoactive bowel sounds, no rebound/guarding; No hepatosplenomegaly  MUSCULOSKELETAL:  no clubbing or cyanosis of digits; no joint swelling or tenderness to palpation  NEUROLOGY: non-focal; no gross sensory deficits   PSYCH: calm affect appropriate   SKIN: No rashes; warm     LABS:                        14.2   7.41  )-----------( 118      ( 25 Dec 2023 03:50 )             42.6     12-25    137  |  104  |  16  ----------------------------<  71  5.0   |  22  |  0.70    Ca    8.5      25 Dec 2023 03:50    TPro  6.8  /  Alb  3.1<L>  /  TBili  0.3  /  DBili  x   /  AST  20  /  ALT  7   /  AlkPhos  76  12-25          Urinalysis Basic - ( 25 Dec 2023 03:50 )    Color: x / Appearance: x / SG: x / pH: x  Gluc: 71 mg/dL / Ketone: x  / Bili: x / Urobili: x   Blood: x / Protein: x / Nitrite: x   Leuk Esterase: x / RBC: x / WBC x   Sq Epi: x / Non Sq Epi: x / Bacteria: x          RADIOLOGY & ADDITIONAL TESTS:  Results Reviewed:   Imaging Personally Reviewed:  Electrocardiogram Personally Reviewed:    COORDINATION OF CARE:  Care Discussed with Consultants/Other Providers [Y/N]:  Prior or Outpatient Records Reviewed [Y/N]:   Kendal Watt MD  [facility] Division of Hospital Medicine  Pager [phone] (M-F 8AM-5PM)  Other Times: [phone]    Patient is a 68y old  Male who presents with a chief complaint of Agitation, aggression (24 Dec 2023 13:27)    SUBJECTIVE / OVERNIGHT EVENTS: no acute events overnight     MEDICATIONS  (STANDING):  albuterol/ipratropium for Nebulization 3 milliLiter(s) Nebulizer every 6 hours  clonazePAM  Tablet 1 milliGRAM(s) Oral at bedtime  divalproex Sprinkle 125 milliGRAM(s) Oral <User Schedule>  divalproex Sprinkle 250 milliGRAM(s) Oral <User Schedule>  influenza  Vaccine (HIGH DOSE) 0.7 milliLiter(s) IntraMuscular once  lactulose Syrup 20 Gram(s) Oral two times a day  melatonin 3 milliGRAM(s) Oral at bedtime  midodrine 10 milliGRAM(s) Oral every 8 hours  QUEtiapine 225 milliGRAM(s) Oral two times a day  tamsulosin 0.4 milliGRAM(s) Oral at bedtime    MEDICATIONS  (PRN):  acetaminophen     Tablet .. 650 milliGRAM(s) Oral every 6 hours PRN Temp greater or equal to 38C (100.4F), Mild Pain (1 - 3)  OLANZapine Injectable 2.5 milliGRAM(s) IntraMuscular every 6 hours PRN severe agitation  senna 2 Tablet(s) Oral at bedtime PRN Constipation      PHYSICAL EXAM:  Vital Signs Last 24 Hrs  T(C): 36.3 (25 Dec 2023 10:42), Max: 36.8 (24 Dec 2023 20:53)  T(F): 97.4 (25 Dec 2023 10:42), Max: 98.3 (24 Dec 2023 20:53)  HR: 95 (25 Dec 2023 10:42) (86 - 95)  BP: 102/52 (25 Dec 2023 10:42) (100/60 - 105/61)  RR: 17 (25 Dec 2023 10:42) (17 - 18)  SpO2: 100% (25 Dec 2023 10:42) (95% - 100%)    Parameters below as of 25 Dec 2023 10:42  Patient On (Oxygen Delivery Method): room air    CONSTITUTIONAL: NAD, well-developed, well-groomed  RESPIRATORY: Normal respiratory effort; lungs are clear to auscultation bilaterally  CARDIOVASCULAR: Regular rate and rhythm, normal S1 and S2, no murmur/rub/gallop; No lower extremity edema  GASTROINTESTINAL: Nontender to palpation, normoactive bowel sounds, no rebound/guarding; No hepatosplenomegaly  MUSCULOSKELETAL:  no clubbing or cyanosis of digits; no joint swelling or tenderness to palpation  NEUROLOGY: non-focal; no gross sensory deficits   PSYCH: calm affect appropriate   SKIN: No rashes; warm     LABS:                        14.2   7.41  )-----------( 118      ( 25 Dec 2023 03:50 )             42.6     12-25    137  |  104  |  16  ----------------------------<  71  5.0   |  22  |  0.70    Ca    8.5      25 Dec 2023 03:50    TPro  6.8  /  Alb  3.1<L>  /  TBili  0.3  /  DBili  x   /  AST  20  /  ALT  7   /  AlkPhos  76  12-25          Urinalysis Basic - ( 25 Dec 2023 03:50 )    Color: x / Appearance: x / SG: x / pH: x  Gluc: 71 mg/dL / Ketone: x  / Bili: x / Urobili: x   Blood: x / Protein: x / Nitrite: x   Leuk Esterase: x / RBC: x / WBC x   Sq Epi: x / Non Sq Epi: x / Bacteria: x          RADIOLOGY & ADDITIONAL TESTS:  Results Reviewed:   Imaging Personally Reviewed:  Electrocardiogram Personally Reviewed:    COORDINATION OF CARE:  Care Discussed with Consultants/Other Providers [Y/N]:  Prior or Outpatient Records Reviewed [Y/N]:

## 2023-12-26 NOTE — BH CONSULTATION LIAISON PROGRESS NOTE - NSICDXBHPRIMARYDX_PSY_ALL_CORE
Delirium due to another medical condition   F05  

## 2023-12-26 NOTE — BH CONSULTATION LIAISON PROGRESS NOTE - NSBHFUPINTERVALCCFT_PSY_A_CORE
"I'm so-so"
"I'm fine"
"you're full of [employer] [place]!"
"I need a nurse"
"you're full of [employer] [place]!"
"I feel sick"

## 2023-12-26 NOTE — BH CONSULTATION LIAISON PROGRESS NOTE - NSBHPTASSESSDT_PSY_A_CORE
22-Dec-2023 13:07
26-Dec-2023 10:56
16-Dec-2023 09:39
18-Dec-2023 11:54
20-Dec-2023 15:55
19-Dec-2023 14:59

## 2023-12-26 NOTE — DISCHARGE NOTE NURSING/CASE MANAGEMENT/SOCIAL WORK - NSDCPEFALRISK_GEN_ALL_CORE
For information on Fall & Injury Prevention, visit: [email or web address] OR  [email or web address] OR  https://www.cdc.gov/steadi/patient.html For information on Fall & Injury Prevention, visit: [email or web address] OR  [email or web address] OR  https://www.cdc.gov/steadi/patient.html

## 2023-12-26 NOTE — BH CONSULTATION LIAISON PROGRESS NOTE - NSBHFUPREASONCONS_PSY_A_CORE
agitation/psychosis
delirium
delirium/agitation/med management
delirium/agitation/med management
agitation
agitation

## 2023-12-26 NOTE — BH CONSULTATION LIAISON PROGRESS NOTE - NSBHCONSFOLLOWNEEDS_PSY_ALL_CORE
No psychiatric contraindications to discharge
No psychiatric contraindications to discharge
Needs further psych safety assessment prior to discharge

## 2023-12-26 NOTE — DISCHARGE NOTE NURSING/CASE MANAGEMENT/SOCIAL WORK - PATIENT PORTAL LINK FT
You can access the FollowMyHealth Patient Portal offered by [facility] by registering at the following website: [email or web address]. By joining [employer]’s FollowMyHealth portal, you will also be able to view your health information using other applications (apps) compatible with our system. You can access the FollowMyHealth Patient Portal offered by [facility] by registering at the following website: [email or web address]. By joining [employer]’s FollowMyHealth portal, you will also be able to view your health information using other applications (apps) compatible with our system.

## 2023-12-26 NOTE — BH CONSULTATION LIAISON PROGRESS NOTE - NSBHATTESTTYPEVISIT_PSY_A_CORE
Attending Only
Attending Only
Resident/Fellow with telephonic supervision
Attending with Resident/Fellow/Student

## 2023-12-26 NOTE — BH CONSULTATION LIAISON PROGRESS NOTE - NSBHASSESSMENTFT_PSY_ALL_CORE
Patient is a 68 years old male with the past hx of  BPH, agitation, as per chart ?dementia, dysphagia after CVA now s/p PEG tube, and ?CHF presenting from [facility] for worsening agitation over the past few weeks. Patient received  Benadryl 50mg IV, Haldol 2.5 IVP x2, Ativan 2mg IVP x2, Versed 2mg IVP x1, 5mg IVP x1, and Zyprexa 2.5mg IM for agitation. Psychiatry consulted for agitation/medication management.    Impression: patient without past psych hx with recently started agitation and aggressive behavior, possible executive and memory dysfunction, confusion, poor attention span raises concern for delirium vs r/o new case of early dementia/ consequences of TBI.    12/16 Pt today irritable and uncooperative on exam, limiting evaluation. Although pt refuses to participate in orientation questions, he appears confused given his persistent requests for PO breakfast despite being reminded of PEG. Req'd 2x PRN yesterday, no significant agitation overnight.  12/18: Patient receiving less PRNs: once on 12/16 and once 12/17. Currently calm, cooperative with the interview.  12/19: received 3 PRNs since yesterday. Currently calm, no safety issues.  12/20: No PRNs since yesterday. Calm on assessment, disoriented in time and place.  12/22: Zyprexa PRN overnight. Tolerating Depakote, with low level. Disoriented to time/place but calm.   12/26: calm, no  recent PRNs. Depakote decreased slightly due to low platelets, and the latter are rebounding. Exam appropriate.    Recommendations:  - Enhanced observation for impulsivity/falls.  - C/W Quetiapine 225mg BID  - C/W Clonazepam 1 mg at bed time to prevent benzos withdrawal (as per med rec in chart, outpatient dose is Klonopin 1mg BID)  - C/W Depakote 250 mg  at 8 am, 125 mg at 2 pm and 250 mg at 8 pm for management of agitation/impulsivity  -PRN for agitation: Zyprexa 2.5 mg Q 6 hours PO/IM. Can give additional Zyprexa 2.5 mg for refractory agitation, combativeness (ensure qtc <500)  - Supportive treatment of delirium: 1) Minimize use of benzodiazepines, opioids, anticholinergics, and other deliriogenic agents whenever possible.  2) Maintain sleep wake cycle.  3) Provide frequent reorientation and redirection.  4) Family member at bedside if possible. 5) Provide corrective lenses/hearing aids if required  Dispo: NH/rehab; no role for inpt psych  CL psych will sign off given lack of behavioral acuity. 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 Patient received  Benadryl 50mg IV, Haldol 2.5 IVP x2, Ativan 2mg IVP x2, Versed 2mg IVP x1, 5mg IVP x1, and Zyprexa 2.5mg IM for agitation. Psychiatry consulted for agitation/medication management.    Impression: patient without past psych hx with recently started agitation and aggressive behavior, possible executive and memory dysfunction, confusion, poor attention span raises concern for delirium vs r/o new case of early dementia/ consequences of TBI.    12/16 Pt today irritable and uncooperative on exam, limiting evaluation. Although pt refuses to participate in orientation questions, he appears confused given his persistent requests for PO breakfast despite being reminded of PEG. Req'd 2x PRN yesterday, no significant agitation overnight.  12/18: Patient receiving less PRNs: once on 12/16 and once 12/17. Currently calm, cooperative with the interview.  12/19: received 3 PRNs since yesterday. Currently calm, no safety issues.  12/20: No PRNs since yesterday. Calm on assessment, disoriented in time and place.  12/22: Zyprexa PRN overnight. Tolerating Depakote, with low level. Disoriented to time/place but calm.   12/26: calm, no  recent PRNs. Depakote decreased slightly due to low platelets, and the latter are rebounding. Exam appropriate.    Recommendations:  - Enhanced observation for impulsivity/falls.  - C/W Quetiapine 225mg BID  - C/W Clonazepam 1 mg at bed time to prevent benzos withdrawal (as per med rec in chart, outpatient dose is Klonopin 1mg BID)  - C/W Depakote 250 mg  at 8 am, 125 mg at 2 pm and 250 mg at 8 pm for management of agitation/impulsivity  -PRN for agitation: Zyprexa 2.5 mg Q 6 hours PO/IM. Can give additional Zyprexa 2.5 mg for refractory agitation, combativeness (ensure qtc <500)  - Supportive treatment of delirium: 1) Minimize use of benzodiazepines, opioids, anticholinergics, and other deliriogenic agents whenever possible.  2) Maintain sleep wake cycle.  3) Provide frequent reorientation and redirection.  4) Family member at bedside if possible. 5) Provide corrective lenses/hearing aids if required  Dispo: NH/rehab; no role for inpt psych  CL psych will sign off given lack of behavioral acuity. Please call us as/if needed

## 2023-12-26 NOTE — BH CONSULTATION LIAISON PROGRESS NOTE - NSBHCHARTREVIEWVS_PSY_A_CORE FT
Vital Signs Last 24 Hrs  T(C): 36.6 (16 Dec 2023 05:57), Max: 37.1 (15 Dec 2023 17:30)  T(F): 97.8 (16 Dec 2023 05:57), Max: 98.7 (15 Dec 2023 17:30)  HR: 81 (16 Dec 2023 05:57) (72 - 109)  BP: 112/53 (16 Dec 2023 05:57) (95/64 - 120/50)  BP(mean): --  RR: 18 (16 Dec 2023 05:57) (17 - 20)  SpO2: 95% (16 Dec 2023 05:57) (95% - 98%)    Parameters below as of 16 Dec 2023 05:57  Patient On (Oxygen Delivery Method): room air    
Vital Signs Last 24 Hrs  T(C): 36.7 (22 Dec 2023 11:32), Max: 36.7 (21 Dec 2023 16:39)  T(F): 98 (22 Dec 2023 11:32), Max: 98.1 (21 Dec 2023 16:39)  HR: 88 (22 Dec 2023 11:32) (71 - 104)  BP: 95/65 (22 Dec 2023 11:32) (95/63 - 107/62)  BP(mean): --  RR: 18 (22 Dec 2023 11:32) (18 - 18)  SpO2: 98% (22 Dec 2023 11:32) (95% - 100%)    Parameters below as of 22 Dec 2023 11:32  Patient On (Oxygen Delivery Method): room air    
Vital Signs Last 24 Hrs  T(C): 36.3 (26 Dec 2023 09:37), Max: 36.4 (25 Dec 2023 17:42)  T(F): 97.3 (26 Dec 2023 09:37), Max: 97.6 (25 Dec 2023 17:42)  HR: 67 (26 Dec 2023 10:24) (67 - 97)  BP: 108/64 (26 Dec 2023 09:37) (106/53 - 122/85)  BP(mean): --  RR: 8 (26 Dec 2023 09:37) (8 - 18)  SpO2: 95% (26 Dec 2023 10:24) (95% - 100%)    Parameters below as of 26 Dec 2023 10:24  Patient On (Oxygen Delivery Method): room air    
Vital Signs Last 24 Hrs  T(C): 36.4 (18 Dec 2023 10:20), Max: 37 (18 Dec 2023 05:10)  T(F): 97.6 (18 Dec 2023 10:20), Max: 98.6 (18 Dec 2023 05:10)  HR: 59 (18 Dec 2023 10:20) (59 - 95)  BP: 98/62 (18 Dec 2023 10:20) (94/57 - 108/65)  BP(mean): --  RR: 17 (18 Dec 2023 10:20) (17 - 19)  SpO2: 100% (18 Dec 2023 10:20) (95% - 100%)    Parameters below as of 18 Dec 2023 10:20  Patient On (Oxygen Delivery Method): room air    
Vital Signs Last 24 Hrs  T(C): 36.5 (19 Dec 2023 10:13), Max: 36.6 (19 Dec 2023 05:10)  T(F): 97.7 (19 Dec 2023 10:13), Max: 97.8 (19 Dec 2023 05:10)  HR: 85 (19 Dec 2023 10:13) (77 - 100)  BP: 98/60 (19 Dec 2023 10:13) (98/60 - 116/58)  BP(mean): --  RR: 18 (19 Dec 2023 10:13) (17 - 18)  SpO2: 98% (19 Dec 2023 10:13) (96% - 99%)    Parameters below as of 19 Dec 2023 10:13  Patient On (Oxygen Delivery Method): room air    
Vital Signs Last 24 Hrs  T(C): 36.4 (20 Dec 2023 05:51), Max: 36.7 (19 Dec 2023 19:02)  T(F): 97.5 (20 Dec 2023 05:51), Max: 98 (19 Dec 2023 19:02)  HR: 82 (20 Dec 2023 09:39) (70 - 90)  BP: 95/54 (20 Dec 2023 05:51) (95/54 - 115/70)  BP(mean): --  RR: 18 (20 Dec 2023 05:51) (18 - 18)  SpO2: 100% (20 Dec 2023 09:39) (93% - 100%)    Parameters below as of 20 Dec 2023 05:51  Patient On (Oxygen Delivery Method): room air

## 2023-12-26 NOTE — CHART NOTE - NSCHARTNOTEFT_GEN_A_CORE
Brief Psych CL Note:  Received call from hospitalist. Patient's platelets are decreasing. Will therefore recommend lowering evening dose of VPA back to 250mg HS.  Please continue to monitor platelets, as well as LFTS, albumin, and VPA level.  Please call Psych CL to follow up on Tuesday.
pt seen and examined. vitals, labs, psych consult note reviewed. pt is stable for discharge back to NH. total time spent on dc 42min

## 2023-12-26 NOTE — BH CONSULTATION LIAISON PROGRESS NOTE - CURRENT MEDICATION
MEDICATIONS  (STANDING):  albuterol/ipratropium for Nebulization 3 milliLiter(s) Nebulizer every 6 hours  clonazePAM  Tablet 1 milliGRAM(s) Oral at bedtime  divalproex Sprinkle 500 milliGRAM(s) Oral <User Schedule>  divalproex Sprinkle 125 milliGRAM(s) Oral <User Schedule>  enoxaparin Injectable 40 milliGRAM(s) SubCutaneous every 24 hours  influenza  Vaccine (HIGH DOSE) 0.7 milliLiter(s) IntraMuscular once  lactulose Syrup 20 Gram(s) Oral two times a day  melatonin 3 milliGRAM(s) Oral at bedtime  midodrine 10 milliGRAM(s) Oral every 8 hours  QUEtiapine 225 milliGRAM(s) Oral two times a day  tamsulosin 0.4 milliGRAM(s) Oral at bedtime    MEDICATIONS  (PRN):  acetaminophen     Tablet .. 650 milliGRAM(s) Oral every 6 hours PRN Temp greater or equal to 38C (100.4F), Mild Pain (1 - 3)  OLANZapine Injectable 2.5 milliGRAM(s) IntraMuscular every 6 hours PRN severe agitation  senna 2 Tablet(s) Oral at bedtime PRN Constipation  
MEDICATIONS  (STANDING):  albuterol/ipratropium for Nebulization 3 milliLiter(s) Nebulizer every 6 hours  cephalexin 500 milliGRAM(s) Oral every 6 hours  clonazePAM  Tablet 1 milliGRAM(s) Oral at bedtime  divalproex Sprinkle 250 milliGRAM(s) Oral <User Schedule>  divalproex Sprinkle 125 milliGRAM(s) Oral <User Schedule>  enoxaparin Injectable 40 milliGRAM(s) SubCutaneous every 24 hours  influenza  Vaccine (HIGH DOSE) 0.7 milliLiter(s) IntraMuscular once  lactulose Syrup 20 Gram(s) Oral two times a day  melatonin 3 milliGRAM(s) Oral at bedtime  midodrine 10 milliGRAM(s) Oral every 8 hours  QUEtiapine 225 milliGRAM(s) Oral two times a day  tamsulosin 0.4 milliGRAM(s) Oral at bedtime    MEDICATIONS  (PRN):  acetaminophen     Tablet .. 650 milliGRAM(s) Oral every 6 hours PRN Temp greater or equal to 38C (100.4F), Mild Pain (1 - 3)  OLANZapine Injectable 2.5 milliGRAM(s) IntraMuscular every 6 hours PRN severe agitation  senna 2 Tablet(s) Oral at bedtime PRN Constipation  
MEDICATIONS  (STANDING):  albuterol/ipratropium for Nebulization 3 milliLiter(s) Nebulizer every 6 hours  clonazePAM  Tablet 1 milliGRAM(s) Oral at bedtime  divalproex Sprinkle 125 milliGRAM(s) Oral <User Schedule>  divalproex Sprinkle 250 milliGRAM(s) Oral <User Schedule>  influenza  Vaccine (HIGH DOSE) 0.7 milliLiter(s) IntraMuscular once  lactulose Syrup 20 Gram(s) Oral two times a day  melatonin 3 milliGRAM(s) Oral at bedtime  midodrine 10 milliGRAM(s) Oral every 8 hours  QUEtiapine 225 milliGRAM(s) Oral two times a day  tamsulosin 0.4 milliGRAM(s) Oral at bedtime    MEDICATIONS  (PRN):  acetaminophen     Tablet .. 650 milliGRAM(s) Oral every 6 hours PRN Temp greater or equal to 38C (100.4F), Mild Pain (1 - 3)  OLANZapine Injectable 2.5 milliGRAM(s) IntraMuscular every 6 hours PRN severe agitation  senna 2 Tablet(s) Oral at bedtime PRN Constipation  
MEDICATIONS  (STANDING):  albuterol/ipratropium for Nebulization 3 milliLiter(s) Nebulizer every 6 hours  cephalexin 500 milliGRAM(s) Oral every 6 hours  clonazePAM  Tablet 1 milliGRAM(s) Oral at bedtime  divalproex Sprinkle 250 milliGRAM(s) Oral <User Schedule>  divalproex Sprinkle 125 milliGRAM(s) Oral <User Schedule>  enoxaparin Injectable 40 milliGRAM(s) SubCutaneous every 24 hours  influenza  Vaccine (HIGH DOSE) 0.7 milliLiter(s) IntraMuscular once  lactulose Syrup 20 Gram(s) Oral two times a day  melatonin 3 milliGRAM(s) Oral at bedtime  midodrine 10 milliGRAM(s) Oral every 8 hours  QUEtiapine 225 milliGRAM(s) Oral two times a day  tamsulosin 0.4 milliGRAM(s) Oral at bedtime    MEDICATIONS  (PRN):  acetaminophen     Tablet .. 650 milliGRAM(s) Oral every 6 hours PRN Temp greater or equal to 38C (100.4F), Mild Pain (1 - 3)  OLANZapine Injectable 2.5 milliGRAM(s) IntraMuscular every 6 hours PRN severe agitation  senna 2 Tablet(s) Oral at bedtime PRN Constipation  
MEDICATIONS  (STANDING):  albuterol/ipratropium for Nebulization 3 milliLiter(s) Nebulizer every 6 hours  ceFAZolin   IVPB      ceFAZolin   IVPB 2000 milliGRAM(s) IV Intermittent every 8 hours  clonazePAM  Tablet 1 milliGRAM(s) Oral at bedtime  dextrose 5% + sodium chloride 0.9%. 1000 milliLiter(s) (75 mL/Hr) IV Continuous <Continuous>  divalproex Sprinkle 250 milliGRAM(s) Oral two times a day  enoxaparin Injectable 40 milliGRAM(s) SubCutaneous every 24 hours  influenza  Vaccine (HIGH DOSE) 0.7 milliLiter(s) IntraMuscular once  lactulose Syrup 20 Gram(s) Oral two times a day  melatonin 3 milliGRAM(s) Oral at bedtime  midodrine 10 milliGRAM(s) Oral every 8 hours  QUEtiapine 225 milliGRAM(s) Oral two times a day  tamsulosin 0.4 milliGRAM(s) Oral at bedtime    MEDICATIONS  (PRN):  acetaminophen     Tablet .. 650 milliGRAM(s) Oral every 6 hours PRN Temp greater or equal to 38C (100.4F), Mild Pain (1 - 3)  OLANZapine Injectable 2.5 milliGRAM(s) IntraMuscular every 6 hours PRN severe agitation  senna 2 Tablet(s) Oral at bedtime PRN Constipation  
MEDICATIONS  (STANDING):  albuterol/ipratropium for Nebulization 3 milliLiter(s) Nebulizer every 6 hours  cephalexin 500 milliGRAM(s) Oral every 6 hours  clonazePAM  Tablet 1 milliGRAM(s) Oral at bedtime  dextrose 5% + sodium chloride 0.9%. 1000 milliLiter(s) (75 mL/Hr) IV Continuous <Continuous>  divalproex Sprinkle 250 milliGRAM(s) Oral two times a day  enoxaparin Injectable 40 milliGRAM(s) SubCutaneous every 24 hours  influenza  Vaccine (HIGH DOSE) 0.7 milliLiter(s) IntraMuscular once  lactulose Syrup 20 Gram(s) Oral two times a day  melatonin 3 milliGRAM(s) Oral at bedtime  midodrine 10 milliGRAM(s) Oral every 8 hours  QUEtiapine 225 milliGRAM(s) Oral two times a day  tamsulosin 0.4 milliGRAM(s) Oral at bedtime    MEDICATIONS  (PRN):  acetaminophen     Tablet .. 650 milliGRAM(s) Oral every 6 hours PRN Temp greater or equal to 38C (100.4F), Mild Pain (1 - 3)  OLANZapine Injectable 2.5 milliGRAM(s) IntraMuscular every 6 hours PRN severe agitation  senna 2 Tablet(s) Oral at bedtime PRN Constipation

## 2023-12-26 NOTE — BH CONSULTATION LIAISON PROGRESS NOTE - NSBHFUPINTERVALHXFT_PSY_A_CORE
Chart reviewed. Discussed with primary team.  On exam, he was slowly removing/placing on rebreather as well as rocking slowly back and forth, though was behaviorally controlled. AA ox 2-3, calm. Denied distress, SI, HI, depression, anxiety/panic, safety concerns, or AVH. Reports he has an appetite. Not sedated. Insight fair.

## 2023-12-26 NOTE — BH CONSULTATION LIAISON PROGRESS NOTE - NSBHATTESTBILLING_PSY_A_CORE
[id number] OBS or IP - moderate complexity OR 35-49 mins [id number] OBS or IP - moderate complexity OR 35-49 mins

## 2023-12-26 NOTE — BH CONSULTATION LIAISON PROGRESS NOTE - MSE UNSTRUCTURED FT
On exam, he was slowly removing/placing on rebreather as well as rocking slowly back and forth, though was behaviorally controlled. AA ox 2-3, calm. Denied distress, SI, HI, depression, anxiety/panic, safety concerns, or AVH. Reports he has an appetite. Not sedated. Insight fair.

## 2023-12-28 NOTE — ED ADULT NURSE NOTE - NSFALLRISKINTERV_ED_ALL_ED
Assistance OOB with selected safe patient handling equipment if applicable/Assistance with ambulation/Communicate fall risk and risk factors to all staff, patient, and family/Monitor gait and stability/Provide patient with walking aids/Provide visual cue: yellow wristband, yellow gown, etc/Reinforce activity limits and safety measures with patient and family/Call bell, personal items and telephone in reach/Instruct patient to call for assistance before getting out of bed/chair/stretcher/Non-slip footwear applied when patient is off stretcher/[place] to call system/Physically safe environment - no spills, clutter or unnecessary equipment/Purposeful Proactive Rounding/Room/bathroom lighting operational, light cord in reach Assistance OOB with selected safe patient handling equipment if applicable/Assistance with ambulation/Communicate fall risk and risk factors to all staff, patient, and family/Monitor gait and stability/Provide patient with walking aids/Provide visual cue: yellow wristband, yellow gown, etc/Reinforce activity limits and safety measures with patient and family/Call bell, personal items and telephone in reach/Instruct patient to call for assistance before getting out of bed/chair/stretcher/Non-slip footwear applied when patient is off stretcher/[place] to call system/Physically safe environment - no spills, clutter or unnecessary equipment/Purposeful Proactive Rounding/Room/bathroom lighting operational, light cord in reach

## 2023-12-28 NOTE — ED ADULT TRIAGE NOTE - CHIEF COMPLAINT QUOTE
Pt arrives 5 [facility]. as per EMT" See here few days ago for agitation as per staff he was laying on floor , uncooperative shouting." Pt awake, alert, Pt st" I got framed, my stomach hurts." pt uncooperative with triage questions.  hx Schizo , bipolar, hypoxia. Pt arrives 5 [facility]. as per EMT" See here few days ago for agitation as per staff he was laying on floor , uncooperative shouting." Pt awake, alert, Pt st" I got framed, my stomach hurts." pt uncooperative with triage questions.  hx Schizo , bipolar, hypoxia.

## 2023-12-28 NOTE — PROVIDER CONTACT NOTE (OTHER) - ASSESSMENT
pt is cleared to return back to [facility] [phone]. ROMULO spoke with Romero SNEED/Nilesh, he accepted pt back to the same room. ROMULO arranged [facility] [phone]. Trip# [id number]. P/U 12;30. MAS Auth# [id number]. Pt has Medicaid ID (KIA #)[id number]. No further ROMULO services needed for this visit. pt is cleared to return back to [facility] [phone]. ROMULO spoke with Romero SNEED/Nilesh, he accepted pt back to the same room. ROMULO arranged [facility] [phone]. Trip# [id number]. P/U 12;30. MAS Auth# [id number]. Pt has Medicaid ID (KIA #)[id number]. No further ROMULO services needed for this visit.

## 2023-12-28 NOTE — ED PROVIDER NOTE - CLINICAL SUMMARY MEDICAL DECISION MAKING FREE TEXT BOX
67 yo M presenting from a SNF for agitation recently discharged after admission for similar concerns. Pt recently admitted for this, not currently agitated. No abd pain currently. w/ attempt to get collateral to determine if pt needs admission or can return to his facility.

## 2023-12-28 NOTE — ED PROVIDER NOTE - OBJECTIVE STATEMENT
67 yo M presenting from a SNF for agitation recently discharged after admission for similar concerns. Pt states he feels well he was frustrated because they don't allow him to walk. He denies abdominal pain, states he has chronic/intermittent abdominal cramping but is currently well. attempted to call facility for collateral, they have not called back. 67 yo M presenting from a SNF for agitation recently discharged after admission for similar concerns. Pt states he feels well he was frustrated because they don't allow him to walk. He denies abdominal pain, states he has chronic/intermittent abdominal cramping but is currently well. attempted to call facility for collateral, they have not called back.    Attendinyo male presents with agitation at nursing home.  states that staff there is not letting him walk.  here patient is cooperative and not combative.

## 2023-12-28 NOTE — ED PROVIDER NOTE - PATIENT PORTAL LINK FT
You can access the FollowMyHealth Patient Portal offered by [facility] by registering at the following website: [email or web address]. By joining [facility]’s FollowMyHealth portal, you will also be able to view your health information using other applications (apps) compatible with our system. You can access the FollowMyHealth Patient Portal offered by [facility] by registering at the following website: [email or web address]. By joining [facility]’s FollowMyHealth portal, you will also be able to view your health information using other applications (apps) compatible with our system.

## 2023-12-28 NOTE — ED ADULT NURSE NOTE - CHIEF COMPLAINT QUOTE
Pt arrives 5 [facility]. as per EMT" See here few days ago for agitation as per staff he was laying on floor , uncooperative shouting." Pt awake, alert, Pt st" I got framed, my stomach hurts." pt uncooperative with triage questions.  hx Schizo , bipolar, hypoxia. Pt arrives 5 [facility]. as per EMT" See here few days ago for agitation as per staff he was laying on floor , uncooperative shouting." Pt awake, alert, Pt st" I got framed, my stomach hurts." pt uncooperative with triage questions.  hx Schizo , bipolar, hypoxia.

## 2023-12-28 NOTE — ED PROVIDER NOTE - PHYSICAL EXAMINATION
General: chronically ill-appearing, no acute distress  Head: Atraumatic, normocephalic  Eyes: EOM grossly in tact, no scleral icterus, no discharge  Neurology: A&Ox 2  Respiratory: normal respiratory effort  CV: Extremities warm and well perfused  Abdominal: Soft, non-distended  Extremities: No edema, no deformities  Skin: warm and dry. No rashes

## 2023-12-28 NOTE — ED PROVIDER NOTE - PROGRESS NOTE DETAILS
Paul, PGY-3, EM: s/w the facility, they are going to call back as their nursing supervisor is not answering. Kayley Vidal, , contacted the nursing home.  they will take pt back if he is calm and not combative.  pickup arranged for 12:30am.

## 2023-12-28 NOTE — ED ADULT NURSE NOTE - OBJECTIVE STATEMENT
Pt arrives to room 3. Pt is A and Ox4. Hx: PEG tube, BPH. Pt arrives to the ED s/p an argument at nursing home. Pt states, "I am not sure why I am here I was just arguing with someone". Pt states that he has been having abdominal pain for, "as long as I can remember" Pt has a PEG tube, tube looks clean, dry, and intact. Pt arrives to room 3. Pt is A and Ox4. Hx: PEG tube, BPH. Pt arrives to the ED s/p an argument at nursing home. Pt states, "I am not sure why I am here I was just arguing with someone". Pt states that he has been having abdominal pain for, "as long as I can remember." Pt has a PEG tube, tube looks clean, dry, and intact. Pt has incontinence at times, pt wearing diaper. Airway is patent, respirations are even and unlabored. VS as noted in the flow sheet. Pt denies GI/ abnormalities. Awaiting provider orders. Plan of care ongoing, safety maintained.

## 2023-12-29 PROBLEM — N40.0 BENIGN PROSTATIC HYPERPLASIA WITHOUT LOWER URINARY TRACT SYMPTOMS: Chronic | Status: ACTIVE | Noted: 2023-01-01

## 2023-12-29 PROBLEM — I63.9 CEREBRAL INFARCTION, UNSPECIFIED: Chronic | Status: ACTIVE | Noted: 2023-01-01

## 2023-12-29 PROBLEM — R13.10 DYSPHAGIA, UNSPECIFIED: Chronic | Status: ACTIVE | Noted: 2023-01-01

## 2023-12-29 PROBLEM — R45.1 RESTLESSNESS AND AGITATION: Chronic | Status: ACTIVE | Noted: 2023-01-01

## 2024-01-01 ENCOUNTER — INPATIENT (INPATIENT)
Facility: HOSPITAL | Age: 69
LOS: 32 days | End: 2024-02-07
Attending: INTERNAL MEDICINE | Admitting: INTERNAL MEDICINE
Payer: MEDICAID

## 2024-01-01 VITALS
HEART RATE: 89 BPM | DIASTOLIC BLOOD PRESSURE: 52 MMHG | TEMPERATURE: 98 F | OXYGEN SATURATION: 90 % | SYSTOLIC BLOOD PRESSURE: 88 MMHG | HEIGHT: 66 IN | RESPIRATION RATE: 16 BRPM

## 2024-01-01 VITALS — OXYGEN SATURATION: 99 %

## 2024-01-01 DIAGNOSIS — J18.9 PNEUMONIA, UNSPECIFIED ORGANISM: ICD-10-CM

## 2024-01-01 DIAGNOSIS — Z71.89 OTHER SPECIFIED COUNSELING: ICD-10-CM

## 2024-01-01 DIAGNOSIS — R53.2 FUNCTIONAL QUADRIPLEGIA: ICD-10-CM

## 2024-01-01 DIAGNOSIS — Z87.438 PERSONAL HISTORY OF OTHER DISEASES OF MALE GENITAL ORGANS: ICD-10-CM

## 2024-01-01 DIAGNOSIS — Z29.9 ENCOUNTER FOR PROPHYLACTIC MEASURES, UNSPECIFIED: ICD-10-CM

## 2024-01-01 DIAGNOSIS — R45.1 RESTLESSNESS AND AGITATION: ICD-10-CM

## 2024-01-01 DIAGNOSIS — Z51.5 ENCOUNTER FOR PALLIATIVE CARE: ICD-10-CM

## 2024-01-01 DIAGNOSIS — A41.9 SEPSIS, UNSPECIFIED ORGANISM: ICD-10-CM

## 2024-01-01 DIAGNOSIS — R50.9 FEVER, UNSPECIFIED: ICD-10-CM

## 2024-01-01 DIAGNOSIS — R09.02 HYPOXEMIA: ICD-10-CM

## 2024-01-01 DIAGNOSIS — G93.49 OTHER ENCEPHALOPATHY: ICD-10-CM

## 2024-01-01 DIAGNOSIS — J96.01 ACUTE RESPIRATORY FAILURE WITH HYPOXIA: ICD-10-CM

## 2024-01-01 DIAGNOSIS — R06.00 DYSPNEA, UNSPECIFIED: ICD-10-CM

## 2024-01-01 DIAGNOSIS — I95.9 HYPOTENSION, UNSPECIFIED: ICD-10-CM

## 2024-01-01 DIAGNOSIS — Z93.1 GASTROSTOMY STATUS: Chronic | ICD-10-CM

## 2024-01-01 DIAGNOSIS — K59.00 CONSTIPATION, UNSPECIFIED: ICD-10-CM

## 2024-01-01 LAB
-  AMPICILLIN/SULBACTAM: SIGNIFICANT CHANGE UP
-  AMPICILLIN/SULBACTAM: SIGNIFICANT CHANGE UP
-  CEFAZOLIN: SIGNIFICANT CHANGE UP
-  CEFAZOLIN: SIGNIFICANT CHANGE UP
-  CLINDAMYCIN: SIGNIFICANT CHANGE UP
-  CLINDAMYCIN: SIGNIFICANT CHANGE UP
-  ERYTHROMYCIN: SIGNIFICANT CHANGE UP
-  ERYTHROMYCIN: SIGNIFICANT CHANGE UP
-  GENTAMICIN: SIGNIFICANT CHANGE UP
-  GENTAMICIN: SIGNIFICANT CHANGE UP
-  LINEZOLID: SIGNIFICANT CHANGE UP
-  LINEZOLID: SIGNIFICANT CHANGE UP
-  OXACILLIN: SIGNIFICANT CHANGE UP
-  OXACILLIN: SIGNIFICANT CHANGE UP
-  PENICILLIN: SIGNIFICANT CHANGE UP
-  PENICILLIN: SIGNIFICANT CHANGE UP
-  RIFAMPIN: SIGNIFICANT CHANGE UP
-  RIFAMPIN: SIGNIFICANT CHANGE UP
-  TETRACYCLINE: SIGNIFICANT CHANGE UP
-  TETRACYCLINE: SIGNIFICANT CHANGE UP
-  TRIMETHOPRIM/SULFAMETHOXAZOLE: SIGNIFICANT CHANGE UP
-  TRIMETHOPRIM/SULFAMETHOXAZOLE: SIGNIFICANT CHANGE UP
-  VANCOMYCIN: SIGNIFICANT CHANGE UP
-  VANCOMYCIN: SIGNIFICANT CHANGE UP
ALBUMIN SERPL ELPH-MCNC: 2.4 G/DL — LOW (ref 3.3–5)
ALBUMIN SERPL ELPH-MCNC: 2.4 G/DL — LOW (ref 3.3–5)
ALBUMIN SERPL ELPH-MCNC: 2.5 G/DL — LOW (ref 3.3–5)
ALBUMIN SERPL ELPH-MCNC: 2.6 G/DL — LOW (ref 3.3–5)
ALBUMIN SERPL ELPH-MCNC: 2.7 G/DL — LOW (ref 3.3–5)
ALBUMIN SERPL ELPH-MCNC: 2.8 G/DL — LOW (ref 3.3–5)
ALBUMIN SERPL ELPH-MCNC: 2.9 G/DL — LOW (ref 3.3–5)
ALBUMIN SERPL ELPH-MCNC: 3 G/DL — LOW (ref 3.3–5)
ALBUMIN SERPL ELPH-MCNC: 3.2 G/DL — LOW (ref 3.3–5)
ALBUMIN SERPL ELPH-MCNC: 3.3 G/DL — SIGNIFICANT CHANGE UP (ref 3.3–5)
ALP SERPL-CCNC: 28 U/L — LOW (ref 40–120)
ALP SERPL-CCNC: 30 U/L — LOW (ref 40–120)
ALP SERPL-CCNC: 33 U/L — LOW (ref 40–120)
ALP SERPL-CCNC: 33 U/L — LOW (ref 40–120)
ALP SERPL-CCNC: 34 U/L — LOW (ref 40–120)
ALP SERPL-CCNC: 36 U/L — LOW (ref 40–120)
ALP SERPL-CCNC: 39 U/L — LOW (ref 40–120)
ALP SERPL-CCNC: 39 U/L — LOW (ref 40–120)
ALP SERPL-CCNC: 41 U/L — SIGNIFICANT CHANGE UP (ref 40–120)
ALP SERPL-CCNC: 41 U/L — SIGNIFICANT CHANGE UP (ref 40–120)
ALP SERPL-CCNC: 43 U/L — SIGNIFICANT CHANGE UP (ref 40–120)
ALP SERPL-CCNC: 44 U/L — SIGNIFICANT CHANGE UP (ref 40–120)
ALP SERPL-CCNC: 45 U/L — SIGNIFICANT CHANGE UP (ref 40–120)
ALP SERPL-CCNC: 46 U/L — SIGNIFICANT CHANGE UP (ref 40–120)
ALP SERPL-CCNC: 48 U/L — SIGNIFICANT CHANGE UP (ref 40–120)
ALP SERPL-CCNC: 51 U/L — SIGNIFICANT CHANGE UP (ref 40–120)
ALP SERPL-CCNC: 51 U/L — SIGNIFICANT CHANGE UP (ref 40–120)
ALP SERPL-CCNC: 61 U/L — SIGNIFICANT CHANGE UP (ref 40–120)
ALP SERPL-CCNC: 65 U/L — SIGNIFICANT CHANGE UP (ref 40–120)
ALT FLD-CCNC: 14 U/L — SIGNIFICANT CHANGE UP (ref 4–41)
ALT FLD-CCNC: 14 U/L — SIGNIFICANT CHANGE UP (ref 4–41)
ALT FLD-CCNC: 5 U/L — SIGNIFICANT CHANGE UP (ref 4–41)
ALT FLD-CCNC: 6 U/L — SIGNIFICANT CHANGE UP (ref 4–41)
ALT FLD-CCNC: 6 U/L — SIGNIFICANT CHANGE UP (ref 4–41)
ALT FLD-CCNC: 7 U/L — SIGNIFICANT CHANGE UP (ref 4–41)
ALT FLD-CCNC: 8 U/L — SIGNIFICANT CHANGE UP (ref 4–41)
ALT FLD-CCNC: 9 U/L — SIGNIFICANT CHANGE UP (ref 4–41)
ALT FLD-CCNC: 9 U/L — SIGNIFICANT CHANGE UP (ref 4–41)
ALT FLD-CCNC: <5 U/L — SIGNIFICANT CHANGE UP (ref 4–41)
ANION GAP SERPL CALC-SCNC: 10 MMOL/L — SIGNIFICANT CHANGE UP (ref 7–14)
ANION GAP SERPL CALC-SCNC: 5 MMOL/L — LOW (ref 7–14)
ANION GAP SERPL CALC-SCNC: 6 MMOL/L — LOW (ref 7–14)
ANION GAP SERPL CALC-SCNC: 7 MMOL/L — SIGNIFICANT CHANGE UP (ref 7–14)
ANION GAP SERPL CALC-SCNC: 8 MMOL/L — SIGNIFICANT CHANGE UP (ref 7–14)
ANION GAP SERPL CALC-SCNC: 9 MMOL/L — SIGNIFICANT CHANGE UP (ref 7–14)
ANISOCYTOSIS BLD QL: SLIGHT — SIGNIFICANT CHANGE UP
APPEARANCE UR: ABNORMAL
APPEARANCE UR: CLEAR — SIGNIFICANT CHANGE UP
APPEARANCE UR: CLEAR — SIGNIFICANT CHANGE UP
APTT BLD: 29.3 SEC — SIGNIFICANT CHANGE UP (ref 24.5–35.6)
APTT BLD: 31.4 SEC — SIGNIFICANT CHANGE UP (ref 24.5–35.6)
APTT BLD: 31.4 SEC — SIGNIFICANT CHANGE UP (ref 24.5–35.6)
APTT BLD: 32.3 SEC — SIGNIFICANT CHANGE UP (ref 24.5–35.6)
APTT BLD: 32.3 SEC — SIGNIFICANT CHANGE UP (ref 24.5–35.6)
APTT BLD: 32.4 SEC — SIGNIFICANT CHANGE UP (ref 24.5–35.6)
APTT BLD: 32.5 SEC — SIGNIFICANT CHANGE UP (ref 24.5–35.6)
APTT BLD: 33.4 SEC — SIGNIFICANT CHANGE UP (ref 24.5–35.6)
APTT BLD: 33.4 SEC — SIGNIFICANT CHANGE UP (ref 24.5–35.6)
APTT BLD: 34 SEC — SIGNIFICANT CHANGE UP (ref 24.5–35.6)
APTT BLD: 34 SEC — SIGNIFICANT CHANGE UP (ref 24.5–35.6)
APTT BLD: 35.1 SEC — SIGNIFICANT CHANGE UP (ref 24.5–35.6)
APTT BLD: 35.1 SEC — SIGNIFICANT CHANGE UP (ref 24.5–35.6)
APTT BLD: 36.4 SEC — HIGH (ref 24.5–35.6)
AST SERPL-CCNC: 10 U/L — SIGNIFICANT CHANGE UP (ref 4–40)
AST SERPL-CCNC: 11 U/L — SIGNIFICANT CHANGE UP (ref 4–40)
AST SERPL-CCNC: 13 U/L — SIGNIFICANT CHANGE UP (ref 4–40)
AST SERPL-CCNC: 14 U/L — SIGNIFICANT CHANGE UP (ref 4–40)
AST SERPL-CCNC: 15 U/L — SIGNIFICANT CHANGE UP (ref 4–40)
AST SERPL-CCNC: 16 U/L — SIGNIFICANT CHANGE UP (ref 4–40)
AST SERPL-CCNC: 35 U/L — SIGNIFICANT CHANGE UP (ref 4–40)
AST SERPL-CCNC: 35 U/L — SIGNIFICANT CHANGE UP (ref 4–40)
AST SERPL-CCNC: 9 U/L — SIGNIFICANT CHANGE UP (ref 4–40)
AST SERPL-CCNC: 9 U/L — SIGNIFICANT CHANGE UP (ref 4–40)
B PERT DNA SPEC QL NAA+PROBE: SIGNIFICANT CHANGE UP
B PERT+PARAPERT DNA PNL SPEC NAA+PROBE: SIGNIFICANT CHANGE UP
BACTERIA # UR AUTO: NEGATIVE /HPF — SIGNIFICANT CHANGE UP
BASE EXCESS BLDA CALC-SCNC: 6.7 MMOL/L — HIGH (ref -2–3)
BASE EXCESS BLDA CALC-SCNC: 6.7 MMOL/L — HIGH (ref -2–3)
BASE EXCESS BLDV CALC-SCNC: 10.7 MMOL/L — HIGH (ref -2–3)
BASE EXCESS BLDV CALC-SCNC: 11.1 MMOL/L — HIGH (ref -2–3)
BASE EXCESS BLDV CALC-SCNC: 12.2 MMOL/L — HIGH (ref -2–3)
BASE EXCESS BLDV CALC-SCNC: 12.2 MMOL/L — HIGH (ref -2–3)
BASE EXCESS BLDV CALC-SCNC: 13.6 MMOL/L — HIGH (ref -2–3)
BASE EXCESS BLDV CALC-SCNC: 7.6 MMOL/L — HIGH (ref -2–3)
BASE EXCESS BLDV CALC-SCNC: 8.5 MMOL/L — HIGH (ref -2–3)
BASE EXCESS BLDV CALC-SCNC: 8.5 MMOL/L — HIGH (ref -2–3)
BASE EXCESS BLDV CALC-SCNC: 9.1 MMOL/L — HIGH (ref -2–3)
BASE EXCESS BLDV CALC-SCNC: 9.1 MMOL/L — HIGH (ref -2–3)
BASE EXCESS BLDV CALC-SCNC: 9.7 MMOL/L — HIGH (ref -2–3)
BASOPHILS # BLD AUTO: 0 K/UL — SIGNIFICANT CHANGE UP (ref 0–0.2)
BASOPHILS # BLD AUTO: 0.03 K/UL — SIGNIFICANT CHANGE UP (ref 0–0.2)
BASOPHILS # BLD AUTO: 0.03 K/UL — SIGNIFICANT CHANGE UP (ref 0–0.2)
BASOPHILS # BLD AUTO: 0.04 K/UL — SIGNIFICANT CHANGE UP (ref 0–0.2)
BASOPHILS # BLD AUTO: 0.06 K/UL — SIGNIFICANT CHANGE UP (ref 0–0.2)
BASOPHILS # BLD AUTO: 0.07 K/UL — SIGNIFICANT CHANGE UP (ref 0–0.2)
BASOPHILS # BLD AUTO: 0.33 K/UL — HIGH (ref 0–0.2)
BASOPHILS NFR BLD AUTO: 0 % — SIGNIFICANT CHANGE UP (ref 0–2)
BASOPHILS NFR BLD AUTO: 0.2 % — SIGNIFICANT CHANGE UP (ref 0–2)
BASOPHILS NFR BLD AUTO: 0.3 % — SIGNIFICANT CHANGE UP (ref 0–2)
BASOPHILS NFR BLD AUTO: 0.3 % — SIGNIFICANT CHANGE UP (ref 0–2)
BASOPHILS NFR BLD AUTO: 0.4 % — SIGNIFICANT CHANGE UP (ref 0–2)
BASOPHILS NFR BLD AUTO: 0.4 % — SIGNIFICANT CHANGE UP (ref 0–2)
BASOPHILS NFR BLD AUTO: 0.5 % — SIGNIFICANT CHANGE UP (ref 0–2)
BASOPHILS NFR BLD AUTO: 0.5 % — SIGNIFICANT CHANGE UP (ref 0–2)
BASOPHILS NFR BLD AUTO: 0.6 % — SIGNIFICANT CHANGE UP (ref 0–2)
BASOPHILS NFR BLD AUTO: 0.6 % — SIGNIFICANT CHANGE UP (ref 0–2)
BASOPHILS NFR BLD AUTO: 0.7 % — SIGNIFICANT CHANGE UP (ref 0–2)
BASOPHILS NFR BLD AUTO: 0.7 % — SIGNIFICANT CHANGE UP (ref 0–2)
BASOPHILS NFR BLD AUTO: 0.8 % — SIGNIFICANT CHANGE UP (ref 0–2)
BASOPHILS NFR BLD AUTO: 1 % — SIGNIFICANT CHANGE UP (ref 0–2)
BASOPHILS NFR BLD AUTO: 1 % — SIGNIFICANT CHANGE UP (ref 0–2)
BILIRUB DIRECT SERPL-MCNC: <0.2 MG/DL — SIGNIFICANT CHANGE UP (ref 0–0.3)
BILIRUB DIRECT SERPL-MCNC: <0.2 MG/DL — SIGNIFICANT CHANGE UP (ref 0–0.3)
BILIRUB INDIRECT FLD-MCNC: >0.1 MG/DL — SIGNIFICANT CHANGE UP (ref 0–1)
BILIRUB INDIRECT FLD-MCNC: >0.2 MG/DL — SIGNIFICANT CHANGE UP (ref 0–1)
BILIRUB SERPL-MCNC: 0.2 MG/DL — SIGNIFICANT CHANGE UP (ref 0.2–1.2)
BILIRUB SERPL-MCNC: 0.3 MG/DL — SIGNIFICANT CHANGE UP (ref 0.2–1.2)
BILIRUB SERPL-MCNC: 0.4 MG/DL — SIGNIFICANT CHANGE UP (ref 0.2–1.2)
BILIRUB SERPL-MCNC: 0.5 MG/DL — SIGNIFICANT CHANGE UP (ref 0.2–1.2)
BILIRUB SERPL-MCNC: 0.7 MG/DL — SIGNIFICANT CHANGE UP (ref 0.2–1.2)
BILIRUB SERPL-MCNC: <0.2 MG/DL — SIGNIFICANT CHANGE UP (ref 0.2–1.2)
BILIRUB UR-MCNC: ABNORMAL
BILIRUB UR-MCNC: NEGATIVE — SIGNIFICANT CHANGE UP
BLD GP AB SCN SERPL QL: NEGATIVE — SIGNIFICANT CHANGE UP
BLOOD GAS ARTERIAL - LYTES,HGB,ICA,LACT RESULT: SIGNIFICANT CHANGE UP
BLOOD GAS ARTERIAL COMPREHENSIVE RESULT: SIGNIFICANT CHANGE UP
BLOOD GAS VENOUS COMPREHENSIVE RESULT: SIGNIFICANT CHANGE UP
BORDETELLA PARAPERTUSSIS (RAPRVP): SIGNIFICANT CHANGE UP
BUN SERPL-MCNC: 10 MG/DL — SIGNIFICANT CHANGE UP (ref 7–23)
BUN SERPL-MCNC: 11 MG/DL — SIGNIFICANT CHANGE UP (ref 7–23)
BUN SERPL-MCNC: 12 MG/DL — SIGNIFICANT CHANGE UP (ref 7–23)
BUN SERPL-MCNC: 12 MG/DL — SIGNIFICANT CHANGE UP (ref 7–23)
BUN SERPL-MCNC: 14 MG/DL — SIGNIFICANT CHANGE UP (ref 7–23)
BUN SERPL-MCNC: 15 MG/DL — SIGNIFICANT CHANGE UP (ref 7–23)
BUN SERPL-MCNC: 16 MG/DL — SIGNIFICANT CHANGE UP (ref 7–23)
BUN SERPL-MCNC: 16 MG/DL — SIGNIFICANT CHANGE UP (ref 7–23)
BUN SERPL-MCNC: 18 MG/DL — SIGNIFICANT CHANGE UP (ref 7–23)
BUN SERPL-MCNC: 20 MG/DL — SIGNIFICANT CHANGE UP (ref 7–23)
BUN SERPL-MCNC: 23 MG/DL — SIGNIFICANT CHANGE UP (ref 7–23)
BUN SERPL-MCNC: 24 MG/DL — HIGH (ref 7–23)
BUN SERPL-MCNC: 8 MG/DL — SIGNIFICANT CHANGE UP (ref 7–23)
BUN SERPL-MCNC: 9 MG/DL — SIGNIFICANT CHANGE UP (ref 7–23)
C PNEUM DNA SPEC QL NAA+PROBE: SIGNIFICANT CHANGE UP
CALCIUM SERPL-MCNC: 7.8 MG/DL — LOW (ref 8.4–10.5)
CALCIUM SERPL-MCNC: 7.8 MG/DL — LOW (ref 8.4–10.5)
CALCIUM SERPL-MCNC: 8 MG/DL — LOW (ref 8.4–10.5)
CALCIUM SERPL-MCNC: 8.1 MG/DL — LOW (ref 8.4–10.5)
CALCIUM SERPL-MCNC: 8.2 MG/DL — LOW (ref 8.4–10.5)
CALCIUM SERPL-MCNC: 8.3 MG/DL — LOW (ref 8.4–10.5)
CALCIUM SERPL-MCNC: 8.4 MG/DL — SIGNIFICANT CHANGE UP (ref 8.4–10.5)
CALCIUM SERPL-MCNC: 8.5 MG/DL — SIGNIFICANT CHANGE UP (ref 8.4–10.5)
CALCIUM SERPL-MCNC: 8.6 MG/DL — SIGNIFICANT CHANGE UP (ref 8.4–10.5)
CALCIUM SERPL-MCNC: 8.7 MG/DL — SIGNIFICANT CHANGE UP (ref 8.4–10.5)
CALCIUM SERPL-MCNC: 8.8 MG/DL — SIGNIFICANT CHANGE UP (ref 8.4–10.5)
CAST: 1 /LPF — SIGNIFICANT CHANGE UP (ref 0–4)
CHLORIDE BLDV-SCNC: 102 MMOL/L — SIGNIFICANT CHANGE UP (ref 96–108)
CHLORIDE BLDV-SCNC: 103 MMOL/L — SIGNIFICANT CHANGE UP (ref 96–108)
CHLORIDE BLDV-SCNC: 103 MMOL/L — SIGNIFICANT CHANGE UP (ref 96–108)
CHLORIDE BLDV-SCNC: 105 MMOL/L — SIGNIFICANT CHANGE UP (ref 96–108)
CHLORIDE BLDV-SCNC: 105 MMOL/L — SIGNIFICANT CHANGE UP (ref 96–108)
CHLORIDE BLDV-SCNC: 106 MMOL/L — SIGNIFICANT CHANGE UP (ref 96–108)
CHLORIDE BLDV-SCNC: 106 MMOL/L — SIGNIFICANT CHANGE UP (ref 96–108)
CHLORIDE BLDV-SCNC: 108 MMOL/L — SIGNIFICANT CHANGE UP (ref 96–108)
CHLORIDE BLDV-SCNC: 108 MMOL/L — SIGNIFICANT CHANGE UP (ref 96–108)
CHLORIDE SERPL-SCNC: 100 MMOL/L — SIGNIFICANT CHANGE UP (ref 98–107)
CHLORIDE SERPL-SCNC: 101 MMOL/L — SIGNIFICANT CHANGE UP (ref 98–107)
CHLORIDE SERPL-SCNC: 102 MMOL/L — SIGNIFICANT CHANGE UP (ref 98–107)
CHLORIDE SERPL-SCNC: 103 MMOL/L — SIGNIFICANT CHANGE UP (ref 98–107)
CHLORIDE SERPL-SCNC: 104 MMOL/L — SIGNIFICANT CHANGE UP (ref 98–107)
CHLORIDE SERPL-SCNC: 105 MMOL/L — SIGNIFICANT CHANGE UP (ref 98–107)
CHLORIDE SERPL-SCNC: 106 MMOL/L — SIGNIFICANT CHANGE UP (ref 98–107)
CHLORIDE SERPL-SCNC: 106 MMOL/L — SIGNIFICANT CHANGE UP (ref 98–107)
CHLORIDE SERPL-SCNC: 108 MMOL/L — HIGH (ref 98–107)
CO2 BLDA-SCNC: 35 MMOL/L — HIGH (ref 19–24)
CO2 BLDA-SCNC: 35 MMOL/L — HIGH (ref 19–24)
CO2 BLDV-SCNC: 36.2 MMOL/L — HIGH (ref 22–26)
CO2 BLDV-SCNC: 36.4 MMOL/L — HIGH (ref 22–26)
CO2 BLDV-SCNC: 37 MMOL/L — HIGH (ref 22–26)
CO2 BLDV-SCNC: 37 MMOL/L — HIGH (ref 22–26)
CO2 BLDV-SCNC: 37.2 MMOL/L — HIGH (ref 22–26)
CO2 BLDV-SCNC: 40.2 MMOL/L — HIGH (ref 22–26)
CO2 BLDV-SCNC: 40.2 MMOL/L — HIGH (ref 22–26)
CO2 BLDV-SCNC: 41.6 MMOL/L — HIGH (ref 22–26)
CO2 BLDV-SCNC: 42.3 MMOL/L — HIGH (ref 22–26)
CO2 BLDV-SCNC: 42.3 MMOL/L — HIGH (ref 22–26)
CO2 BLDV-SCNC: 44.1 MMOL/L — HIGH (ref 22–26)
CO2 SERPL-SCNC: 27 MMOL/L — SIGNIFICANT CHANGE UP (ref 22–31)
CO2 SERPL-SCNC: 29 MMOL/L — SIGNIFICANT CHANGE UP (ref 22–31)
CO2 SERPL-SCNC: 29 MMOL/L — SIGNIFICANT CHANGE UP (ref 22–31)
CO2 SERPL-SCNC: 30 MMOL/L — SIGNIFICANT CHANGE UP (ref 22–31)
CO2 SERPL-SCNC: 31 MMOL/L — SIGNIFICANT CHANGE UP (ref 22–31)
CO2 SERPL-SCNC: 32 MMOL/L — HIGH (ref 22–31)
CO2 SERPL-SCNC: 33 MMOL/L — HIGH (ref 22–31)
CO2 SERPL-SCNC: 33 MMOL/L — HIGH (ref 22–31)
CO2 SERPL-SCNC: 34 MMOL/L — HIGH (ref 22–31)
CO2 SERPL-SCNC: 35 MMOL/L — HIGH (ref 22–31)
CO2 SERPL-SCNC: 35 MMOL/L — HIGH (ref 22–31)
CO2 SERPL-SCNC: 36 MMOL/L — HIGH (ref 22–31)
CO2 SERPL-SCNC: 36 MMOL/L — HIGH (ref 22–31)
COLOR SPEC: SIGNIFICANT CHANGE UP
COLOR SPEC: YELLOW — SIGNIFICANT CHANGE UP
CREAT SERPL-MCNC: 0.61 MG/DL — SIGNIFICANT CHANGE UP (ref 0.5–1.3)
CREAT SERPL-MCNC: 0.61 MG/DL — SIGNIFICANT CHANGE UP (ref 0.5–1.3)
CREAT SERPL-MCNC: 0.62 MG/DL — SIGNIFICANT CHANGE UP (ref 0.5–1.3)
CREAT SERPL-MCNC: 0.63 MG/DL — SIGNIFICANT CHANGE UP (ref 0.5–1.3)
CREAT SERPL-MCNC: 0.64 MG/DL — SIGNIFICANT CHANGE UP (ref 0.5–1.3)
CREAT SERPL-MCNC: 0.65 MG/DL — SIGNIFICANT CHANGE UP (ref 0.5–1.3)
CREAT SERPL-MCNC: 0.67 MG/DL — SIGNIFICANT CHANGE UP (ref 0.5–1.3)
CREAT SERPL-MCNC: 0.68 MG/DL — SIGNIFICANT CHANGE UP (ref 0.5–1.3)
CREAT SERPL-MCNC: 0.69 MG/DL — SIGNIFICANT CHANGE UP (ref 0.5–1.3)
CREAT SERPL-MCNC: 0.69 MG/DL — SIGNIFICANT CHANGE UP (ref 0.5–1.3)
CREAT SERPL-MCNC: 0.7 MG/DL — SIGNIFICANT CHANGE UP (ref 0.5–1.3)
CREAT SERPL-MCNC: 0.71 MG/DL — SIGNIFICANT CHANGE UP (ref 0.5–1.3)
CREAT SERPL-MCNC: 0.73 MG/DL — SIGNIFICANT CHANGE UP (ref 0.5–1.3)
CREAT SERPL-MCNC: 0.74 MG/DL — SIGNIFICANT CHANGE UP (ref 0.5–1.3)
CREAT SERPL-MCNC: 0.74 MG/DL — SIGNIFICANT CHANGE UP (ref 0.5–1.3)
CREAT SERPL-MCNC: 0.75 MG/DL — SIGNIFICANT CHANGE UP (ref 0.5–1.3)
CREAT SERPL-MCNC: 0.76 MG/DL — SIGNIFICANT CHANGE UP (ref 0.5–1.3)
CREAT SERPL-MCNC: 0.77 MG/DL — SIGNIFICANT CHANGE UP (ref 0.5–1.3)
CREAT SERPL-MCNC: 0.77 MG/DL — SIGNIFICANT CHANGE UP (ref 0.5–1.3)
CREAT SERPL-MCNC: 0.8 MG/DL — SIGNIFICANT CHANGE UP (ref 0.5–1.3)
CREAT SERPL-MCNC: 0.85 MG/DL — SIGNIFICANT CHANGE UP (ref 0.5–1.3)
CREAT SERPL-MCNC: 0.95 MG/DL — SIGNIFICANT CHANGE UP (ref 0.5–1.3)
CULTURE RESULTS: ABNORMAL
CULTURE RESULTS: SIGNIFICANT CHANGE UP
D DIMER BLD IA.RAPID-MCNC: 979 NG/ML DDU — HIGH
D DIMER BLD IA.RAPID-MCNC: 979 NG/ML DDU — HIGH
DIFF PNL FLD: ABNORMAL
DIFF PNL FLD: NEGATIVE — SIGNIFICANT CHANGE UP
DIFF PNL FLD: NEGATIVE — SIGNIFICANT CHANGE UP
EGFR: 100 ML/MIN/1.73M2 — SIGNIFICANT CHANGE UP
EGFR: 101 ML/MIN/1.73M2 — SIGNIFICANT CHANGE UP
EGFR: 102 ML/MIN/1.73M2 — SIGNIFICANT CHANGE UP
EGFR: 103 ML/MIN/1.73M2 — SIGNIFICANT CHANGE UP
EGFR: 104 ML/MIN/1.73M2 — SIGNIFICANT CHANGE UP
EGFR: 105 ML/MIN/1.73M2 — SIGNIFICANT CHANGE UP
EGFR: 105 ML/MIN/1.73M2 — SIGNIFICANT CHANGE UP
EGFR: 87 ML/MIN/1.73M2 — SIGNIFICANT CHANGE UP
EGFR: 95 ML/MIN/1.73M2 — SIGNIFICANT CHANGE UP
EGFR: 96 ML/MIN/1.73M2 — SIGNIFICANT CHANGE UP
EGFR: 98 ML/MIN/1.73M2 — SIGNIFICANT CHANGE UP
EGFR: 99 ML/MIN/1.73M2 — SIGNIFICANT CHANGE UP
EOSINOPHIL # BLD AUTO: 0 K/UL — SIGNIFICANT CHANGE UP (ref 0–0.5)
EOSINOPHIL # BLD AUTO: 0.01 K/UL — SIGNIFICANT CHANGE UP (ref 0–0.5)
EOSINOPHIL # BLD AUTO: 0.05 K/UL — SIGNIFICANT CHANGE UP (ref 0–0.5)
EOSINOPHIL # BLD AUTO: 0.1 K/UL — SIGNIFICANT CHANGE UP (ref 0–0.5)
EOSINOPHIL # BLD AUTO: 0.14 K/UL — SIGNIFICANT CHANGE UP (ref 0–0.5)
EOSINOPHIL # BLD AUTO: 0.16 K/UL — SIGNIFICANT CHANGE UP (ref 0–0.5)
EOSINOPHIL # BLD AUTO: 0.17 K/UL — SIGNIFICANT CHANGE UP (ref 0–0.5)
EOSINOPHIL # BLD AUTO: 0.19 K/UL — SIGNIFICANT CHANGE UP (ref 0–0.5)
EOSINOPHIL # BLD AUTO: 0.23 K/UL — SIGNIFICANT CHANGE UP (ref 0–0.5)
EOSINOPHIL # BLD AUTO: 0.25 K/UL — SIGNIFICANT CHANGE UP (ref 0–0.5)
EOSINOPHIL # BLD AUTO: 0.25 K/UL — SIGNIFICANT CHANGE UP (ref 0–0.5)
EOSINOPHIL # BLD AUTO: 0.26 K/UL — SIGNIFICANT CHANGE UP (ref 0–0.5)
EOSINOPHIL # BLD AUTO: 0.39 K/UL — SIGNIFICANT CHANGE UP (ref 0–0.5)
EOSINOPHIL NFR BLD AUTO: 0 % — SIGNIFICANT CHANGE UP (ref 0–6)
EOSINOPHIL NFR BLD AUTO: 0.1 % — SIGNIFICANT CHANGE UP (ref 0–6)
EOSINOPHIL NFR BLD AUTO: 0.3 % — SIGNIFICANT CHANGE UP (ref 0–6)
EOSINOPHIL NFR BLD AUTO: 0.9 % — SIGNIFICANT CHANGE UP (ref 0–6)
EOSINOPHIL NFR BLD AUTO: 1.1 % — SIGNIFICANT CHANGE UP (ref 0–6)
EOSINOPHIL NFR BLD AUTO: 1.1 % — SIGNIFICANT CHANGE UP (ref 0–6)
EOSINOPHIL NFR BLD AUTO: 1.2 % — SIGNIFICANT CHANGE UP (ref 0–6)
EOSINOPHIL NFR BLD AUTO: 1.6 % — SIGNIFICANT CHANGE UP (ref 0–6)
EOSINOPHIL NFR BLD AUTO: 1.7 % — SIGNIFICANT CHANGE UP (ref 0–6)
EOSINOPHIL NFR BLD AUTO: 2 % — SIGNIFICANT CHANGE UP (ref 0–6)
EOSINOPHIL NFR BLD AUTO: 2.2 % — SIGNIFICANT CHANGE UP (ref 0–6)
EOSINOPHIL NFR BLD AUTO: 2.4 % — SIGNIFICANT CHANGE UP (ref 0–6)
EOSINOPHIL NFR BLD AUTO: 2.6 % — SIGNIFICANT CHANGE UP (ref 0–6)
EOSINOPHIL NFR BLD AUTO: 3 % — SIGNIFICANT CHANGE UP (ref 0–6)
EOSINOPHIL NFR BLD AUTO: 3.7 % — SIGNIFICANT CHANGE UP (ref 0–6)
FLUAV SUBTYP SPEC NAA+PROBE: SIGNIFICANT CHANGE UP
FLUBV RNA SPEC QL NAA+PROBE: SIGNIFICANT CHANGE UP
GAS PNL BLDV: 140 MMOL/L — SIGNIFICANT CHANGE UP (ref 136–145)
GAS PNL BLDV: 140 MMOL/L — SIGNIFICANT CHANGE UP (ref 136–145)
GAS PNL BLDV: 141 MMOL/L — SIGNIFICANT CHANGE UP (ref 136–145)
GAS PNL BLDV: 142 MMOL/L — SIGNIFICANT CHANGE UP (ref 136–145)
GAS PNL BLDV: 143 MMOL/L — SIGNIFICANT CHANGE UP (ref 136–145)
GAS PNL BLDV: SIGNIFICANT CHANGE UP
GLUCOSE BLDC GLUCOMTR-MCNC: 104 MG/DL — HIGH (ref 70–99)
GLUCOSE BLDC GLUCOMTR-MCNC: 110 MG/DL — HIGH (ref 70–99)
GLUCOSE BLDC GLUCOMTR-MCNC: 111 MG/DL — HIGH (ref 70–99)
GLUCOSE BLDC GLUCOMTR-MCNC: 127 MG/DL — HIGH (ref 70–99)
GLUCOSE BLDC GLUCOMTR-MCNC: 130 MG/DL — HIGH (ref 70–99)
GLUCOSE BLDC GLUCOMTR-MCNC: 134 MG/DL — HIGH (ref 70–99)
GLUCOSE BLDC GLUCOMTR-MCNC: 146 MG/DL — HIGH (ref 70–99)
GLUCOSE BLDC GLUCOMTR-MCNC: 149 MG/DL — HIGH (ref 70–99)
GLUCOSE BLDC GLUCOMTR-MCNC: 81 MG/DL — SIGNIFICANT CHANGE UP (ref 70–99)
GLUCOSE BLDC GLUCOMTR-MCNC: 89 MG/DL — SIGNIFICANT CHANGE UP (ref 70–99)
GLUCOSE BLDC GLUCOMTR-MCNC: 90 MG/DL — SIGNIFICANT CHANGE UP (ref 70–99)
GLUCOSE BLDC GLUCOMTR-MCNC: 90 MG/DL — SIGNIFICANT CHANGE UP (ref 70–99)
GLUCOSE BLDC GLUCOMTR-MCNC: 95 MG/DL — SIGNIFICANT CHANGE UP (ref 70–99)
GLUCOSE BLDV-MCNC: 106 MG/DL — HIGH (ref 70–99)
GLUCOSE BLDV-MCNC: 106 MG/DL — HIGH (ref 70–99)
GLUCOSE BLDV-MCNC: 109 MG/DL — HIGH (ref 70–99)
GLUCOSE BLDV-MCNC: 138 MG/DL — HIGH (ref 70–99)
GLUCOSE BLDV-MCNC: 94 MG/DL — SIGNIFICANT CHANGE UP (ref 70–99)
GLUCOSE BLDV-MCNC: 97 MG/DL — SIGNIFICANT CHANGE UP (ref 70–99)
GLUCOSE BLDV-MCNC: 97 MG/DL — SIGNIFICANT CHANGE UP (ref 70–99)
GLUCOSE BLDV-MCNC: 98 MG/DL — SIGNIFICANT CHANGE UP (ref 70–99)
GLUCOSE BLDV-MCNC: 98 MG/DL — SIGNIFICANT CHANGE UP (ref 70–99)
GLUCOSE SERPL-MCNC: 101 MG/DL — HIGH (ref 70–99)
GLUCOSE SERPL-MCNC: 103 MG/DL — HIGH (ref 70–99)
GLUCOSE SERPL-MCNC: 103 MG/DL — HIGH (ref 70–99)
GLUCOSE SERPL-MCNC: 106 MG/DL — HIGH (ref 70–99)
GLUCOSE SERPL-MCNC: 107 MG/DL — HIGH (ref 70–99)
GLUCOSE SERPL-MCNC: 109 MG/DL — HIGH (ref 70–99)
GLUCOSE SERPL-MCNC: 109 MG/DL — HIGH (ref 70–99)
GLUCOSE SERPL-MCNC: 110 MG/DL — HIGH (ref 70–99)
GLUCOSE SERPL-MCNC: 113 MG/DL — HIGH (ref 70–99)
GLUCOSE SERPL-MCNC: 113 MG/DL — HIGH (ref 70–99)
GLUCOSE SERPL-MCNC: 114 MG/DL — HIGH (ref 70–99)
GLUCOSE SERPL-MCNC: 116 MG/DL — HIGH (ref 70–99)
GLUCOSE SERPL-MCNC: 116 MG/DL — HIGH (ref 70–99)
GLUCOSE SERPL-MCNC: 117 MG/DL — HIGH (ref 70–99)
GLUCOSE SERPL-MCNC: 120 MG/DL — HIGH (ref 70–99)
GLUCOSE SERPL-MCNC: 121 MG/DL — HIGH (ref 70–99)
GLUCOSE SERPL-MCNC: 122 MG/DL — HIGH (ref 70–99)
GLUCOSE SERPL-MCNC: 122 MG/DL — HIGH (ref 70–99)
GLUCOSE SERPL-MCNC: 125 MG/DL — HIGH (ref 70–99)
GLUCOSE SERPL-MCNC: 127 MG/DL — HIGH (ref 70–99)
GLUCOSE SERPL-MCNC: 129 MG/DL — HIGH (ref 70–99)
GLUCOSE SERPL-MCNC: 134 MG/DL — HIGH (ref 70–99)
GLUCOSE SERPL-MCNC: 134 MG/DL — HIGH (ref 70–99)
GLUCOSE SERPL-MCNC: 143 MG/DL — HIGH (ref 70–99)
GLUCOSE SERPL-MCNC: 163 MG/DL — HIGH (ref 70–99)
GLUCOSE SERPL-MCNC: 88 MG/DL — SIGNIFICANT CHANGE UP (ref 70–99)
GLUCOSE SERPL-MCNC: 91 MG/DL — SIGNIFICANT CHANGE UP (ref 70–99)
GLUCOSE SERPL-MCNC: 97 MG/DL — SIGNIFICANT CHANGE UP (ref 70–99)
GLUCOSE SERPL-MCNC: 97 MG/DL — SIGNIFICANT CHANGE UP (ref 70–99)
GLUCOSE SERPL-MCNC: 98 MG/DL — SIGNIFICANT CHANGE UP (ref 70–99)
GLUCOSE SERPL-MCNC: 98 MG/DL — SIGNIFICANT CHANGE UP (ref 70–99)
GLUCOSE UR QL: NEGATIVE MG/DL — SIGNIFICANT CHANGE UP
GRAM STN FLD: ABNORMAL
HADV DNA SPEC QL NAA+PROBE: SIGNIFICANT CHANGE UP
HCO3 BLDA-SCNC: 33 MMOL/L — HIGH (ref 21–28)
HCO3 BLDA-SCNC: 33 MMOL/L — HIGH (ref 21–28)
HCO3 BLDV-SCNC: 34 MMOL/L — HIGH (ref 22–29)
HCO3 BLDV-SCNC: 35 MMOL/L — HIGH (ref 22–29)
HCO3 BLDV-SCNC: 36 MMOL/L — HIGH (ref 22–29)
HCO3 BLDV-SCNC: 38 MMOL/L — HIGH (ref 22–29)
HCO3 BLDV-SCNC: 38 MMOL/L — HIGH (ref 22–29)
HCO3 BLDV-SCNC: 39 MMOL/L — HIGH (ref 22–29)
HCO3 BLDV-SCNC: 40 MMOL/L — HIGH (ref 22–29)
HCO3 BLDV-SCNC: 40 MMOL/L — HIGH (ref 22–29)
HCO3 BLDV-SCNC: 42 MMOL/L — HIGH (ref 22–29)
HCOV 229E RNA SPEC QL NAA+PROBE: SIGNIFICANT CHANGE UP
HCOV HKU1 RNA SPEC QL NAA+PROBE: SIGNIFICANT CHANGE UP
HCOV NL63 RNA SPEC QL NAA+PROBE: SIGNIFICANT CHANGE UP
HCOV OC43 RNA SPEC QL NAA+PROBE: SIGNIFICANT CHANGE UP
HCT VFR BLD CALC: 26.2 % — LOW (ref 39–50)
HCT VFR BLD CALC: 27.3 % — LOW (ref 39–50)
HCT VFR BLD CALC: 28 % — LOW (ref 39–50)
HCT VFR BLD CALC: 28.2 % — LOW (ref 39–50)
HCT VFR BLD CALC: 29.6 % — LOW (ref 39–50)
HCT VFR BLD CALC: 29.6 % — LOW (ref 39–50)
HCT VFR BLD CALC: 29.8 % — LOW (ref 39–50)
HCT VFR BLD CALC: 30 % — LOW (ref 39–50)
HCT VFR BLD CALC: 30.3 % — LOW (ref 39–50)
HCT VFR BLD CALC: 31.8 % — LOW (ref 39–50)
HCT VFR BLD CALC: 31.8 % — LOW (ref 39–50)
HCT VFR BLD CALC: 32.9 % — LOW (ref 39–50)
HCT VFR BLD CALC: 33.9 % — LOW (ref 39–50)
HCT VFR BLD CALC: 34.1 % — LOW (ref 39–50)
HCT VFR BLD CALC: 34.1 % — LOW (ref 39–50)
HCT VFR BLD CALC: 34.3 % — LOW (ref 39–50)
HCT VFR BLD CALC: 34.3 % — LOW (ref 39–50)
HCT VFR BLD CALC: 34.7 % — LOW (ref 39–50)
HCT VFR BLD CALC: 34.7 % — LOW (ref 39–50)
HCT VFR BLD CALC: 34.8 % — LOW (ref 39–50)
HCT VFR BLD CALC: 35.2 % — LOW (ref 39–50)
HCT VFR BLD CALC: 35.4 % — LOW (ref 39–50)
HCT VFR BLD CALC: 35.4 % — LOW (ref 39–50)
HCT VFR BLD CALC: 35.7 % — LOW (ref 39–50)
HCT VFR BLD CALC: 35.7 % — LOW (ref 39–50)
HCT VFR BLD CALC: 35.8 % — LOW (ref 39–50)
HCT VFR BLD CALC: 35.8 % — LOW (ref 39–50)
HCT VFR BLD CALC: 36.7 % — LOW (ref 39–50)
HCT VFR BLD CALC: 37.4 % — LOW (ref 39–50)
HCT VFR BLD CALC: 38.8 % — LOW (ref 39–50)
HCT VFR BLD CALC: 38.8 % — LOW (ref 39–50)
HCT VFR BLD CALC: 39.4 % — SIGNIFICANT CHANGE UP (ref 39–50)
HCT VFR BLD CALC: 39.4 % — SIGNIFICANT CHANGE UP (ref 39–50)
HCT VFR BLD CALC: 42.8 % — SIGNIFICANT CHANGE UP (ref 39–50)
HCT VFR BLD CALC: 42.8 % — SIGNIFICANT CHANGE UP (ref 39–50)
HCT VFR BLDA CALC: 33 % — LOW (ref 39–51)
HCT VFR BLDA CALC: 34 % — LOW (ref 39–51)
HCT VFR BLDA CALC: 38 % — LOW (ref 39–51)
HCT VFR BLDA CALC: 42 % — SIGNIFICANT CHANGE UP (ref 39–51)
HGB BLD CALC-MCNC: 11.1 G/DL — LOW (ref 12.6–17.4)
HGB BLD CALC-MCNC: 11.4 G/DL — LOW (ref 12.6–17.4)
HGB BLD CALC-MCNC: 12.6 G/DL — SIGNIFICANT CHANGE UP (ref 12.6–17.4)
HGB BLD CALC-MCNC: 12.8 G/DL — SIGNIFICANT CHANGE UP (ref 12.6–17.4)
HGB BLD CALC-MCNC: 12.8 G/DL — SIGNIFICANT CHANGE UP (ref 12.6–17.4)
HGB BLD CALC-MCNC: 13.9 G/DL — SIGNIFICANT CHANGE UP (ref 12.6–17.4)
HGB BLD-MCNC: 10.2 G/DL — LOW (ref 13–17)
HGB BLD-MCNC: 10.5 G/DL — LOW (ref 13–17)
HGB BLD-MCNC: 10.5 G/DL — LOW (ref 13–17)
HGB BLD-MCNC: 10.8 G/DL — LOW (ref 13–17)
HGB BLD-MCNC: 10.9 G/DL — LOW (ref 13–17)
HGB BLD-MCNC: 11 G/DL — LOW (ref 13–17)
HGB BLD-MCNC: 11.1 G/DL — LOW (ref 13–17)
HGB BLD-MCNC: 11.1 G/DL — LOW (ref 13–17)
HGB BLD-MCNC: 11.3 G/DL — LOW (ref 13–17)
HGB BLD-MCNC: 11.3 G/DL — LOW (ref 13–17)
HGB BLD-MCNC: 11.4 G/DL — LOW (ref 13–17)
HGB BLD-MCNC: 11.5 G/DL — LOW (ref 13–17)
HGB BLD-MCNC: 11.5 G/DL — LOW (ref 13–17)
HGB BLD-MCNC: 11.6 G/DL — LOW (ref 13–17)
HGB BLD-MCNC: 11.7 G/DL — LOW (ref 13–17)
HGB BLD-MCNC: 11.7 G/DL — LOW (ref 13–17)
HGB BLD-MCNC: 11.8 G/DL — LOW (ref 13–17)
HGB BLD-MCNC: 12.3 G/DL — LOW (ref 13–17)
HGB BLD-MCNC: 12.9 G/DL — LOW (ref 13–17)
HGB BLD-MCNC: 12.9 G/DL — LOW (ref 13–17)
HGB BLD-MCNC: 13.4 G/DL — SIGNIFICANT CHANGE UP (ref 13–17)
HGB BLD-MCNC: 13.4 G/DL — SIGNIFICANT CHANGE UP (ref 13–17)
HGB BLD-MCNC: 8.6 G/DL — LOW (ref 13–17)
HGB BLD-MCNC: 8.9 G/DL — LOW (ref 13–17)
HGB BLD-MCNC: 9 G/DL — LOW (ref 13–17)
HGB BLD-MCNC: 9.4 G/DL — LOW (ref 13–17)
HGB BLD-MCNC: 9.5 G/DL — LOW (ref 13–17)
HGB BLD-MCNC: 9.7 G/DL — LOW (ref 13–17)
HGB BLD-MCNC: 9.8 G/DL — LOW (ref 13–17)
HGB BLD-MCNC: 9.9 G/DL — LOW (ref 13–17)
HMPV RNA SPEC QL NAA+PROBE: SIGNIFICANT CHANGE UP
HOROWITZ INDEX BLDA+IHG-RTO: 40 — SIGNIFICANT CHANGE UP
HOROWITZ INDEX BLDA+IHG-RTO: 40 — SIGNIFICANT CHANGE UP
HPIV1 RNA SPEC QL NAA+PROBE: SIGNIFICANT CHANGE UP
HPIV2 RNA SPEC QL NAA+PROBE: SIGNIFICANT CHANGE UP
HPIV3 RNA SPEC QL NAA+PROBE: SIGNIFICANT CHANGE UP
HPIV4 RNA SPEC QL NAA+PROBE: SIGNIFICANT CHANGE UP
IANC: 11.01 K/UL — HIGH (ref 1.8–7.4)
IANC: 13.09 K/UL — HIGH (ref 1.8–7.4)
IANC: 13.28 K/UL — HIGH (ref 1.8–7.4)
IANC: 13.28 K/UL — HIGH (ref 1.8–7.4)
IANC: 14.24 K/UL — HIGH (ref 1.8–7.4)
IANC: 23.51 K/UL — HIGH (ref 1.8–7.4)
IANC: 29.23 K/UL — HIGH (ref 1.8–7.4)
IANC: 38.16 K/UL — HIGH (ref 1.8–7.4)
IANC: 4.68 K/UL — SIGNIFICANT CHANGE UP (ref 1.8–7.4)
IANC: 5.2 K/UL — SIGNIFICANT CHANGE UP (ref 1.8–7.4)
IANC: 5.56 K/UL — SIGNIFICANT CHANGE UP (ref 1.8–7.4)
IANC: 6.2 K/UL — SIGNIFICANT CHANGE UP (ref 1.8–7.4)
IANC: 7.6 K/UL — HIGH (ref 1.8–7.4)
IANC: 7.99 K/UL — HIGH (ref 1.8–7.4)
IANC: 7.99 K/UL — HIGH (ref 1.8–7.4)
IANC: 8.04 K/UL — HIGH (ref 1.8–7.4)
IANC: 8.68 K/UL — HIGH (ref 1.8–7.4)
IANC: 9.68 K/UL — HIGH (ref 1.8–7.4)
IMM GRANULOCYTES NFR BLD AUTO: 0.2 % — SIGNIFICANT CHANGE UP (ref 0–0.9)
IMM GRANULOCYTES NFR BLD AUTO: 0.2 % — SIGNIFICANT CHANGE UP (ref 0–0.9)
IMM GRANULOCYTES NFR BLD AUTO: 0.4 % — SIGNIFICANT CHANGE UP (ref 0–0.9)
IMM GRANULOCYTES NFR BLD AUTO: 0.5 % — SIGNIFICANT CHANGE UP (ref 0–0.9)
IMM GRANULOCYTES NFR BLD AUTO: 0.5 % — SIGNIFICANT CHANGE UP (ref 0–0.9)
IMM GRANULOCYTES NFR BLD AUTO: 0.6 % — SIGNIFICANT CHANGE UP (ref 0–0.9)
IMM GRANULOCYTES NFR BLD AUTO: 0.6 % — SIGNIFICANT CHANGE UP (ref 0–0.9)
IMM GRANULOCYTES NFR BLD AUTO: 0.8 % — SIGNIFICANT CHANGE UP (ref 0–0.9)
IMM GRANULOCYTES NFR BLD AUTO: 0.8 % — SIGNIFICANT CHANGE UP (ref 0–0.9)
IMM GRANULOCYTES NFR BLD AUTO: 0.9 % — SIGNIFICANT CHANGE UP (ref 0–0.9)
IMM GRANULOCYTES NFR BLD AUTO: 1.3 % — HIGH (ref 0–0.9)
INR BLD: 1.22 RATIO — HIGH (ref 0.85–1.18)
INR BLD: 1.22 RATIO — HIGH (ref 0.85–1.18)
INR BLD: 1.28 RATIO — HIGH (ref 0.85–1.18)
INR BLD: 1.28 RATIO — HIGH (ref 0.85–1.18)
INR BLD: 1.3 RATIO — HIGH (ref 0.85–1.18)
INR BLD: 1.3 RATIO — HIGH (ref 0.85–1.18)
INR BLD: 1.35 RATIO — HIGH (ref 0.85–1.18)
INR BLD: 1.35 RATIO — HIGH (ref 0.85–1.18)
INR BLD: 1.38 RATIO — HIGH (ref 0.85–1.18)
INR BLD: 1.38 RATIO — HIGH (ref 0.85–1.18)
INR BLD: 1.47 RATIO — HIGH (ref 0.85–1.18)
INR BLD: 1.5 RATIO — HIGH (ref 0.85–1.18)
INR BLD: 1.5 RATIO — HIGH (ref 0.85–1.18)
INR BLD: 1.52 RATIO — HIGH (ref 0.85–1.18)
INR BLD: 1.71 RATIO — HIGH (ref 0.85–1.18)
KETONES UR-MCNC: 80 MG/DL
KETONES UR-MCNC: 80 MG/DL
KETONES UR-MCNC: ABNORMAL MG/DL
KETONES UR-MCNC: ABNORMAL MG/DL
KETONES UR-MCNC: NEGATIVE MG/DL — SIGNIFICANT CHANGE UP
LACTATE BLDV-MCNC: 1.3 MMOL/L — SIGNIFICANT CHANGE UP (ref 0.5–2)
LACTATE BLDV-MCNC: 1.9 MMOL/L — SIGNIFICANT CHANGE UP (ref 0.5–2)
LACTATE BLDV-MCNC: 2.1 MMOL/L — HIGH (ref 0.5–2)
LACTATE BLDV-MCNC: 2.1 MMOL/L — HIGH (ref 0.5–2)
LACTATE BLDV-MCNC: 2.3 MMOL/L — HIGH (ref 0.5–2)
LEUKOCYTE ESTERASE UR-ACNC: ABNORMAL
LEUKOCYTE ESTERASE UR-ACNC: NEGATIVE — SIGNIFICANT CHANGE UP
LEUKOCYTE ESTERASE UR-ACNC: NEGATIVE — SIGNIFICANT CHANGE UP
LYMPHOCYTES # BLD AUTO: 0 % — LOW (ref 13–44)
LYMPHOCYTES # BLD AUTO: 0 K/UL — LOW (ref 1–3.3)
LYMPHOCYTES # BLD AUTO: 0.14 K/UL — LOW (ref 1–3.3)
LYMPHOCYTES # BLD AUTO: 0.14 K/UL — LOW (ref 1–3.3)
LYMPHOCYTES # BLD AUTO: 0.37 K/UL — LOW (ref 1–3.3)
LYMPHOCYTES # BLD AUTO: 0.5 K/UL — LOW (ref 1–3.3)
LYMPHOCYTES # BLD AUTO: 0.5 K/UL — LOW (ref 1–3.3)
LYMPHOCYTES # BLD AUTO: 0.58 K/UL — LOW (ref 1–3.3)
LYMPHOCYTES # BLD AUTO: 0.69 K/UL — LOW (ref 1–3.3)
LYMPHOCYTES # BLD AUTO: 0.72 K/UL — LOW (ref 1–3.3)
LYMPHOCYTES # BLD AUTO: 0.72 K/UL — LOW (ref 1–3.3)
LYMPHOCYTES # BLD AUTO: 0.75 K/UL — LOW (ref 1–3.3)
LYMPHOCYTES # BLD AUTO: 0.8 K/UL — LOW (ref 1–3.3)
LYMPHOCYTES # BLD AUTO: 0.82 K/UL — LOW (ref 1–3.3)
LYMPHOCYTES # BLD AUTO: 0.85 K/UL — LOW (ref 1–3.3)
LYMPHOCYTES # BLD AUTO: 0.89 K/UL — LOW (ref 1–3.3)
LYMPHOCYTES # BLD AUTO: 0.9 % — LOW (ref 13–44)
LYMPHOCYTES # BLD AUTO: 0.96 K/UL — LOW (ref 1–3.3)
LYMPHOCYTES # BLD AUTO: 1.12 K/UL — SIGNIFICANT CHANGE UP (ref 1–3.3)
LYMPHOCYTES # BLD AUTO: 1.3 K/UL — SIGNIFICANT CHANGE UP (ref 1–3.3)
LYMPHOCYTES # BLD AUTO: 10.2 % — LOW (ref 13–44)
LYMPHOCYTES # BLD AUTO: 11 % — LOW (ref 13–44)
LYMPHOCYTES # BLD AUTO: 11.3 % — LOW (ref 13–44)
LYMPHOCYTES # BLD AUTO: 11.3 % — LOW (ref 13–44)
LYMPHOCYTES # BLD AUTO: 12.7 % — LOW (ref 13–44)
LYMPHOCYTES # BLD AUTO: 14.4 % — SIGNIFICANT CHANGE UP (ref 13–44)
LYMPHOCYTES # BLD AUTO: 2.3 % — LOW (ref 13–44)
LYMPHOCYTES # BLD AUTO: 4.4 % — LOW (ref 13–44)
LYMPHOCYTES # BLD AUTO: 4.4 % — LOW (ref 13–44)
LYMPHOCYTES # BLD AUTO: 5 % — LOW (ref 13–44)
LYMPHOCYTES # BLD AUTO: 5.3 % — LOW (ref 13–44)
LYMPHOCYTES # BLD AUTO: 5.3 % — LOW (ref 13–44)
LYMPHOCYTES # BLD AUTO: 6.7 % — LOW (ref 13–44)
LYMPHOCYTES # BLD AUTO: 7.4 % — LOW (ref 13–44)
M PNEUMO DNA SPEC QL NAA+PROBE: SIGNIFICANT CHANGE UP
MACROCYTES BLD QL: SLIGHT — SIGNIFICANT CHANGE UP
MAGNESIUM SERPL-MCNC: 1.6 MG/DL — SIGNIFICANT CHANGE UP (ref 1.6–2.6)
MAGNESIUM SERPL-MCNC: 1.9 MG/DL — SIGNIFICANT CHANGE UP (ref 1.6–2.6)
MAGNESIUM SERPL-MCNC: 2 MG/DL — SIGNIFICANT CHANGE UP (ref 1.6–2.6)
MAGNESIUM SERPL-MCNC: 2.1 MG/DL — SIGNIFICANT CHANGE UP (ref 1.6–2.6)
MAGNESIUM SERPL-MCNC: 2.2 MG/DL — SIGNIFICANT CHANGE UP (ref 1.6–2.6)
MAGNESIUM SERPL-MCNC: 2.3 MG/DL — SIGNIFICANT CHANGE UP (ref 1.6–2.6)
MANUAL SMEAR VERIFICATION: SIGNIFICANT CHANGE UP
MCHC RBC-ENTMCNC: 31.3 GM/DL — LOW (ref 32–36)
MCHC RBC-ENTMCNC: 31.3 GM/DL — LOW (ref 32–36)
MCHC RBC-ENTMCNC: 31.7 GM/DL — LOW (ref 32–36)
MCHC RBC-ENTMCNC: 31.7 GM/DL — LOW (ref 32–36)
MCHC RBC-ENTMCNC: 31.8 GM/DL — LOW (ref 32–36)
MCHC RBC-ENTMCNC: 31.9 GM/DL — LOW (ref 32–36)
MCHC RBC-ENTMCNC: 32 PG — SIGNIFICANT CHANGE UP (ref 27–34)
MCHC RBC-ENTMCNC: 32.1 PG — SIGNIFICANT CHANGE UP (ref 27–34)
MCHC RBC-ENTMCNC: 32.2 GM/DL — SIGNIFICANT CHANGE UP (ref 32–36)
MCHC RBC-ENTMCNC: 32.2 PG — SIGNIFICANT CHANGE UP (ref 27–34)
MCHC RBC-ENTMCNC: 32.3 PG — SIGNIFICANT CHANGE UP (ref 27–34)
MCHC RBC-ENTMCNC: 32.4 GM/DL — SIGNIFICANT CHANGE UP (ref 32–36)
MCHC RBC-ENTMCNC: 32.5 PG — SIGNIFICANT CHANGE UP (ref 27–34)
MCHC RBC-ENTMCNC: 32.6 GM/DL — SIGNIFICANT CHANGE UP (ref 32–36)
MCHC RBC-ENTMCNC: 32.6 PG — SIGNIFICANT CHANGE UP (ref 27–34)
MCHC RBC-ENTMCNC: 32.7 GM/DL — SIGNIFICANT CHANGE UP (ref 32–36)
MCHC RBC-ENTMCNC: 32.7 PG — SIGNIFICANT CHANGE UP (ref 27–34)
MCHC RBC-ENTMCNC: 32.8 GM/DL — SIGNIFICANT CHANGE UP (ref 32–36)
MCHC RBC-ENTMCNC: 32.8 PG — SIGNIFICANT CHANGE UP (ref 27–34)
MCHC RBC-ENTMCNC: 32.9 GM/DL — SIGNIFICANT CHANGE UP (ref 32–36)
MCHC RBC-ENTMCNC: 32.9 PG — SIGNIFICANT CHANGE UP (ref 27–34)
MCHC RBC-ENTMCNC: 33 GM/DL — SIGNIFICANT CHANGE UP (ref 32–36)
MCHC RBC-ENTMCNC: 33 GM/DL — SIGNIFICANT CHANGE UP (ref 32–36)
MCHC RBC-ENTMCNC: 33 PG — SIGNIFICANT CHANGE UP (ref 27–34)
MCHC RBC-ENTMCNC: 33.1 GM/DL — SIGNIFICANT CHANGE UP (ref 32–36)
MCHC RBC-ENTMCNC: 33.1 GM/DL — SIGNIFICANT CHANGE UP (ref 32–36)
MCHC RBC-ENTMCNC: 33.2 PG — SIGNIFICANT CHANGE UP (ref 27–34)
MCHC RBC-ENTMCNC: 33.3 GM/DL — SIGNIFICANT CHANGE UP (ref 32–36)
MCHC RBC-ENTMCNC: 33.3 PG — SIGNIFICANT CHANGE UP (ref 27–34)
MCHC RBC-ENTMCNC: 33.4 GM/DL — SIGNIFICANT CHANGE UP (ref 32–36)
MCHC RBC-ENTMCNC: 33.4 PG — SIGNIFICANT CHANGE UP (ref 27–34)
MCHC RBC-ENTMCNC: 33.5 GM/DL — SIGNIFICANT CHANGE UP (ref 32–36)
MCHC RBC-ENTMCNC: 33.6 GM/DL — SIGNIFICANT CHANGE UP (ref 32–36)
MCHC RBC-ENTMCNC: 33.7 GM/DL — SIGNIFICANT CHANGE UP (ref 32–36)
MCV RBC AUTO: 100 FL — SIGNIFICANT CHANGE UP (ref 80–100)
MCV RBC AUTO: 100 FL — SIGNIFICANT CHANGE UP (ref 80–100)
MCV RBC AUTO: 100.3 FL — HIGH (ref 80–100)
MCV RBC AUTO: 100.3 FL — HIGH (ref 80–100)
MCV RBC AUTO: 100.6 FL — HIGH (ref 80–100)
MCV RBC AUTO: 100.6 FL — HIGH (ref 80–100)
MCV RBC AUTO: 100.9 FL — HIGH (ref 80–100)
MCV RBC AUTO: 101.1 FL — HIGH (ref 80–100)
MCV RBC AUTO: 101.1 FL — HIGH (ref 80–100)
MCV RBC AUTO: 101.5 FL — HIGH (ref 80–100)
MCV RBC AUTO: 101.6 FL — HIGH (ref 80–100)
MCV RBC AUTO: 101.6 FL — HIGH (ref 80–100)
MCV RBC AUTO: 101.7 FL — HIGH (ref 80–100)
MCV RBC AUTO: 101.8 FL — HIGH (ref 80–100)
MCV RBC AUTO: 102.6 FL — HIGH (ref 80–100)
MCV RBC AUTO: 102.6 FL — HIGH (ref 80–100)
MCV RBC AUTO: 102.7 FL — HIGH (ref 80–100)
MCV RBC AUTO: 97.9 FL — SIGNIFICANT CHANGE UP (ref 80–100)
MCV RBC AUTO: 98 FL — SIGNIFICANT CHANGE UP (ref 80–100)
MCV RBC AUTO: 98.1 FL — SIGNIFICANT CHANGE UP (ref 80–100)
MCV RBC AUTO: 98.1 FL — SIGNIFICANT CHANGE UP (ref 80–100)
MCV RBC AUTO: 98.3 FL — SIGNIFICANT CHANGE UP (ref 80–100)
MCV RBC AUTO: 98.6 FL — SIGNIFICANT CHANGE UP (ref 80–100)
MCV RBC AUTO: 98.6 FL — SIGNIFICANT CHANGE UP (ref 80–100)
MCV RBC AUTO: 98.7 FL — SIGNIFICANT CHANGE UP (ref 80–100)
MCV RBC AUTO: 98.7 FL — SIGNIFICANT CHANGE UP (ref 80–100)
MCV RBC AUTO: 99 FL — SIGNIFICANT CHANGE UP (ref 80–100)
MCV RBC AUTO: 99.1 FL — SIGNIFICANT CHANGE UP (ref 80–100)
MCV RBC AUTO: 99.5 FL — SIGNIFICANT CHANGE UP (ref 80–100)
MCV RBC AUTO: 99.5 FL — SIGNIFICANT CHANGE UP (ref 80–100)
MCV RBC AUTO: 99.6 FL — SIGNIFICANT CHANGE UP (ref 80–100)
MCV RBC AUTO: 99.6 FL — SIGNIFICANT CHANGE UP (ref 80–100)
MCV RBC AUTO: 99.7 FL — SIGNIFICANT CHANGE UP (ref 80–100)
METHOD TYPE: SIGNIFICANT CHANGE UP
METHOD TYPE: SIGNIFICANT CHANGE UP
MONOCYTES # BLD AUTO: 0.37 K/UL — SIGNIFICANT CHANGE UP (ref 0–0.9)
MONOCYTES # BLD AUTO: 0.51 K/UL — SIGNIFICANT CHANGE UP (ref 0–0.9)
MONOCYTES # BLD AUTO: 0.57 K/UL — SIGNIFICANT CHANGE UP (ref 0–0.9)
MONOCYTES # BLD AUTO: 0.63 K/UL — SIGNIFICANT CHANGE UP (ref 0–0.9)
MONOCYTES # BLD AUTO: 0.69 K/UL — SIGNIFICANT CHANGE UP (ref 0–0.9)
MONOCYTES # BLD AUTO: 0.72 K/UL — SIGNIFICANT CHANGE UP (ref 0–0.9)
MONOCYTES # BLD AUTO: 0.72 K/UL — SIGNIFICANT CHANGE UP (ref 0–0.9)
MONOCYTES # BLD AUTO: 0.73 K/UL — SIGNIFICANT CHANGE UP (ref 0–0.9)
MONOCYTES # BLD AUTO: 0.88 K/UL — SIGNIFICANT CHANGE UP (ref 0–0.9)
MONOCYTES # BLD AUTO: 0.89 K/UL — SIGNIFICANT CHANGE UP (ref 0–0.9)
MONOCYTES # BLD AUTO: 0.92 K/UL — HIGH (ref 0–0.9)
MONOCYTES # BLD AUTO: 0.92 K/UL — HIGH (ref 0–0.9)
MONOCYTES # BLD AUTO: 1.07 K/UL — HIGH (ref 0–0.9)
MONOCYTES # BLD AUTO: 1.11 K/UL — HIGH (ref 0–0.9)
MONOCYTES # BLD AUTO: 1.11 K/UL — HIGH (ref 0–0.9)
MONOCYTES # BLD AUTO: 1.13 K/UL — HIGH (ref 0–0.9)
MONOCYTES # BLD AUTO: 1.28 K/UL — HIGH (ref 0–0.9)
MONOCYTES # BLD AUTO: 1.34 K/UL — HIGH (ref 0–0.9)
MONOCYTES NFR BLD AUTO: 0.9 % — LOW (ref 2–14)
MONOCYTES NFR BLD AUTO: 10.3 % — SIGNIFICANT CHANGE UP (ref 2–14)
MONOCYTES NFR BLD AUTO: 13.5 % — SIGNIFICANT CHANGE UP (ref 2–14)
MONOCYTES NFR BLD AUTO: 3.5 % — SIGNIFICANT CHANGE UP (ref 2–14)
MONOCYTES NFR BLD AUTO: 4.2 % — SIGNIFICANT CHANGE UP (ref 2–14)
MONOCYTES NFR BLD AUTO: 5.6 % — SIGNIFICANT CHANGE UP (ref 2–14)
MONOCYTES NFR BLD AUTO: 6.1 % — SIGNIFICANT CHANGE UP (ref 2–14)
MONOCYTES NFR BLD AUTO: 6.1 % — SIGNIFICANT CHANGE UP (ref 2–14)
MONOCYTES NFR BLD AUTO: 7.2 % — SIGNIFICANT CHANGE UP (ref 2–14)
MONOCYTES NFR BLD AUTO: 7.2 % — SIGNIFICANT CHANGE UP (ref 2–14)
MONOCYTES NFR BLD AUTO: 7.4 % — SIGNIFICANT CHANGE UP (ref 2–14)
MONOCYTES NFR BLD AUTO: 7.7 % — SIGNIFICANT CHANGE UP (ref 2–14)
MONOCYTES NFR BLD AUTO: 7.7 % — SIGNIFICANT CHANGE UP (ref 2–14)
MONOCYTES NFR BLD AUTO: 7.8 % — SIGNIFICANT CHANGE UP (ref 2–14)
MONOCYTES NFR BLD AUTO: 8.4 % — SIGNIFICANT CHANGE UP (ref 2–14)
MONOCYTES NFR BLD AUTO: 8.7 % — SIGNIFICANT CHANGE UP (ref 2–14)
MONOCYTES NFR BLD AUTO: 8.7 % — SIGNIFICANT CHANGE UP (ref 2–14)
MONOCYTES NFR BLD AUTO: 8.9 % — SIGNIFICANT CHANGE UP (ref 2–14)
MRSA PCR RESULT.: DETECTED
NEUTROPHILS # BLD AUTO: 11.01 K/UL — HIGH (ref 1.8–7.4)
NEUTROPHILS # BLD AUTO: 13.09 K/UL — HIGH (ref 1.8–7.4)
NEUTROPHILS # BLD AUTO: 13.82 K/UL — HIGH (ref 1.8–7.4)
NEUTROPHILS # BLD AUTO: 13.82 K/UL — HIGH (ref 1.8–7.4)
NEUTROPHILS # BLD AUTO: 14.24 K/UL — HIGH (ref 1.8–7.4)
NEUTROPHILS # BLD AUTO: 24.1 K/UL — HIGH (ref 1.8–7.4)
NEUTROPHILS # BLD AUTO: 29.23 K/UL — HIGH (ref 1.8–7.4)
NEUTROPHILS # BLD AUTO: 39.42 K/UL — HIGH (ref 1.8–7.4)
NEUTROPHILS # BLD AUTO: 4.68 K/UL — SIGNIFICANT CHANGE UP (ref 1.8–7.4)
NEUTROPHILS # BLD AUTO: 5.31 K/UL — SIGNIFICANT CHANGE UP (ref 1.8–7.4)
NEUTROPHILS # BLD AUTO: 5.56 K/UL — SIGNIFICANT CHANGE UP (ref 1.8–7.4)
NEUTROPHILS # BLD AUTO: 6.2 K/UL — SIGNIFICANT CHANGE UP (ref 1.8–7.4)
NEUTROPHILS # BLD AUTO: 7.6 K/UL — HIGH (ref 1.8–7.4)
NEUTROPHILS # BLD AUTO: 7.99 K/UL — HIGH (ref 1.8–7.4)
NEUTROPHILS # BLD AUTO: 7.99 K/UL — HIGH (ref 1.8–7.4)
NEUTROPHILS # BLD AUTO: 8.04 K/UL — HIGH (ref 1.8–7.4)
NEUTROPHILS # BLD AUTO: 8.68 K/UL — HIGH (ref 1.8–7.4)
NEUTROPHILS # BLD AUTO: 9.68 K/UL — HIGH (ref 1.8–7.4)
NEUTROPHILS NFR BLD AUTO: 70.2 % — SIGNIFICANT CHANGE UP (ref 43–77)
NEUTROPHILS NFR BLD AUTO: 73 % — SIGNIFICANT CHANGE UP (ref 43–77)
NEUTROPHILS NFR BLD AUTO: 74.1 % — SIGNIFICANT CHANGE UP (ref 43–77)
NEUTROPHILS NFR BLD AUTO: 74.2 % — SIGNIFICANT CHANGE UP (ref 43–77)
NEUTROPHILS NFR BLD AUTO: 77.4 % — HIGH (ref 43–77)
NEUTROPHILS NFR BLD AUTO: 78.1 % — HIGH (ref 43–77)
NEUTROPHILS NFR BLD AUTO: 78.9 % — HIGH (ref 43–77)
NEUTROPHILS NFR BLD AUTO: 80.3 % — HIGH (ref 43–77)
NEUTROPHILS NFR BLD AUTO: 83.2 % — HIGH (ref 43–77)
NEUTROPHILS NFR BLD AUTO: 84.5 % — HIGH (ref 43–77)
NEUTROPHILS NFR BLD AUTO: 85.4 % — HIGH (ref 43–77)
NEUTROPHILS NFR BLD AUTO: 85.4 % — HIGH (ref 43–77)
NEUTROPHILS NFR BLD AUTO: 86.3 % — HIGH (ref 43–77)
NEUTROPHILS NFR BLD AUTO: 86.7 % — HIGH (ref 43–77)
NEUTROPHILS NFR BLD AUTO: 86.8 % — HIGH (ref 43–77)
NEUTROPHILS NFR BLD AUTO: 86.8 % — HIGH (ref 43–77)
NEUTROPHILS NFR BLD AUTO: 91.3 % — HIGH (ref 43–77)
NEUTROPHILS NFR BLD AUTO: 92.5 % — HIGH (ref 43–77)
NEUTS BAND # BLD: 4.3 % — SIGNIFICANT CHANGE UP (ref 0–6)
NITRITE UR-MCNC: NEGATIVE — SIGNIFICANT CHANGE UP
NRBC # BLD: 0 /100 WBCS — SIGNIFICANT CHANGE UP (ref 0–0)
NRBC # FLD: 0 K/UL — SIGNIFICANT CHANGE UP (ref 0–0)
NT-PROBNP SERPL-SCNC: 1004 PG/ML — HIGH
NT-PROBNP SERPL-SCNC: 308 PG/ML — HIGH
NT-PROBNP SERPL-SCNC: 308 PG/ML — HIGH
ORGANISM # SPEC MICROSCOPIC CNT: ABNORMAL
OVALOCYTES BLD QL SMEAR: SLIGHT — SIGNIFICANT CHANGE UP
PCO2 BLDA: 55 MMHG — HIGH (ref 35–48)
PCO2 BLDA: 55 MMHG — HIGH (ref 35–48)
PCO2 BLDV: 48 MMHG — SIGNIFICANT CHANGE UP (ref 42–55)
PCO2 BLDV: 48 MMHG — SIGNIFICANT CHANGE UP (ref 42–55)
PCO2 BLDV: 57 MMHG — HIGH (ref 42–55)
PCO2 BLDV: 68 MMHG — HIGH (ref 42–55)
PCO2 BLDV: 68 MMHG — HIGH (ref 42–55)
PCO2 BLDV: 72 MMHG — HIGH (ref 42–55)
PCO2 BLDV: 72 MMHG — HIGH (ref 42–55)
PCO2 BLDV: 73 MMHG — HIGH (ref 42–55)
PCO2 BLDV: 74 MMHG — HIGH (ref 42–55)
PH BLDA: 7.39 — SIGNIFICANT CHANGE UP (ref 7.35–7.45)
PH BLDA: 7.39 — SIGNIFICANT CHANGE UP (ref 7.35–7.45)
PH BLDV: 7.33 — SIGNIFICANT CHANGE UP (ref 7.32–7.43)
PH BLDV: 7.33 — SIGNIFICANT CHANGE UP (ref 7.32–7.43)
PH BLDV: 7.34 — SIGNIFICANT CHANGE UP (ref 7.32–7.43)
PH BLDV: 7.36 — SIGNIFICANT CHANGE UP (ref 7.32–7.43)
PH BLDV: 7.38 — SIGNIFICANT CHANGE UP (ref 7.32–7.43)
PH BLDV: 7.38 — SIGNIFICANT CHANGE UP (ref 7.32–7.43)
PH BLDV: 7.39 — SIGNIFICANT CHANGE UP (ref 7.32–7.43)
PH BLDV: 7.4 — SIGNIFICANT CHANGE UP (ref 7.32–7.43)
PH BLDV: 7.4 — SIGNIFICANT CHANGE UP (ref 7.32–7.43)
PH BLDV: 7.47 — HIGH (ref 7.32–7.43)
PH BLDV: 7.48 — HIGH (ref 7.32–7.43)
PH UR: 5.5 — SIGNIFICANT CHANGE UP (ref 5–8)
PH UR: 6 — SIGNIFICANT CHANGE UP (ref 5–8)
PH UR: 6.5 — SIGNIFICANT CHANGE UP (ref 5–8)
PH UR: 6.5 — SIGNIFICANT CHANGE UP (ref 5–8)
PH UR: 8 — SIGNIFICANT CHANGE UP (ref 5–8)
PHOSPHATE SERPL-MCNC: 2.4 MG/DL — LOW (ref 2.5–4.5)
PHOSPHATE SERPL-MCNC: 2.4 MG/DL — LOW (ref 2.5–4.5)
PHOSPHATE SERPL-MCNC: 2.5 MG/DL — SIGNIFICANT CHANGE UP (ref 2.5–4.5)
PHOSPHATE SERPL-MCNC: 2.6 MG/DL — SIGNIFICANT CHANGE UP (ref 2.5–4.5)
PHOSPHATE SERPL-MCNC: 2.7 MG/DL — SIGNIFICANT CHANGE UP (ref 2.5–4.5)
PHOSPHATE SERPL-MCNC: 2.8 MG/DL — SIGNIFICANT CHANGE UP (ref 2.5–4.5)
PHOSPHATE SERPL-MCNC: 2.9 MG/DL — SIGNIFICANT CHANGE UP (ref 2.5–4.5)
PHOSPHATE SERPL-MCNC: 3 MG/DL — SIGNIFICANT CHANGE UP (ref 2.5–4.5)
PHOSPHATE SERPL-MCNC: 3 MG/DL — SIGNIFICANT CHANGE UP (ref 2.5–4.5)
PHOSPHATE SERPL-MCNC: 3.1 MG/DL — SIGNIFICANT CHANGE UP (ref 2.5–4.5)
PHOSPHATE SERPL-MCNC: 3.1 MG/DL — SIGNIFICANT CHANGE UP (ref 2.5–4.5)
PHOSPHATE SERPL-MCNC: 3.2 MG/DL — SIGNIFICANT CHANGE UP (ref 2.5–4.5)
PHOSPHATE SERPL-MCNC: 3.3 MG/DL — SIGNIFICANT CHANGE UP (ref 2.5–4.5)
PHOSPHATE SERPL-MCNC: 3.4 MG/DL — SIGNIFICANT CHANGE UP (ref 2.5–4.5)
PHOSPHATE SERPL-MCNC: 3.4 MG/DL — SIGNIFICANT CHANGE UP (ref 2.5–4.5)
PHOSPHATE SERPL-MCNC: 3.5 MG/DL — SIGNIFICANT CHANGE UP (ref 2.5–4.5)
PHOSPHATE SERPL-MCNC: 3.6 MG/DL — SIGNIFICANT CHANGE UP (ref 2.5–4.5)
PHOSPHATE SERPL-MCNC: 3.7 MG/DL — SIGNIFICANT CHANGE UP (ref 2.5–4.5)
PHOSPHATE SERPL-MCNC: 3.7 MG/DL — SIGNIFICANT CHANGE UP (ref 2.5–4.5)
PHOSPHATE SERPL-MCNC: 4 MG/DL — SIGNIFICANT CHANGE UP (ref 2.5–4.5)
PHOSPHATE SERPL-MCNC: 4 MG/DL — SIGNIFICANT CHANGE UP (ref 2.5–4.5)
PLAT MORPH BLD: NORMAL — SIGNIFICANT CHANGE UP
PLATELET # BLD AUTO: 100 K/UL — LOW (ref 150–400)
PLATELET # BLD AUTO: 108 K/UL — LOW (ref 150–400)
PLATELET # BLD AUTO: 114 K/UL — LOW (ref 150–400)
PLATELET # BLD AUTO: 114 K/UL — LOW (ref 150–400)
PLATELET # BLD AUTO: 115 K/UL — LOW (ref 150–400)
PLATELET # BLD AUTO: 115 K/UL — LOW (ref 150–400)
PLATELET # BLD AUTO: 118 K/UL — LOW (ref 150–400)
PLATELET # BLD AUTO: 125 K/UL — LOW (ref 150–400)
PLATELET # BLD AUTO: 127 K/UL — LOW (ref 150–400)
PLATELET # BLD AUTO: 128 K/UL — LOW (ref 150–400)
PLATELET # BLD AUTO: 128 K/UL — LOW (ref 150–400)
PLATELET # BLD AUTO: 130 K/UL — LOW (ref 150–400)
PLATELET # BLD AUTO: 130 K/UL — LOW (ref 150–400)
PLATELET # BLD AUTO: 134 K/UL — LOW (ref 150–400)
PLATELET # BLD AUTO: 134 K/UL — LOW (ref 150–400)
PLATELET # BLD AUTO: 146 K/UL — LOW (ref 150–400)
PLATELET # BLD AUTO: 147 K/UL — LOW (ref 150–400)
PLATELET # BLD AUTO: 147 K/UL — LOW (ref 150–400)
PLATELET # BLD AUTO: 149 K/UL — LOW (ref 150–400)
PLATELET # BLD AUTO: 152 K/UL — SIGNIFICANT CHANGE UP (ref 150–400)
PLATELET # BLD AUTO: 156 K/UL — SIGNIFICANT CHANGE UP (ref 150–400)
PLATELET # BLD AUTO: 157 K/UL — SIGNIFICANT CHANGE UP (ref 150–400)
PLATELET # BLD AUTO: 157 K/UL — SIGNIFICANT CHANGE UP (ref 150–400)
PLATELET # BLD AUTO: 160 K/UL — SIGNIFICANT CHANGE UP (ref 150–400)
PLATELET # BLD AUTO: 165 K/UL — SIGNIFICANT CHANGE UP (ref 150–400)
PLATELET # BLD AUTO: 165 K/UL — SIGNIFICANT CHANGE UP (ref 150–400)
PLATELET # BLD AUTO: 168 K/UL — SIGNIFICANT CHANGE UP (ref 150–400)
PLATELET # BLD AUTO: 168 K/UL — SIGNIFICANT CHANGE UP (ref 150–400)
PLATELET # BLD AUTO: 180 K/UL — SIGNIFICANT CHANGE UP (ref 150–400)
PLATELET # BLD AUTO: 194 K/UL — SIGNIFICANT CHANGE UP (ref 150–400)
PLATELET # BLD AUTO: 196 K/UL — SIGNIFICANT CHANGE UP (ref 150–400)
PLATELET # BLD AUTO: 196 K/UL — SIGNIFICANT CHANGE UP (ref 150–400)
PLATELET COUNT - ESTIMATE: NORMAL — SIGNIFICANT CHANGE UP
PO2 BLDA: 100 MMHG — SIGNIFICANT CHANGE UP (ref 83–108)
PO2 BLDA: 100 MMHG — SIGNIFICANT CHANGE UP (ref 83–108)
PO2 BLDV: 35 MMHG — SIGNIFICANT CHANGE UP (ref 25–45)
PO2 BLDV: 35 MMHG — SIGNIFICANT CHANGE UP (ref 25–45)
PO2 BLDV: 41 MMHG — SIGNIFICANT CHANGE UP (ref 25–45)
PO2 BLDV: 41 MMHG — SIGNIFICANT CHANGE UP (ref 25–45)
PO2 BLDV: 47 MMHG — HIGH (ref 25–45)
PO2 BLDV: 47 MMHG — HIGH (ref 25–45)
PO2 BLDV: 49 MMHG — HIGH (ref 25–45)
PO2 BLDV: 49 MMHG — HIGH (ref 25–45)
PO2 BLDV: 62 MMHG — HIGH (ref 25–45)
PO2 BLDV: 65 MMHG — HIGH (ref 25–45)
PO2 BLDV: 70 MMHG — HIGH (ref 25–45)
POIKILOCYTOSIS BLD QL AUTO: SLIGHT — SIGNIFICANT CHANGE UP
POLYCHROMASIA BLD QL SMEAR: SLIGHT — SIGNIFICANT CHANGE UP
POTASSIUM BLDV-SCNC: 3.9 MMOL/L — SIGNIFICANT CHANGE UP (ref 3.5–5.1)
POTASSIUM BLDV-SCNC: 4 MMOL/L — SIGNIFICANT CHANGE UP (ref 3.5–5.1)
POTASSIUM BLDV-SCNC: 4.5 MMOL/L — SIGNIFICANT CHANGE UP (ref 3.5–5.1)
POTASSIUM BLDV-SCNC: 4.9 MMOL/L — SIGNIFICANT CHANGE UP (ref 3.5–5.1)
POTASSIUM BLDV-SCNC: 4.9 MMOL/L — SIGNIFICANT CHANGE UP (ref 3.5–5.1)
POTASSIUM BLDV-SCNC: SIGNIFICANT CHANGE UP MMOL/L (ref 3.5–5.1)
POTASSIUM BLDV-SCNC: SIGNIFICANT CHANGE UP MMOL/L (ref 3.5–5.1)
POTASSIUM SERPL-MCNC: 3.5 MMOL/L — SIGNIFICANT CHANGE UP (ref 3.5–5.3)
POTASSIUM SERPL-MCNC: 3.5 MMOL/L — SIGNIFICANT CHANGE UP (ref 3.5–5.3)
POTASSIUM SERPL-MCNC: 3.7 MMOL/L — SIGNIFICANT CHANGE UP (ref 3.5–5.3)
POTASSIUM SERPL-MCNC: 3.7 MMOL/L — SIGNIFICANT CHANGE UP (ref 3.5–5.3)
POTASSIUM SERPL-MCNC: 3.9 MMOL/L — SIGNIFICANT CHANGE UP (ref 3.5–5.3)
POTASSIUM SERPL-MCNC: 4 MMOL/L — SIGNIFICANT CHANGE UP (ref 3.5–5.3)
POTASSIUM SERPL-MCNC: 4.1 MMOL/L — SIGNIFICANT CHANGE UP (ref 3.5–5.3)
POTASSIUM SERPL-MCNC: 4.2 MMOL/L — SIGNIFICANT CHANGE UP (ref 3.5–5.3)
POTASSIUM SERPL-MCNC: 4.3 MMOL/L — SIGNIFICANT CHANGE UP (ref 3.5–5.3)
POTASSIUM SERPL-MCNC: 4.4 MMOL/L — SIGNIFICANT CHANGE UP (ref 3.5–5.3)
POTASSIUM SERPL-MCNC: 4.5 MMOL/L — SIGNIFICANT CHANGE UP (ref 3.5–5.3)
POTASSIUM SERPL-MCNC: 5.4 MMOL/L — HIGH (ref 3.5–5.3)
POTASSIUM SERPL-MCNC: 5.4 MMOL/L — HIGH (ref 3.5–5.3)
POTASSIUM SERPL-SCNC: 3.5 MMOL/L — SIGNIFICANT CHANGE UP (ref 3.5–5.3)
POTASSIUM SERPL-SCNC: 3.5 MMOL/L — SIGNIFICANT CHANGE UP (ref 3.5–5.3)
POTASSIUM SERPL-SCNC: 3.7 MMOL/L — SIGNIFICANT CHANGE UP (ref 3.5–5.3)
POTASSIUM SERPL-SCNC: 3.7 MMOL/L — SIGNIFICANT CHANGE UP (ref 3.5–5.3)
POTASSIUM SERPL-SCNC: 3.9 MMOL/L — SIGNIFICANT CHANGE UP (ref 3.5–5.3)
POTASSIUM SERPL-SCNC: 4 MMOL/L — SIGNIFICANT CHANGE UP (ref 3.5–5.3)
POTASSIUM SERPL-SCNC: 4.1 MMOL/L — SIGNIFICANT CHANGE UP (ref 3.5–5.3)
POTASSIUM SERPL-SCNC: 4.2 MMOL/L — SIGNIFICANT CHANGE UP (ref 3.5–5.3)
POTASSIUM SERPL-SCNC: 4.3 MMOL/L — SIGNIFICANT CHANGE UP (ref 3.5–5.3)
POTASSIUM SERPL-SCNC: 4.4 MMOL/L — SIGNIFICANT CHANGE UP (ref 3.5–5.3)
POTASSIUM SERPL-SCNC: 4.5 MMOL/L — SIGNIFICANT CHANGE UP (ref 3.5–5.3)
POTASSIUM SERPL-SCNC: 5.4 MMOL/L — HIGH (ref 3.5–5.3)
POTASSIUM SERPL-SCNC: 5.4 MMOL/L — HIGH (ref 3.5–5.3)
PROCALCITONIN SERPL-MCNC: 0.15 NG/ML — HIGH (ref 0.02–0.1)
PROCALCITONIN SERPL-MCNC: 0.15 NG/ML — HIGH (ref 0.02–0.1)
PROCALCITONIN SERPL-MCNC: 0.4 NG/ML — HIGH (ref 0.02–0.1)
PROCALCITONIN SERPL-MCNC: 4.69 NG/ML — HIGH (ref 0.02–0.1)
PROT SERPL-MCNC: 6 G/DL — SIGNIFICANT CHANGE UP (ref 6–8.3)
PROT SERPL-MCNC: 6 G/DL — SIGNIFICANT CHANGE UP (ref 6–8.3)
PROT SERPL-MCNC: 6.2 G/DL — SIGNIFICANT CHANGE UP (ref 6–8.3)
PROT SERPL-MCNC: 6.3 G/DL — SIGNIFICANT CHANGE UP (ref 6–8.3)
PROT SERPL-MCNC: 6.3 G/DL — SIGNIFICANT CHANGE UP (ref 6–8.3)
PROT SERPL-MCNC: 6.4 G/DL — SIGNIFICANT CHANGE UP (ref 6–8.3)
PROT SERPL-MCNC: 6.5 G/DL — SIGNIFICANT CHANGE UP (ref 6–8.3)
PROT SERPL-MCNC: 6.7 G/DL — SIGNIFICANT CHANGE UP (ref 6–8.3)
PROT SERPL-MCNC: 6.8 G/DL — SIGNIFICANT CHANGE UP (ref 6–8.3)
PROT SERPL-MCNC: 6.9 G/DL — SIGNIFICANT CHANGE UP (ref 6–8.3)
PROT SERPL-MCNC: 7 G/DL — SIGNIFICANT CHANGE UP (ref 6–8.3)
PROT SERPL-MCNC: 7.1 G/DL — SIGNIFICANT CHANGE UP (ref 6–8.3)
PROT SERPL-MCNC: 7.2 G/DL — SIGNIFICANT CHANGE UP (ref 6–8.3)
PROT SERPL-MCNC: 7.2 G/DL — SIGNIFICANT CHANGE UP (ref 6–8.3)
PROT SERPL-MCNC: 7.5 G/DL — SIGNIFICANT CHANGE UP (ref 6–8.3)
PROT SERPL-MCNC: 7.6 G/DL — SIGNIFICANT CHANGE UP (ref 6–8.3)
PROT SERPL-MCNC: 8 G/DL — SIGNIFICANT CHANGE UP (ref 6–8.3)
PROT SERPL-MCNC: 8.1 G/DL — SIGNIFICANT CHANGE UP (ref 6–8.3)
PROT UR-MCNC: 100 MG/DL
PROT UR-MCNC: 30 MG/DL
PROT UR-MCNC: SIGNIFICANT CHANGE UP MG/DL
PROTHROM AB SERPL-ACNC: 13.7 SEC — HIGH (ref 9.5–13)
PROTHROM AB SERPL-ACNC: 13.7 SEC — HIGH (ref 9.5–13)
PROTHROM AB SERPL-ACNC: 14.2 SEC — HIGH (ref 9.5–13)
PROTHROM AB SERPL-ACNC: 14.2 SEC — HIGH (ref 9.5–13)
PROTHROM AB SERPL-ACNC: 14.5 SEC — HIGH (ref 9.5–13)
PROTHROM AB SERPL-ACNC: 14.5 SEC — HIGH (ref 9.5–13)
PROTHROM AB SERPL-ACNC: 15.1 SEC — HIGH (ref 9.5–13)
PROTHROM AB SERPL-ACNC: 15.1 SEC — HIGH (ref 9.5–13)
PROTHROM AB SERPL-ACNC: 15.3 SEC — HIGH (ref 9.5–13)
PROTHROM AB SERPL-ACNC: 15.3 SEC — HIGH (ref 9.5–13)
PROTHROM AB SERPL-ACNC: 16.3 SEC — HIGH (ref 9.5–13)
PROTHROM AB SERPL-ACNC: 16.7 SEC — HIGH (ref 9.5–13)
PROTHROM AB SERPL-ACNC: 16.7 SEC — HIGH (ref 9.5–13)
PROTHROM AB SERPL-ACNC: 16.8 SEC — HIGH (ref 9.5–13)
PROTHROM AB SERPL-ACNC: 17 SEC — HIGH (ref 9.5–13)
PROTHROM AB SERPL-ACNC: 17 SEC — HIGH (ref 9.5–13)
PROTHROM AB SERPL-ACNC: 18.9 SEC — HIGH (ref 9.5–13)
RAPID RVP RESULT: SIGNIFICANT CHANGE UP
RBC # BLD: 2.63 M/UL — LOW (ref 4.2–5.8)
RBC # BLD: 2.74 M/UL — LOW (ref 4.2–5.8)
RBC # BLD: 2.77 M/UL — LOW (ref 4.2–5.8)
RBC # BLD: 2.86 M/UL — LOW (ref 4.2–5.8)
RBC # BLD: 2.97 M/UL — LOW (ref 4.2–5.8)
RBC # BLD: 3 M/UL — LOW (ref 4.2–5.8)
RBC # BLD: 3.02 M/UL — LOW (ref 4.2–5.8)
RBC # BLD: 3.04 M/UL — LOW (ref 4.2–5.8)
RBC # BLD: 3.06 M/UL — LOW (ref 4.2–5.8)
RBC # BLD: 3.21 M/UL — LOW (ref 4.2–5.8)
RBC # BLD: 3.21 M/UL — LOW (ref 4.2–5.8)
RBC # BLD: 3.32 M/UL — LOW (ref 4.2–5.8)
RBC # BLD: 3.34 M/UL — LOW (ref 4.2–5.8)
RBC # BLD: 3.36 M/UL — LOW (ref 4.2–5.8)
RBC # BLD: 3.39 M/UL — LOW (ref 4.2–5.8)
RBC # BLD: 3.39 M/UL — LOW (ref 4.2–5.8)
RBC # BLD: 3.43 M/UL — LOW (ref 4.2–5.8)
RBC # BLD: 3.44 M/UL — LOW (ref 4.2–5.8)
RBC # BLD: 3.53 M/UL — LOW (ref 4.2–5.8)
RBC # BLD: 3.56 M/UL — LOW (ref 4.2–5.8)
RBC # BLD: 3.56 M/UL — LOW (ref 4.2–5.8)
RBC # BLD: 3.58 M/UL — LOW (ref 4.2–5.8)
RBC # BLD: 3.61 M/UL — LOW (ref 4.2–5.8)
RBC # BLD: 3.73 M/UL — LOW (ref 4.2–5.8)
RBC # BLD: 3.82 M/UL — LOW (ref 4.2–5.8)
RBC # BLD: 3.82 M/UL — LOW (ref 4.2–5.8)
RBC # BLD: 3.96 M/UL — LOW (ref 4.2–5.8)
RBC # BLD: 3.96 M/UL — LOW (ref 4.2–5.8)
RBC # BLD: 4.17 M/UL — LOW (ref 4.2–5.8)
RBC # BLD: 4.17 M/UL — LOW (ref 4.2–5.8)
RBC # FLD: 12.2 % — SIGNIFICANT CHANGE UP (ref 10.3–14.5)
RBC # FLD: 12.2 % — SIGNIFICANT CHANGE UP (ref 10.3–14.5)
RBC # FLD: 12.3 % — SIGNIFICANT CHANGE UP (ref 10.3–14.5)
RBC # FLD: 12.4 % — SIGNIFICANT CHANGE UP (ref 10.3–14.5)
RBC # FLD: 12.5 % — SIGNIFICANT CHANGE UP (ref 10.3–14.5)
RBC # FLD: 12.6 % — SIGNIFICANT CHANGE UP (ref 10.3–14.5)
RBC # FLD: 12.7 % — SIGNIFICANT CHANGE UP (ref 10.3–14.5)
RBC # FLD: 12.8 % — SIGNIFICANT CHANGE UP (ref 10.3–14.5)
RBC # FLD: 12.9 % — SIGNIFICANT CHANGE UP (ref 10.3–14.5)
RBC # FLD: 13 % — SIGNIFICANT CHANGE UP (ref 10.3–14.5)
RBC # FLD: 13.1 % — SIGNIFICANT CHANGE UP (ref 10.3–14.5)
RBC # FLD: 13.1 % — SIGNIFICANT CHANGE UP (ref 10.3–14.5)
RBC # FLD: 13.2 % — SIGNIFICANT CHANGE UP (ref 10.3–14.5)
RBC # FLD: 13.5 % — SIGNIFICANT CHANGE UP (ref 10.3–14.5)
RBC # FLD: 13.7 % — SIGNIFICANT CHANGE UP (ref 10.3–14.5)
RBC BLD AUTO: ABNORMAL
RBC CASTS # UR COMP ASSIST: 3 /HPF — SIGNIFICANT CHANGE UP (ref 0–4)
RH IG SCN BLD-IMP: POSITIVE — SIGNIFICANT CHANGE UP
RSV RNA SPEC QL NAA+PROBE: SIGNIFICANT CHANGE UP
RV+EV RNA SPEC QL NAA+PROBE: SIGNIFICANT CHANGE UP
S AUREUS DNA NOSE QL NAA+PROBE: DETECTED
S PNEUM AG UR QL: NEGATIVE — SIGNIFICANT CHANGE UP
S PNEUM AG UR QL: NEGATIVE — SIGNIFICANT CHANGE UP
SAO2 % BLDA: 98.2 % — HIGH (ref 94–98)
SAO2 % BLDA: 98.2 % — HIGH (ref 94–98)
SAO2 % BLDV: 58 % — LOW (ref 67–88)
SAO2 % BLDV: 58 % — LOW (ref 67–88)
SAO2 % BLDV: 67.6 % — SIGNIFICANT CHANGE UP (ref 67–88)
SAO2 % BLDV: 67.6 % — SIGNIFICANT CHANGE UP (ref 67–88)
SAO2 % BLDV: 70.4 % — SIGNIFICANT CHANGE UP (ref 67–88)
SAO2 % BLDV: 79.7 % — SIGNIFICANT CHANGE UP (ref 67–88)
SAO2 % BLDV: 80.9 % — SIGNIFICANT CHANGE UP (ref 67–88)
SAO2 % BLDV: 80.9 % — SIGNIFICANT CHANGE UP (ref 67–88)
SAO2 % BLDV: 91.3 % — HIGH (ref 67–88)
SAO2 % BLDV: 93.2 % — HIGH (ref 67–88)
SAO2 % BLDV: 95.2 % — HIGH (ref 67–88)
SARS-COV-2 RNA SPEC QL NAA+PROBE: SIGNIFICANT CHANGE UP
SODIUM SERPL-SCNC: 137 MMOL/L — SIGNIFICANT CHANGE UP (ref 135–145)
SODIUM SERPL-SCNC: 139 MMOL/L — SIGNIFICANT CHANGE UP (ref 135–145)
SODIUM SERPL-SCNC: 140 MMOL/L — SIGNIFICANT CHANGE UP (ref 135–145)
SODIUM SERPL-SCNC: 141 MMOL/L — SIGNIFICANT CHANGE UP (ref 135–145)
SODIUM SERPL-SCNC: 142 MMOL/L — SIGNIFICANT CHANGE UP (ref 135–145)
SODIUM SERPL-SCNC: 143 MMOL/L — SIGNIFICANT CHANGE UP (ref 135–145)
SODIUM SERPL-SCNC: 144 MMOL/L — SIGNIFICANT CHANGE UP (ref 135–145)
SODIUM SERPL-SCNC: 146 MMOL/L — HIGH (ref 135–145)
SODIUM SERPL-SCNC: 148 MMOL/L — HIGH (ref 135–145)
SODIUM SERPL-SCNC: 148 MMOL/L — HIGH (ref 135–145)
SP GR SPEC: 1.02 — SIGNIFICANT CHANGE UP (ref 1–1.03)
SP GR SPEC: 1.02 — SIGNIFICANT CHANGE UP (ref 1–1.03)
SP GR SPEC: 1.03 — SIGNIFICANT CHANGE UP (ref 1–1.03)
SP GR SPEC: 1.03 — SIGNIFICANT CHANGE UP (ref 1–1.03)
SP GR SPEC: 1.04 — HIGH (ref 1–1.03)
SPECIMEN SOURCE: SIGNIFICANT CHANGE UP
SQUAMOUS # UR AUTO: 0 /HPF — SIGNIFICANT CHANGE UP (ref 0–5)
T PALLIDUM AB TITR SER: NEGATIVE — SIGNIFICANT CHANGE UP
TROPONIN T, HIGH SENSITIVITY RESULT: 39 NG/L — SIGNIFICANT CHANGE UP
TROPONIN T, HIGH SENSITIVITY RESULT: 39 NG/L — SIGNIFICANT CHANGE UP
TSH SERPL-MCNC: 1.84 UIU/ML — SIGNIFICANT CHANGE UP (ref 0.27–4.2)
UROBILINOGEN FLD QL: 1 MG/DL — SIGNIFICANT CHANGE UP (ref 0.2–1)
VALPROATE SERPL-MCNC: 49.4 UG/ML — LOW (ref 50–100)
VALPROATE SERPL-MCNC: 53.6 UG/ML — SIGNIFICANT CHANGE UP (ref 50–100)
VANCOMYCIN FLD-MCNC: 23.1 UG/ML — SIGNIFICANT CHANGE UP
VANCOMYCIN FLD-MCNC: 23.1 UG/ML — SIGNIFICANT CHANGE UP
VANCOMYCIN TROUGH SERPL-MCNC: 11.2 UG/ML — SIGNIFICANT CHANGE UP (ref 10–20)
VANCOMYCIN TROUGH SERPL-MCNC: 12.2 UG/ML — SIGNIFICANT CHANGE UP (ref 10–20)
VANCOMYCIN TROUGH SERPL-MCNC: 16.1 UG/ML — SIGNIFICANT CHANGE UP (ref 10–20)
VANCOMYCIN TROUGH SERPL-MCNC: 16.1 UG/ML — SIGNIFICANT CHANGE UP (ref 10–20)
VANCOMYCIN TROUGH SERPL-MCNC: 17.3 UG/ML — SIGNIFICANT CHANGE UP (ref 10–20)
VANCOMYCIN TROUGH SERPL-MCNC: 17.3 UG/ML — SIGNIFICANT CHANGE UP (ref 10–20)
VANCOMYCIN TROUGH SERPL-MCNC: 20.7 UG/ML — HIGH (ref 10–20)
VANCOMYCIN TROUGH SERPL-MCNC: 20.9 UG/ML — HIGH (ref 10–20)
VANCOMYCIN TROUGH SERPL-MCNC: 20.9 UG/ML — HIGH (ref 10–20)
VANCOMYCIN TROUGH SERPL-MCNC: 9.6 UG/ML — LOW (ref 10–20)
VANCOMYCIN TROUGH SERPL-MCNC: 9.6 UG/ML — LOW (ref 10–20)
WBC # BLD: 10.18 K/UL — SIGNIFICANT CHANGE UP (ref 3.8–10.5)
WBC # BLD: 10.24 K/UL — SIGNIFICANT CHANGE UP (ref 3.8–10.5)
WBC # BLD: 10.26 K/UL — SIGNIFICANT CHANGE UP (ref 3.8–10.5)
WBC # BLD: 10.38 K/UL — SIGNIFICANT CHANGE UP (ref 3.8–10.5)
WBC # BLD: 10.38 K/UL — SIGNIFICANT CHANGE UP (ref 3.8–10.5)
WBC # BLD: 10.49 K/UL — SIGNIFICANT CHANGE UP (ref 3.8–10.5)
WBC # BLD: 10.49 K/UL — SIGNIFICANT CHANGE UP (ref 3.8–10.5)
WBC # BLD: 10.91 K/UL — HIGH (ref 3.8–10.5)
WBC # BLD: 10.93 K/UL — HIGH (ref 3.8–10.5)
WBC # BLD: 10.93 K/UL — HIGH (ref 3.8–10.5)
WBC # BLD: 11.32 K/UL — HIGH (ref 3.8–10.5)
WBC # BLD: 11.32 K/UL — HIGH (ref 3.8–10.5)
WBC # BLD: 11.6 K/UL — HIGH (ref 3.8–10.5)
WBC # BLD: 12.04 K/UL — HIGH (ref 3.8–10.5)
WBC # BLD: 13.01 K/UL — HIGH (ref 3.8–10.5)
WBC # BLD: 13.01 K/UL — HIGH (ref 3.8–10.5)
WBC # BLD: 13.14 K/UL — HIGH (ref 3.8–10.5)
WBC # BLD: 13.21 K/UL — HIGH (ref 3.8–10.5)
WBC # BLD: 15.01 K/UL — HIGH (ref 3.8–10.5)
WBC # BLD: 15.01 K/UL — HIGH (ref 3.8–10.5)
WBC # BLD: 15.08 K/UL — HIGH (ref 3.8–10.5)
WBC # BLD: 15.42 K/UL — HIGH (ref 3.8–10.5)
WBC # BLD: 15.42 K/UL — HIGH (ref 3.8–10.5)
WBC # BLD: 16.5 K/UL — HIGH (ref 3.8–10.5)
WBC # BLD: 18.4 K/UL — HIGH (ref 3.8–10.5)
WBC # BLD: 18.4 K/UL — HIGH (ref 3.8–10.5)
WBC # BLD: 25.11 K/UL — HIGH (ref 3.8–10.5)
WBC # BLD: 25.11 K/UL — HIGH (ref 3.8–10.5)
WBC # BLD: 25.21 K/UL — HIGH (ref 3.8–10.5)
WBC # BLD: 31.62 K/UL — HIGH (ref 3.8–10.5)
WBC # BLD: 40.85 K/UL — CRITICAL HIGH (ref 3.8–10.5)
WBC # BLD: 6.68 K/UL — SIGNIFICANT CHANGE UP (ref 3.8–10.5)
WBC # BLD: 7.11 K/UL — SIGNIFICANT CHANGE UP (ref 3.8–10.5)
WBC # BLD: 7.28 K/UL — SIGNIFICANT CHANGE UP (ref 3.8–10.5)
WBC # BLD: 8.36 K/UL — SIGNIFICANT CHANGE UP (ref 3.8–10.5)
WBC # BLD: 9.11 K/UL — SIGNIFICANT CHANGE UP (ref 3.8–10.5)
WBC # BLD: 9.11 K/UL — SIGNIFICANT CHANGE UP (ref 3.8–10.5)
WBC # BLD: 9.36 K/UL — SIGNIFICANT CHANGE UP (ref 3.8–10.5)
WBC # BLD: 9.36 K/UL — SIGNIFICANT CHANGE UP (ref 3.8–10.5)
WBC # FLD AUTO: 10.18 K/UL — SIGNIFICANT CHANGE UP (ref 3.8–10.5)
WBC # FLD AUTO: 10.24 K/UL — SIGNIFICANT CHANGE UP (ref 3.8–10.5)
WBC # FLD AUTO: 10.26 K/UL — SIGNIFICANT CHANGE UP (ref 3.8–10.5)
WBC # FLD AUTO: 10.38 K/UL — SIGNIFICANT CHANGE UP (ref 3.8–10.5)
WBC # FLD AUTO: 10.38 K/UL — SIGNIFICANT CHANGE UP (ref 3.8–10.5)
WBC # FLD AUTO: 10.49 K/UL — SIGNIFICANT CHANGE UP (ref 3.8–10.5)
WBC # FLD AUTO: 10.49 K/UL — SIGNIFICANT CHANGE UP (ref 3.8–10.5)
WBC # FLD AUTO: 10.91 K/UL — HIGH (ref 3.8–10.5)
WBC # FLD AUTO: 10.93 K/UL — HIGH (ref 3.8–10.5)
WBC # FLD AUTO: 10.93 K/UL — HIGH (ref 3.8–10.5)
WBC # FLD AUTO: 11.32 K/UL — HIGH (ref 3.8–10.5)
WBC # FLD AUTO: 11.32 K/UL — HIGH (ref 3.8–10.5)
WBC # FLD AUTO: 11.6 K/UL — HIGH (ref 3.8–10.5)
WBC # FLD AUTO: 12.04 K/UL — HIGH (ref 3.8–10.5)
WBC # FLD AUTO: 13.01 K/UL — HIGH (ref 3.8–10.5)
WBC # FLD AUTO: 13.01 K/UL — HIGH (ref 3.8–10.5)
WBC # FLD AUTO: 13.14 K/UL — HIGH (ref 3.8–10.5)
WBC # FLD AUTO: 13.21 K/UL — HIGH (ref 3.8–10.5)
WBC # FLD AUTO: 15.01 K/UL — HIGH (ref 3.8–10.5)
WBC # FLD AUTO: 15.01 K/UL — HIGH (ref 3.8–10.5)
WBC # FLD AUTO: 15.08 K/UL — HIGH (ref 3.8–10.5)
WBC # FLD AUTO: 15.42 K/UL — HIGH (ref 3.8–10.5)
WBC # FLD AUTO: 15.42 K/UL — HIGH (ref 3.8–10.5)
WBC # FLD AUTO: 16.5 K/UL — HIGH (ref 3.8–10.5)
WBC # FLD AUTO: 18.4 K/UL — HIGH (ref 3.8–10.5)
WBC # FLD AUTO: 18.4 K/UL — HIGH (ref 3.8–10.5)
WBC # FLD AUTO: 25.11 K/UL — HIGH (ref 3.8–10.5)
WBC # FLD AUTO: 25.11 K/UL — HIGH (ref 3.8–10.5)
WBC # FLD AUTO: 25.21 K/UL — HIGH (ref 3.8–10.5)
WBC # FLD AUTO: 31.62 K/UL — HIGH (ref 3.8–10.5)
WBC # FLD AUTO: 40.85 K/UL — CRITICAL HIGH (ref 3.8–10.5)
WBC # FLD AUTO: 6.68 K/UL — SIGNIFICANT CHANGE UP (ref 3.8–10.5)
WBC # FLD AUTO: 7.11 K/UL — SIGNIFICANT CHANGE UP (ref 3.8–10.5)
WBC # FLD AUTO: 7.28 K/UL — SIGNIFICANT CHANGE UP (ref 3.8–10.5)
WBC # FLD AUTO: 8.36 K/UL — SIGNIFICANT CHANGE UP (ref 3.8–10.5)
WBC # FLD AUTO: 9.11 K/UL — SIGNIFICANT CHANGE UP (ref 3.8–10.5)
WBC # FLD AUTO: 9.11 K/UL — SIGNIFICANT CHANGE UP (ref 3.8–10.5)
WBC # FLD AUTO: 9.36 K/UL — SIGNIFICANT CHANGE UP (ref 3.8–10.5)
WBC # FLD AUTO: 9.36 K/UL — SIGNIFICANT CHANGE UP (ref 3.8–10.5)
WBC UR QL: 0 /HPF — SIGNIFICANT CHANGE UP (ref 0–5)

## 2024-01-01 PROCEDURE — 36620 INSERTION CATHETER ARTERY: CPT | Mod: GC

## 2024-01-01 PROCEDURE — 71045 X-RAY EXAM CHEST 1 VIEW: CPT | Mod: 26,77

## 2024-01-01 PROCEDURE — 99232 SBSQ HOSP IP/OBS MODERATE 35: CPT

## 2024-01-01 PROCEDURE — 99497 ADVNCD CARE PLAN 30 MIN: CPT

## 2024-01-01 PROCEDURE — 99233 SBSQ HOSP IP/OBS HIGH 50: CPT

## 2024-01-01 PROCEDURE — 71045 X-RAY EXAM CHEST 1 VIEW: CPT | Mod: 26

## 2024-01-01 PROCEDURE — 99292 CRITICAL CARE ADDL 30 MIN: CPT | Mod: GC,25

## 2024-01-01 PROCEDURE — 99223 1ST HOSP IP/OBS HIGH 75: CPT | Mod: GC

## 2024-01-01 PROCEDURE — 93010 ELECTROCARDIOGRAM REPORT: CPT

## 2024-01-01 PROCEDURE — 99291 CRITICAL CARE FIRST HOUR: CPT

## 2024-01-01 PROCEDURE — 76604 US EXAM CHEST: CPT | Mod: 26,GC

## 2024-01-01 PROCEDURE — 99232 SBSQ HOSP IP/OBS MODERATE 35: CPT | Mod: GC

## 2024-01-01 PROCEDURE — 99233 SBSQ HOSP IP/OBS HIGH 50: CPT | Mod: 25

## 2024-01-01 PROCEDURE — 93308 TTE F-UP OR LMTD: CPT | Mod: 26,GC

## 2024-01-01 PROCEDURE — 99223 1ST HOSP IP/OBS HIGH 75: CPT

## 2024-01-01 PROCEDURE — 36556 INSERT NON-TUNNEL CV CATH: CPT

## 2024-01-01 PROCEDURE — 99291 CRITICAL CARE FIRST HOUR: CPT | Mod: GC

## 2024-01-01 PROCEDURE — 90792 PSYCH DIAG EVAL W/MED SRVCS: CPT

## 2024-01-01 PROCEDURE — 99291 CRITICAL CARE FIRST HOUR: CPT | Mod: GC,25

## 2024-01-01 PROCEDURE — 99285 EMERGENCY DEPT VISIT HI MDM: CPT

## 2024-01-01 PROCEDURE — 93306 TTE W/DOPPLER COMPLETE: CPT | Mod: 26

## 2024-01-01 PROCEDURE — 99239 HOSP IP/OBS DSCHRG MGMT >30: CPT

## 2024-01-01 PROCEDURE — 99497 ADVNCD CARE PLAN 30 MIN: CPT | Mod: 25

## 2024-01-01 RX ORDER — PIPERACILLIN AND TAZOBACTAM 4; .5 G/20ML; G/20ML
3.38 INJECTION, POWDER, LYOPHILIZED, FOR SOLUTION INTRAVENOUS ONCE
Refills: 0 | Status: COMPLETED | OUTPATIENT
Start: 2024-01-01 | End: 2024-01-01

## 2024-01-01 RX ORDER — SODIUM CHLORIDE 9 MG/ML
4 INJECTION INTRAMUSCULAR; INTRAVENOUS; SUBCUTANEOUS EVERY 12 HOURS
Refills: 0 | Status: DISCONTINUED | OUTPATIENT
Start: 2024-01-01 | End: 2024-01-01

## 2024-01-01 RX ORDER — MIDODRINE HYDROCHLORIDE 2.5 MG/1
30 TABLET ORAL EVERY 8 HOURS
Refills: 0 | Status: DISCONTINUED | OUTPATIENT
Start: 2024-01-01 | End: 2024-01-01

## 2024-01-01 RX ORDER — PIPERACILLIN AND TAZOBACTAM 4; .5 G/20ML; G/20ML
3.38 INJECTION, POWDER, LYOPHILIZED, FOR SOLUTION INTRAVENOUS ONCE
Refills: 0 | Status: DISCONTINUED | OUTPATIENT
Start: 2024-01-01 | End: 2024-01-01

## 2024-01-01 RX ORDER — TAMSULOSIN HYDROCHLORIDE 0.4 MG/1
0.4 CAPSULE ORAL AT BEDTIME
Refills: 0 | Status: DISCONTINUED | OUTPATIENT
Start: 2024-01-01 | End: 2024-01-01

## 2024-01-01 RX ORDER — VANCOMYCIN HCL 1 G
VIAL (EA) INTRAVENOUS
Refills: 0 | Status: DISCONTINUED | OUTPATIENT
Start: 2024-01-01 | End: 2024-01-01

## 2024-01-01 RX ORDER — CLONAZEPAM 1 MG
1 TABLET ORAL AT BEDTIME
Refills: 0 | Status: DISCONTINUED | OUTPATIENT
Start: 2024-01-01 | End: 2024-01-01

## 2024-01-01 RX ORDER — SODIUM CHLORIDE 9 MG/ML
4 INJECTION INTRAMUSCULAR; INTRAVENOUS; SUBCUTANEOUS EVERY 6 HOURS
Refills: 0 | Status: DISCONTINUED | OUTPATIENT
Start: 2024-01-01 | End: 2024-01-01

## 2024-01-01 RX ORDER — ENOXAPARIN SODIUM 100 MG/ML
40 INJECTION SUBCUTANEOUS EVERY 24 HOURS
Refills: 0 | Status: DISCONTINUED | OUTPATIENT
Start: 2024-01-01 | End: 2024-01-01

## 2024-01-01 RX ORDER — PROPOFOL 10 MG/ML
50 INJECTION, EMULSION INTRAVENOUS
Qty: 1000 | Refills: 0 | Status: DISCONTINUED | OUTPATIENT
Start: 2024-01-01 | End: 2024-01-01

## 2024-01-01 RX ORDER — ROBINUL 0.2 MG/ML
0.4 INJECTION INTRAMUSCULAR; INTRAVENOUS EVERY 6 HOURS
Refills: 0 | Status: DISCONTINUED | OUTPATIENT
Start: 2024-01-01 | End: 2024-01-01

## 2024-01-01 RX ORDER — POTASSIUM CHLORIDE 20 MEQ
20 PACKET (EA) ORAL ONCE
Refills: 0 | Status: COMPLETED | OUTPATIENT
Start: 2024-01-01 | End: 2024-01-01

## 2024-01-01 RX ORDER — POTASSIUM CHLORIDE 20 MEQ
20 PACKET (EA) ORAL ONCE
Refills: 0 | Status: DISCONTINUED | OUTPATIENT
Start: 2024-01-01 | End: 2024-01-01

## 2024-01-01 RX ORDER — MAGNESIUM SULFATE 500 MG/ML
2 VIAL (ML) INJECTION ONCE
Refills: 0 | Status: COMPLETED | OUTPATIENT
Start: 2024-01-01 | End: 2024-01-01

## 2024-01-01 RX ORDER — CLONAZEPAM 1 MG
1 TABLET ORAL EVERY 12 HOURS
Refills: 0 | Status: DISCONTINUED | OUTPATIENT
Start: 2024-01-01 | End: 2024-01-01

## 2024-01-01 RX ORDER — SODIUM CHLORIDE 9 MG/ML
500 INJECTION, SOLUTION INTRAVENOUS ONCE
Refills: 0 | Status: COMPLETED | OUTPATIENT
Start: 2024-01-01 | End: 2024-01-01

## 2024-01-01 RX ORDER — ACETAMINOPHEN 500 MG
650 TABLET ORAL EVERY 6 HOURS
Refills: 0 | Status: DISCONTINUED | OUTPATIENT
Start: 2024-01-01 | End: 2024-01-01

## 2024-01-01 RX ORDER — DEXMEDETOMIDINE HYDROCHLORIDE IN 0.9% SODIUM CHLORIDE 4 UG/ML
0.2 INJECTION INTRAVENOUS
Qty: 400 | Refills: 0 | Status: DISCONTINUED | OUTPATIENT
Start: 2024-01-01 | End: 2024-01-01

## 2024-01-01 RX ORDER — PROPOFOL 10 MG/ML
25 INJECTION, EMULSION INTRAVENOUS
Qty: 1000 | Refills: 0 | Status: DISCONTINUED | OUTPATIENT
Start: 2024-01-01 | End: 2024-01-01

## 2024-01-01 RX ORDER — DIVALPROEX SODIUM 500 MG/1
125 TABLET, DELAYED RELEASE ORAL
Refills: 0 | Status: DISCONTINUED | OUTPATIENT
Start: 2024-01-01 | End: 2024-01-01

## 2024-01-01 RX ORDER — NOREPINEPHRINE BITARTRATE/D5W 8 MG/250ML
0.5 PLASTIC BAG, INJECTION (ML) INTRAVENOUS
Qty: 16 | Refills: 0 | Status: DISCONTINUED | OUTPATIENT
Start: 2024-01-01 | End: 2024-01-01

## 2024-01-01 RX ORDER — MIDODRINE HYDROCHLORIDE 2.5 MG/1
20 TABLET ORAL EVERY 8 HOURS
Refills: 0 | Status: DISCONTINUED | OUTPATIENT
Start: 2024-01-01 | End: 2024-01-01

## 2024-01-01 RX ORDER — SODIUM CHLORIDE 9 MG/ML
1000 INJECTION, SOLUTION INTRAVENOUS ONCE
Refills: 0 | Status: COMPLETED | OUTPATIENT
Start: 2024-01-01 | End: 2024-01-01

## 2024-01-01 RX ORDER — IPRATROPIUM/ALBUTEROL SULFATE 18-103MCG
3 AEROSOL WITH ADAPTER (GRAM) INHALATION EVERY 6 HOURS
Refills: 0 | Status: DISCONTINUED | OUTPATIENT
Start: 2024-01-01 | End: 2024-01-01

## 2024-01-01 RX ORDER — FENTANYL CITRATE 50 UG/ML
2.5 INJECTION INTRAVENOUS
Qty: 5000 | Refills: 0 | Status: DISCONTINUED | OUTPATIENT
Start: 2024-01-01 | End: 2024-01-01

## 2024-01-01 RX ORDER — SODIUM,POTASSIUM PHOSPHATES 278-250MG
2 POWDER IN PACKET (EA) ORAL ONCE
Refills: 0 | Status: COMPLETED | OUTPATIENT
Start: 2024-01-01 | End: 2024-01-01

## 2024-01-01 RX ORDER — PROPOFOL 10 MG/ML
10 INJECTION, EMULSION INTRAVENOUS
Qty: 1000 | Refills: 0 | Status: DISCONTINUED | OUTPATIENT
Start: 2024-01-01 | End: 2024-01-01

## 2024-01-01 RX ORDER — VANCOMYCIN HCL 1 G
250 VIAL (EA) INTRAVENOUS ONCE
Refills: 0 | Status: DISCONTINUED | OUTPATIENT
Start: 2024-01-01 | End: 2024-01-01

## 2024-01-01 RX ORDER — MIDAZOLAM HYDROCHLORIDE 1 MG/ML
2 INJECTION, SOLUTION INTRAMUSCULAR; INTRAVENOUS ONCE
Refills: 0 | Status: DISCONTINUED | OUTPATIENT
Start: 2024-01-01 | End: 2024-01-01

## 2024-01-01 RX ORDER — VANCOMYCIN HCL 1 G
1000 VIAL (EA) INTRAVENOUS ONCE
Refills: 0 | Status: COMPLETED | OUTPATIENT
Start: 2024-01-01 | End: 2024-01-01

## 2024-01-01 RX ORDER — OLANZAPINE 15 MG/1
2.5 TABLET, FILM COATED ORAL ONCE
Refills: 0 | Status: COMPLETED | OUTPATIENT
Start: 2024-01-01 | End: 2024-01-01

## 2024-01-01 RX ORDER — VANCOMYCIN HCL 1 G
VIAL (EA) INTRAVENOUS
Refills: 0 | Status: COMPLETED | OUTPATIENT
Start: 2024-01-01 | End: 2024-01-01

## 2024-01-01 RX ORDER — NITROGLYCERIN 6.5 MG
1 CAPSULE, EXTENDED RELEASE ORAL ONCE
Refills: 0 | Status: COMPLETED | OUTPATIENT
Start: 2024-01-01 | End: 2024-01-01

## 2024-01-01 RX ORDER — DROXIDOPA 100 MG/1
200 CAPSULE ORAL THREE TIMES A DAY
Refills: 0 | Status: DISCONTINUED | OUTPATIENT
Start: 2024-01-01 | End: 2024-01-01

## 2024-01-01 RX ORDER — PIPERACILLIN AND TAZOBACTAM 4; .5 G/20ML; G/20ML
3.38 INJECTION, POWDER, LYOPHILIZED, FOR SOLUTION INTRAVENOUS EVERY 8 HOURS
Refills: 0 | Status: COMPLETED | OUTPATIENT
Start: 2024-01-01 | End: 2024-01-01

## 2024-01-01 RX ORDER — CALCIUM GLUCONATE 100 MG/ML
1 VIAL (ML) INTRAVENOUS ONCE
Refills: 0 | Status: COMPLETED | OUTPATIENT
Start: 2024-01-01 | End: 2024-01-01

## 2024-01-01 RX ORDER — OLANZAPINE 15 MG/1
2.5 TABLET, FILM COATED ORAL EVERY 6 HOURS
Refills: 0 | Status: DISCONTINUED | OUTPATIENT
Start: 2024-01-01 | End: 2024-01-01

## 2024-01-01 RX ORDER — CHLORHEXIDINE GLUCONATE 213 G/1000ML
15 SOLUTION TOPICAL EVERY 12 HOURS
Refills: 0 | Status: DISCONTINUED | OUTPATIENT
Start: 2024-01-01 | End: 2024-01-01

## 2024-01-01 RX ORDER — CLONAZEPAM 1 MG
1 TABLET ORAL
Refills: 0 | Status: DISCONTINUED | OUTPATIENT
Start: 2024-01-01 | End: 2024-01-01

## 2024-01-01 RX ORDER — MIDODRINE HYDROCHLORIDE 2.5 MG/1
10 TABLET ORAL
Refills: 0 | Status: DISCONTINUED | OUTPATIENT
Start: 2024-01-01 | End: 2024-01-01

## 2024-01-01 RX ORDER — DEXMEDETOMIDINE HYDROCHLORIDE IN 0.9% SODIUM CHLORIDE 4 UG/ML
0.5 INJECTION INTRAVENOUS
Qty: 400 | Refills: 0 | Status: DISCONTINUED | OUTPATIENT
Start: 2024-01-01 | End: 2024-01-01

## 2024-01-01 RX ORDER — VALPROIC ACID (AS SODIUM SALT) 250 MG/5ML
125 SOLUTION, ORAL ORAL
Refills: 0 | Status: DISCONTINUED | OUTPATIENT
Start: 2024-01-01 | End: 2024-01-01

## 2024-01-01 RX ORDER — PANTOPRAZOLE SODIUM 20 MG/1
40 TABLET, DELAYED RELEASE ORAL
Refills: 0 | Status: DISCONTINUED | OUTPATIENT
Start: 2024-01-01 | End: 2024-01-01

## 2024-01-01 RX ORDER — DEXMEDETOMIDINE HYDROCHLORIDE IN 0.9% SODIUM CHLORIDE 4 UG/ML
0.3 INJECTION INTRAVENOUS
Qty: 400 | Refills: 0 | Status: DISCONTINUED | OUTPATIENT
Start: 2024-01-01 | End: 2024-01-01

## 2024-01-01 RX ORDER — NOREPINEPHRINE BITARTRATE/D5W 8 MG/250ML
0.04 PLASTIC BAG, INJECTION (ML) INTRAVENOUS
Qty: 8 | Refills: 0 | Status: DISCONTINUED | OUTPATIENT
Start: 2024-01-01 | End: 2024-01-01

## 2024-01-01 RX ORDER — VANCOMYCIN HCL 1 G
1250 VIAL (EA) INTRAVENOUS EVERY 12 HOURS
Refills: 0 | Status: DISCONTINUED | OUTPATIENT
Start: 2024-01-01 | End: 2024-01-01

## 2024-01-01 RX ORDER — VANCOMYCIN HCL 1 G
1500 VIAL (EA) INTRAVENOUS EVERY 12 HOURS
Refills: 0 | Status: DISCONTINUED | OUTPATIENT
Start: 2024-01-01 | End: 2024-01-01

## 2024-01-01 RX ORDER — CHLORHEXIDINE GLUCONATE 213 G/1000ML
1 SOLUTION TOPICAL
Refills: 0 | Status: DISCONTINUED | OUTPATIENT
Start: 2024-01-01 | End: 2024-01-01

## 2024-01-01 RX ORDER — HALOPERIDOL DECANOATE 100 MG/ML
2.5 INJECTION INTRAMUSCULAR ONCE
Refills: 0 | Status: COMPLETED | OUTPATIENT
Start: 2024-01-01 | End: 2024-01-01

## 2024-01-01 RX ORDER — NOREPINEPHRINE BITARTRATE/D5W 8 MG/250ML
0.24 PLASTIC BAG, INJECTION (ML) INTRAVENOUS
Qty: 8 | Refills: 0 | Status: DISCONTINUED | OUTPATIENT
Start: 2024-01-01 | End: 2024-01-01

## 2024-01-01 RX ORDER — QUETIAPINE FUMARATE 200 MG/1
100 TABLET, FILM COATED ORAL
Refills: 0 | Status: DISCONTINUED | OUTPATIENT
Start: 2024-01-01 | End: 2024-01-01

## 2024-01-01 RX ORDER — POLYETHYLENE GLYCOL 3350 17 G/17G
17 POWDER, FOR SOLUTION ORAL
Refills: 0 | Status: DISCONTINUED | OUTPATIENT
Start: 2024-01-01 | End: 2024-01-01

## 2024-01-01 RX ORDER — POTASSIUM PHOSPHATE, MONOBASIC POTASSIUM PHOSPHATE, DIBASIC 236; 224 MG/ML; MG/ML
15 INJECTION, SOLUTION INTRAVENOUS ONCE
Refills: 0 | Status: COMPLETED | OUTPATIENT
Start: 2024-01-01 | End: 2024-01-01

## 2024-01-01 RX ORDER — ACETAMINOPHEN 500 MG
1000 TABLET ORAL ONCE
Refills: 0 | Status: COMPLETED | OUTPATIENT
Start: 2024-01-01 | End: 2024-01-01

## 2024-01-01 RX ORDER — LACTULOSE 10 G/15ML
20 SOLUTION ORAL
Refills: 0 | Status: DISCONTINUED | OUTPATIENT
Start: 2024-01-01 | End: 2024-01-01

## 2024-01-01 RX ORDER — SODIUM CHLORIDE 9 MG/ML
1000 INJECTION, SOLUTION INTRAVENOUS
Refills: 0 | Status: DISCONTINUED | OUTPATIENT
Start: 2024-01-01 | End: 2024-01-01

## 2024-01-01 RX ORDER — HYDROMORPHONE HYDROCHLORIDE 2 MG/ML
0.2 INJECTION INTRAMUSCULAR; INTRAVENOUS; SUBCUTANEOUS EVERY 8 HOURS
Refills: 0 | Status: DISCONTINUED | OUTPATIENT
Start: 2024-01-01 | End: 2024-01-01

## 2024-01-01 RX ORDER — FENTANYL CITRATE 50 UG/ML
100 INJECTION INTRAVENOUS ONCE
Refills: 0 | Status: DISCONTINUED | OUTPATIENT
Start: 2024-01-01 | End: 2024-01-01

## 2024-01-01 RX ORDER — MUPIROCIN 20 MG/G
1 OINTMENT TOPICAL
Refills: 0 | Status: DISCONTINUED | OUTPATIENT
Start: 2024-01-01 | End: 2024-01-01

## 2024-01-01 RX ORDER — SODIUM CHLORIDE 9 MG/ML
500 INJECTION INTRAMUSCULAR; INTRAVENOUS; SUBCUTANEOUS ONCE
Refills: 0 | Status: COMPLETED | OUTPATIENT
Start: 2024-01-01 | End: 2024-01-01

## 2024-01-01 RX ORDER — ACETYLCYSTEINE 200 MG/ML
4 VIAL (ML) MISCELLANEOUS EVERY 12 HOURS
Refills: 0 | Status: DISCONTINUED | OUTPATIENT
Start: 2024-01-01 | End: 2024-01-01

## 2024-01-01 RX ORDER — PROPOFOL 10 MG/ML
15 INJECTION, EMULSION INTRAVENOUS
Qty: 1000 | Refills: 0 | Status: DISCONTINUED | OUTPATIENT
Start: 2024-01-01 | End: 2024-01-01

## 2024-01-01 RX ORDER — VANCOMYCIN HCL 1 G
1000 VIAL (EA) INTRAVENOUS EVERY 12 HOURS
Refills: 0 | Status: DISCONTINUED | OUTPATIENT
Start: 2024-01-01 | End: 2024-01-01

## 2024-01-01 RX ORDER — CEFTRIAXONE 500 MG/1
1000 INJECTION, POWDER, FOR SOLUTION INTRAMUSCULAR; INTRAVENOUS ONCE
Refills: 0 | Status: COMPLETED | OUTPATIENT
Start: 2024-01-01 | End: 2024-01-01

## 2024-01-01 RX ORDER — LANOLIN ALCOHOL/MO/W.PET/CERES
10 CREAM (GRAM) TOPICAL AT BEDTIME
Refills: 0 | Status: DISCONTINUED | OUTPATIENT
Start: 2024-01-01 | End: 2024-01-01

## 2024-01-01 RX ORDER — HALOPERIDOL DECANOATE 100 MG/ML
1 INJECTION INTRAMUSCULAR ONCE
Refills: 0 | Status: COMPLETED | OUTPATIENT
Start: 2024-01-01 | End: 2024-01-01

## 2024-01-01 RX ORDER — AZITHROMYCIN 500 MG/1
500 TABLET, FILM COATED ORAL EVERY 24 HOURS
Refills: 0 | Status: DISCONTINUED | OUTPATIENT
Start: 2024-01-01 | End: 2024-01-01

## 2024-01-01 RX ORDER — PIPERACILLIN AND TAZOBACTAM 4; .5 G/20ML; G/20ML
3.38 INJECTION, POWDER, LYOPHILIZED, FOR SOLUTION INTRAVENOUS EVERY 8 HOURS
Refills: 0 | Status: DISCONTINUED | OUTPATIENT
Start: 2024-01-01 | End: 2024-01-01

## 2024-01-01 RX ORDER — HALOPERIDOL DECANOATE 100 MG/ML
2 INJECTION INTRAMUSCULAR ONCE
Refills: 0 | Status: DISCONTINUED | OUTPATIENT
Start: 2024-01-01 | End: 2024-01-01

## 2024-01-01 RX ORDER — DIVALPROEX SODIUM 500 MG/1
125 TABLET, DELAYED RELEASE ORAL ONCE
Refills: 0 | Status: COMPLETED | OUTPATIENT
Start: 2024-01-01 | End: 2024-01-01

## 2024-01-01 RX ORDER — SENNA PLUS 8.6 MG/1
2 TABLET ORAL AT BEDTIME
Refills: 0 | Status: DISCONTINUED | OUTPATIENT
Start: 2024-01-01 | End: 2024-01-01

## 2024-01-01 RX ORDER — DEXTROSE 50 % IN WATER 50 %
50 SYRINGE (ML) INTRAVENOUS ONCE
Refills: 0 | Status: DISCONTINUED | OUTPATIENT
Start: 2024-01-01 | End: 2024-01-01

## 2024-01-01 RX ORDER — SENNA PLUS 8.6 MG/1
1 TABLET ORAL EVERY 12 HOURS
Refills: 0 | Status: DISCONTINUED | OUTPATIENT
Start: 2024-01-01 | End: 2024-01-01

## 2024-01-01 RX ORDER — DROXIDOPA 100 MG/1
300 CAPSULE ORAL THREE TIMES A DAY
Refills: 0 | Status: DISCONTINUED | OUTPATIENT
Start: 2024-01-01 | End: 2024-01-01

## 2024-01-01 RX ORDER — CHLORHEXIDINE GLUCONATE 213 G/1000ML
1 SOLUTION TOPICAL DAILY
Refills: 0 | Status: DISCONTINUED | OUTPATIENT
Start: 2024-01-01 | End: 2024-01-01

## 2024-01-01 RX ORDER — DOXAZOSIN MESYLATE 4 MG
1 TABLET ORAL AT BEDTIME
Refills: 0 | Status: DISCONTINUED | OUTPATIENT
Start: 2024-01-01 | End: 2024-01-01

## 2024-01-01 RX ORDER — FENTANYL CITRATE 50 UG/ML
0.5 INJECTION INTRAVENOUS
Qty: 2500 | Refills: 0 | Status: DISCONTINUED | OUTPATIENT
Start: 2024-01-01 | End: 2024-01-01

## 2024-01-01 RX ORDER — MAGNESIUM SULFATE 500 MG/ML
1 VIAL (ML) INJECTION ONCE
Refills: 0 | Status: COMPLETED | OUTPATIENT
Start: 2024-01-01 | End: 2024-01-01

## 2024-01-01 RX ORDER — ACETYLCYSTEINE 200 MG/ML
4 VIAL (ML) MISCELLANEOUS EVERY 6 HOURS
Refills: 0 | Status: DISCONTINUED | OUTPATIENT
Start: 2024-01-01 | End: 2024-01-01

## 2024-01-01 RX ORDER — ALBUTEROL 90 UG/1
2.5 AEROSOL, METERED ORAL EVERY 6 HOURS
Refills: 0 | Status: DISCONTINUED | OUTPATIENT
Start: 2024-01-01 | End: 2024-01-01

## 2024-01-01 RX ORDER — VANCOMYCIN HCL 1 G
1000 VIAL (EA) INTRAVENOUS EVERY 12 HOURS
Refills: 0 | Status: COMPLETED | OUTPATIENT
Start: 2024-01-01 | End: 2024-01-01

## 2024-01-01 RX ORDER — NOREPINEPHRINE BITARTRATE/D5W 8 MG/250ML
0.3 PLASTIC BAG, INJECTION (ML) INTRAVENOUS
Qty: 8 | Refills: 0 | Status: DISCONTINUED | OUTPATIENT
Start: 2024-01-01 | End: 2024-01-01

## 2024-01-01 RX ORDER — PANTOPRAZOLE SODIUM 20 MG/1
40 TABLET, DELAYED RELEASE ORAL DAILY
Refills: 0 | Status: DISCONTINUED | OUTPATIENT
Start: 2024-01-01 | End: 2024-01-01

## 2024-01-01 RX ORDER — POTASSIUM PHOSPHATE, MONOBASIC POTASSIUM PHOSPHATE, DIBASIC 236; 224 MG/ML; MG/ML
30 INJECTION, SOLUTION INTRAVENOUS ONCE
Refills: 0 | Status: COMPLETED | OUTPATIENT
Start: 2024-01-01 | End: 2024-01-01

## 2024-01-01 RX ORDER — MUPIROCIN 20 MG/G
1 OINTMENT TOPICAL
Refills: 0 | Status: COMPLETED | OUTPATIENT
Start: 2024-01-01 | End: 2024-01-01

## 2024-01-01 RX ORDER — LACTULOSE 10 G/15ML
20 SOLUTION ORAL THREE TIMES A DAY
Refills: 0 | Status: DISCONTINUED | OUTPATIENT
Start: 2024-01-01 | End: 2024-01-01

## 2024-01-01 RX ORDER — QUETIAPINE FUMARATE 200 MG/1
225 TABLET, FILM COATED ORAL
Refills: 0 | Status: DISCONTINUED | OUTPATIENT
Start: 2024-01-01 | End: 2024-01-01

## 2024-01-01 RX ORDER — OLANZAPINE 15 MG/1
5 TABLET, FILM COATED ORAL ONCE
Refills: 0 | Status: COMPLETED | OUTPATIENT
Start: 2024-01-01 | End: 2024-01-01

## 2024-01-01 RX ORDER — POTASSIUM CHLORIDE 20 MEQ
40 PACKET (EA) ORAL ONCE
Refills: 0 | Status: COMPLETED | OUTPATIENT
Start: 2024-01-01 | End: 2024-01-01

## 2024-01-01 RX ORDER — HYDROMORPHONE HYDROCHLORIDE 2 MG/ML
0.2 INJECTION INTRAMUSCULAR; INTRAVENOUS; SUBCUTANEOUS
Refills: 0 | Status: DISCONTINUED | OUTPATIENT
Start: 2024-01-01 | End: 2024-01-01

## 2024-01-01 RX ORDER — CEFTRIAXONE 500 MG/1
1000 INJECTION, POWDER, FOR SOLUTION INTRAMUSCULAR; INTRAVENOUS EVERY 24 HOURS
Refills: 0 | Status: DISCONTINUED | OUTPATIENT
Start: 2024-01-01 | End: 2024-01-01

## 2024-01-01 RX ORDER — CLOTRIMAZOLE AND BETAMETHASONE DIPROPIONATE 10; .5 MG/G; MG/G
1 CREAM TOPICAL
Refills: 0 | DISCHARGE

## 2024-01-01 RX ORDER — SODIUM,POTASSIUM PHOSPHATES 278-250MG
1 POWDER IN PACKET (EA) ORAL ONCE
Refills: 0 | Status: COMPLETED | OUTPATIENT
Start: 2024-01-01 | End: 2024-01-01

## 2024-01-01 RX ORDER — PROPOFOL 10 MG/ML
50 INJECTION, EMULSION INTRAVENOUS
Qty: 500 | Refills: 0 | Status: DISCONTINUED | OUTPATIENT
Start: 2024-01-01 | End: 2024-01-01

## 2024-01-01 RX ORDER — SODIUM CHLORIDE 9 MG/ML
10 INJECTION INTRAMUSCULAR; INTRAVENOUS; SUBCUTANEOUS
Refills: 0 | Status: DISCONTINUED | OUTPATIENT
Start: 2024-01-01 | End: 2024-01-01

## 2024-01-01 RX ORDER — VALPROIC ACID (AS SODIUM SALT) 250 MG/5ML
250 SOLUTION, ORAL ORAL
Refills: 0 | Status: DISCONTINUED | OUTPATIENT
Start: 2024-01-01 | End: 2024-01-01

## 2024-01-01 RX ORDER — MUPIROCIN 20 MG/G
1 OINTMENT TOPICAL ONCE
Refills: 0 | Status: DISCONTINUED | OUTPATIENT
Start: 2024-01-01 | End: 2024-01-01

## 2024-01-01 RX ORDER — QUETIAPINE FUMARATE 200 MG/1
200 TABLET, FILM COATED ORAL
Refills: 0 | Status: DISCONTINUED | OUTPATIENT
Start: 2024-01-01 | End: 2024-01-01

## 2024-01-01 RX ORDER — DIVALPROEX SODIUM 500 MG/1
250 TABLET, DELAYED RELEASE ORAL
Refills: 0 | Status: DISCONTINUED | OUTPATIENT
Start: 2024-01-01 | End: 2024-01-01

## 2024-01-01 RX ORDER — AZITHROMYCIN 500 MG/1
500 TABLET, FILM COATED ORAL ONCE
Refills: 0 | Status: COMPLETED | OUTPATIENT
Start: 2024-01-01 | End: 2024-01-01

## 2024-01-01 RX ADMIN — Medication 1 DROP(S): at 17:53

## 2024-01-01 RX ADMIN — Medication 250 MILLIGRAM(S): at 08:23

## 2024-01-01 RX ADMIN — SODIUM CHLORIDE 4 MILLILITER(S): 9 INJECTION INTRAMUSCULAR; INTRAVENOUS; SUBCUTANEOUS at 04:00

## 2024-01-01 RX ADMIN — Medication 4 MILLILITER(S): at 20:13

## 2024-01-01 RX ADMIN — DIVALPROEX SODIUM 125 MILLIGRAM(S): 500 TABLET, DELAYED RELEASE ORAL at 19:24

## 2024-01-01 RX ADMIN — LACTULOSE 20 GRAM(S): 10 SOLUTION ORAL at 13:50

## 2024-01-01 RX ADMIN — Medication 1 DROP(S): at 05:34

## 2024-01-01 RX ADMIN — SENNA PLUS 1 TABLET(S): 8.6 TABLET ORAL at 05:57

## 2024-01-01 RX ADMIN — Medication 1 DROP(S): at 17:04

## 2024-01-01 RX ADMIN — Medication 4 MILLILITER(S): at 21:27

## 2024-01-01 RX ADMIN — MUPIROCIN 1 APPLICATION(S): 20 OINTMENT TOPICAL at 06:14

## 2024-01-01 RX ADMIN — ENOXAPARIN SODIUM 40 MILLIGRAM(S): 100 INJECTION SUBCUTANEOUS at 09:20

## 2024-01-01 RX ADMIN — PANTOPRAZOLE SODIUM 40 MILLIGRAM(S): 20 TABLET, DELAYED RELEASE ORAL at 12:01

## 2024-01-01 RX ADMIN — QUETIAPINE FUMARATE 225 MILLIGRAM(S): 200 TABLET, FILM COATED ORAL at 08:57

## 2024-01-01 RX ADMIN — MIDODRINE HYDROCHLORIDE 10 MILLIGRAM(S): 2.5 TABLET ORAL at 13:10

## 2024-01-01 RX ADMIN — LACTULOSE 20 GRAM(S): 10 SOLUTION ORAL at 17:58

## 2024-01-01 RX ADMIN — ENOXAPARIN SODIUM 40 MILLIGRAM(S): 100 INJECTION SUBCUTANEOUS at 08:32

## 2024-01-01 RX ADMIN — DEXMEDETOMIDINE HYDROCHLORIDE IN 0.9% SODIUM CHLORIDE 8.38 MICROGRAM(S)/KG/HR: 4 INJECTION INTRAVENOUS at 10:27

## 2024-01-01 RX ADMIN — OLANZAPINE 2.5 MILLIGRAM(S): 15 TABLET, FILM COATED ORAL at 08:31

## 2024-01-01 RX ADMIN — Medication 250 MILLIGRAM(S): at 05:09

## 2024-01-01 RX ADMIN — DROXIDOPA 300 MILLIGRAM(S): 100 CAPSULE ORAL at 05:05

## 2024-01-01 RX ADMIN — Medication 31.4 MICROGRAM(S)/KG/MIN: at 17:01

## 2024-01-01 RX ADMIN — MUPIROCIN 1 APPLICATION(S): 20 OINTMENT TOPICAL at 05:04

## 2024-01-01 RX ADMIN — Medication 4 MILLILITER(S): at 22:06

## 2024-01-01 RX ADMIN — LACTULOSE 20 GRAM(S): 10 SOLUTION ORAL at 14:06

## 2024-01-01 RX ADMIN — Medication 1 MILLIGRAM(S): at 17:05

## 2024-01-01 RX ADMIN — Medication 250 MILLIGRAM(S): at 20:42

## 2024-01-01 RX ADMIN — Medication 250 MILLIGRAM(S): at 08:00

## 2024-01-01 RX ADMIN — Medication 1 DROP(S): at 13:07

## 2024-01-01 RX ADMIN — Medication 3 MILLILITER(S): at 08:53

## 2024-01-01 RX ADMIN — Medication 4 MILLILITER(S): at 09:36

## 2024-01-01 RX ADMIN — ENOXAPARIN SODIUM 40 MILLIGRAM(S): 100 INJECTION SUBCUTANEOUS at 09:19

## 2024-01-01 RX ADMIN — Medication 4 MILLILITER(S): at 15:03

## 2024-01-01 RX ADMIN — Medication 250 MILLIGRAM(S): at 06:20

## 2024-01-01 RX ADMIN — Medication 1 MILLIGRAM(S): at 05:06

## 2024-01-01 RX ADMIN — Medication 1 DROP(S): at 23:13

## 2024-01-01 RX ADMIN — PIPERACILLIN AND TAZOBACTAM 25 GRAM(S): 4; .5 INJECTION, POWDER, LYOPHILIZED, FOR SOLUTION INTRAVENOUS at 05:04

## 2024-01-01 RX ADMIN — LACTULOSE 20 GRAM(S): 10 SOLUTION ORAL at 17:53

## 2024-01-01 RX ADMIN — SODIUM CHLORIDE 4 MILLILITER(S): 9 INJECTION INTRAMUSCULAR; INTRAVENOUS; SUBCUTANEOUS at 21:33

## 2024-01-01 RX ADMIN — MIDODRINE HYDROCHLORIDE 30 MILLIGRAM(S): 2.5 TABLET ORAL at 23:12

## 2024-01-01 RX ADMIN — Medication 1 MILLIGRAM(S): at 21:21

## 2024-01-01 RX ADMIN — MIDODRINE HYDROCHLORIDE 30 MILLIGRAM(S): 2.5 TABLET ORAL at 05:59

## 2024-01-01 RX ADMIN — PANTOPRAZOLE SODIUM 40 MILLIGRAM(S): 20 TABLET, DELAYED RELEASE ORAL at 06:58

## 2024-01-01 RX ADMIN — ENOXAPARIN SODIUM 40 MILLIGRAM(S): 100 INJECTION SUBCUTANEOUS at 12:37

## 2024-01-01 RX ADMIN — ALBUTEROL 2.5 MILLIGRAM(S): 90 AEROSOL, METERED ORAL at 17:37

## 2024-01-01 RX ADMIN — Medication 166.67 MILLIGRAM(S): at 05:04

## 2024-01-01 RX ADMIN — QUETIAPINE FUMARATE 225 MILLIGRAM(S): 200 TABLET, FILM COATED ORAL at 08:33

## 2024-01-01 RX ADMIN — ALBUTEROL 2.5 MILLIGRAM(S): 90 AEROSOL, METERED ORAL at 21:08

## 2024-01-01 RX ADMIN — Medication 250 MILLIGRAM(S): at 05:18

## 2024-01-01 RX ADMIN — DROXIDOPA 300 MILLIGRAM(S): 100 CAPSULE ORAL at 17:24

## 2024-01-01 RX ADMIN — Medication 250 MILLIGRAM(S): at 19:53

## 2024-01-01 RX ADMIN — LACTULOSE 20 GRAM(S): 10 SOLUTION ORAL at 22:20

## 2024-01-01 RX ADMIN — Medication 1 DROP(S): at 12:36

## 2024-01-01 RX ADMIN — LACTULOSE 20 GRAM(S): 10 SOLUTION ORAL at 17:29

## 2024-01-01 RX ADMIN — POLYETHYLENE GLYCOL 3350 17 GRAM(S): 17 POWDER, FOR SOLUTION ORAL at 05:10

## 2024-01-01 RX ADMIN — ALBUTEROL 2.5 MILLIGRAM(S): 90 AEROSOL, METERED ORAL at 10:04

## 2024-01-01 RX ADMIN — Medication 1 MILLIGRAM(S): at 05:35

## 2024-01-01 RX ADMIN — SODIUM CHLORIDE 4 MILLILITER(S): 9 INJECTION INTRAMUSCULAR; INTRAVENOUS; SUBCUTANEOUS at 21:36

## 2024-01-01 RX ADMIN — CHLORHEXIDINE GLUCONATE 1 APPLICATION(S): 213 SOLUTION TOPICAL at 15:48

## 2024-01-01 RX ADMIN — SENNA PLUS 1 TABLET(S): 8.6 TABLET ORAL at 06:40

## 2024-01-01 RX ADMIN — DEXMEDETOMIDINE HYDROCHLORIDE IN 0.9% SODIUM CHLORIDE 8.38 MICROGRAM(S)/KG/HR: 4 INJECTION INTRAVENOUS at 19:38

## 2024-01-01 RX ADMIN — QUETIAPINE FUMARATE 225 MILLIGRAM(S): 200 TABLET, FILM COATED ORAL at 21:08

## 2024-01-01 RX ADMIN — ALBUTEROL 2.5 MILLIGRAM(S): 90 AEROSOL, METERED ORAL at 08:57

## 2024-01-01 RX ADMIN — SODIUM CHLORIDE 500 MILLILITER(S): 9 INJECTION INTRAMUSCULAR; INTRAVENOUS; SUBCUTANEOUS at 06:12

## 2024-01-01 RX ADMIN — MUPIROCIN 1 APPLICATION(S): 20 OINTMENT TOPICAL at 18:12

## 2024-01-01 RX ADMIN — Medication 1 DROP(S): at 13:51

## 2024-01-01 RX ADMIN — Medication 3 MILLILITER(S): at 21:09

## 2024-01-01 RX ADMIN — MIDODRINE HYDROCHLORIDE 20 MILLIGRAM(S): 2.5 TABLET ORAL at 05:05

## 2024-01-01 RX ADMIN — PIPERACILLIN AND TAZOBACTAM 200 GRAM(S): 4; .5 INJECTION, POWDER, LYOPHILIZED, FOR SOLUTION INTRAVENOUS at 02:35

## 2024-01-01 RX ADMIN — PIPERACILLIN AND TAZOBACTAM 200 GRAM(S): 4; .5 INJECTION, POWDER, LYOPHILIZED, FOR SOLUTION INTRAVENOUS at 08:23

## 2024-01-01 RX ADMIN — HYDROMORPHONE HYDROCHLORIDE 0.2 MILLIGRAM(S): 2 INJECTION INTRAMUSCULAR; INTRAVENOUS; SUBCUTANEOUS at 23:08

## 2024-01-01 RX ADMIN — Medication 1 MILLIGRAM(S): at 21:11

## 2024-01-01 RX ADMIN — PROPOFOL 6.03 MICROGRAM(S)/KG/MIN: 10 INJECTION, EMULSION INTRAVENOUS at 17:05

## 2024-01-01 RX ADMIN — ENOXAPARIN SODIUM 40 MILLIGRAM(S): 100 INJECTION SUBCUTANEOUS at 08:07

## 2024-01-01 RX ADMIN — TAMSULOSIN HYDROCHLORIDE 0.4 MILLIGRAM(S): 0.4 CAPSULE ORAL at 21:19

## 2024-01-01 RX ADMIN — Medication 2 PACKET(S): at 13:04

## 2024-01-01 RX ADMIN — CHLORHEXIDINE GLUCONATE 15 MILLILITER(S): 213 SOLUTION TOPICAL at 04:36

## 2024-01-01 RX ADMIN — SODIUM CHLORIDE 4 MILLILITER(S): 9 INJECTION INTRAMUSCULAR; INTRAVENOUS; SUBCUTANEOUS at 09:36

## 2024-01-01 RX ADMIN — CHLORHEXIDINE GLUCONATE 1 APPLICATION(S): 213 SOLUTION TOPICAL at 11:18

## 2024-01-01 RX ADMIN — Medication 30.2 MICROGRAM(S)/KG/MIN: at 19:20

## 2024-01-01 RX ADMIN — Medication 25 GRAM(S): at 06:03

## 2024-01-01 RX ADMIN — CHLORHEXIDINE GLUCONATE 15 MILLILITER(S): 213 SOLUTION TOPICAL at 18:02

## 2024-01-01 RX ADMIN — MIDODRINE HYDROCHLORIDE 30 MILLIGRAM(S): 2.5 TABLET ORAL at 21:12

## 2024-01-01 RX ADMIN — Medication 1 DROP(S): at 13:28

## 2024-01-01 RX ADMIN — SODIUM CHLORIDE 1000 MILLILITER(S): 9 INJECTION, SOLUTION INTRAVENOUS at 15:55

## 2024-01-01 RX ADMIN — CHLORHEXIDINE GLUCONATE 1 APPLICATION(S): 213 SOLUTION TOPICAL at 11:31

## 2024-01-01 RX ADMIN — Medication 4 MILLILITER(S): at 15:34

## 2024-01-01 RX ADMIN — Medication 1 DROP(S): at 05:09

## 2024-01-01 RX ADMIN — CHLORHEXIDINE GLUCONATE 15 MILLILITER(S): 213 SOLUTION TOPICAL at 17:29

## 2024-01-01 RX ADMIN — Medication 1 MILLIGRAM(S): at 10:25

## 2024-01-01 RX ADMIN — Medication 125 MILLIGRAM(S): at 13:46

## 2024-01-01 RX ADMIN — Medication 31.4 MICROGRAM(S)/KG/MIN: at 08:29

## 2024-01-01 RX ADMIN — MIDODRINE HYDROCHLORIDE 30 MILLIGRAM(S): 2.5 TABLET ORAL at 21:33

## 2024-01-01 RX ADMIN — Medication 3 MILLILITER(S): at 04:08

## 2024-01-01 RX ADMIN — ENOXAPARIN SODIUM 40 MILLIGRAM(S): 100 INJECTION SUBCUTANEOUS at 09:17

## 2024-01-01 RX ADMIN — ALBUTEROL 2.5 MILLIGRAM(S): 90 AEROSOL, METERED ORAL at 04:05

## 2024-01-01 RX ADMIN — ENOXAPARIN SODIUM 40 MILLIGRAM(S): 100 INJECTION SUBCUTANEOUS at 08:02

## 2024-01-01 RX ADMIN — MIDODRINE HYDROCHLORIDE 30 MILLIGRAM(S): 2.5 TABLET ORAL at 21:28

## 2024-01-01 RX ADMIN — Medication 4 MILLILITER(S): at 03:38

## 2024-01-01 RX ADMIN — Medication 400 MILLIGRAM(S): at 01:31

## 2024-01-01 RX ADMIN — MIDODRINE HYDROCHLORIDE 20 MILLIGRAM(S): 2.5 TABLET ORAL at 22:09

## 2024-01-01 RX ADMIN — Medication 3 MILLILITER(S): at 03:02

## 2024-01-01 RX ADMIN — MUPIROCIN 1 APPLICATION(S): 20 OINTMENT TOPICAL at 05:08

## 2024-01-01 RX ADMIN — Medication 1 DROP(S): at 13:14

## 2024-01-01 RX ADMIN — Medication 1 MILLIGRAM(S): at 16:56

## 2024-01-01 RX ADMIN — Medication 1000 MILLIGRAM(S): at 18:49

## 2024-01-01 RX ADMIN — Medication 37.7 MICROGRAM(S)/KG/MIN: at 10:15

## 2024-01-01 RX ADMIN — SODIUM CHLORIDE 4 MILLILITER(S): 9 INJECTION INTRAMUSCULAR; INTRAVENOUS; SUBCUTANEOUS at 15:29

## 2024-01-01 RX ADMIN — MIDODRINE HYDROCHLORIDE 30 MILLIGRAM(S): 2.5 TABLET ORAL at 13:01

## 2024-01-01 RX ADMIN — TAMSULOSIN HYDROCHLORIDE 0.4 MILLIGRAM(S): 0.4 CAPSULE ORAL at 21:45

## 2024-01-01 RX ADMIN — Medication 125 MILLIGRAM(S): at 13:44

## 2024-01-01 RX ADMIN — QUETIAPINE FUMARATE 225 MILLIGRAM(S): 200 TABLET, FILM COATED ORAL at 09:29

## 2024-01-01 RX ADMIN — Medication 650 MILLIGRAM(S): at 18:00

## 2024-01-01 RX ADMIN — Medication 1 DROP(S): at 05:06

## 2024-01-01 RX ADMIN — SODIUM CHLORIDE 250 MILLILITER(S): 9 INJECTION, SOLUTION INTRAVENOUS at 10:30

## 2024-01-01 RX ADMIN — MUPIROCIN 1 APPLICATION(S): 20 OINTMENT TOPICAL at 04:20

## 2024-01-01 RX ADMIN — SODIUM CHLORIDE 4 MILLILITER(S): 9 INJECTION INTRAMUSCULAR; INTRAVENOUS; SUBCUTANEOUS at 20:49

## 2024-01-01 RX ADMIN — Medication 166.67 MILLIGRAM(S): at 17:22

## 2024-01-01 RX ADMIN — Medication 125 MILLIGRAM(S): at 08:03

## 2024-01-01 RX ADMIN — SENNA PLUS 1 TABLET(S): 8.6 TABLET ORAL at 06:02

## 2024-01-01 RX ADMIN — ENOXAPARIN SODIUM 40 MILLIGRAM(S): 100 INJECTION SUBCUTANEOUS at 08:51

## 2024-01-01 RX ADMIN — Medication 100 GRAM(S): at 02:54

## 2024-01-01 RX ADMIN — ENOXAPARIN SODIUM 40 MILLIGRAM(S): 100 INJECTION SUBCUTANEOUS at 10:01

## 2024-01-01 RX ADMIN — ALBUTEROL 2.5 MILLIGRAM(S): 90 AEROSOL, METERED ORAL at 20:03

## 2024-01-01 RX ADMIN — Medication 250 MILLIGRAM(S): at 20:26

## 2024-01-01 RX ADMIN — Medication 1 DROP(S): at 14:11

## 2024-01-01 RX ADMIN — SENNA PLUS 1 TABLET(S): 8.6 TABLET ORAL at 17:53

## 2024-01-01 RX ADMIN — SODIUM CHLORIDE 4 MILLILITER(S): 9 INJECTION INTRAMUSCULAR; INTRAVENOUS; SUBCUTANEOUS at 09:22

## 2024-01-01 RX ADMIN — Medication 1 DROP(S): at 00:09

## 2024-01-01 RX ADMIN — Medication 250 MILLIGRAM(S): at 23:11

## 2024-01-01 RX ADMIN — PANTOPRAZOLE SODIUM 40 MILLIGRAM(S): 20 TABLET, DELAYED RELEASE ORAL at 12:45

## 2024-01-01 RX ADMIN — PIPERACILLIN AND TAZOBACTAM 25 GRAM(S): 4; .5 INJECTION, POWDER, LYOPHILIZED, FOR SOLUTION INTRAVENOUS at 21:10

## 2024-01-01 RX ADMIN — SODIUM CHLORIDE 4 MILLILITER(S): 9 INJECTION INTRAMUSCULAR; INTRAVENOUS; SUBCUTANEOUS at 02:35

## 2024-01-01 RX ADMIN — PIPERACILLIN AND TAZOBACTAM 25 GRAM(S): 4; .5 INJECTION, POWDER, LYOPHILIZED, FOR SOLUTION INTRAVENOUS at 21:19

## 2024-01-01 RX ADMIN — Medication 1 DROP(S): at 11:32

## 2024-01-01 RX ADMIN — ALBUTEROL 2.5 MILLIGRAM(S): 90 AEROSOL, METERED ORAL at 15:34

## 2024-01-01 RX ADMIN — Medication 3 MILLILITER(S): at 15:40

## 2024-01-01 RX ADMIN — ALBUTEROL 2.5 MILLIGRAM(S): 90 AEROSOL, METERED ORAL at 21:46

## 2024-01-01 RX ADMIN — ALBUTEROL 2.5 MILLIGRAM(S): 90 AEROSOL, METERED ORAL at 17:38

## 2024-01-01 RX ADMIN — Medication 4 MILLILITER(S): at 09:23

## 2024-01-01 RX ADMIN — Medication 1 DROP(S): at 23:33

## 2024-01-01 RX ADMIN — CHLORHEXIDINE GLUCONATE 1 APPLICATION(S): 213 SOLUTION TOPICAL at 12:22

## 2024-01-01 RX ADMIN — Medication 4 MILLILITER(S): at 20:31

## 2024-01-01 RX ADMIN — Medication 250 MILLIGRAM(S): at 21:32

## 2024-01-01 RX ADMIN — ALBUTEROL 2.5 MILLIGRAM(S): 90 AEROSOL, METERED ORAL at 12:25

## 2024-01-01 RX ADMIN — Medication 125 MILLIGRAM(S): at 13:02

## 2024-01-01 RX ADMIN — DEXMEDETOMIDINE HYDROCHLORIDE IN 0.9% SODIUM CHLORIDE 5.03 MICROGRAM(S)/KG/HR: 4 INJECTION INTRAVENOUS at 09:10

## 2024-01-01 RX ADMIN — MIDODRINE HYDROCHLORIDE 30 MILLIGRAM(S): 2.5 TABLET ORAL at 13:18

## 2024-01-01 RX ADMIN — Medication 250 MILLIGRAM(S): at 20:40

## 2024-01-01 RX ADMIN — HYDROMORPHONE HYDROCHLORIDE 0.2 MILLIGRAM(S): 2 INJECTION INTRAMUSCULAR; INTRAVENOUS; SUBCUTANEOUS at 22:55

## 2024-01-01 RX ADMIN — HYDROMORPHONE HYDROCHLORIDE 0.2 MILLIGRAM(S): 2 INJECTION INTRAMUSCULAR; INTRAVENOUS; SUBCUTANEOUS at 13:51

## 2024-01-01 RX ADMIN — LACTULOSE 20 GRAM(S): 10 SOLUTION ORAL at 13:02

## 2024-01-01 RX ADMIN — Medication 1 DROP(S): at 17:24

## 2024-01-01 RX ADMIN — Medication 4 MILLILITER(S): at 17:08

## 2024-01-01 RX ADMIN — MIDODRINE HYDROCHLORIDE 30 MILLIGRAM(S): 2.5 TABLET ORAL at 13:03

## 2024-01-01 RX ADMIN — HYDROMORPHONE HYDROCHLORIDE 0.2 MILLIGRAM(S): 2 INJECTION INTRAMUSCULAR; INTRAVENOUS; SUBCUTANEOUS at 05:09

## 2024-01-01 RX ADMIN — Medication 250 MILLIGRAM(S): at 07:48

## 2024-01-01 RX ADMIN — HYDROMORPHONE HYDROCHLORIDE 0.2 MILLIGRAM(S): 2 INJECTION INTRAMUSCULAR; INTRAVENOUS; SUBCUTANEOUS at 21:55

## 2024-01-01 RX ADMIN — Medication 3 MILLILITER(S): at 04:25

## 2024-01-01 RX ADMIN — ALBUTEROL 2.5 MILLIGRAM(S): 90 AEROSOL, METERED ORAL at 04:25

## 2024-01-01 RX ADMIN — Medication 4 MILLILITER(S): at 15:29

## 2024-01-01 RX ADMIN — SODIUM CHLORIDE 4 MILLILITER(S): 9 INJECTION INTRAMUSCULAR; INTRAVENOUS; SUBCUTANEOUS at 04:25

## 2024-01-01 RX ADMIN — Medication 1 DROP(S): at 05:58

## 2024-01-01 RX ADMIN — QUETIAPINE FUMARATE 225 MILLIGRAM(S): 200 TABLET, FILM COATED ORAL at 20:42

## 2024-01-01 RX ADMIN — ALBUTEROL 2.5 MILLIGRAM(S): 90 AEROSOL, METERED ORAL at 10:09

## 2024-01-01 RX ADMIN — Medication 1 MILLIGRAM(S): at 10:02

## 2024-01-01 RX ADMIN — LACTULOSE 20 GRAM(S): 10 SOLUTION ORAL at 21:03

## 2024-01-01 RX ADMIN — PANTOPRAZOLE SODIUM 40 MILLIGRAM(S): 20 TABLET, DELAYED RELEASE ORAL at 13:06

## 2024-01-01 RX ADMIN — Medication 4 MILLILITER(S): at 02:36

## 2024-01-01 RX ADMIN — Medication 250 MILLIGRAM(S): at 07:46

## 2024-01-01 RX ADMIN — QUETIAPINE FUMARATE 225 MILLIGRAM(S): 200 TABLET, FILM COATED ORAL at 08:42

## 2024-01-01 RX ADMIN — MIDODRINE HYDROCHLORIDE 20 MILLIGRAM(S): 2.5 TABLET ORAL at 06:14

## 2024-01-01 RX ADMIN — ALBUTEROL 2.5 MILLIGRAM(S): 90 AEROSOL, METERED ORAL at 16:03

## 2024-01-01 RX ADMIN — CHLORHEXIDINE GLUCONATE 15 MILLILITER(S): 213 SOLUTION TOPICAL at 17:04

## 2024-01-01 RX ADMIN — Medication 1 DROP(S): at 17:49

## 2024-01-01 RX ADMIN — MIDODRINE HYDROCHLORIDE 10 MILLIGRAM(S): 2.5 TABLET ORAL at 22:07

## 2024-01-01 RX ADMIN — DEXMEDETOMIDINE HYDROCHLORIDE IN 0.9% SODIUM CHLORIDE 8.38 MICROGRAM(S)/KG/HR: 4 INJECTION INTRAVENOUS at 19:53

## 2024-01-01 RX ADMIN — ENOXAPARIN SODIUM 40 MILLIGRAM(S): 100 INJECTION SUBCUTANEOUS at 10:17

## 2024-01-01 RX ADMIN — Medication 31.4 MICROGRAM(S)/KG/MIN: at 23:05

## 2024-01-01 RX ADMIN — SODIUM CHLORIDE 4 MILLILITER(S): 9 INJECTION INTRAMUSCULAR; INTRAVENOUS; SUBCUTANEOUS at 23:43

## 2024-01-01 RX ADMIN — Medication 3 MILLILITER(S): at 09:01

## 2024-01-01 RX ADMIN — MUPIROCIN 1 APPLICATION(S): 20 OINTMENT TOPICAL at 05:58

## 2024-01-01 RX ADMIN — OLANZAPINE 2.5 MILLIGRAM(S): 15 TABLET, FILM COATED ORAL at 11:41

## 2024-01-01 RX ADMIN — SENNA PLUS 1 TABLET(S): 8.6 TABLET ORAL at 17:07

## 2024-01-01 RX ADMIN — OLANZAPINE 2.5 MILLIGRAM(S): 15 TABLET, FILM COATED ORAL at 10:29

## 2024-01-01 RX ADMIN — MIDODRINE HYDROCHLORIDE 20 MILLIGRAM(S): 2.5 TABLET ORAL at 22:17

## 2024-01-01 RX ADMIN — ALBUTEROL 2.5 MILLIGRAM(S): 90 AEROSOL, METERED ORAL at 21:47

## 2024-01-01 RX ADMIN — LACTULOSE 20 GRAM(S): 10 SOLUTION ORAL at 05:26

## 2024-01-01 RX ADMIN — Medication 1 DROP(S): at 11:12

## 2024-01-01 RX ADMIN — POLYETHYLENE GLYCOL 3350 17 GRAM(S): 17 POWDER, FOR SOLUTION ORAL at 05:26

## 2024-01-01 RX ADMIN — Medication 1 MILLIGRAM(S): at 21:29

## 2024-01-01 RX ADMIN — Medication 4 MILLILITER(S): at 09:10

## 2024-01-01 RX ADMIN — CHLORHEXIDINE GLUCONATE 1 APPLICATION(S): 213 SOLUTION TOPICAL at 11:13

## 2024-01-01 RX ADMIN — Medication 1 DROP(S): at 00:24

## 2024-01-01 RX ADMIN — FENTANYL CITRATE 100 MICROGRAM(S): 50 INJECTION INTRAVENOUS at 06:42

## 2024-01-01 RX ADMIN — SODIUM CHLORIDE 4 MILLILITER(S): 9 INJECTION INTRAMUSCULAR; INTRAVENOUS; SUBCUTANEOUS at 07:44

## 2024-01-01 RX ADMIN — POTASSIUM PHOSPHATE, MONOBASIC POTASSIUM PHOSPHATE, DIBASIC 62.5 MILLIMOLE(S): 236; 224 INJECTION, SOLUTION INTRAVENOUS at 13:13

## 2024-01-01 RX ADMIN — Medication 125 MILLIGRAM(S): at 13:04

## 2024-01-01 RX ADMIN — Medication 1 MILLIGRAM(S): at 17:04

## 2024-01-01 RX ADMIN — Medication 250 MILLIGRAM(S): at 17:53

## 2024-01-01 RX ADMIN — Medication 1 DROP(S): at 12:56

## 2024-01-01 RX ADMIN — HYDROMORPHONE HYDROCHLORIDE 0.2 MILLIGRAM(S): 2 INJECTION INTRAMUSCULAR; INTRAVENOUS; SUBCUTANEOUS at 23:06

## 2024-01-01 RX ADMIN — PROPOFOL 4.02 MICROGRAM(S)/KG/MIN: 10 INJECTION, EMULSION INTRAVENOUS at 20:01

## 2024-01-01 RX ADMIN — Medication 1 DROP(S): at 00:50

## 2024-01-01 RX ADMIN — Medication 31.4 MICROGRAM(S)/KG/MIN: at 02:49

## 2024-01-01 RX ADMIN — PANTOPRAZOLE SODIUM 40 MILLIGRAM(S): 20 TABLET, DELAYED RELEASE ORAL at 11:12

## 2024-01-01 RX ADMIN — MIDODRINE HYDROCHLORIDE 20 MILLIGRAM(S): 2.5 TABLET ORAL at 23:21

## 2024-01-01 RX ADMIN — ALBUTEROL 2.5 MILLIGRAM(S): 90 AEROSOL, METERED ORAL at 20:51

## 2024-01-01 RX ADMIN — SODIUM CHLORIDE 4 MILLILITER(S): 9 INJECTION INTRAMUSCULAR; INTRAVENOUS; SUBCUTANEOUS at 16:39

## 2024-01-01 RX ADMIN — Medication 1 DROP(S): at 06:03

## 2024-01-01 RX ADMIN — CHLORHEXIDINE GLUCONATE 1 APPLICATION(S): 213 SOLUTION TOPICAL at 12:25

## 2024-01-01 RX ADMIN — Medication 5.03 MICROGRAM(S)/KG/MIN: at 20:01

## 2024-01-01 RX ADMIN — Medication 1 INCH(S): at 10:25

## 2024-01-01 RX ADMIN — Medication 1 DROP(S): at 12:02

## 2024-01-01 RX ADMIN — POTASSIUM PHOSPHATE, MONOBASIC POTASSIUM PHOSPHATE, DIBASIC 62.5 MILLIMOLE(S): 236; 224 INJECTION, SOLUTION INTRAVENOUS at 09:53

## 2024-01-01 RX ADMIN — Medication 1 DROP(S): at 17:12

## 2024-01-01 RX ADMIN — Medication 1 MILLIGRAM(S): at 04:20

## 2024-01-01 RX ADMIN — FENTANYL CITRATE 100 MICROGRAM(S): 50 INJECTION INTRAVENOUS at 07:12

## 2024-01-01 RX ADMIN — OLANZAPINE 2.5 MILLIGRAM(S): 15 TABLET, FILM COATED ORAL at 10:07

## 2024-01-01 RX ADMIN — MIDODRINE HYDROCHLORIDE 20 MILLIGRAM(S): 2.5 TABLET ORAL at 05:06

## 2024-01-01 RX ADMIN — CHLORHEXIDINE GLUCONATE 15 MILLILITER(S): 213 SOLUTION TOPICAL at 05:52

## 2024-01-01 RX ADMIN — MIDODRINE HYDROCHLORIDE 30 MILLIGRAM(S): 2.5 TABLET ORAL at 05:04

## 2024-01-01 RX ADMIN — ENOXAPARIN SODIUM 40 MILLIGRAM(S): 100 INJECTION SUBCUTANEOUS at 08:00

## 2024-01-01 RX ADMIN — TAMSULOSIN HYDROCHLORIDE 0.4 MILLIGRAM(S): 0.4 CAPSULE ORAL at 21:03

## 2024-01-01 RX ADMIN — QUETIAPINE FUMARATE 100 MILLIGRAM(S): 200 TABLET, FILM COATED ORAL at 21:45

## 2024-01-01 RX ADMIN — PIPERACILLIN AND TAZOBACTAM 25 GRAM(S): 4; .5 INJECTION, POWDER, LYOPHILIZED, FOR SOLUTION INTRAVENOUS at 21:29

## 2024-01-01 RX ADMIN — ALBUTEROL 2.5 MILLIGRAM(S): 90 AEROSOL, METERED ORAL at 21:18

## 2024-01-01 RX ADMIN — Medication 4 MILLILITER(S): at 21:47

## 2024-01-01 RX ADMIN — PIPERACILLIN AND TAZOBACTAM 25 GRAM(S): 4; .5 INJECTION, POWDER, LYOPHILIZED, FOR SOLUTION INTRAVENOUS at 05:07

## 2024-01-01 RX ADMIN — Medication 30.2 MICROGRAM(S)/KG/MIN: at 08:59

## 2024-01-01 RX ADMIN — CHLORHEXIDINE GLUCONATE 15 MILLILITER(S): 213 SOLUTION TOPICAL at 17:12

## 2024-01-01 RX ADMIN — MUPIROCIN 1 APPLICATION(S): 20 OINTMENT TOPICAL at 05:10

## 2024-01-01 RX ADMIN — LACTULOSE 20 GRAM(S): 10 SOLUTION ORAL at 14:55

## 2024-01-01 RX ADMIN — SODIUM CHLORIDE 4 MILLILITER(S): 9 INJECTION INTRAMUSCULAR; INTRAVENOUS; SUBCUTANEOUS at 09:32

## 2024-01-01 RX ADMIN — Medication 4 MILLILITER(S): at 22:59

## 2024-01-01 RX ADMIN — Medication 1 DROP(S): at 00:16

## 2024-01-01 RX ADMIN — SODIUM CHLORIDE 4 MILLILITER(S): 9 INJECTION INTRAMUSCULAR; INTRAVENOUS; SUBCUTANEOUS at 21:46

## 2024-01-01 RX ADMIN — Medication 1 DROP(S): at 12:22

## 2024-01-01 RX ADMIN — Medication 250 MILLIGRAM(S): at 17:11

## 2024-01-01 RX ADMIN — PIPERACILLIN AND TAZOBACTAM 25 GRAM(S): 4; .5 INJECTION, POWDER, LYOPHILIZED, FOR SOLUTION INTRAVENOUS at 13:00

## 2024-01-01 RX ADMIN — DEXMEDETOMIDINE HYDROCHLORIDE IN 0.9% SODIUM CHLORIDE 5.03 MICROGRAM(S)/KG/HR: 4 INJECTION INTRAVENOUS at 19:29

## 2024-01-01 RX ADMIN — Medication 125 MILLIGRAM(S): at 09:21

## 2024-01-01 RX ADMIN — SODIUM CHLORIDE 4 MILLILITER(S): 9 INJECTION INTRAMUSCULAR; INTRAVENOUS; SUBCUTANEOUS at 21:14

## 2024-01-01 RX ADMIN — HYDROMORPHONE HYDROCHLORIDE 0.2 MILLIGRAM(S): 2 INJECTION INTRAMUSCULAR; INTRAVENOUS; SUBCUTANEOUS at 00:00

## 2024-01-01 RX ADMIN — Medication 250 MILLIGRAM(S): at 06:01

## 2024-01-01 RX ADMIN — Medication 250 MILLIGRAM(S): at 17:43

## 2024-01-01 RX ADMIN — Medication 400 MILLIGRAM(S): at 16:06

## 2024-01-01 RX ADMIN — Medication 1 MILLIGRAM(S): at 21:19

## 2024-01-01 RX ADMIN — MUPIROCIN 1 APPLICATION(S): 20 OINTMENT TOPICAL at 05:05

## 2024-01-01 RX ADMIN — MUPIROCIN 1 APPLICATION(S): 20 OINTMENT TOPICAL at 18:23

## 2024-01-01 RX ADMIN — Medication 25 GRAM(S): at 09:09

## 2024-01-01 RX ADMIN — CHLORHEXIDINE GLUCONATE 15 MILLILITER(S): 213 SOLUTION TOPICAL at 05:18

## 2024-01-01 RX ADMIN — PROPOFOL 6.03 MICROGRAM(S)/KG/MIN: 10 INJECTION, EMULSION INTRAVENOUS at 19:28

## 2024-01-01 RX ADMIN — Medication 4 MILLILITER(S): at 23:43

## 2024-01-01 RX ADMIN — DROXIDOPA 300 MILLIGRAM(S): 100 CAPSULE ORAL at 11:51

## 2024-01-01 RX ADMIN — TAMSULOSIN HYDROCHLORIDE 0.4 MILLIGRAM(S): 0.4 CAPSULE ORAL at 21:05

## 2024-01-01 RX ADMIN — PROPOFOL 4.02 MICROGRAM(S)/KG/MIN: 10 INJECTION, EMULSION INTRAVENOUS at 21:24

## 2024-01-01 RX ADMIN — Medication 166.67 MILLIGRAM(S): at 05:53

## 2024-01-01 RX ADMIN — Medication 1 DROP(S): at 11:39

## 2024-01-01 RX ADMIN — CHLORHEXIDINE GLUCONATE 1 APPLICATION(S): 213 SOLUTION TOPICAL at 13:51

## 2024-01-01 RX ADMIN — ALBUTEROL 2.5 MILLIGRAM(S): 90 AEROSOL, METERED ORAL at 03:17

## 2024-01-01 RX ADMIN — HYDROMORPHONE HYDROCHLORIDE 0.2 MILLIGRAM(S): 2 INJECTION INTRAMUSCULAR; INTRAVENOUS; SUBCUTANEOUS at 06:25

## 2024-01-01 RX ADMIN — PANTOPRAZOLE SODIUM 40 MILLIGRAM(S): 20 TABLET, DELAYED RELEASE ORAL at 17:58

## 2024-01-01 RX ADMIN — Medication 4 MILLILITER(S): at 04:00

## 2024-01-01 RX ADMIN — SODIUM CHLORIDE 4 MILLILITER(S): 9 INJECTION INTRAMUSCULAR; INTRAVENOUS; SUBCUTANEOUS at 20:51

## 2024-01-01 RX ADMIN — Medication 4 MILLILITER(S): at 03:58

## 2024-01-01 RX ADMIN — MIDODRINE HYDROCHLORIDE 30 MILLIGRAM(S): 2.5 TABLET ORAL at 05:05

## 2024-01-01 RX ADMIN — CHLORHEXIDINE GLUCONATE 1 APPLICATION(S): 213 SOLUTION TOPICAL at 12:37

## 2024-01-01 RX ADMIN — ALBUTEROL 2.5 MILLIGRAM(S): 90 AEROSOL, METERED ORAL at 21:13

## 2024-01-01 RX ADMIN — QUETIAPINE FUMARATE 100 MILLIGRAM(S): 200 TABLET, FILM COATED ORAL at 05:57

## 2024-01-01 RX ADMIN — SODIUM CHLORIDE 4 MILLILITER(S): 9 INJECTION INTRAMUSCULAR; INTRAVENOUS; SUBCUTANEOUS at 04:46

## 2024-01-01 RX ADMIN — SODIUM CHLORIDE 4 MILLILITER(S): 9 INJECTION INTRAMUSCULAR; INTRAVENOUS; SUBCUTANEOUS at 09:01

## 2024-01-01 RX ADMIN — Medication 1 DROP(S): at 23:07

## 2024-01-01 RX ADMIN — Medication 1 DROP(S): at 18:02

## 2024-01-01 RX ADMIN — ALBUTEROL 2.5 MILLIGRAM(S): 90 AEROSOL, METERED ORAL at 15:18

## 2024-01-01 RX ADMIN — SODIUM CHLORIDE 250 MILLILITER(S): 9 INJECTION, SOLUTION INTRAVENOUS at 17:09

## 2024-01-01 RX ADMIN — SODIUM CHLORIDE 50 MILLILITER(S): 9 INJECTION, SOLUTION INTRAVENOUS at 04:21

## 2024-01-01 RX ADMIN — Medication 166.67 MILLIGRAM(S): at 05:05

## 2024-01-01 RX ADMIN — Medication 3 MILLILITER(S): at 22:22

## 2024-01-01 RX ADMIN — Medication 125 MILLIGRAM(S): at 14:06

## 2024-01-01 RX ADMIN — DEXMEDETOMIDINE HYDROCHLORIDE IN 0.9% SODIUM CHLORIDE 8.38 MICROGRAM(S)/KG/HR: 4 INJECTION INTRAVENOUS at 05:06

## 2024-01-01 RX ADMIN — QUETIAPINE FUMARATE 225 MILLIGRAM(S): 200 TABLET, FILM COATED ORAL at 08:29

## 2024-01-01 RX ADMIN — Medication 3 MILLILITER(S): at 15:10

## 2024-01-01 RX ADMIN — Medication 4 MILLILITER(S): at 02:31

## 2024-01-01 RX ADMIN — Medication 1 MILLIGRAM(S): at 17:26

## 2024-01-01 RX ADMIN — MIDODRINE HYDROCHLORIDE 20 MILLIGRAM(S): 2.5 TABLET ORAL at 06:54

## 2024-01-01 RX ADMIN — DEXMEDETOMIDINE HYDROCHLORIDE IN 0.9% SODIUM CHLORIDE 8.38 MICROGRAM(S)/KG/HR: 4 INJECTION INTRAVENOUS at 02:49

## 2024-01-01 RX ADMIN — Medication 1000 MILLIGRAM(S): at 10:10

## 2024-01-01 RX ADMIN — DROXIDOPA 300 MILLIGRAM(S): 100 CAPSULE ORAL at 06:45

## 2024-01-01 RX ADMIN — SODIUM CHLORIDE 4 MILLILITER(S): 9 INJECTION INTRAMUSCULAR; INTRAVENOUS; SUBCUTANEOUS at 03:30

## 2024-01-01 RX ADMIN — FENTANYL CITRATE 3.35 MICROGRAM(S)/KG/HR: 50 INJECTION INTRAVENOUS at 08:22

## 2024-01-01 RX ADMIN — SENNA PLUS 1 TABLET(S): 8.6 TABLET ORAL at 05:59

## 2024-01-01 RX ADMIN — Medication 400 MILLIGRAM(S): at 04:33

## 2024-01-01 RX ADMIN — Medication 1 MILLIGRAM(S): at 17:23

## 2024-01-01 RX ADMIN — HYDROMORPHONE HYDROCHLORIDE 0.2 MILLIGRAM(S): 2 INJECTION INTRAMUSCULAR; INTRAVENOUS; SUBCUTANEOUS at 05:12

## 2024-01-01 RX ADMIN — Medication 1 DROP(S): at 11:19

## 2024-01-01 RX ADMIN — Medication 3 MILLILITER(S): at 08:57

## 2024-01-01 RX ADMIN — MIDODRINE HYDROCHLORIDE 30 MILLIGRAM(S): 2.5 TABLET ORAL at 14:55

## 2024-01-01 RX ADMIN — Medication 125 MILLIGRAM(S): at 15:10

## 2024-01-01 RX ADMIN — SODIUM CHLORIDE 4 MILLILITER(S): 9 INJECTION INTRAMUSCULAR; INTRAVENOUS; SUBCUTANEOUS at 21:47

## 2024-01-01 RX ADMIN — Medication 1 DROP(S): at 23:27

## 2024-01-01 RX ADMIN — TAMSULOSIN HYDROCHLORIDE 0.4 MILLIGRAM(S): 0.4 CAPSULE ORAL at 23:12

## 2024-01-01 RX ADMIN — ALBUTEROL 2.5 MILLIGRAM(S): 90 AEROSOL, METERED ORAL at 22:59

## 2024-01-01 RX ADMIN — SODIUM CHLORIDE 4 MILLILITER(S): 9 INJECTION INTRAMUSCULAR; INTRAVENOUS; SUBCUTANEOUS at 15:04

## 2024-01-01 RX ADMIN — LACTULOSE 20 GRAM(S): 10 SOLUTION ORAL at 13:30

## 2024-01-01 RX ADMIN — PIPERACILLIN AND TAZOBACTAM 25 GRAM(S): 4; .5 INJECTION, POWDER, LYOPHILIZED, FOR SOLUTION INTRAVENOUS at 14:47

## 2024-01-01 RX ADMIN — POLYETHYLENE GLYCOL 3350 17 GRAM(S): 17 POWDER, FOR SOLUTION ORAL at 18:28

## 2024-01-01 RX ADMIN — Medication 1 DROP(S): at 00:56

## 2024-01-01 RX ADMIN — Medication 125 MILLIGRAM(S): at 13:23

## 2024-01-01 RX ADMIN — SODIUM CHLORIDE 4 MILLILITER(S): 9 INJECTION INTRAMUSCULAR; INTRAVENOUS; SUBCUTANEOUS at 22:40

## 2024-01-01 RX ADMIN — SODIUM CHLORIDE 4 MILLILITER(S): 9 INJECTION INTRAMUSCULAR; INTRAVENOUS; SUBCUTANEOUS at 09:23

## 2024-01-01 RX ADMIN — HYDROMORPHONE HYDROCHLORIDE 0.2 MILLIGRAM(S): 2 INJECTION INTRAMUSCULAR; INTRAVENOUS; SUBCUTANEOUS at 14:51

## 2024-01-01 RX ADMIN — Medication 4 MILLILITER(S): at 21:46

## 2024-01-01 RX ADMIN — SODIUM CHLORIDE 4 MILLILITER(S): 9 INJECTION INTRAMUSCULAR; INTRAVENOUS; SUBCUTANEOUS at 07:46

## 2024-01-01 RX ADMIN — MIDODRINE HYDROCHLORIDE 30 MILLIGRAM(S): 2.5 TABLET ORAL at 12:37

## 2024-01-01 RX ADMIN — SODIUM CHLORIDE 4 MILLILITER(S): 9 INJECTION INTRAMUSCULAR; INTRAVENOUS; SUBCUTANEOUS at 09:10

## 2024-01-01 RX ADMIN — SENNA PLUS 1 TABLET(S): 8.6 TABLET ORAL at 17:28

## 2024-01-01 RX ADMIN — DROXIDOPA 300 MILLIGRAM(S): 100 CAPSULE ORAL at 12:56

## 2024-01-01 RX ADMIN — LACTULOSE 20 GRAM(S): 10 SOLUTION ORAL at 05:18

## 2024-01-01 RX ADMIN — Medication 250 MILLIGRAM(S): at 17:33

## 2024-01-01 RX ADMIN — Medication 20 MILLIEQUIVALENT(S): at 11:42

## 2024-01-01 RX ADMIN — SODIUM CHLORIDE 4 MILLILITER(S): 9 INJECTION INTRAMUSCULAR; INTRAVENOUS; SUBCUTANEOUS at 09:42

## 2024-01-01 RX ADMIN — Medication 125 MILLIGRAM(S): at 14:34

## 2024-01-01 RX ADMIN — DROXIDOPA 300 MILLIGRAM(S): 100 CAPSULE ORAL at 17:28

## 2024-01-01 RX ADMIN — Medication 250 MILLIGRAM(S): at 19:50

## 2024-01-01 RX ADMIN — Medication 4 MILLILITER(S): at 03:30

## 2024-01-01 RX ADMIN — Medication 1 MILLIGRAM(S): at 18:11

## 2024-01-01 RX ADMIN — Medication 1 MILLIGRAM(S): at 18:41

## 2024-01-01 RX ADMIN — Medication 1 MILLIGRAM(S): at 05:33

## 2024-01-01 RX ADMIN — LACTULOSE 20 GRAM(S): 10 SOLUTION ORAL at 05:05

## 2024-01-01 RX ADMIN — Medication 1 DROP(S): at 05:05

## 2024-01-01 RX ADMIN — ENOXAPARIN SODIUM 40 MILLIGRAM(S): 100 INJECTION SUBCUTANEOUS at 10:15

## 2024-01-01 RX ADMIN — HYDROMORPHONE HYDROCHLORIDE 0.2 MILLIGRAM(S): 2 INJECTION INTRAMUSCULAR; INTRAVENOUS; SUBCUTANEOUS at 13:32

## 2024-01-01 RX ADMIN — SODIUM CHLORIDE 4 MILLILITER(S): 9 INJECTION INTRAMUSCULAR; INTRAVENOUS; SUBCUTANEOUS at 10:56

## 2024-01-01 RX ADMIN — Medication 4 MILLILITER(S): at 17:39

## 2024-01-01 RX ADMIN — SODIUM CHLORIDE 50 MILLILITER(S): 9 INJECTION, SOLUTION INTRAVENOUS at 09:02

## 2024-01-01 RX ADMIN — ENOXAPARIN SODIUM 40 MILLIGRAM(S): 100 INJECTION SUBCUTANEOUS at 08:53

## 2024-01-01 RX ADMIN — ALBUTEROL 2.5 MILLIGRAM(S): 90 AEROSOL, METERED ORAL at 11:30

## 2024-01-01 RX ADMIN — Medication 1 DROP(S): at 05:26

## 2024-01-01 RX ADMIN — QUETIAPINE FUMARATE 225 MILLIGRAM(S): 200 TABLET, FILM COATED ORAL at 09:19

## 2024-01-01 RX ADMIN — SODIUM CHLORIDE 4 MILLILITER(S): 9 INJECTION INTRAMUSCULAR; INTRAVENOUS; SUBCUTANEOUS at 15:34

## 2024-01-01 RX ADMIN — TAMSULOSIN HYDROCHLORIDE 0.4 MILLIGRAM(S): 0.4 CAPSULE ORAL at 00:22

## 2024-01-01 RX ADMIN — ALBUTEROL 2.5 MILLIGRAM(S): 90 AEROSOL, METERED ORAL at 09:23

## 2024-01-01 RX ADMIN — POLYETHYLENE GLYCOL 3350 17 GRAM(S): 17 POWDER, FOR SOLUTION ORAL at 17:25

## 2024-01-01 RX ADMIN — MIDODRINE HYDROCHLORIDE 30 MILLIGRAM(S): 2.5 TABLET ORAL at 21:52

## 2024-01-01 RX ADMIN — PROPOFOL 6.03 MICROGRAM(S)/KG/MIN: 10 INJECTION, EMULSION INTRAVENOUS at 18:45

## 2024-01-01 RX ADMIN — Medication 250 MILLIGRAM(S): at 21:05

## 2024-01-01 RX ADMIN — MIDODRINE HYDROCHLORIDE 20 MILLIGRAM(S): 2.5 TABLET ORAL at 13:10

## 2024-01-01 RX ADMIN — Medication 4 MILLILITER(S): at 03:43

## 2024-01-01 RX ADMIN — Medication 3 MILLILITER(S): at 15:29

## 2024-01-01 RX ADMIN — PIPERACILLIN AND TAZOBACTAM 25 GRAM(S): 4; .5 INJECTION, POWDER, LYOPHILIZED, FOR SOLUTION INTRAVENOUS at 05:06

## 2024-01-01 RX ADMIN — Medication 1 DROP(S): at 04:20

## 2024-01-01 RX ADMIN — Medication 3 MILLILITER(S): at 15:53

## 2024-01-01 RX ADMIN — Medication 1 DROP(S): at 11:36

## 2024-01-01 RX ADMIN — Medication 4 MILLILITER(S): at 20:51

## 2024-01-01 RX ADMIN — SODIUM CHLORIDE 4 MILLILITER(S): 9 INJECTION INTRAMUSCULAR; INTRAVENOUS; SUBCUTANEOUS at 20:13

## 2024-01-01 RX ADMIN — ALBUTEROL 2.5 MILLIGRAM(S): 90 AEROSOL, METERED ORAL at 21:27

## 2024-01-01 RX ADMIN — Medication 4 MILLILITER(S): at 09:15

## 2024-01-01 RX ADMIN — Medication 1 DROP(S): at 23:04

## 2024-01-01 RX ADMIN — MIDODRINE HYDROCHLORIDE 30 MILLIGRAM(S): 2.5 TABLET ORAL at 21:36

## 2024-01-01 RX ADMIN — OLANZAPINE 2.5 MILLIGRAM(S): 15 TABLET, FILM COATED ORAL at 02:42

## 2024-01-01 RX ADMIN — ENOXAPARIN SODIUM 40 MILLIGRAM(S): 100 INJECTION SUBCUTANEOUS at 09:10

## 2024-01-01 RX ADMIN — POLYETHYLENE GLYCOL 3350 17 GRAM(S): 17 POWDER, FOR SOLUTION ORAL at 05:34

## 2024-01-01 RX ADMIN — Medication 1 MILLIGRAM(S): at 18:10

## 2024-01-01 RX ADMIN — Medication 1 DROP(S): at 16:56

## 2024-01-01 RX ADMIN — MIDODRINE HYDROCHLORIDE 10 MILLIGRAM(S): 2.5 TABLET ORAL at 13:06

## 2024-01-01 RX ADMIN — Medication 3 MILLILITER(S): at 03:42

## 2024-01-01 RX ADMIN — LACTULOSE 20 GRAM(S): 10 SOLUTION ORAL at 05:33

## 2024-01-01 RX ADMIN — QUETIAPINE FUMARATE 225 MILLIGRAM(S): 200 TABLET, FILM COATED ORAL at 20:24

## 2024-01-01 RX ADMIN — ALBUTEROL 2.5 MILLIGRAM(S): 90 AEROSOL, METERED ORAL at 16:37

## 2024-01-01 RX ADMIN — HYDROMORPHONE HYDROCHLORIDE 0.2 MILLIGRAM(S): 2 INJECTION INTRAMUSCULAR; INTRAVENOUS; SUBCUTANEOUS at 13:59

## 2024-01-01 RX ADMIN — SODIUM CHLORIDE 4 MILLILITER(S): 9 INJECTION INTRAMUSCULAR; INTRAVENOUS; SUBCUTANEOUS at 21:16

## 2024-01-01 RX ADMIN — HYDROMORPHONE HYDROCHLORIDE 0.2 MILLIGRAM(S): 2 INJECTION INTRAMUSCULAR; INTRAVENOUS; SUBCUTANEOUS at 22:10

## 2024-01-01 RX ADMIN — DROXIDOPA 300 MILLIGRAM(S): 100 CAPSULE ORAL at 19:19

## 2024-01-01 RX ADMIN — Medication 4 MILLILITER(S): at 07:46

## 2024-01-01 RX ADMIN — OLANZAPINE 2.5 MILLIGRAM(S): 15 TABLET, FILM COATED ORAL at 01:03

## 2024-01-01 RX ADMIN — Medication 1 DROP(S): at 17:42

## 2024-01-01 RX ADMIN — PIPERACILLIN AND TAZOBACTAM 25 GRAM(S): 4; .5 INJECTION, POWDER, LYOPHILIZED, FOR SOLUTION INTRAVENOUS at 13:07

## 2024-01-01 RX ADMIN — SODIUM CHLORIDE 4 MILLILITER(S): 9 INJECTION INTRAMUSCULAR; INTRAVENOUS; SUBCUTANEOUS at 09:17

## 2024-01-01 RX ADMIN — QUETIAPINE FUMARATE 225 MILLIGRAM(S): 200 TABLET, FILM COATED ORAL at 21:10

## 2024-01-01 RX ADMIN — Medication 1 DROP(S): at 05:04

## 2024-01-01 RX ADMIN — Medication 250 MILLIGRAM(S): at 21:35

## 2024-01-01 RX ADMIN — Medication 166.67 MILLIGRAM(S): at 18:43

## 2024-01-01 RX ADMIN — SENNA PLUS 1 TABLET(S): 8.6 TABLET ORAL at 05:52

## 2024-01-01 RX ADMIN — SODIUM CHLORIDE 4 MILLILITER(S): 9 INJECTION INTRAMUSCULAR; INTRAVENOUS; SUBCUTANEOUS at 15:15

## 2024-01-01 RX ADMIN — Medication 3 MILLILITER(S): at 09:00

## 2024-01-01 RX ADMIN — SODIUM CHLORIDE 4 MILLILITER(S): 9 INJECTION INTRAMUSCULAR; INTRAVENOUS; SUBCUTANEOUS at 22:58

## 2024-01-01 RX ADMIN — Medication 3 MILLILITER(S): at 03:55

## 2024-01-01 RX ADMIN — LACTULOSE 20 GRAM(S): 10 SOLUTION ORAL at 06:55

## 2024-01-01 RX ADMIN — POLYETHYLENE GLYCOL 3350 17 GRAM(S): 17 POWDER, FOR SOLUTION ORAL at 16:56

## 2024-01-01 RX ADMIN — SODIUM CHLORIDE 4 MILLILITER(S): 9 INJECTION INTRAMUSCULAR; INTRAVENOUS; SUBCUTANEOUS at 08:53

## 2024-01-01 RX ADMIN — DROXIDOPA 300 MILLIGRAM(S): 100 CAPSULE ORAL at 06:01

## 2024-01-01 RX ADMIN — CHLORHEXIDINE GLUCONATE 1 APPLICATION(S): 213 SOLUTION TOPICAL at 17:19

## 2024-01-01 RX ADMIN — TAMSULOSIN HYDROCHLORIDE 0.4 MILLIGRAM(S): 0.4 CAPSULE ORAL at 21:11

## 2024-01-01 RX ADMIN — Medication 4 MILLILITER(S): at 02:34

## 2024-01-01 RX ADMIN — POLYETHYLENE GLYCOL 3350 17 GRAM(S): 17 POWDER, FOR SOLUTION ORAL at 17:30

## 2024-01-01 RX ADMIN — SODIUM CHLORIDE 4 MILLILITER(S): 9 INJECTION INTRAMUSCULAR; INTRAVENOUS; SUBCUTANEOUS at 03:36

## 2024-01-01 RX ADMIN — Medication 4 MILLILITER(S): at 15:46

## 2024-01-01 RX ADMIN — Medication 1 DROP(S): at 17:44

## 2024-01-01 RX ADMIN — Medication 250 MILLIGRAM(S): at 05:35

## 2024-01-01 RX ADMIN — ALBUTEROL 2.5 MILLIGRAM(S): 90 AEROSOL, METERED ORAL at 21:23

## 2024-01-01 RX ADMIN — ENOXAPARIN SODIUM 40 MILLIGRAM(S): 100 INJECTION SUBCUTANEOUS at 08:08

## 2024-01-01 RX ADMIN — QUETIAPINE FUMARATE 100 MILLIGRAM(S): 200 TABLET, FILM COATED ORAL at 22:22

## 2024-01-01 RX ADMIN — Medication 125 MILLIGRAM(S): at 13:51

## 2024-01-01 RX ADMIN — HYDROMORPHONE HYDROCHLORIDE 0.2 MILLIGRAM(S): 2 INJECTION INTRAMUSCULAR; INTRAVENOUS; SUBCUTANEOUS at 21:10

## 2024-01-01 RX ADMIN — Medication 4 MILLILITER(S): at 15:12

## 2024-01-01 RX ADMIN — SENNA PLUS 1 TABLET(S): 8.6 TABLET ORAL at 05:40

## 2024-01-01 RX ADMIN — OLANZAPINE 2.5 MILLIGRAM(S): 15 TABLET, FILM COATED ORAL at 10:17

## 2024-01-01 RX ADMIN — Medication 1 DROP(S): at 13:10

## 2024-01-01 RX ADMIN — Medication 1 MILLIGRAM(S): at 05:00

## 2024-01-01 RX ADMIN — Medication 3 MILLILITER(S): at 15:33

## 2024-01-01 RX ADMIN — POLYETHYLENE GLYCOL 3350 17 GRAM(S): 17 POWDER, FOR SOLUTION ORAL at 17:11

## 2024-01-01 RX ADMIN — DROXIDOPA 300 MILLIGRAM(S): 100 CAPSULE ORAL at 17:36

## 2024-01-01 RX ADMIN — QUETIAPINE FUMARATE 225 MILLIGRAM(S): 200 TABLET, FILM COATED ORAL at 21:35

## 2024-01-01 RX ADMIN — Medication 40 MILLIEQUIVALENT(S): at 13:04

## 2024-01-01 RX ADMIN — Medication 1 MILLIGRAM(S): at 23:53

## 2024-01-01 RX ADMIN — PANTOPRAZOLE SODIUM 40 MILLIGRAM(S): 20 TABLET, DELAYED RELEASE ORAL at 12:20

## 2024-01-01 RX ADMIN — LACTULOSE 20 GRAM(S): 10 SOLUTION ORAL at 17:13

## 2024-01-01 RX ADMIN — LACTULOSE 20 GRAM(S): 10 SOLUTION ORAL at 13:03

## 2024-01-01 RX ADMIN — PIPERACILLIN AND TAZOBACTAM 200 GRAM(S): 4; .5 INJECTION, POWDER, LYOPHILIZED, FOR SOLUTION INTRAVENOUS at 08:03

## 2024-01-01 RX ADMIN — Medication 3 MILLILITER(S): at 23:25

## 2024-01-01 RX ADMIN — Medication 20 MILLIEQUIVALENT(S): at 14:55

## 2024-01-01 RX ADMIN — POLYETHYLENE GLYCOL 3350 17 GRAM(S): 17 POWDER, FOR SOLUTION ORAL at 05:07

## 2024-01-01 RX ADMIN — MIDODRINE HYDROCHLORIDE 20 MILLIGRAM(S): 2.5 TABLET ORAL at 13:59

## 2024-01-01 RX ADMIN — QUETIAPINE FUMARATE 225 MILLIGRAM(S): 200 TABLET, FILM COATED ORAL at 09:22

## 2024-01-01 RX ADMIN — DROXIDOPA 300 MILLIGRAM(S): 100 CAPSULE ORAL at 11:33

## 2024-01-01 RX ADMIN — PIPERACILLIN AND TAZOBACTAM 25 GRAM(S): 4; .5 INJECTION, POWDER, LYOPHILIZED, FOR SOLUTION INTRAVENOUS at 06:42

## 2024-01-01 RX ADMIN — Medication 1 DROP(S): at 18:06

## 2024-01-01 RX ADMIN — QUETIAPINE FUMARATE 225 MILLIGRAM(S): 200 TABLET, FILM COATED ORAL at 21:05

## 2024-01-01 RX ADMIN — PIPERACILLIN AND TAZOBACTAM 25 GRAM(S): 4; .5 INJECTION, POWDER, LYOPHILIZED, FOR SOLUTION INTRAVENOUS at 06:40

## 2024-01-01 RX ADMIN — PIPERACILLIN AND TAZOBACTAM 25 GRAM(S): 4; .5 INJECTION, POWDER, LYOPHILIZED, FOR SOLUTION INTRAVENOUS at 14:23

## 2024-01-01 RX ADMIN — CHLORHEXIDINE GLUCONATE 15 MILLILITER(S): 213 SOLUTION TOPICAL at 05:14

## 2024-01-01 RX ADMIN — CHLORHEXIDINE GLUCONATE 1 APPLICATION(S): 213 SOLUTION TOPICAL at 18:28

## 2024-01-01 RX ADMIN — QUETIAPINE FUMARATE 225 MILLIGRAM(S): 200 TABLET, FILM COATED ORAL at 09:48

## 2024-01-01 RX ADMIN — Medication 4 MILLILITER(S): at 15:21

## 2024-01-01 RX ADMIN — Medication 125 MILLIGRAM(S): at 14:56

## 2024-01-01 RX ADMIN — SODIUM CHLORIDE 4 MILLILITER(S): 9 INJECTION INTRAMUSCULAR; INTRAVENOUS; SUBCUTANEOUS at 03:59

## 2024-01-01 RX ADMIN — MIDODRINE HYDROCHLORIDE 20 MILLIGRAM(S): 2.5 TABLET ORAL at 22:23

## 2024-01-01 RX ADMIN — HYDROMORPHONE HYDROCHLORIDE 0.2 MILLIGRAM(S): 2 INJECTION INTRAMUSCULAR; INTRAVENOUS; SUBCUTANEOUS at 05:00

## 2024-01-01 RX ADMIN — Medication 3 MILLILITER(S): at 03:07

## 2024-01-01 RX ADMIN — PIPERACILLIN AND TAZOBACTAM 25 GRAM(S): 4; .5 INJECTION, POWDER, LYOPHILIZED, FOR SOLUTION INTRAVENOUS at 05:36

## 2024-01-01 RX ADMIN — ALBUTEROL 2.5 MILLIGRAM(S): 90 AEROSOL, METERED ORAL at 09:16

## 2024-01-01 RX ADMIN — MIDODRINE HYDROCHLORIDE 20 MILLIGRAM(S): 2.5 TABLET ORAL at 14:06

## 2024-01-01 RX ADMIN — ALBUTEROL 2.5 MILLIGRAM(S): 90 AEROSOL, METERED ORAL at 09:17

## 2024-01-01 RX ADMIN — SENNA PLUS 1 TABLET(S): 8.6 TABLET ORAL at 17:27

## 2024-01-01 RX ADMIN — Medication 4 MILLILITER(S): at 11:30

## 2024-01-01 RX ADMIN — Medication 1 MILLIGRAM(S): at 22:17

## 2024-01-01 RX ADMIN — Medication 1 MILLIGRAM(S): at 17:36

## 2024-01-01 RX ADMIN — Medication 250 MILLIGRAM(S): at 17:10

## 2024-01-01 RX ADMIN — POLYETHYLENE GLYCOL 3350 17 GRAM(S): 17 POWDER, FOR SOLUTION ORAL at 17:18

## 2024-01-01 RX ADMIN — MIDODRINE HYDROCHLORIDE 30 MILLIGRAM(S): 2.5 TABLET ORAL at 05:06

## 2024-01-01 RX ADMIN — OLANZAPINE 2.5 MILLIGRAM(S): 15 TABLET, FILM COATED ORAL at 21:33

## 2024-01-01 RX ADMIN — Medication 2 PACKET(S): at 09:09

## 2024-01-01 RX ADMIN — Medication 3 MILLILITER(S): at 20:01

## 2024-01-01 RX ADMIN — Medication 3 MILLILITER(S): at 15:58

## 2024-01-01 RX ADMIN — PROPOFOL 20.1 MICROGRAM(S)/KG/MIN: 10 INJECTION, EMULSION INTRAVENOUS at 07:48

## 2024-01-01 RX ADMIN — HYDROMORPHONE HYDROCHLORIDE 0.2 MILLIGRAM(S): 2 INJECTION INTRAMUSCULAR; INTRAVENOUS; SUBCUTANEOUS at 05:25

## 2024-01-01 RX ADMIN — Medication 4 MILLILITER(S): at 10:10

## 2024-01-01 RX ADMIN — ALBUTEROL 2.5 MILLIGRAM(S): 90 AEROSOL, METERED ORAL at 09:44

## 2024-01-01 RX ADMIN — Medication 1 DROP(S): at 06:04

## 2024-01-01 RX ADMIN — Medication 3 MILLILITER(S): at 09:53

## 2024-01-01 RX ADMIN — SENNA PLUS 1 TABLET(S): 8.6 TABLET ORAL at 17:25

## 2024-01-01 RX ADMIN — POLYETHYLENE GLYCOL 3350 17 GRAM(S): 17 POWDER, FOR SOLUTION ORAL at 05:35

## 2024-01-01 RX ADMIN — Medication 3 MILLILITER(S): at 04:02

## 2024-01-01 RX ADMIN — Medication 250 MILLIGRAM(S): at 20:24

## 2024-01-01 RX ADMIN — Medication 4 MILLILITER(S): at 20:03

## 2024-01-01 RX ADMIN — CHLORHEXIDINE GLUCONATE 15 MILLILITER(S): 213 SOLUTION TOPICAL at 05:25

## 2024-01-01 RX ADMIN — ALBUTEROL 2.5 MILLIGRAM(S): 90 AEROSOL, METERED ORAL at 03:59

## 2024-01-01 RX ADMIN — SENNA PLUS 1 TABLET(S): 8.6 TABLET ORAL at 05:34

## 2024-01-01 RX ADMIN — DEXMEDETOMIDINE HYDROCHLORIDE IN 0.9% SODIUM CHLORIDE 8.38 MICROGRAM(S)/KG/HR: 4 INJECTION INTRAVENOUS at 08:02

## 2024-01-01 RX ADMIN — Medication 1 DROP(S): at 03:09

## 2024-01-01 RX ADMIN — Medication 3 MILLILITER(S): at 02:21

## 2024-01-01 RX ADMIN — Medication 3 MILLILITER(S): at 15:12

## 2024-01-01 RX ADMIN — Medication 1 DROP(S): at 05:36

## 2024-01-01 RX ADMIN — CHLORHEXIDINE GLUCONATE 1 APPLICATION(S): 213 SOLUTION TOPICAL at 12:28

## 2024-01-01 RX ADMIN — Medication 1 DROP(S): at 17:35

## 2024-01-01 RX ADMIN — ALBUTEROL 2.5 MILLIGRAM(S): 90 AEROSOL, METERED ORAL at 17:08

## 2024-01-01 RX ADMIN — Medication 37.7 MICROGRAM(S)/KG/MIN: at 09:11

## 2024-01-01 RX ADMIN — CHLORHEXIDINE GLUCONATE 1 APPLICATION(S): 213 SOLUTION TOPICAL at 11:39

## 2024-01-01 RX ADMIN — SODIUM CHLORIDE 500 MILLILITER(S): 9 INJECTION, SOLUTION INTRAVENOUS at 16:47

## 2024-01-01 RX ADMIN — Medication 250 MILLIGRAM(S): at 19:23

## 2024-01-01 RX ADMIN — Medication 1 DROP(S): at 05:18

## 2024-01-01 RX ADMIN — DROXIDOPA 300 MILLIGRAM(S): 100 CAPSULE ORAL at 11:45

## 2024-01-01 RX ADMIN — MUPIROCIN 1 APPLICATION(S): 20 OINTMENT TOPICAL at 17:29

## 2024-01-01 RX ADMIN — LACTULOSE 20 GRAM(S): 10 SOLUTION ORAL at 05:40

## 2024-01-01 RX ADMIN — PIPERACILLIN AND TAZOBACTAM 25 GRAM(S): 4; .5 INJECTION, POWDER, LYOPHILIZED, FOR SOLUTION INTRAVENOUS at 22:07

## 2024-01-01 RX ADMIN — ALBUTEROL 2.5 MILLIGRAM(S): 90 AEROSOL, METERED ORAL at 08:41

## 2024-01-01 RX ADMIN — OLANZAPINE 2.5 MILLIGRAM(S): 15 TABLET, FILM COATED ORAL at 21:08

## 2024-01-01 RX ADMIN — POTASSIUM PHOSPHATE, MONOBASIC POTASSIUM PHOSPHATE, DIBASIC 62.5 MILLIMOLE(S): 236; 224 INJECTION, SOLUTION INTRAVENOUS at 16:20

## 2024-01-01 RX ADMIN — MIDODRINE HYDROCHLORIDE 30 MILLIGRAM(S): 2.5 TABLET ORAL at 05:09

## 2024-01-01 RX ADMIN — MIDODRINE HYDROCHLORIDE 30 MILLIGRAM(S): 2.5 TABLET ORAL at 21:45

## 2024-01-01 RX ADMIN — PIPERACILLIN AND TAZOBACTAM 25 GRAM(S): 4; .5 INJECTION, POWDER, LYOPHILIZED, FOR SOLUTION INTRAVENOUS at 21:15

## 2024-01-01 RX ADMIN — PIPERACILLIN AND TAZOBACTAM 25 GRAM(S): 4; .5 INJECTION, POWDER, LYOPHILIZED, FOR SOLUTION INTRAVENOUS at 21:30

## 2024-01-01 RX ADMIN — MUPIROCIN 1 APPLICATION(S): 20 OINTMENT TOPICAL at 17:04

## 2024-01-01 RX ADMIN — Medication 250 MILLIGRAM(S): at 10:02

## 2024-01-01 RX ADMIN — SODIUM CHLORIDE 4 MILLILITER(S): 9 INJECTION INTRAMUSCULAR; INTRAVENOUS; SUBCUTANEOUS at 04:02

## 2024-01-01 RX ADMIN — QUETIAPINE FUMARATE 100 MILLIGRAM(S): 200 TABLET, FILM COATED ORAL at 13:29

## 2024-01-01 RX ADMIN — Medication 250 MILLIGRAM(S): at 17:58

## 2024-01-01 RX ADMIN — SODIUM CHLORIDE 4 MILLILITER(S): 9 INJECTION INTRAMUSCULAR; INTRAVENOUS; SUBCUTANEOUS at 02:34

## 2024-01-01 RX ADMIN — Medication 650 MILLIGRAM(S): at 17:02

## 2024-01-01 RX ADMIN — Medication 1 DROP(S): at 11:54

## 2024-01-01 RX ADMIN — LACTULOSE 20 GRAM(S): 10 SOLUTION ORAL at 06:03

## 2024-01-01 RX ADMIN — Medication 4 MILLILITER(S): at 21:13

## 2024-01-01 RX ADMIN — QUETIAPINE FUMARATE 100 MILLIGRAM(S): 200 TABLET, FILM COATED ORAL at 05:06

## 2024-01-01 RX ADMIN — Medication 1 MILLIGRAM(S): at 23:05

## 2024-01-01 RX ADMIN — SODIUM CHLORIDE 4 MILLILITER(S): 9 INJECTION INTRAMUSCULAR; INTRAVENOUS; SUBCUTANEOUS at 21:13

## 2024-01-01 RX ADMIN — MUPIROCIN 1 APPLICATION(S): 20 OINTMENT TOPICAL at 17:27

## 2024-01-01 RX ADMIN — MIDODRINE HYDROCHLORIDE 10 MILLIGRAM(S): 2.5 TABLET ORAL at 08:23

## 2024-01-01 RX ADMIN — HYDROMORPHONE HYDROCHLORIDE 0.2 MILLIGRAM(S): 2 INJECTION INTRAMUSCULAR; INTRAVENOUS; SUBCUTANEOUS at 01:03

## 2024-01-01 RX ADMIN — QUETIAPINE FUMARATE 225 MILLIGRAM(S): 200 TABLET, FILM COATED ORAL at 08:36

## 2024-01-01 RX ADMIN — MUPIROCIN 1 APPLICATION(S): 20 OINTMENT TOPICAL at 18:03

## 2024-01-01 RX ADMIN — QUETIAPINE FUMARATE 225 MILLIGRAM(S): 200 TABLET, FILM COATED ORAL at 21:31

## 2024-01-01 RX ADMIN — Medication 250 MILLIGRAM(S): at 19:11

## 2024-01-01 RX ADMIN — Medication 1 DROP(S): at 23:49

## 2024-01-01 RX ADMIN — Medication 3 MILLILITER(S): at 21:35

## 2024-01-01 RX ADMIN — CHLORHEXIDINE GLUCONATE 1 APPLICATION(S): 213 SOLUTION TOPICAL at 13:46

## 2024-01-01 RX ADMIN — MIDODRINE HYDROCHLORIDE 30 MILLIGRAM(S): 2.5 TABLET ORAL at 05:35

## 2024-01-01 RX ADMIN — HYDROMORPHONE HYDROCHLORIDE 0.2 MILLIGRAM(S): 2 INJECTION INTRAMUSCULAR; INTRAVENOUS; SUBCUTANEOUS at 22:08

## 2024-01-01 RX ADMIN — CHLORHEXIDINE GLUCONATE 1 APPLICATION(S): 213 SOLUTION TOPICAL at 11:36

## 2024-01-01 RX ADMIN — Medication 1 MILLIGRAM(S): at 05:10

## 2024-01-01 RX ADMIN — PROPOFOL 20.1 MICROGRAM(S)/KG/MIN: 10 INJECTION, EMULSION INTRAVENOUS at 19:20

## 2024-01-01 RX ADMIN — SENNA PLUS 1 TABLET(S): 8.6 TABLET ORAL at 05:04

## 2024-01-01 RX ADMIN — Medication 4 MILLILITER(S): at 12:25

## 2024-01-01 RX ADMIN — DROXIDOPA 300 MILLIGRAM(S): 100 CAPSULE ORAL at 17:45

## 2024-01-01 RX ADMIN — ALBUTEROL 2.5 MILLIGRAM(S): 90 AEROSOL, METERED ORAL at 15:35

## 2024-01-01 RX ADMIN — DEXMEDETOMIDINE HYDROCHLORIDE IN 0.9% SODIUM CHLORIDE 8.38 MICROGRAM(S)/KG/HR: 4 INJECTION INTRAVENOUS at 19:41

## 2024-01-01 RX ADMIN — OLANZAPINE 2.5 MILLIGRAM(S): 15 TABLET, FILM COATED ORAL at 01:13

## 2024-01-01 RX ADMIN — Medication 3 MILLILITER(S): at 14:45

## 2024-01-01 RX ADMIN — DROXIDOPA 200 MILLIGRAM(S): 100 CAPSULE ORAL at 05:10

## 2024-01-01 RX ADMIN — DROXIDOPA 300 MILLIGRAM(S): 100 CAPSULE ORAL at 16:44

## 2024-01-01 RX ADMIN — Medication 125 MILLIGRAM(S): at 13:07

## 2024-01-01 RX ADMIN — Medication 4 MILLILITER(S): at 04:47

## 2024-01-01 RX ADMIN — TAMSULOSIN HYDROCHLORIDE 0.4 MILLIGRAM(S): 0.4 CAPSULE ORAL at 22:22

## 2024-01-01 RX ADMIN — LACTULOSE 20 GRAM(S): 10 SOLUTION ORAL at 21:35

## 2024-01-01 RX ADMIN — Medication 1 DROP(S): at 17:06

## 2024-01-01 RX ADMIN — Medication 4 MILLILITER(S): at 08:57

## 2024-01-01 RX ADMIN — Medication 250 MILLIGRAM(S): at 20:01

## 2024-01-01 RX ADMIN — MIDODRINE HYDROCHLORIDE 30 MILLIGRAM(S): 2.5 TABLET ORAL at 06:03

## 2024-01-01 RX ADMIN — DROXIDOPA 300 MILLIGRAM(S): 100 CAPSULE ORAL at 13:28

## 2024-01-01 RX ADMIN — MIDODRINE HYDROCHLORIDE 30 MILLIGRAM(S): 2.5 TABLET ORAL at 13:24

## 2024-01-01 RX ADMIN — Medication 1 DROP(S): at 17:28

## 2024-01-01 RX ADMIN — SODIUM CHLORIDE 4 MILLILITER(S): 9 INJECTION INTRAMUSCULAR; INTRAVENOUS; SUBCUTANEOUS at 16:18

## 2024-01-01 RX ADMIN — LACTULOSE 20 GRAM(S): 10 SOLUTION ORAL at 23:12

## 2024-01-01 RX ADMIN — Medication 100 GRAM(S): at 16:58

## 2024-01-01 RX ADMIN — HALOPERIDOL DECANOATE 1 MILLIGRAM(S): 100 INJECTION INTRAMUSCULAR at 15:11

## 2024-01-01 RX ADMIN — Medication 37.7 MICROGRAM(S)/KG/MIN: at 17:05

## 2024-01-01 RX ADMIN — Medication 250 MILLIGRAM(S): at 09:52

## 2024-01-01 RX ADMIN — ALBUTEROL 2.5 MILLIGRAM(S): 90 AEROSOL, METERED ORAL at 15:46

## 2024-01-01 RX ADMIN — Medication 4 MILLILITER(S): at 03:22

## 2024-01-01 RX ADMIN — CHLORHEXIDINE GLUCONATE 1 APPLICATION(S): 213 SOLUTION TOPICAL at 11:53

## 2024-01-01 RX ADMIN — SODIUM CHLORIDE 4 MILLILITER(S): 9 INJECTION INTRAMUSCULAR; INTRAVENOUS; SUBCUTANEOUS at 03:37

## 2024-01-01 RX ADMIN — QUETIAPINE FUMARATE 225 MILLIGRAM(S): 200 TABLET, FILM COATED ORAL at 21:45

## 2024-01-01 RX ADMIN — CEFTRIAXONE 100 MILLIGRAM(S): 500 INJECTION, POWDER, FOR SOLUTION INTRAMUSCULAR; INTRAVENOUS at 16:30

## 2024-01-01 RX ADMIN — SENNA PLUS 1 TABLET(S): 8.6 TABLET ORAL at 17:29

## 2024-01-01 RX ADMIN — Medication 1 DROP(S): at 18:11

## 2024-01-01 RX ADMIN — Medication 31.4 MICROGRAM(S)/KG/MIN: at 08:33

## 2024-01-01 RX ADMIN — POLYETHYLENE GLYCOL 3350 17 GRAM(S): 17 POWDER, FOR SOLUTION ORAL at 17:27

## 2024-01-01 RX ADMIN — Medication 650 MILLIGRAM(S): at 15:00

## 2024-01-01 RX ADMIN — Medication 3 MILLILITER(S): at 03:41

## 2024-01-01 RX ADMIN — MIDODRINE HYDROCHLORIDE 30 MILLIGRAM(S): 2.5 TABLET ORAL at 21:29

## 2024-01-01 RX ADMIN — HYDROMORPHONE HYDROCHLORIDE 0.2 MILLIGRAM(S): 2 INJECTION INTRAMUSCULAR; INTRAVENOUS; SUBCUTANEOUS at 13:18

## 2024-01-01 RX ADMIN — ALBUTEROL 2.5 MILLIGRAM(S): 90 AEROSOL, METERED ORAL at 08:52

## 2024-01-01 RX ADMIN — Medication 3 MILLILITER(S): at 22:03

## 2024-01-01 RX ADMIN — Medication 1 MILLIGRAM(S): at 22:22

## 2024-01-01 RX ADMIN — ALBUTEROL 2.5 MILLIGRAM(S): 90 AEROSOL, METERED ORAL at 22:25

## 2024-01-01 RX ADMIN — MIDODRINE HYDROCHLORIDE 30 MILLIGRAM(S): 2.5 TABLET ORAL at 15:15

## 2024-01-01 RX ADMIN — QUETIAPINE FUMARATE 100 MILLIGRAM(S): 200 TABLET, FILM COATED ORAL at 06:00

## 2024-01-01 RX ADMIN — SODIUM CHLORIDE 4 MILLILITER(S): 9 INJECTION INTRAMUSCULAR; INTRAVENOUS; SUBCUTANEOUS at 03:13

## 2024-01-01 RX ADMIN — Medication 4 MILLILITER(S): at 16:03

## 2024-01-01 RX ADMIN — POLYETHYLENE GLYCOL 3350 17 GRAM(S): 17 POWDER, FOR SOLUTION ORAL at 05:40

## 2024-01-01 RX ADMIN — Medication 4 MILLILITER(S): at 03:14

## 2024-01-01 RX ADMIN — POLYETHYLENE GLYCOL 3350 17 GRAM(S): 17 POWDER, FOR SOLUTION ORAL at 18:48

## 2024-01-01 RX ADMIN — POLYETHYLENE GLYCOL 3350 17 GRAM(S): 17 POWDER, FOR SOLUTION ORAL at 05:18

## 2024-01-01 RX ADMIN — PIPERACILLIN AND TAZOBACTAM 25 GRAM(S): 4; .5 INJECTION, POWDER, LYOPHILIZED, FOR SOLUTION INTRAVENOUS at 13:44

## 2024-01-01 RX ADMIN — Medication 3 MILLILITER(S): at 21:30

## 2024-01-01 RX ADMIN — Medication 25 GRAM(S): at 13:06

## 2024-01-01 RX ADMIN — DEXMEDETOMIDINE HYDROCHLORIDE IN 0.9% SODIUM CHLORIDE 8.38 MICROGRAM(S)/KG/HR: 4 INJECTION INTRAVENOUS at 07:32

## 2024-01-01 RX ADMIN — Medication 650 MILLIGRAM(S): at 03:30

## 2024-01-01 RX ADMIN — DEXMEDETOMIDINE HYDROCHLORIDE IN 0.9% SODIUM CHLORIDE 3.35 MICROGRAM(S)/KG/HR: 4 INJECTION INTRAVENOUS at 12:44

## 2024-01-01 RX ADMIN — LACTULOSE 20 GRAM(S): 10 SOLUTION ORAL at 05:04

## 2024-01-01 RX ADMIN — MIDODRINE HYDROCHLORIDE 30 MILLIGRAM(S): 2.5 TABLET ORAL at 13:04

## 2024-01-01 RX ADMIN — SENNA PLUS 1 TABLET(S): 8.6 TABLET ORAL at 17:33

## 2024-01-01 RX ADMIN — DEXMEDETOMIDINE HYDROCHLORIDE IN 0.9% SODIUM CHLORIDE 5.03 MICROGRAM(S)/KG/HR: 4 INJECTION INTRAVENOUS at 07:23

## 2024-01-01 RX ADMIN — Medication 1 MILLIGRAM(S): at 21:30

## 2024-01-01 RX ADMIN — CHLORHEXIDINE GLUCONATE 15 MILLILITER(S): 213 SOLUTION TOPICAL at 05:35

## 2024-01-01 RX ADMIN — SODIUM CHLORIDE 4 MILLILITER(S): 9 INJECTION INTRAMUSCULAR; INTRAVENOUS; SUBCUTANEOUS at 21:27

## 2024-01-01 RX ADMIN — Medication 1 DROP(S): at 18:33

## 2024-01-01 RX ADMIN — CHLORHEXIDINE GLUCONATE 15 MILLILITER(S): 213 SOLUTION TOPICAL at 17:53

## 2024-01-01 RX ADMIN — Medication 4 MILLILITER(S): at 15:32

## 2024-01-01 RX ADMIN — HYDROMORPHONE HYDROCHLORIDE 0.2 MILLIGRAM(S): 2 INJECTION INTRAMUSCULAR; INTRAVENOUS; SUBCUTANEOUS at 13:03

## 2024-01-01 RX ADMIN — SODIUM CHLORIDE 4 MILLILITER(S): 9 INJECTION INTRAMUSCULAR; INTRAVENOUS; SUBCUTANEOUS at 22:21

## 2024-01-01 RX ADMIN — SENNA PLUS 1 TABLET(S): 8.6 TABLET ORAL at 22:25

## 2024-01-01 RX ADMIN — MIDODRINE HYDROCHLORIDE 10 MILLIGRAM(S): 2.5 TABLET ORAL at 05:52

## 2024-01-01 RX ADMIN — POLYETHYLENE GLYCOL 3350 17 GRAM(S): 17 POWDER, FOR SOLUTION ORAL at 06:13

## 2024-01-01 RX ADMIN — Medication 250 MILLIGRAM(S): at 08:25

## 2024-01-01 RX ADMIN — Medication 4 MILLILITER(S): at 09:03

## 2024-01-01 RX ADMIN — SENNA PLUS 1 TABLET(S): 8.6 TABLET ORAL at 05:35

## 2024-01-01 RX ADMIN — DEXMEDETOMIDINE HYDROCHLORIDE IN 0.9% SODIUM CHLORIDE 5.03 MICROGRAM(S)/KG/HR: 4 INJECTION INTRAVENOUS at 10:21

## 2024-01-01 RX ADMIN — Medication 1 DROP(S): at 05:59

## 2024-01-01 RX ADMIN — MIDODRINE HYDROCHLORIDE 30 MILLIGRAM(S): 2.5 TABLET ORAL at 05:36

## 2024-01-01 RX ADMIN — Medication 31.4 MICROGRAM(S)/KG/MIN: at 22:21

## 2024-01-01 RX ADMIN — Medication 1 MILLIGRAM(S): at 21:03

## 2024-01-01 RX ADMIN — Medication 1 MILLIGRAM(S): at 05:07

## 2024-01-01 RX ADMIN — LACTULOSE 20 GRAM(S): 10 SOLUTION ORAL at 22:22

## 2024-01-01 RX ADMIN — POLYETHYLENE GLYCOL 3350 17 GRAM(S): 17 POWDER, FOR SOLUTION ORAL at 06:03

## 2024-01-01 RX ADMIN — Medication 400 MILLIGRAM(S): at 18:00

## 2024-01-01 RX ADMIN — SODIUM CHLORIDE 4 MILLILITER(S): 9 INJECTION INTRAMUSCULAR; INTRAVENOUS; SUBCUTANEOUS at 23:25

## 2024-01-01 RX ADMIN — Medication 1 PACKET(S): at 05:35

## 2024-01-01 RX ADMIN — SODIUM CHLORIDE 4 MILLILITER(S): 9 INJECTION INTRAMUSCULAR; INTRAVENOUS; SUBCUTANEOUS at 04:24

## 2024-01-01 RX ADMIN — PIPERACILLIN AND TAZOBACTAM 25 GRAM(S): 4; .5 INJECTION, POWDER, LYOPHILIZED, FOR SOLUTION INTRAVENOUS at 21:20

## 2024-01-01 RX ADMIN — ALBUTEROL 2.5 MILLIGRAM(S): 90 AEROSOL, METERED ORAL at 09:10

## 2024-01-01 RX ADMIN — CHLORHEXIDINE GLUCONATE 1 APPLICATION(S): 213 SOLUTION TOPICAL at 13:52

## 2024-01-01 RX ADMIN — Medication 3 MILLILITER(S): at 04:27

## 2024-01-01 RX ADMIN — SODIUM CHLORIDE 4 MILLILITER(S): 9 INJECTION INTRAMUSCULAR; INTRAVENOUS; SUBCUTANEOUS at 20:01

## 2024-01-01 RX ADMIN — OLANZAPINE 2.5 MILLIGRAM(S): 15 TABLET, FILM COATED ORAL at 15:10

## 2024-01-01 RX ADMIN — ALBUTEROL 2.5 MILLIGRAM(S): 90 AEROSOL, METERED ORAL at 16:18

## 2024-01-01 RX ADMIN — SODIUM CHLORIDE 4 MILLILITER(S): 9 INJECTION INTRAMUSCULAR; INTRAVENOUS; SUBCUTANEOUS at 21:31

## 2024-01-01 RX ADMIN — Medication 125 MILLIGRAM(S): at 13:33

## 2024-01-01 RX ADMIN — MIDODRINE HYDROCHLORIDE 30 MILLIGRAM(S): 2.5 TABLET ORAL at 21:20

## 2024-01-01 RX ADMIN — PIPERACILLIN AND TAZOBACTAM 25 GRAM(S): 4; .5 INJECTION, POWDER, LYOPHILIZED, FOR SOLUTION INTRAVENOUS at 13:18

## 2024-01-01 RX ADMIN — Medication 300 MILLIGRAM(S): at 06:26

## 2024-01-01 RX ADMIN — Medication 3 MILLILITER(S): at 16:13

## 2024-01-01 RX ADMIN — DROXIDOPA 300 MILLIGRAM(S): 100 CAPSULE ORAL at 05:40

## 2024-01-01 RX ADMIN — CHLORHEXIDINE GLUCONATE 15 MILLILITER(S): 213 SOLUTION TOPICAL at 17:42

## 2024-01-01 RX ADMIN — QUETIAPINE FUMARATE 100 MILLIGRAM(S): 200 TABLET, FILM COATED ORAL at 12:56

## 2024-01-01 RX ADMIN — Medication 4 MILLILITER(S): at 03:17

## 2024-01-01 RX ADMIN — CHLORHEXIDINE GLUCONATE 15 MILLILITER(S): 213 SOLUTION TOPICAL at 18:00

## 2024-01-01 RX ADMIN — Medication 31.4 MICROGRAM(S)/KG/MIN: at 10:04

## 2024-01-01 RX ADMIN — Medication 4 MILLILITER(S): at 22:23

## 2024-01-01 RX ADMIN — LACTULOSE 20 GRAM(S): 10 SOLUTION ORAL at 21:32

## 2024-01-01 RX ADMIN — CHLORHEXIDINE GLUCONATE 1 APPLICATION(S): 213 SOLUTION TOPICAL at 12:53

## 2024-01-01 RX ADMIN — SENNA PLUS 1 TABLET(S): 8.6 TABLET ORAL at 18:00

## 2024-01-01 RX ADMIN — MIDODRINE HYDROCHLORIDE 30 MILLIGRAM(S): 2.5 TABLET ORAL at 05:18

## 2024-01-01 RX ADMIN — PIPERACILLIN AND TAZOBACTAM 25 GRAM(S): 4; .5 INJECTION, POWDER, LYOPHILIZED, FOR SOLUTION INTRAVENOUS at 04:36

## 2024-01-01 RX ADMIN — HYDROMORPHONE HYDROCHLORIDE 0.2 MILLIGRAM(S): 2 INJECTION INTRAMUSCULAR; INTRAVENOUS; SUBCUTANEOUS at 16:05

## 2024-01-01 RX ADMIN — Medication 250 MILLIGRAM(S): at 08:07

## 2024-01-01 RX ADMIN — OLANZAPINE 5 MILLIGRAM(S): 15 TABLET, FILM COATED ORAL at 16:09

## 2024-01-01 RX ADMIN — MIDODRINE HYDROCHLORIDE 30 MILLIGRAM(S): 2.5 TABLET ORAL at 13:51

## 2024-01-01 RX ADMIN — QUETIAPINE FUMARATE 225 MILLIGRAM(S): 200 TABLET, FILM COATED ORAL at 22:39

## 2024-01-01 RX ADMIN — Medication 3 MILLILITER(S): at 08:20

## 2024-01-01 RX ADMIN — LACTULOSE 20 GRAM(S): 10 SOLUTION ORAL at 13:01

## 2024-01-01 RX ADMIN — Medication 1 MILLIGRAM(S): at 17:29

## 2024-01-01 RX ADMIN — ALBUTEROL 2.5 MILLIGRAM(S): 90 AEROSOL, METERED ORAL at 02:34

## 2024-01-01 RX ADMIN — Medication 1 MILLIGRAM(S): at 04:36

## 2024-01-01 RX ADMIN — MIDODRINE HYDROCHLORIDE 10 MILLIGRAM(S): 2.5 TABLET ORAL at 14:34

## 2024-01-01 RX ADMIN — HALOPERIDOL DECANOATE 2.5 MILLIGRAM(S): 100 INJECTION INTRAMUSCULAR at 16:43

## 2024-01-01 RX ADMIN — LACTULOSE 20 GRAM(S): 10 SOLUTION ORAL at 21:45

## 2024-01-01 RX ADMIN — SODIUM CHLORIDE 4 MILLILITER(S): 9 INJECTION INTRAMUSCULAR; INTRAVENOUS; SUBCUTANEOUS at 15:35

## 2024-01-01 RX ADMIN — SODIUM CHLORIDE 4 MILLILITER(S): 9 INJECTION INTRAMUSCULAR; INTRAVENOUS; SUBCUTANEOUS at 21:07

## 2024-01-01 RX ADMIN — HYDROMORPHONE HYDROCHLORIDE 0.2 MILLIGRAM(S): 2 INJECTION INTRAMUSCULAR; INTRAVENOUS; SUBCUTANEOUS at 19:30

## 2024-01-01 RX ADMIN — Medication 37.7 MICROGRAM(S)/KG/MIN: at 05:48

## 2024-01-01 RX ADMIN — DROXIDOPA 300 MILLIGRAM(S): 100 CAPSULE ORAL at 12:25

## 2024-01-01 RX ADMIN — SODIUM CHLORIDE 4 MILLILITER(S): 9 INJECTION INTRAMUSCULAR; INTRAVENOUS; SUBCUTANEOUS at 00:37

## 2024-01-01 RX ADMIN — SODIUM CHLORIDE 4 MILLILITER(S): 9 INJECTION INTRAMUSCULAR; INTRAVENOUS; SUBCUTANEOUS at 11:31

## 2024-01-01 RX ADMIN — DROXIDOPA 300 MILLIGRAM(S): 100 CAPSULE ORAL at 11:34

## 2024-01-01 RX ADMIN — Medication 1 MILLIGRAM(S): at 18:30

## 2024-01-01 RX ADMIN — MIDODRINE HYDROCHLORIDE 30 MILLIGRAM(S): 2.5 TABLET ORAL at 22:22

## 2024-01-01 RX ADMIN — HYDROMORPHONE HYDROCHLORIDE 0.2 MILLIGRAM(S): 2 INJECTION INTRAMUSCULAR; INTRAVENOUS; SUBCUTANEOUS at 06:00

## 2024-01-01 RX ADMIN — ALBUTEROL 2.5 MILLIGRAM(S): 90 AEROSOL, METERED ORAL at 20:31

## 2024-01-01 RX ADMIN — CHLORHEXIDINE GLUCONATE 1 APPLICATION(S): 213 SOLUTION TOPICAL at 16:42

## 2024-01-01 RX ADMIN — Medication 31.4 MICROGRAM(S)/KG/MIN: at 08:08

## 2024-01-01 RX ADMIN — SENNA PLUS 1 TABLET(S): 8.6 TABLET ORAL at 18:11

## 2024-01-01 RX ADMIN — Medication 1 MILLIGRAM(S): at 17:44

## 2024-01-01 RX ADMIN — QUETIAPINE FUMARATE 225 MILLIGRAM(S): 200 TABLET, FILM COATED ORAL at 23:12

## 2024-01-01 RX ADMIN — Medication 4 MILLILITER(S): at 16:37

## 2024-01-01 RX ADMIN — SODIUM CHLORIDE 4 MILLILITER(S): 9 INJECTION INTRAMUSCULAR; INTRAVENOUS; SUBCUTANEOUS at 15:46

## 2024-01-01 RX ADMIN — POLYETHYLENE GLYCOL 3350 17 GRAM(S): 17 POWDER, FOR SOLUTION ORAL at 06:59

## 2024-01-01 RX ADMIN — Medication 4 MILLILITER(S): at 15:15

## 2024-01-01 RX ADMIN — Medication 4 MILLILITER(S): at 04:25

## 2024-01-01 RX ADMIN — QUETIAPINE FUMARATE 225 MILLIGRAM(S): 200 TABLET, FILM COATED ORAL at 21:17

## 2024-01-01 RX ADMIN — ENOXAPARIN SODIUM 40 MILLIGRAM(S): 100 INJECTION SUBCUTANEOUS at 08:59

## 2024-01-01 RX ADMIN — SODIUM CHLORIDE 4 MILLILITER(S): 9 INJECTION INTRAMUSCULAR; INTRAVENOUS; SUBCUTANEOUS at 21:23

## 2024-01-01 RX ADMIN — Medication 1 DROP(S): at 05:03

## 2024-01-01 RX ADMIN — Medication 1 MILLIGRAM(S): at 18:00

## 2024-01-01 RX ADMIN — ENOXAPARIN SODIUM 40 MILLIGRAM(S): 100 INJECTION SUBCUTANEOUS at 09:22

## 2024-01-01 RX ADMIN — SODIUM CHLORIDE 4 MILLILITER(S): 9 INJECTION INTRAMUSCULAR; INTRAVENOUS; SUBCUTANEOUS at 16:34

## 2024-01-01 RX ADMIN — CHLORHEXIDINE GLUCONATE 1 APPLICATION(S): 213 SOLUTION TOPICAL at 13:29

## 2024-01-01 RX ADMIN — POLYETHYLENE GLYCOL 3350 17 GRAM(S): 17 POWDER, FOR SOLUTION ORAL at 11:43

## 2024-01-01 RX ADMIN — Medication 1 DROP(S): at 17:11

## 2024-01-01 RX ADMIN — CHLORHEXIDINE GLUCONATE 1 APPLICATION(S): 213 SOLUTION TOPICAL at 13:13

## 2024-01-01 RX ADMIN — DROXIDOPA 300 MILLIGRAM(S): 100 CAPSULE ORAL at 05:56

## 2024-01-01 RX ADMIN — ENOXAPARIN SODIUM 40 MILLIGRAM(S): 100 INJECTION SUBCUTANEOUS at 12:53

## 2024-01-01 RX ADMIN — PIPERACILLIN AND TAZOBACTAM 25 GRAM(S): 4; .5 INJECTION, POWDER, LYOPHILIZED, FOR SOLUTION INTRAVENOUS at 00:05

## 2024-01-01 RX ADMIN — Medication 1 DROP(S): at 11:52

## 2024-01-01 RX ADMIN — Medication 1 MILLIGRAM(S): at 06:01

## 2024-01-01 RX ADMIN — SENNA PLUS 1 TABLET(S): 8.6 TABLET ORAL at 17:58

## 2024-01-01 RX ADMIN — CHLORHEXIDINE GLUCONATE 1 APPLICATION(S): 213 SOLUTION TOPICAL at 11:44

## 2024-01-01 RX ADMIN — OLANZAPINE 2.5 MILLIGRAM(S): 15 TABLET, FILM COATED ORAL at 10:08

## 2024-01-01 RX ADMIN — Medication 1 DROP(S): at 05:22

## 2024-01-01 RX ADMIN — DROXIDOPA 300 MILLIGRAM(S): 100 CAPSULE ORAL at 11:44

## 2024-01-01 RX ADMIN — CHLORHEXIDINE GLUCONATE 15 MILLILITER(S): 213 SOLUTION TOPICAL at 05:08

## 2024-01-01 RX ADMIN — SODIUM CHLORIDE 1000 MILLILITER(S): 9 INJECTION, SOLUTION INTRAVENOUS at 09:50

## 2024-01-01 RX ADMIN — PIPERACILLIN AND TAZOBACTAM 25 GRAM(S): 4; .5 INJECTION, POWDER, LYOPHILIZED, FOR SOLUTION INTRAVENOUS at 04:20

## 2024-01-01 RX ADMIN — POLYETHYLENE GLYCOL 3350 17 GRAM(S): 17 POWDER, FOR SOLUTION ORAL at 06:40

## 2024-01-01 RX ADMIN — Medication 4 MILLILITER(S): at 21:31

## 2024-01-01 RX ADMIN — MUPIROCIN 1 APPLICATION(S): 20 OINTMENT TOPICAL at 05:26

## 2024-01-01 RX ADMIN — Medication 4 MILLILITER(S): at 21:17

## 2024-01-01 RX ADMIN — MIDODRINE HYDROCHLORIDE 30 MILLIGRAM(S): 2.5 TABLET ORAL at 20:10

## 2024-01-01 RX ADMIN — DROXIDOPA 300 MILLIGRAM(S): 100 CAPSULE ORAL at 06:00

## 2024-01-01 RX ADMIN — Medication 1 MILLIGRAM(S): at 06:40

## 2024-01-01 RX ADMIN — HYDROMORPHONE HYDROCHLORIDE 0.2 MILLIGRAM(S): 2 INJECTION INTRAMUSCULAR; INTRAVENOUS; SUBCUTANEOUS at 06:09

## 2024-01-01 RX ADMIN — Medication 3 MILLILITER(S): at 21:15

## 2024-01-01 RX ADMIN — HYDROMORPHONE HYDROCHLORIDE 0.2 MILLIGRAM(S): 2 INJECTION INTRAMUSCULAR; INTRAVENOUS; SUBCUTANEOUS at 22:06

## 2024-01-01 RX ADMIN — SODIUM CHLORIDE 4 MILLILITER(S): 9 INJECTION INTRAMUSCULAR; INTRAVENOUS; SUBCUTANEOUS at 22:23

## 2024-01-01 RX ADMIN — HYDROMORPHONE HYDROCHLORIDE 0.2 MILLIGRAM(S): 2 INJECTION INTRAMUSCULAR; INTRAVENOUS; SUBCUTANEOUS at 18:30

## 2024-01-01 RX ADMIN — Medication 3 MILLILITER(S): at 15:24

## 2024-01-01 RX ADMIN — SODIUM CHLORIDE 4 MILLILITER(S): 9 INJECTION INTRAMUSCULAR; INTRAVENOUS; SUBCUTANEOUS at 21:15

## 2024-01-01 RX ADMIN — MIDODRINE HYDROCHLORIDE 30 MILLIGRAM(S): 2.5 TABLET ORAL at 13:46

## 2024-01-01 RX ADMIN — ALBUTEROL 2.5 MILLIGRAM(S): 90 AEROSOL, METERED ORAL at 23:43

## 2024-01-01 RX ADMIN — ENOXAPARIN SODIUM 40 MILLIGRAM(S): 100 INJECTION SUBCUTANEOUS at 07:46

## 2024-01-01 RX ADMIN — MIDODRINE HYDROCHLORIDE 30 MILLIGRAM(S): 2.5 TABLET ORAL at 21:09

## 2024-01-01 RX ADMIN — Medication 40 MILLIEQUIVALENT(S): at 06:45

## 2024-01-01 RX ADMIN — AZITHROMYCIN 255 MILLIGRAM(S): 500 TABLET, FILM COATED ORAL at 17:58

## 2024-01-01 RX ADMIN — Medication 1 DROP(S): at 05:47

## 2024-01-01 RX ADMIN — TAMSULOSIN HYDROCHLORIDE 0.4 MILLIGRAM(S): 0.4 CAPSULE ORAL at 21:36

## 2024-01-01 RX ADMIN — Medication 3 MILLILITER(S): at 03:35

## 2024-01-01 RX ADMIN — Medication 1 MILLIGRAM(S): at 06:59

## 2024-01-01 RX ADMIN — MIDODRINE HYDROCHLORIDE 10 MILLIGRAM(S): 2.5 TABLET ORAL at 05:26

## 2024-01-01 RX ADMIN — Medication 3 MILLILITER(S): at 21:52

## 2024-01-01 RX ADMIN — MIDODRINE HYDROCHLORIDE 30 MILLIGRAM(S): 2.5 TABLET ORAL at 06:01

## 2024-01-01 RX ADMIN — SODIUM CHLORIDE 4 MILLILITER(S): 9 INJECTION INTRAMUSCULAR; INTRAVENOUS; SUBCUTANEOUS at 09:45

## 2024-01-01 RX ADMIN — SENNA PLUS 1 TABLET(S): 8.6 TABLET ORAL at 05:05

## 2024-01-01 RX ADMIN — Medication 31.4 MICROGRAM(S)/KG/MIN: at 19:53

## 2024-01-01 RX ADMIN — MUPIROCIN 1 APPLICATION(S): 20 OINTMENT TOPICAL at 05:35

## 2024-01-01 RX ADMIN — Medication 250 MILLIGRAM(S): at 05:34

## 2024-01-01 RX ADMIN — QUETIAPINE FUMARATE 225 MILLIGRAM(S): 200 TABLET, FILM COATED ORAL at 08:08

## 2024-01-01 RX ADMIN — ALBUTEROL 2.5 MILLIGRAM(S): 90 AEROSOL, METERED ORAL at 09:36

## 2024-01-01 RX ADMIN — MIDODRINE HYDROCHLORIDE 30 MILLIGRAM(S): 2.5 TABLET ORAL at 14:35

## 2024-01-01 RX ADMIN — Medication 125 MILLIGRAM(S): at 15:17

## 2024-01-01 RX ADMIN — Medication 4 MILLILITER(S): at 08:40

## 2024-01-01 RX ADMIN — Medication 3 MILLILITER(S): at 09:42

## 2024-01-01 RX ADMIN — SODIUM CHLORIDE 4 MILLILITER(S): 9 INJECTION INTRAMUSCULAR; INTRAVENOUS; SUBCUTANEOUS at 11:18

## 2024-01-01 RX ADMIN — HYDROMORPHONE HYDROCHLORIDE 0.2 MILLIGRAM(S): 2 INJECTION INTRAMUSCULAR; INTRAVENOUS; SUBCUTANEOUS at 18:48

## 2024-01-01 RX ADMIN — Medication 1 DROP(S): at 06:10

## 2024-01-01 RX ADMIN — QUETIAPINE FUMARATE 225 MILLIGRAM(S): 200 TABLET, FILM COATED ORAL at 20:23

## 2024-01-01 RX ADMIN — CHLORHEXIDINE GLUCONATE 15 MILLILITER(S): 213 SOLUTION TOPICAL at 17:58

## 2024-01-01 RX ADMIN — Medication 1 INCH(S): at 22:30

## 2024-01-01 RX ADMIN — TAMSULOSIN HYDROCHLORIDE 0.4 MILLIGRAM(S): 0.4 CAPSULE ORAL at 21:09

## 2024-01-01 RX ADMIN — HYDROMORPHONE HYDROCHLORIDE 0.2 MILLIGRAM(S): 2 INJECTION INTRAMUSCULAR; INTRAVENOUS; SUBCUTANEOUS at 21:07

## 2024-01-01 RX ADMIN — Medication 1 MILLIGRAM(S): at 22:07

## 2024-01-01 RX ADMIN — MIDODRINE HYDROCHLORIDE 10 MILLIGRAM(S): 2.5 TABLET ORAL at 05:36

## 2024-01-01 RX ADMIN — SENNA PLUS 1 TABLET(S): 8.6 TABLET ORAL at 05:26

## 2024-01-01 RX ADMIN — Medication 125 MILLIGRAM(S): at 13:01

## 2024-01-01 RX ADMIN — SODIUM CHLORIDE 4 MILLILITER(S): 9 INJECTION INTRAMUSCULAR; INTRAVENOUS; SUBCUTANEOUS at 03:43

## 2024-01-01 RX ADMIN — Medication 3 MILLILITER(S): at 03:47

## 2024-01-01 RX ADMIN — Medication 1 DROP(S): at 08:00

## 2024-01-01 RX ADMIN — Medication 1 DROP(S): at 06:41

## 2024-01-01 RX ADMIN — DROXIDOPA 200 MILLIGRAM(S): 100 CAPSULE ORAL at 17:11

## 2024-01-01 RX ADMIN — CHLORHEXIDINE GLUCONATE 1 APPLICATION(S): 213 SOLUTION TOPICAL at 12:44

## 2024-01-01 RX ADMIN — Medication 31.4 MICROGRAM(S)/KG/MIN: at 19:41

## 2024-01-01 RX ADMIN — POLYETHYLENE GLYCOL 3350 17 GRAM(S): 17 POWDER, FOR SOLUTION ORAL at 05:59

## 2024-01-01 RX ADMIN — HYDROMORPHONE HYDROCHLORIDE 0.2 MILLIGRAM(S): 2 INJECTION INTRAMUSCULAR; INTRAVENOUS; SUBCUTANEOUS at 05:24

## 2024-01-01 RX ADMIN — SODIUM CHLORIDE 4 MILLILITER(S): 9 INJECTION INTRAMUSCULAR; INTRAVENOUS; SUBCUTANEOUS at 08:57

## 2024-01-01 RX ADMIN — MIDODRINE HYDROCHLORIDE 10 MILLIGRAM(S): 2.5 TABLET ORAL at 00:23

## 2024-01-01 RX ADMIN — ALBUTEROL 2.5 MILLIGRAM(S): 90 AEROSOL, METERED ORAL at 02:36

## 2024-01-01 RX ADMIN — MIDODRINE HYDROCHLORIDE 30 MILLIGRAM(S): 2.5 TABLET ORAL at 05:21

## 2024-01-01 RX ADMIN — Medication 250 MILLIGRAM(S): at 20:44

## 2024-01-01 RX ADMIN — Medication 1 DROP(S): at 00:15

## 2024-01-01 RX ADMIN — Medication 1 INCH(S): at 18:00

## 2024-01-01 RX ADMIN — ENOXAPARIN SODIUM 40 MILLIGRAM(S): 100 INJECTION SUBCUTANEOUS at 09:11

## 2024-01-01 RX ADMIN — QUETIAPINE FUMARATE 225 MILLIGRAM(S): 200 TABLET, FILM COATED ORAL at 08:24

## 2024-01-01 RX ADMIN — PIPERACILLIN AND TAZOBACTAM 25 GRAM(S): 4; .5 INJECTION, POWDER, LYOPHILIZED, FOR SOLUTION INTRAVENOUS at 17:59

## 2024-01-01 RX ADMIN — MIDODRINE HYDROCHLORIDE 30 MILLIGRAM(S): 2.5 TABLET ORAL at 14:48

## 2024-01-01 RX ADMIN — Medication 4 MILLILITER(S): at 23:24

## 2024-01-01 RX ADMIN — PIPERACILLIN AND TAZOBACTAM 25 GRAM(S): 4; .5 INJECTION, POWDER, LYOPHILIZED, FOR SOLUTION INTRAVENOUS at 05:52

## 2024-01-01 RX ADMIN — Medication 1 DROP(S): at 00:58

## 2024-01-01 RX ADMIN — Medication 31.4 MICROGRAM(S)/KG/MIN: at 05:22

## 2024-01-01 RX ADMIN — HYDROMORPHONE HYDROCHLORIDE 0.2 MILLIGRAM(S): 2 INJECTION INTRAMUSCULAR; INTRAVENOUS; SUBCUTANEOUS at 14:32

## 2024-01-01 RX ADMIN — Medication 3 MILLILITER(S): at 09:33

## 2024-01-01 RX ADMIN — LACTULOSE 20 GRAM(S): 10 SOLUTION ORAL at 18:00

## 2024-01-01 RX ADMIN — CHLORHEXIDINE GLUCONATE 15 MILLILITER(S): 213 SOLUTION TOPICAL at 05:05

## 2024-01-01 RX ADMIN — MIDODRINE HYDROCHLORIDE 30 MILLIGRAM(S): 2.5 TABLET ORAL at 21:15

## 2024-01-01 RX ADMIN — SODIUM CHLORIDE 4 MILLILITER(S): 9 INJECTION INTRAMUSCULAR; INTRAVENOUS; SUBCUTANEOUS at 20:03

## 2024-01-01 RX ADMIN — ALBUTEROL 2.5 MILLIGRAM(S): 90 AEROSOL, METERED ORAL at 03:13

## 2024-01-01 RX ADMIN — PROPOFOL 20.1 MICROGRAM(S)/KG/MIN: 10 INJECTION, EMULSION INTRAVENOUS at 10:48

## 2024-01-01 RX ADMIN — Medication 1 DROP(S): at 17:26

## 2024-01-01 RX ADMIN — Medication 3 MILLILITER(S): at 22:07

## 2024-01-01 RX ADMIN — CHLORHEXIDINE GLUCONATE 15 MILLILITER(S): 213 SOLUTION TOPICAL at 05:04

## 2024-01-01 RX ADMIN — SODIUM CHLORIDE 4 MILLILITER(S): 9 INJECTION INTRAMUSCULAR; INTRAVENOUS; SUBCUTANEOUS at 21:19

## 2024-01-01 RX ADMIN — Medication 3 MILLILITER(S): at 05:02

## 2024-01-01 RX ADMIN — Medication 3 MILLILITER(S): at 10:56

## 2024-01-01 RX ADMIN — Medication 1 DROP(S): at 17:59

## 2024-01-01 RX ADMIN — ALBUTEROL 2.5 MILLIGRAM(S): 90 AEROSOL, METERED ORAL at 09:05

## 2024-01-01 RX ADMIN — DEXMEDETOMIDINE HYDROCHLORIDE IN 0.9% SODIUM CHLORIDE 8.38 MICROGRAM(S)/KG/HR: 4 INJECTION INTRAVENOUS at 11:31

## 2024-01-01 RX ADMIN — POLYETHYLENE GLYCOL 3350 17 GRAM(S): 17 POWDER, FOR SOLUTION ORAL at 06:00

## 2024-01-01 RX ADMIN — SODIUM CHLORIDE 2000 MILLILITER(S): 9 INJECTION, SOLUTION INTRAVENOUS at 09:11

## 2024-01-01 RX ADMIN — Medication 1 MILLIGRAM(S): at 05:59

## 2024-01-01 RX ADMIN — SODIUM CHLORIDE 4 MILLILITER(S): 9 INJECTION INTRAMUSCULAR; INTRAVENOUS; SUBCUTANEOUS at 17:38

## 2024-01-01 RX ADMIN — POLYETHYLENE GLYCOL 3350 17 GRAM(S): 17 POWDER, FOR SOLUTION ORAL at 05:13

## 2024-01-01 RX ADMIN — Medication 37.7 MICROGRAM(S)/KG/MIN: at 19:27

## 2024-01-01 RX ADMIN — CHLORHEXIDINE GLUCONATE 1 APPLICATION(S): 213 SOLUTION TOPICAL at 13:04

## 2024-01-01 RX ADMIN — POLYETHYLENE GLYCOL 3350 17 GRAM(S): 17 POWDER, FOR SOLUTION ORAL at 05:56

## 2024-01-01 RX ADMIN — SODIUM CHLORIDE 4 MILLILITER(S): 9 INJECTION INTRAMUSCULAR; INTRAVENOUS; SUBCUTANEOUS at 22:06

## 2024-01-01 RX ADMIN — Medication 31.4 MICROGRAM(S)/KG/MIN: at 07:48

## 2024-01-01 RX ADMIN — Medication 650 MILLIGRAM(S): at 16:05

## 2024-01-01 RX ADMIN — Medication 1 MILLIGRAM(S): at 17:07

## 2024-01-01 RX ADMIN — PROPOFOL 20.1 MICROGRAM(S)/KG/MIN: 10 INJECTION, EMULSION INTRAVENOUS at 18:00

## 2024-01-01 RX ADMIN — Medication 1 DROP(S): at 05:52

## 2024-01-01 RX ADMIN — ENOXAPARIN SODIUM 40 MILLIGRAM(S): 100 INJECTION SUBCUTANEOUS at 12:21

## 2024-01-01 RX ADMIN — MIDODRINE HYDROCHLORIDE 10 MILLIGRAM(S): 2.5 TABLET ORAL at 21:19

## 2024-01-01 RX ADMIN — Medication 1 DROP(S): at 01:04

## 2024-01-01 RX ADMIN — Medication 3 MILLILITER(S): at 09:36

## 2024-01-01 RX ADMIN — Medication 1 MILLIGRAM(S): at 22:09

## 2024-01-01 RX ADMIN — HYDROMORPHONE HYDROCHLORIDE 0.2 MILLIGRAM(S): 2 INJECTION INTRAMUSCULAR; INTRAVENOUS; SUBCUTANEOUS at 14:14

## 2024-01-01 RX ADMIN — Medication 1 DROP(S): at 05:10

## 2024-01-01 RX ADMIN — Medication 125 MILLIGRAM(S): at 13:09

## 2024-01-01 RX ADMIN — Medication 4 MILLILITER(S): at 22:21

## 2024-01-01 RX ADMIN — SODIUM CHLORIDE 4 MILLILITER(S): 9 INJECTION INTRAMUSCULAR; INTRAVENOUS; SUBCUTANEOUS at 03:22

## 2024-01-01 RX ADMIN — Medication 1 DROP(S): at 17:19

## 2024-01-01 RX ADMIN — SODIUM CHLORIDE 4 MILLILITER(S): 9 INJECTION INTRAMUSCULAR; INTRAVENOUS; SUBCUTANEOUS at 16:04

## 2024-01-01 RX ADMIN — QUETIAPINE FUMARATE 225 MILLIGRAM(S): 200 TABLET, FILM COATED ORAL at 21:06

## 2024-01-01 RX ADMIN — Medication 31.4 MICROGRAM(S)/KG/MIN: at 19:29

## 2024-01-01 RX ADMIN — ALBUTEROL 2.5 MILLIGRAM(S): 90 AEROSOL, METERED ORAL at 15:10

## 2024-01-01 RX ADMIN — Medication 1 DROP(S): at 17:03

## 2024-01-01 RX ADMIN — Medication 1 DROP(S): at 05:28

## 2024-01-01 RX ADMIN — SODIUM CHLORIDE 4 MILLILITER(S): 9 INJECTION INTRAMUSCULAR; INTRAVENOUS; SUBCUTANEOUS at 08:52

## 2024-01-01 RX ADMIN — OLANZAPINE 2.5 MILLIGRAM(S): 15 TABLET, FILM COATED ORAL at 16:05

## 2024-01-01 RX ADMIN — Medication 31.4 MICROGRAM(S)/KG/MIN: at 19:17

## 2024-01-01 RX ADMIN — Medication 1 DROP(S): at 12:37

## 2024-01-01 RX ADMIN — Medication 250 MILLIGRAM(S): at 08:45

## 2024-01-01 RX ADMIN — SODIUM CHLORIDE 4 MILLILITER(S): 9 INJECTION INTRAMUSCULAR; INTRAVENOUS; SUBCUTANEOUS at 12:55

## 2024-01-01 RX ADMIN — ENOXAPARIN SODIUM 40 MILLIGRAM(S): 100 INJECTION SUBCUTANEOUS at 09:16

## 2024-01-01 RX ADMIN — POLYETHYLENE GLYCOL 3350 17 GRAM(S): 17 POWDER, FOR SOLUTION ORAL at 22:20

## 2024-01-01 RX ADMIN — Medication 250 MILLIGRAM(S): at 20:23

## 2024-01-01 RX ADMIN — Medication 31.4 MICROGRAM(S)/KG/MIN: at 00:29

## 2024-01-01 RX ADMIN — MUPIROCIN 1 APPLICATION(S): 20 OINTMENT TOPICAL at 17:11

## 2024-01-01 RX ADMIN — POTASSIUM PHOSPHATE, MONOBASIC POTASSIUM PHOSPHATE, DIBASIC 83.33 MILLIMOLE(S): 236; 224 INJECTION, SOLUTION INTRAVENOUS at 02:54

## 2024-01-01 RX ADMIN — Medication 5.03 MICROGRAM(S)/KG/MIN: at 22:33

## 2024-01-01 RX ADMIN — Medication 250 MILLIGRAM(S): at 08:36

## 2024-01-01 RX ADMIN — POLYETHYLENE GLYCOL 3350 17 GRAM(S): 17 POWDER, FOR SOLUTION ORAL at 17:04

## 2024-01-01 RX ADMIN — PIPERACILLIN AND TAZOBACTAM 25 GRAM(S): 4; .5 INJECTION, POWDER, LYOPHILIZED, FOR SOLUTION INTRAVENOUS at 13:10

## 2024-01-01 RX ADMIN — Medication 4 MILLILITER(S): at 21:19

## 2024-01-01 RX ADMIN — MUPIROCIN 1 APPLICATION(S): 20 OINTMENT TOPICAL at 06:03

## 2024-01-01 RX ADMIN — Medication 166.67 MILLIGRAM(S): at 05:07

## 2024-01-01 RX ADMIN — ALBUTEROL 2.5 MILLIGRAM(S): 90 AEROSOL, METERED ORAL at 16:39

## 2024-01-01 RX ADMIN — POLYETHYLENE GLYCOL 3350 17 GRAM(S): 17 POWDER, FOR SOLUTION ORAL at 17:33

## 2024-01-01 RX ADMIN — MIDODRINE HYDROCHLORIDE 20 MILLIGRAM(S): 2.5 TABLET ORAL at 13:46

## 2024-01-01 RX ADMIN — Medication 37.7 MICROGRAM(S)/KG/MIN: at 19:29

## 2024-01-01 RX ADMIN — Medication 1 MILLIGRAM(S): at 20:11

## 2024-01-01 RX ADMIN — Medication 4 MILLILITER(S): at 09:33

## 2024-01-01 RX ADMIN — MIDODRINE HYDROCHLORIDE 20 MILLIGRAM(S): 2.5 TABLET ORAL at 04:19

## 2024-01-01 RX ADMIN — LACTULOSE 20 GRAM(S): 10 SOLUTION ORAL at 06:13

## 2024-01-01 RX ADMIN — Medication 1 DROP(S): at 05:08

## 2024-01-01 RX ADMIN — MIDODRINE HYDROCHLORIDE 30 MILLIGRAM(S): 2.5 TABLET ORAL at 13:31

## 2024-01-01 RX ADMIN — PANTOPRAZOLE SODIUM 40 MILLIGRAM(S): 20 TABLET, DELAYED RELEASE ORAL at 06:40

## 2024-01-01 RX ADMIN — Medication 1 DROP(S): at 12:53

## 2024-01-01 RX ADMIN — Medication 250 MILLIGRAM(S): at 07:44

## 2024-01-01 RX ADMIN — Medication 4 MILLILITER(S): at 10:08

## 2024-01-01 RX ADMIN — Medication 4 MILLILITER(S): at 08:53

## 2024-01-01 RX ADMIN — SODIUM CHLORIDE 4 MILLILITER(S): 9 INJECTION INTRAMUSCULAR; INTRAVENOUS; SUBCUTANEOUS at 10:10

## 2024-01-01 RX ADMIN — SODIUM CHLORIDE 4 MILLILITER(S): 9 INJECTION INTRAMUSCULAR; INTRAVENOUS; SUBCUTANEOUS at 15:11

## 2024-01-01 RX ADMIN — Medication 166.67 MILLIGRAM(S): at 17:54

## 2024-01-01 RX ADMIN — PIPERACILLIN AND TAZOBACTAM 25 GRAM(S): 4; .5 INJECTION, POWDER, LYOPHILIZED, FOR SOLUTION INTRAVENOUS at 13:09

## 2024-01-01 RX ADMIN — ALBUTEROL 2.5 MILLIGRAM(S): 90 AEROSOL, METERED ORAL at 04:02

## 2024-01-01 RX ADMIN — Medication 1 MILLIGRAM(S): at 05:13

## 2024-01-01 RX ADMIN — MUPIROCIN 1 APPLICATION(S): 20 OINTMENT TOPICAL at 17:25

## 2024-01-01 RX ADMIN — SODIUM CHLORIDE 4 MILLILITER(S): 9 INJECTION INTRAMUSCULAR; INTRAVENOUS; SUBCUTANEOUS at 09:07

## 2024-01-01 RX ADMIN — Medication 1 DROP(S): at 17:29

## 2024-01-01 RX ADMIN — Medication 1 MILLIGRAM(S): at 17:06

## 2024-01-01 RX ADMIN — QUETIAPINE FUMARATE 225 MILLIGRAM(S): 200 TABLET, FILM COATED ORAL at 21:36

## 2024-01-01 RX ADMIN — SODIUM CHLORIDE 4 MILLILITER(S): 9 INJECTION INTRAMUSCULAR; INTRAVENOUS; SUBCUTANEOUS at 15:32

## 2024-01-01 RX ADMIN — DEXMEDETOMIDINE HYDROCHLORIDE IN 0.9% SODIUM CHLORIDE 3.35 MICROGRAM(S)/KG/HR: 4 INJECTION INTRAVENOUS at 17:00

## 2024-01-01 RX ADMIN — Medication 4 MILLILITER(S): at 17:37

## 2024-01-01 RX ADMIN — ENOXAPARIN SODIUM 40 MILLIGRAM(S): 100 INJECTION SUBCUTANEOUS at 09:01

## 2024-01-01 RX ADMIN — MIDODRINE HYDROCHLORIDE 10 MILLIGRAM(S): 2.5 TABLET ORAL at 21:09

## 2024-01-01 RX ADMIN — SODIUM CHLORIDE 4 MILLILITER(S): 9 INJECTION INTRAMUSCULAR; INTRAVENOUS; SUBCUTANEOUS at 20:35

## 2024-01-01 RX ADMIN — Medication 1 MILLIGRAM(S): at 05:30

## 2024-01-01 RX ADMIN — Medication 4 MILLILITER(S): at 09:07

## 2024-01-01 RX ADMIN — LACTULOSE 20 GRAM(S): 10 SOLUTION ORAL at 05:52

## 2024-01-01 RX ADMIN — Medication 1 DROP(S): at 06:53

## 2024-01-01 RX ADMIN — Medication 31.4 MICROGRAM(S)/KG/MIN: at 08:02

## 2024-01-01 RX ADMIN — Medication 4 MILLILITER(S): at 09:22

## 2024-01-01 RX ADMIN — SODIUM CHLORIDE 4 MILLILITER(S): 9 INJECTION INTRAMUSCULAR; INTRAVENOUS; SUBCUTANEOUS at 08:41

## 2024-01-01 RX ADMIN — MUPIROCIN 1 APPLICATION(S): 20 OINTMENT TOPICAL at 17:13

## 2024-01-01 RX ADMIN — DROXIDOPA 300 MILLIGRAM(S): 100 CAPSULE ORAL at 06:02

## 2024-01-01 RX ADMIN — Medication 3 MILLILITER(S): at 14:40

## 2024-01-01 RX ADMIN — ALBUTEROL 2.5 MILLIGRAM(S): 90 AEROSOL, METERED ORAL at 10:48

## 2024-01-01 RX ADMIN — SENNA PLUS 1 TABLET(S): 8.6 TABLET ORAL at 06:59

## 2024-01-01 RX ADMIN — CHLORHEXIDINE GLUCONATE 1 APPLICATION(S): 213 SOLUTION TOPICAL at 11:21

## 2024-01-01 RX ADMIN — MIDODRINE HYDROCHLORIDE 30 MILLIGRAM(S): 2.5 TABLET ORAL at 21:04

## 2024-01-01 RX ADMIN — ALBUTEROL 2.5 MILLIGRAM(S): 90 AEROSOL, METERED ORAL at 02:31

## 2024-01-01 RX ADMIN — Medication 250 MILLIGRAM(S): at 08:53

## 2024-01-01 RX ADMIN — SENNA PLUS 1 TABLET(S): 8.6 TABLET ORAL at 06:13

## 2024-01-01 RX ADMIN — Medication 1 DROP(S): at 17:23

## 2024-01-01 RX ADMIN — PIPERACILLIN AND TAZOBACTAM 25 GRAM(S): 4; .5 INJECTION, POWDER, LYOPHILIZED, FOR SOLUTION INTRAVENOUS at 06:21

## 2024-01-01 RX ADMIN — Medication 1 DROP(S): at 18:39

## 2024-01-01 RX ADMIN — Medication 3 MILLILITER(S): at 10:37

## 2024-01-01 RX ADMIN — Medication 250 MILLIGRAM(S): at 18:22

## 2024-01-01 RX ADMIN — QUETIAPINE FUMARATE 225 MILLIGRAM(S): 200 TABLET, FILM COATED ORAL at 09:15

## 2024-01-01 RX ADMIN — Medication 166.67 MILLIGRAM(S): at 17:28

## 2024-01-01 RX ADMIN — CHLORHEXIDINE GLUCONATE 1 APPLICATION(S): 213 SOLUTION TOPICAL at 18:12

## 2024-01-01 RX ADMIN — Medication 1 MILLIGRAM(S): at 09:24

## 2024-01-01 RX ADMIN — Medication 250 MILLIGRAM(S): at 17:25

## 2024-01-01 RX ADMIN — Medication 3 MILLILITER(S): at 21:13

## 2024-01-01 RX ADMIN — Medication 1 DROP(S): at 00:05

## 2024-01-01 RX ADMIN — Medication 5 MILLIGRAM(S): at 22:30

## 2024-01-01 RX ADMIN — LACTULOSE 20 GRAM(S): 10 SOLUTION ORAL at 06:00

## 2024-01-01 RX ADMIN — DEXMEDETOMIDINE HYDROCHLORIDE IN 0.9% SODIUM CHLORIDE 8.38 MICROGRAM(S)/KG/HR: 4 INJECTION INTRAVENOUS at 21:17

## 2024-01-01 RX ADMIN — ALBUTEROL 2.5 MILLIGRAM(S): 90 AEROSOL, METERED ORAL at 04:47

## 2024-01-01 RX ADMIN — Medication 3 MILLILITER(S): at 08:22

## 2024-01-01 RX ADMIN — QUETIAPINE FUMARATE 225 MILLIGRAM(S): 200 TABLET, FILM COATED ORAL at 11:13

## 2024-01-01 RX ADMIN — QUETIAPINE FUMARATE 100 MILLIGRAM(S): 200 TABLET, FILM COATED ORAL at 11:31

## 2024-01-01 RX ADMIN — Medication 1 MILLIGRAM(S): at 09:01

## 2024-01-01 RX ADMIN — SODIUM CHLORIDE 4 MILLILITER(S): 9 INJECTION INTRAMUSCULAR; INTRAVENOUS; SUBCUTANEOUS at 17:08

## 2024-01-01 RX ADMIN — PANTOPRAZOLE SODIUM 40 MILLIGRAM(S): 20 TABLET, DELAYED RELEASE ORAL at 12:37

## 2024-01-01 RX ADMIN — PANTOPRAZOLE SODIUM 40 MILLIGRAM(S): 20 TABLET, DELAYED RELEASE ORAL at 11:39

## 2024-01-01 RX ADMIN — Medication 650 MILLIGRAM(S): at 14:10

## 2024-01-01 RX ADMIN — Medication 4 MILLILITER(S): at 21:07

## 2024-01-01 RX ADMIN — Medication 3 MILLILITER(S): at 15:21

## 2024-01-01 RX ADMIN — DEXMEDETOMIDINE HYDROCHLORIDE IN 0.9% SODIUM CHLORIDE 8.38 MICROGRAM(S)/KG/HR: 4 INJECTION INTRAVENOUS at 00:47

## 2024-01-01 RX ADMIN — Medication 250 MILLIGRAM(S): at 08:33

## 2024-01-01 RX ADMIN — Medication 4 MILLILITER(S): at 16:18

## 2024-01-01 RX ADMIN — DEXMEDETOMIDINE HYDROCHLORIDE IN 0.9% SODIUM CHLORIDE 8.38 MICROGRAM(S)/KG/HR: 4 INJECTION INTRAVENOUS at 02:04

## 2024-01-01 RX ADMIN — CHLORHEXIDINE GLUCONATE 1 APPLICATION(S): 213 SOLUTION TOPICAL at 11:41

## 2024-01-01 RX ADMIN — HYDROMORPHONE HYDROCHLORIDE 0.2 MILLIGRAM(S): 2 INJECTION INTRAMUSCULAR; INTRAVENOUS; SUBCUTANEOUS at 19:00

## 2024-01-01 RX ADMIN — Medication 4 MILLILITER(S): at 09:44

## 2024-01-01 RX ADMIN — HYDROMORPHONE HYDROCHLORIDE 0.2 MILLIGRAM(S): 2 INJECTION INTRAMUSCULAR; INTRAVENOUS; SUBCUTANEOUS at 23:51

## 2024-01-01 RX ADMIN — Medication 31.4 MICROGRAM(S)/KG/MIN: at 07:34

## 2024-01-01 RX ADMIN — Medication 4 MILLILITER(S): at 23:13

## 2024-01-01 RX ADMIN — MIDODRINE HYDROCHLORIDE 30 MILLIGRAM(S): 2.5 TABLET ORAL at 15:10

## 2024-01-01 RX ADMIN — CHLORHEXIDINE GLUCONATE 1 APPLICATION(S): 213 SOLUTION TOPICAL at 12:01

## 2024-01-01 RX ADMIN — MIDODRINE HYDROCHLORIDE 30 MILLIGRAM(S): 2.5 TABLET ORAL at 21:08

## 2024-01-01 RX ADMIN — QUETIAPINE FUMARATE 100 MILLIGRAM(S): 200 TABLET, FILM COATED ORAL at 21:33

## 2024-01-01 RX ADMIN — Medication 4 MILLILITER(S): at 07:45

## 2024-01-01 RX ADMIN — MIDODRINE HYDROCHLORIDE 30 MILLIGRAM(S): 2.5 TABLET ORAL at 05:41

## 2024-01-01 RX ADMIN — Medication 3 MILLILITER(S): at 14:46

## 2024-01-01 RX ADMIN — Medication 250 MILLIGRAM(S): at 09:10

## 2024-01-01 RX ADMIN — ALBUTEROL 2.5 MILLIGRAM(S): 90 AEROSOL, METERED ORAL at 15:29

## 2024-01-01 RX ADMIN — TAMSULOSIN HYDROCHLORIDE 0.4 MILLIGRAM(S): 0.4 CAPSULE ORAL at 22:03

## 2024-01-01 RX ADMIN — Medication 1 MILLIGRAM(S): at 05:04

## 2024-01-01 RX ADMIN — PIPERACILLIN AND TAZOBACTAM 25 GRAM(S): 4; .5 INJECTION, POWDER, LYOPHILIZED, FOR SOLUTION INTRAVENOUS at 21:52

## 2024-01-01 RX ADMIN — ENOXAPARIN SODIUM 40 MILLIGRAM(S): 100 INJECTION SUBCUTANEOUS at 09:51

## 2024-01-01 RX ADMIN — HYDROMORPHONE HYDROCHLORIDE 0.2 MILLIGRAM(S): 2 INJECTION INTRAMUSCULAR; INTRAVENOUS; SUBCUTANEOUS at 22:00

## 2024-01-01 RX ADMIN — PIPERACILLIN AND TAZOBACTAM 25 GRAM(S): 4; .5 INJECTION, POWDER, LYOPHILIZED, FOR SOLUTION INTRAVENOUS at 11:31

## 2024-01-01 RX ADMIN — Medication 4 MILLILITER(S): at 21:23

## 2024-01-01 RX ADMIN — MIDODRINE HYDROCHLORIDE 30 MILLIGRAM(S): 2.5 TABLET ORAL at 05:34

## 2024-01-01 RX ADMIN — Medication 1 DROP(S): at 23:53

## 2024-01-01 RX ADMIN — LACTULOSE 20 GRAM(S): 10 SOLUTION ORAL at 21:24

## 2024-01-01 RX ADMIN — MIDODRINE HYDROCHLORIDE 10 MILLIGRAM(S): 2.5 TABLET ORAL at 13:44

## 2024-01-01 RX ADMIN — ALBUTEROL 2.5 MILLIGRAM(S): 90 AEROSOL, METERED ORAL at 03:37

## 2024-01-01 RX ADMIN — LACTULOSE 20 GRAM(S): 10 SOLUTION ORAL at 21:09

## 2024-01-01 RX ADMIN — Medication 1 DROP(S): at 18:28

## 2024-01-01 RX ADMIN — DROXIDOPA 200 MILLIGRAM(S): 100 CAPSULE ORAL at 12:28

## 2024-01-01 RX ADMIN — MIDODRINE HYDROCHLORIDE 30 MILLIGRAM(S): 2.5 TABLET ORAL at 13:06

## 2024-01-01 RX ADMIN — MUPIROCIN 1 APPLICATION(S): 20 OINTMENT TOPICAL at 17:19

## 2024-01-01 RX ADMIN — Medication 4 MILLILITER(S): at 22:41

## 2024-01-01 RX ADMIN — MIDODRINE HYDROCHLORIDE 30 MILLIGRAM(S): 2.5 TABLET ORAL at 21:26

## 2024-01-01 RX ADMIN — SODIUM CHLORIDE 4 MILLILITER(S): 9 INJECTION INTRAMUSCULAR; INTRAVENOUS; SUBCUTANEOUS at 15:19

## 2024-01-01 RX ADMIN — ALBUTEROL 2.5 MILLIGRAM(S): 90 AEROSOL, METERED ORAL at 09:07

## 2024-01-01 RX ADMIN — ENOXAPARIN SODIUM 40 MILLIGRAM(S): 100 INJECTION SUBCUTANEOUS at 08:23

## 2024-01-01 RX ADMIN — DEXMEDETOMIDINE HYDROCHLORIDE IN 0.9% SODIUM CHLORIDE 8.38 MICROGRAM(S)/KG/HR: 4 INJECTION INTRAVENOUS at 19:17

## 2024-01-01 RX ADMIN — LACTULOSE 20 GRAM(S): 10 SOLUTION ORAL at 06:29

## 2024-01-01 RX ADMIN — Medication 4 MILLILITER(S): at 04:01

## 2024-01-01 RX ADMIN — POLYETHYLENE GLYCOL 3350 17 GRAM(S): 17 POWDER, FOR SOLUTION ORAL at 18:39

## 2024-01-01 RX ADMIN — Medication 400 MILLIGRAM(S): at 08:59

## 2024-01-01 RX ADMIN — MUPIROCIN 1 APPLICATION(S): 20 OINTMENT TOPICAL at 17:12

## 2024-01-01 RX ADMIN — SODIUM CHLORIDE 4 MILLILITER(S): 9 INJECTION INTRAMUSCULAR; INTRAVENOUS; SUBCUTANEOUS at 09:53

## 2024-01-01 RX ADMIN — LACTULOSE 20 GRAM(S): 10 SOLUTION ORAL at 06:01

## 2024-01-01 RX ADMIN — MIDODRINE HYDROCHLORIDE 30 MILLIGRAM(S): 2.5 TABLET ORAL at 05:56

## 2024-01-01 RX ADMIN — SODIUM CHLORIDE 4 MILLILITER(S): 9 INJECTION INTRAMUSCULAR; INTRAVENOUS; SUBCUTANEOUS at 09:16

## 2024-01-01 RX ADMIN — Medication 1 DROP(S): at 00:36

## 2024-01-01 RX ADMIN — SODIUM CHLORIDE 4 MILLILITER(S): 9 INJECTION INTRAMUSCULAR; INTRAVENOUS; SUBCUTANEOUS at 10:37

## 2024-01-01 RX ADMIN — PIPERACILLIN AND TAZOBACTAM 25 GRAM(S): 4; .5 INJECTION, POWDER, LYOPHILIZED, FOR SOLUTION INTRAVENOUS at 13:27

## 2024-01-01 RX ADMIN — ENOXAPARIN SODIUM 40 MILLIGRAM(S): 100 INJECTION SUBCUTANEOUS at 12:00

## 2024-01-01 RX ADMIN — PIPERACILLIN AND TAZOBACTAM 25 GRAM(S): 4; .5 INJECTION, POWDER, LYOPHILIZED, FOR SOLUTION INTRAVENOUS at 20:11

## 2024-01-01 RX ADMIN — ENOXAPARIN SODIUM 40 MILLIGRAM(S): 100 INJECTION SUBCUTANEOUS at 08:03

## 2024-01-01 RX ADMIN — SODIUM CHLORIDE 4 MILLILITER(S): 9 INJECTION INTRAMUSCULAR; INTRAVENOUS; SUBCUTANEOUS at 10:05

## 2024-01-01 RX ADMIN — HYDROMORPHONE HYDROCHLORIDE 0.2 MILLIGRAM(S): 2 INJECTION INTRAMUSCULAR; INTRAVENOUS; SUBCUTANEOUS at 06:59

## 2024-01-01 RX ADMIN — Medication 4 MILLILITER(S): at 15:35

## 2024-01-01 RX ADMIN — PIPERACILLIN AND TAZOBACTAM 25 GRAM(S): 4; .5 INJECTION, POWDER, LYOPHILIZED, FOR SOLUTION INTRAVENOUS at 06:29

## 2024-01-01 RX ADMIN — Medication 1 DROP(S): at 01:16

## 2024-01-01 RX ADMIN — Medication 250 MILLIGRAM(S): at 19:41

## 2024-01-01 RX ADMIN — Medication 1000 MILLIGRAM(S): at 16:36

## 2024-01-01 RX ADMIN — MIDAZOLAM HYDROCHLORIDE 2 MILLIGRAM(S): 1 INJECTION, SOLUTION INTRAMUSCULAR; INTRAVENOUS at 12:30

## 2024-01-01 RX ADMIN — Medication 1 MILLIGRAM(S): at 16:06

## 2024-01-01 RX ADMIN — LACTULOSE 20 GRAM(S): 10 SOLUTION ORAL at 05:55

## 2024-01-01 RX ADMIN — CHLORHEXIDINE GLUCONATE 1 APPLICATION(S): 213 SOLUTION TOPICAL at 12:57

## 2024-01-01 RX ADMIN — ALBUTEROL 2.5 MILLIGRAM(S): 90 AEROSOL, METERED ORAL at 04:00

## 2024-01-01 RX ADMIN — MUPIROCIN 1 APPLICATION(S): 20 OINTMENT TOPICAL at 05:07

## 2024-01-01 RX ADMIN — MIDODRINE HYDROCHLORIDE 30 MILLIGRAM(S): 2.5 TABLET ORAL at 04:20

## 2024-01-01 RX ADMIN — SENNA PLUS 1 TABLET(S): 8.6 TABLET ORAL at 17:05

## 2024-01-01 RX ADMIN — Medication 31.4 MICROGRAM(S)/KG/MIN: at 08:01

## 2024-01-01 RX ADMIN — MIDODRINE HYDROCHLORIDE 20 MILLIGRAM(S): 2.5 TABLET ORAL at 12:38

## 2024-01-01 RX ADMIN — Medication 1 DROP(S): at 17:07

## 2024-01-01 RX ADMIN — SENNA PLUS 1 TABLET(S): 8.6 TABLET ORAL at 17:18

## 2024-01-01 RX ADMIN — PIPERACILLIN AND TAZOBACTAM 25 GRAM(S): 4; .5 INJECTION, POWDER, LYOPHILIZED, FOR SOLUTION INTRAVENOUS at 14:35

## 2024-01-01 RX ADMIN — Medication 3 MILLILITER(S): at 20:48

## 2024-01-01 RX ADMIN — DEXMEDETOMIDINE HYDROCHLORIDE IN 0.9% SODIUM CHLORIDE 8.38 MICROGRAM(S)/KG/HR: 4 INJECTION INTRAVENOUS at 13:09

## 2024-01-01 RX ADMIN — Medication 650 MILLIGRAM(S): at 03:00

## 2024-01-01 RX ADMIN — ALBUTEROL 2.5 MILLIGRAM(S): 90 AEROSOL, METERED ORAL at 21:44

## 2024-01-01 RX ADMIN — Medication 37.7 MICROGRAM(S)/KG/MIN: at 07:24

## 2024-01-01 RX ADMIN — CHLORHEXIDINE GLUCONATE 1 APPLICATION(S): 213 SOLUTION TOPICAL at 13:07

## 2024-01-01 RX ADMIN — SODIUM CHLORIDE 500 MILLILITER(S): 9 INJECTION, SOLUTION INTRAVENOUS at 10:48

## 2024-01-01 RX ADMIN — TAMSULOSIN HYDROCHLORIDE 0.4 MILLIGRAM(S): 0.4 CAPSULE ORAL at 21:26

## 2024-01-01 RX ADMIN — Medication 166.67 MILLIGRAM(S): at 18:44

## 2024-01-05 NOTE — H&P ADULT - ATTENDING COMMENTS
67 yo M with PMhx of BPH, agitation, ?dementia, dysphagia after CVA now s/p PEG tube, and ?CHF presenting from [facility] for worsening agitation and hypoxia, found to have possible L infiltrate on CXR.    Patient seen and examined at bedside. Reports feeling SOB but unable to elaborate. Denies cough, chest pain, fever, chills. Also reports abd pain and last BM was on Tuesday. On exam, awake, alert, cooperative with examination. Abdomen: soft, mildly tender to palpitation in the mid abdomen, PEG site clean, dry, no erythema. Lung: rales noted. Normal respiratory effort. Cardiac: RRR. AOx2.    #Acute hypoxia respiratory failure  #Agitation  #Quesitonable L infiltrate on CXR  #hypernatremia  -check mrsa swab, strept pneumo, urine legionella. c/w abx for now. f/u cultures  -wean from NRB to NC as tolerated  -c/w home psych meds. Psych recs from 12/26 noted. QTc <500 1/5/24  -dysphagia screen. if passed, encourage po intake. if failed, patient has peg tube. Can do free water through peg tube if needed  -consider CT chest if hypoxia not improved  -BM regimen. If abd persisted, check xray abd    Rest as above.  Discussed with HS. 67 yo M with PMhx of BPH, agitation, ?dementia, dysphagia after CVA now s/p PEG tube, and ?CHF presenting from [facility] for worsening agitation and hypoxia, found to have possible L infiltrate on CXR.    Patient seen and examined at bedside. Reports feeling SOB but unable to elaborate. Denies cough, chest pain, fever, chills. Also reports abd pain and last BM was on Tuesday. On exam, awake, alert, cooperative with examination. Abdomen: soft, mildly tender to palpitation in the mid abdomen, PEG site clean, dry, no erythema. Lung: rales noted. Normal respiratory effort. Cardiac: RRR. AOx2.    #Acute hypoxia respiratory failure  #Agitation  #Quesitonable L infiltrate on CXR  #hypernatremia  -check mrsa swab, strept pneumo, urine legionella. c/w abx for now. f/u cultures  -wean from NRB to NC as tolerated  -c/w home psych meds. Psych recs from 12/26 noted. QTc <500 1/5/24  -dysphagia screen. if passed, encourage po intake. if failed, patient has peg tube. Can do free water through peg tube if needed  -consider CT chest if hypoxia not improved  -BM regimen. If abd persisted, check xray abd    Rest as above.  Discussed with HS. 69 yo M with PMhx of BPH, agitation, ?dementia, dysphagia after CVA now s/p PEG tube, and ?CHF presenting from [facility] for worsening agitation and hypoxia, found to have possible L infiltrate on CXR.    Patient seen and examined at bedside. Reports feeling SOB but unable to elaborate. Denies cough, chest pain, fever, chills. Also reports abd pain and last BM was on Tuesday. On exam, awake, alert, cooperative with examination. Abdomen: soft, mildly tender to palpitation in the mid abdomen, PEG site clean, dry, no erythema. Lung: rales noted. Normal respiratory effort. Cardiac: RRR. AOx2.    #Acute hypoxia respiratory failure  #Agitation  #Quesitonable L infiltrate on CXR  #hypernatremia  -check mrsa swab, strept pneumo, urine legionella, ddimer. c/w abx for now. f/u cultures  -wean from NRB to NC as tolerated  -c/w home psych meds. Psych recs from 12/26 noted. QTc <500 1/5/24  -dysphagia screen. if passed, encourage po intake. if failed, patient has peg tube. Can do free water through peg tube if needed  -consider CT chest if hypoxia not improved  -BM regimen. If abd persisted, check xray abd    Rest as above.  Discussed with HS. 67 yo M with PMhx of BPH, agitation, ?dementia, dysphagia after CVA now s/p PEG tube, and ?CHF presenting from [facility] for worsening agitation and hypoxia, found to have possible L infiltrate on CXR.    Patient seen and examined at bedside. Reports feeling SOB but unable to elaborate. Denies cough, chest pain, fever, chills. Also reports abd pain and last BM was on Tuesday. On exam, awake, alert, cooperative with examination. Abdomen: soft, mildly tender to palpitation in the mid abdomen, PEG site clean, dry, no erythema. Lung: rales noted. Normal respiratory effort. Cardiac: RRR. AOx2.    #Acute hypoxia respiratory failure  #Agitation  #Quesitonable L infiltrate on CXR  #hypernatremia  -check mrsa swab, strept pneumo, urine legionella, ddimer. c/w abx for now. f/u cultures  -wean from NRB to NC as tolerated  -c/w home psych meds. Psych recs from 12/26 noted. QTc <500 1/5/24  -dysphagia screen. if passed, encourage po intake. if failed, patient has peg tube. Can do free water through peg tube if needed  -consider CT chest if hypoxia not improved  -BM regimen. If abd persisted, check xray abd    Rest as above.  Discussed with HS.

## 2024-01-05 NOTE — ED PROVIDER NOTE - ATTENDING APP SHARED VISIT CONTRIBUTION OF CARE
67 y/o M PMH schizophrenia, orthostatic hypotension on midodrine, CAD, CHF, CVA with dysphagia sent in from NH for agitation, tachycardia, and hypoxia today. Pt offers no complaints in ED currently but appears somnolent. Denies fever, chills, CP, SOB, abdominal pain, N/V/D.

## 2024-01-05 NOTE — ED ADULT NURSE REASSESSMENT NOTE - NS ED NURSE REASSESS COMMENT FT1
pt very restless agitated not cooperative pt on constant observation keeps removing oxygen and medical devices and tries to get out of bed PCA is bedside

## 2024-01-05 NOTE — H&P ADULT - NSHPLABSRESULTS_GEN_ALL_CORE
LABS: Personally reviewed labs, imaging, and ECG                          12.3   9.36  )-----------( 128      ( 05 Jan 2024 12:45 )             38.8       01-05    148<H>  |  106  |  18  ----------------------------<  98  4.0   |  36<H>  |  0.74    Ca    8.4      05 Jan 2024 12:45    TPro  7.1  /  Alb  3.3  /  TBili  0.4  /  DBili  x   /  AST  9   /  ALT  9   /  AlkPhos  61  01-05       LIVER FUNCTIONS - ( 05 Jan 2024 12:45 )  Alb: 3.3 g/dL / Pro: 7.1 g/dL / ALK PHOS: 61 U/L / ALT: 9 U/L / AST: 9 U/L / GGT: x                    Urinalysis Basic - ( 05 Jan 2024 12:45 )    Color: x / Appearance: x / SG: x / pH: x  Gluc: 98 mg/dL / Ketone: x  / Bili: x / Urobili: x   Blood: x / Protein: x / Nitrite: x   Leuk Esterase: x / RBC: x / WBC x   Sq Epi: x / Non Sq Epi: x / Bacteria: x        PT/INR - ( 05 Jan 2024 12:45 )   PT: 13.7 sec;   INR: 1.22 ratio         PTT - ( 05 Jan 2024 12:45 )  PTT:32.4 sec    Lactate Trend            CAPILLARY BLOOD GLUCOSE            Culture Results:   No growth at 5 days (12-14 @ 23:45)  Culture Results:   No growth at 5 days (12-14 @ 23:30)  Culture Results:   No growth (12-14 @ 15:00)      RADIOLOGY & ADDITIONAL TESTS:    < from: Xray Chest 1 View- PORTABLE-Urgent (01.05.24 @ 14:15) >    INTERPRETATION:  EXAMINATION: XR CHEST URGENT    CLINICAL INDICATION: Sepsis    TECHNIQUE: Single frontal, portable view of the chest was obtained.    COMPARISON: Chest x-ray 12/14/2023.    FINDINGS:  LINES/TUBES/SUPPORT DEVICES: None    LUNGS/PLEURA: Clear right lung with questionable retrocardiac infiltrate.   No pleural effusion or pneumothorax.    HEART AND MEDIASTINUM: Heart size is within normal limits.    BONES: Chronic fracture deformity of the right distal clavicle. Old   right-sided rib fractures.    IMPRESSION:  Questionable left infiltrate. Follow-up study is recommended as   clinically warranted.    < end of copied text >

## 2024-01-05 NOTE — ED ADULT NURSE NOTE - OBJECTIVE STATEMENT
María RN: 68 year old male received to rm 26 AAOx3, brought in by EMS from 5 [facility] for "agitation" per triage note. pt appears lethargic, responds to verbal stimuli, taking off his nasal cannula. pt placed on simple face mask 8L sating 96%. pt then taking off simple mask, pt placed on NRB 12L sating 100%. pt hypotensive 80/40s, MD rodriguez, LEEANNE Lanier at bedside. pt with wet gurgly cough. respirations even and unlabored. skin pale, warm, dry. rectal temp 99.1 at this time. PIV #18 right forearm. peg tube noted to abd. labs drawn and sent. Report given to primary RN María RN: 68 year old male received to rm 26 AAOx3, brought in by EMS from 5 [facility] for "agitation" per triage note. pt appears lethargic, responds to verbal stimuli, taking off his nasal cannula. pt placed on simple face mask 8L sating 96%. pt then taking off simple mask, pt placed on NRB 12L sating 100%. pt hypotensive 80/40s, MD rodriguez, LEEANNE Lanier at bedside. pt with wet gurgly cough. respirations even and unlabored. skin pale, warm, dry. rectal temp 99.1 at this time. PIV #18 right forearm. peg tube noted to abd. labs drawn and sent. Report given to primary RN

## 2024-01-05 NOTE — H&P ADULT - ASSESSMENT
69 yo M with PMhx of BPH, agitation, ?dementia, dysphagia after CVA now s/p PEG tube, and ?CHF presenting from [facility] for worsening agitation and hypoxia, found to have possible L infiltrate on CXR. 67 yo M with PMhx of BPH, agitation, ?dementia, dysphagia after CVA now s/p PEG tube, and ?CHF presenting from [facility] for worsening agitation and hypoxia, found to have possible L infiltrate on CXR.

## 2024-01-05 NOTE — H&P ADULT - PROBLEM SELECTOR PLAN 1
Presents from nursing home for worsening agitation and aggression to staff. Unclear if encephalopathy iso URI? vs. worsening dementia vs. mood disorder      - QTc 472 on admission, cont monitoring EKGs for QTc after antipsychotics  - c/w home seroquel 225mg BID  - c/w home depakote 125mg BID  - Zyprexa IM for severe agitation when pt refusing oral medications  - c/w home Clonazepam for now given Zyprexa administration  - pending UA  - consider D5 for mild hypernatremia 148  - Behavioral health c/s in AM for worsening agitation and medication management Presents from nursing home for worsening agitation and aggression to staff. Unclear if encephalopathy iso URI? vs. worsening dementia vs. mood disorder      - QTc 472 on admission, cont monitoring EKGs for QTc after antipsychotics  - c/w home seroquel 225mg BID  - c/w home depakote 125mg at 2pm, 250 BID  - Zyprexa IM for severe agitation when pt refusing oral medications  - c/w home Clonazepam for now given Zyprexa administration  - pending UA  - consider D5 for mild hypernatremia 148. Encourage po intake for now  - consider Behavioral health c/s in AM for worsening agitation and medication management

## 2024-01-05 NOTE — ED PROVIDER NOTE - CLINICAL SUMMARY MEDICAL DECISION MAKING FREE TEXT BOX
pt with somnolence, hypotension and hypoxia in ED; sepsis panel ordered to assess for infectious etiology.

## 2024-01-05 NOTE — ED PROVIDER NOTE - CONSTITUTIONAL, MLM
ill appearing, awake, alert, oriented to person, place, time (knows the year) and in no apparent distress. normal...

## 2024-01-05 NOTE — ED ADULT NURSE NOTE - CHIEF COMPLAINT QUOTE
sent in from 5 [facility] for "agitation"- arrives lethargic, pale, hypoxic to 80s% RA- placed on 6L NC in triage with improvement sent in from 5 [facility] for "agitation"- arrives lethargic, pale, hypoxic to 80s% RA- placed on 6L NC in triage with improvement

## 2024-01-05 NOTE — ED ADULT NURSE NOTE - NSFALLRISKINTERV_ED_ALL_ED
Assistance OOB with selected safe patient handling equipment if applicable/Assistance with ambulation/Communicate fall risk and risk factors to all staff, patient, and family/Provide visual cue: yellow wristband, yellow gown, etc/Reinforce activity limits and safety measures with patient and family/Use of alarms - bed, stretcher, chair and/or video monitoring/Call bell, personal items and telephone in reach/Instruct patient to call for assistance before getting out of bed/chair/stretcher/Non-slip footwear applied when patient is off stretcher/[place] to call system/Physically safe environment - no spills, clutter or unnecessary equipment/Purposeful Proactive Rounding/Room/bathroom lighting operational, light cord in reach Assistance OOB with selected safe patient handling equipment if applicable/Assistance with ambulation/Communicate fall risk and risk factors to all staff, patient, and family/Provide visual cue: yellow wristband, yellow gown, etc/Reinforce activity limits and safety measures with patient and family/Use of alarms - bed, stretcher, chair and/or video monitoring/Call bell, personal items and telephone in reach/Instruct patient to call for assistance before getting out of bed/chair/stretcher/Non-slip footwear applied when patient is off stretcher/[place] to call system/Physically safe environment - no spills, clutter or unnecessary equipment/Purposeful Proactive Rounding/Room/bathroom lighting operational, light cord in reach

## 2024-01-05 NOTE — H&P ADULT - TIME BILLING
Face to face encounter. Reviewed vitals, labs, imaging. Reviewed prior hospitalization record. Documentation in sunrise.

## 2024-01-05 NOTE — ED ADULT NURSE REASSESSMENT NOTE - NS ED NURSE REASSESS COMMENT FT1
pt medicated as prescribed pt remains constant observation still on off not cooperative trying to remove medical devices, oxygen 100% w/mask 82-84 at room air. not able to get urine at this time pt refusing to give urine, refusing catheter pt grabbing staffs arms no cooperative. pending urine collection and bed on floor. pt medicated as prescribed pt remains constant observation still on off not cooperative trying to remove medical devices, oxygen 100% w/mask 82-84 at room air. not able to get urine at this time pt refusing to give urine, refusing catheter pt grabbing staffs arms no cooperative. sacrum w/nonblanchable redness, pt cleaned and changed. pending urine collection and bed on floor.

## 2024-01-05 NOTE — H&P ADULT - PROBLEM SELECTOR PLAN 2
Per brother, pt has "breathing problems" at baseline. Per report at NH, pt desatted to 89, was placed on 4 L NC. Pt euvolemic on exam, likely not CHF related. Ddx: URI vs. chronic aspiration from secretions    -CXR 1/5 w/ questionable L infiltrate  -s/p azithromycin, ceftriaxone  -blood cx  -try to transition NRB to NC  -duonebs q6  -chest PT Per brother, pt has "breathing problems" at baseline. Per report at NH, pt desatted to 89, was placed on 4 L NC. Pt euvolemic on exam, likely not CHF related. Ddx: URI vs. chronic aspiration from secretions    -CXR 1/5 w/ questionable L infiltrate  -s/p azithromycin, ceftriaxone  -c/w abx for possible pna  -blood cx  -try to transition NRB to NC  -duonebs q6  -chest PT

## 2024-01-05 NOTE — H&P ADULT - NSHPPHYSICALEXAM_GEN_ALL_CORE
T(C): 37.2 (01-05-24 @ 13:34), Max: 37.3 (01-05-24 @ 12:41)  HR: 83 (01-05-24 @ 16:00) (73 - 89)  BP: 101/58 (01-05-24 @ 16:00) (81/48 - 101/58)  RR: 22 (01-05-24 @ 16:00) (15 - 22)  SpO2: 100% (01-05-24 @ 16:00) (90% - 100%)    PHYSICAL EXAM:  GENERAL: NAD, thin  HEAD:  Atraumatic, Normocephalic  EYES: PERRLA, conjunctiva and sclera clear, no jaundice   ENMT: No tonsillar erythema, exudates, or enlargement; Dry mucous membranes  NECK: Supple, non tender  HEART: Regular rate and rhythm; No murmurs, rubs, or gallops. S1/S2 present  RESPIRATORY: coarse breath sounds B/L, No wheezing  ABDOMEN: PEG tube C/D/I. Soft, Nontender, Nondistended; Bowel sounds present   NEUROLOGY: A&Ox2 (name, [facility]), nonfocal, moving all extremities  EXTREMITIES:  2+ Peripheral Pulses, No clubbing, cyanosis, or edema   SKIN: warm, dry, normal color, no abnormal lesions T(C): 37.2 (01-05-24 @ 13:34), Max: 37.3 (01-05-24 @ 12:41)  HR: 83 (01-05-24 @ 16:00) (73 - 89)  BP: 101/58 (01-05-24 @ 16:00) (81/48 - 101/58)  RR: 22 (01-05-24 @ 16:00) (15 - 22)  SpO2: 100% (01-05-24 @ 16:00) (90% - 100%)    PHYSICAL EXAM:  GENERAL: NAD, thin  HEAD:  Atraumatic, Normocephalic  EYES: PERRLA, conjunctiva and sclera clear, no jaundice   ENMT: No tonsillar erythema, exudates, or enlargement; Dry mucous membranes  NECK: Supple, non tender  HEART: Regular rate and rhythm; No murmurs, rubs, or gallops. S1/S2 present  RESPIRATORY: coarse breath sounds B/L, No wheezing  ABDOMEN: PEG tube C/D/I. Soft, Nontender, Nondistended; Bowel sounds present   NEUROLOGY: A&Ox2 (name, [facility]), nonfocal, moving all extremities  EXTREMITIES:  2+ Peripheral Pulses, No clubbing, cyanosis, or edema   SKIN: warm, dry, normal color, no abnormal lesions

## 2024-01-05 NOTE — ED ADULT NURSE REASSESSMENT NOTE - NS ED NURSE REASSESS COMMENT FT1
Received report from day RN. Patient is on constant observation, agitated and restless. A&Ox1, breathing even and unlabored on 12L nonrebreather, O2 saturation 100%. Patient is admitted, pending bed placement.

## 2024-01-05 NOTE — ED ADULT TRIAGE NOTE - HEIGHT IN FEET
Urology:    Please chart all voided output, bladder scans, and straight catheterization output.  Will follow voiding through the day.    There is only one PVR scan documented - 335cc.    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   · If > 350 ml or no void please straight cath .   · If scans are < 350 x 3 you can discontinue checking scans unless patient has symptoms.   · Repeat UA in 4-6 weeks to check for microhematuria    Call/page with questions or concerns. We will continue to follow peripherally today.    BERNARDINO Dos Santos  Pager: [phone]    Please page urology PA/NP with questions on weekdays between the hours of 9:00 am and 5:00 pm. Please page Urologist with questions on nights and weekends.    ** If you have paged me and have not had paged returned in 15 minutes- Please page [phone]**    Urology Treatment Team:  Roz Becerril NP (pager: [phone])  Shadi Page MD ([phone])       Staff Addendum  4/30/2019  3:20 PM    Notified by RN- Patient PVR scan ~360cc. Patient refusing straight catheterization. He is not uncomfortable. Will adjust straight catheterization parameters to 450cc and continue to monitor.    BERNARDINO Dos Santos       5

## 2024-01-05 NOTE — ED ADULT NURSE REASSESSMENT NOTE - NS ED NURSE REASSESS COMMENT FT1
Attempted to wean down patient's oxygen from 12L to 10L, patient immediately desatted to mid 80s. O2 bumped back up to 12L nonrebreather. Yes

## 2024-01-05 NOTE — H&P ADULT - HISTORY OF PRESENT ILLNESS
67 yo M with PMhx of BPH, agitation, ?dementia, dysphagia after CVA now s/p PEG tube, and ?CHF presenting from [facility] for worsening agitation today. Per NH, pt was very agitated in the morning there. Pt coughs from time to time. At the NH, pt's O2 went down to 89% on RA, was placed on 4 L NC and transferred to ED. On exam, pt endorsed SOB but stated that he wants to go back home. A&Ox2 (name, in hospital).      In ED, pt received ceftriaxone x1, azithromycin x1, depakote x1, haldol 2 x1. CXR showing questionable L infiltrate. Pt refusing NRB in ED. 67 yo M with PMhx of BPH, agitation, ?dementia, dysphagia after CVA now s/p PEG tube, and ?CHF presenting from [facility] for worsening agitation today. Per NH, pt was very agitated in the morning there. Pt coughs from time to time. At the NH, pt's O2 went down to 89% on RA, was placed on 4 L NC and transferred to ED. On exam, pt endorsed SOB but stated that he wants to go back home. A&Ox2 (name, in hospital).      In ED, pt received ceftriaxone x1, azithromycin x1, depakote x1, haldol 2 x1. CXR showing questionable L infiltrate. Pt refusing NRB in ED. 69 yo M with PMhx of BPH, agitation, ?dementia, dysphagia after CVA now s/p PEG tube, and ?CHF presenting from [facility] for worsening agitation today. Per NH, pt was very agitated in the morning there. Pt coughs from time to time. At the NH, pt's O2 went down to 89% on RA, was placed on 4 L NC and transferred to ED. On exam, pt endorsed SOB but stated that he wants to go back home. A&Ox2 (name, in hospital).      In ED, pt received ceftriaxone x1, azithromycin x1. CXR showing questionable L infiltrate. Pt refusing NRB in ED. 67 yo M with PMhx of BPH, agitation, ?dementia, dysphagia after CVA now s/p PEG tube, and ?CHF presenting from [facility] for worsening agitation today. Per NH, pt was very agitated in the morning there. Pt coughs from time to time. At the NH, pt's O2 went down to 89% on RA, was placed on 4 L NC and transferred to ED. On exam, pt endorsed SOB but stated that he wants to go back home. A&Ox2 (name, in hospital).      In ED, pt received ceftriaxone x1, azithromycin x1. CXR showing questionable L infiltrate. Pt refusing NRB in ED.

## 2024-01-05 NOTE — H&P ADULT - PROBLEM SELECTOR PLAN 6
DVT ppx: hold for now given thrombocytopenia    Diet: pureed diet per prior note, ordered dysphagia screen first    Dispo: [facility] DVT ppx: hold for now given thrombocytopenia    Diet: pureed diet per prior note, ordered dysphagia screen first    Dispo: [facility] DVT ppx: hold for now given thrombocytopenia. If continues to be stable, can initiate    Diet: pureed diet per prior note, ordered dysphagia screen first    Dispo: [facility] DVT ppx: hold for now given thrombocytopenia. If continues to be stable, can initiate    Diet: pureed diet per prior note, ordered dysphagia screen first    Dispo: [facility]

## 2024-01-05 NOTE — ED PROVIDER NOTE - NSICDXPASTMEDICALHX_GEN_ALL_CORE_FT
PAST MEDICAL HISTORY:  Agitation     BPH (benign prostatic hyperplasia)     Congestive heart failure (CHF)     CVA (cerebral vascular accident)     CVA (cerebrovascular accident)     Dysphagia     Dysphagia     Schizophrenia

## 2024-01-05 NOTE — ED PROVIDER NOTE - MUSCULOSKELETAL, MLM
pt frail however, spine appears normal, range of motion is not limited, no muscle or joint tenderness

## 2024-01-05 NOTE — H&P ADULT - NSHPREVIEWOFSYSTEMS_GEN_ALL_CORE
Pending Prescriptions:                       Disp   Refills    lisinopril (PRINIVIL/ZESTRIL) 30 MG tablet       0          Routing refill request to provider for review/approval because:  Medication is reported/historical  LOV 8/7/2019    BP Readings from Last 3 Encounters:   08/07/19 128/82   05/20/19 120/82   02/14/19 155/87     Keila Yepez, RN, BSN             REVIEW OF SYSTEMS:    RESPIRATORY: +SOB. No cough, wheezing, hemoptysis   CARDIOVASCULAR: No chest pain or palpitations  GASTROINTESTINAL: No abdominal pain

## 2024-01-05 NOTE — ED ADULT TRIAGE NOTE - CHIEF COMPLAINT QUOTE
sent in from 5 [facility] for "agitation"- arrives lethargic, pale, hypoxic to 80s% RA- placed on 6L NC in triage with improvement sent in from 5 [facility] for "agitation"- arrives lethargic, pale, hypoxic to 80s% RA- placed on 6L NC in triage with improvement

## 2024-01-06 NOTE — PROGRESS NOTE ADULT - SUBJECTIVE AND OBJECTIVE BOX
MICU PROGRESS NOTE    INTERVAL HPI/OVERNIGHT EVENTS:  - admitted overnight, intubated and sedated.     SUBJECTIVE: Intubated and sedated.     OBJECTIVE:    VITAL SIGNS:  ICU Vital Signs Last 24 Hrs  T(C): 37.1 (06 Jan 2024 01:00), Max: 37.3 (05 Jan 2024 12:41)  T(F): 98.7 (06 Jan 2024 01:00), Max: 99.1 (05 Jan 2024 12:41)  HR: 95 (06 Jan 2024 08:11) (73 - 120)  BP: 148/73 (06 Jan 2024 06:00) (81/48 - 148/73)  BP(mean): 93 (06 Jan 2024 06:00) (60 - 93)  ABP: --  ABP(mean): --  RR: 22 (06 Jan 2024 06:00) (15 - 22)  SpO2: 100% (06 Jan 2024 08:11) (88% - 100%)    O2 Parameters below as of 06 Jan 2024 06:00  Patient On (Oxygen Delivery Method): ventilator    O2 Concentration (%): 100    VENTS:  Mode: AC/ CMV (Assist Control/ Continuous Mandatory Ventilation), RR (machine): 20, TV (machine): 480, FiO2: 100, PEEP: 8, PS: 8, ITime: 0.76, MAP: 13, PIP: 27    I&O:    PHYSICAL EXAM:  GENERAL: Sedated and intubated. Frail.   EYES: Constricted and minimally reactive pupils b/l, no scleral icterus  NECK: No JVD  LUNG: Left lung with decreased breath sounds   HEART: Regular rate and rhythm  ABDOMEN: PEG tube in place. Soft, Nontender, Nondistended  EXTREMITIES: Right tibial IO. No LE edema, 2+ Peripheral Pulses  NEUROLOGY: Sedated. Moving bilateral UE against restraints.   SKIN: No rashes or lesions                                     MEDICATIONS:  MEDICATIONS  (STANDING):  albuterol/ipratropium for Nebulization 3 milliLiter(s) Nebulizer every 6 hours  chlorhexidine 4% Liquid 1 Application(s) Topical <User Schedule>  clonazePAM  Tablet 1 milliGRAM(s) Oral at bedtime  dextrose 5%. 1000 milliLiter(s) (50 mL/Hr) IV Continuous <Continuous>  divalproex Sprinkle 125 milliGRAM(s) Oral <User Schedule>  divalproex Sprinkle 250 milliGRAM(s) Oral <User Schedule>  enoxaparin Injectable 40 milliGRAM(s) SubCutaneous every 24 hours  fentaNYL   Infusion. 0.5 MICROgram(s)/kG/Hr (3.35 mL/Hr) IV Continuous <Continuous>  lactulose Syrup 20 Gram(s) Oral two times a day  midodrine. 10 milliGRAM(s) Oral <User Schedule>  piperacillin/tazobactam IVPB. 3.375 Gram(s) IV Intermittent once  piperacillin/tazobactam IVPB.- 3.375 Gram(s) IV Intermittent once  piperacillin/tazobactam IVPB.- 3.375 Gram(s) IV Intermittent once  piperacillin/tazobactam IVPB.. 3.375 Gram(s) IV Intermittent every 8 hours  QUEtiapine 225 milliGRAM(s) Oral <User Schedule>  senna 1 Tablet(s) Oral every 12 hours  tamsulosin 0.4 milliGRAM(s) Oral at bedtime  vancomycin  IVPB 1000 milliGRAM(s) IV Intermittent every 12 hours    MEDICATIONS  (PRN):  acetaminophen     Tablet .. 650 milliGRAM(s) Oral every 6 hours PRN Moderate Pain (4 - 6)  OLANZapine Injectable 2.5 milliGRAM(s) IntraMuscular every 6 hours PRN agitation  sodium chloride 0.9% lock flush 10 milliLiter(s) IV Push every 1 hour PRN Pre/post blood products, medications, blood draw, and to maintain line patency    ALLERGIES:  Allergies    No Known Allergies    Intolerances        LABS:                        13.4   15.01 )-----------( 147      ( 06 Jan 2024 05:18 )             42.8     01-06    144  |  102  |  14  ----------------------------<  97  5.4<H>   |  32<H>  |  0.70    Ca    8.7      06 Jan 2024 05:18  Phos  2.9     01-06  Mg     2.00     01-06    TPro  7.5  /  Alb  3.3  /  TBili  0.5  /  DBili  x   /  AST  35  /  ALT  14  /  AlkPhos  65  01-06    PT/INR - ( 06 Jan 2024 05:18 )   PT: 14.5 sec;   INR: 1.30 ratio         PTT - ( 06 Jan 2024 05:18 )  PTT:31.4 sec  Urinalysis Basic - ( 06 Jan 2024 05:18 )    Color: x / Appearance: x / SG: x / pH: x  Gluc: 97 mg/dL / Ketone: x  / Bili: x / Urobili: x   Blood: x / Protein: x / Nitrite: x   Leuk Esterase: x / RBC: x / WBC x   Sq Epi: x / Non Sq Epi: x / Bacteria: x    CAPILLARY BLOOD GLUCOSE    POCT Blood Glucose.: 90 mg/dL (06 Jan 2024 03:43)    RADIOLOGY & ADDITIONAL TESTS: Reviewed.

## 2024-01-06 NOTE — PROCEDURE NOTE - NSPOSTPRCRAD_GEN_A_CORE
18CM deep/central line located in the superior vena cava/depth of insertion/no pneumothorax/post-procedure radiography performed

## 2024-01-06 NOTE — AIRWAY PLACEMENT NOTE ADULT - POST AIRWAY PLACEMENT ASSESSMENT:
breath sounds bilateral/breath sounds equal/positive end tidal CO2 noted/CXR pending/chest excursion noted breath sounds bilateral/breath sounds equal/positive end tidal CO2 noted/CXR pending/chest excursion noted/skin color improved

## 2024-01-06 NOTE — CHART NOTE - NSCHARTNOTEFT_GEN_A_CORE
MICU Accept Note    CHIEF COMPLAINT: Hypoxia     HPI / INTERVAL HISTORY:  67 yo M with PMhx of BPH, agitation, ?dementia, dysphagia after CVA now s/p PEG tube, and ?CHF presenting from [facility] for worsening agitation. Per NH, pt was very agitated in the morning there. Intermittent cough with O2 sat down to 89% on RA, was placed on 4 L NC and transferred to ED. He received ceftriaxone x1, azithromycin x1 in ED. CXR showing questionable L infiltrate. He was placed on a nonrebreather, sats increased to 100%, from mid 80s on RA. However as patient was waiting to be transferred to the medicine floors, he became more tachypneic and hypoxic despite NRB. RRT was called and patient was intubated. Transferred to MICU for further care.       PAST MEDICAL & SURGICAL HISTORY:  CVA (cerebrovascular accident)  Dysphagia  BPH (benign prostatic hyperplasia)  Agitation  Schizophrenia  Congestive heart failure (CHF)  CVA (cerebral vascular accident)  Dysphagia  S/P percutaneous endoscopic gastrostomy (PEG) tube placement      HOME MEDICATIONS:  Depakote Sprinkles 125 mg oral delayed release capsule: 1 cap qd  ClonazePAM 1 mg oral tablet: 1 tab qd  QUEtiapine 200 mg oral tablet: 1 tab BID  QUEtiapine 25 mg oral tablet: 1 tab BID  Acetaminophen 325 mg oral tablet: 2 tabs q6h prn for pain   Senna 8.6 mg oral tablet: 1 tab BID  Lactulose 10 g/15 mL oral syrup: 30 milliliter(s) BID   Midodrine 10 mg oral tablet: 1 tab q8h  Melatonin 10 mg oral capsule: 1 cap qd  DuoNeb 0.5 mg-2.5 mg/3 mL inhalation solution: 3mL nebulizer q6 prn   Tamsulosin 0.4 mg oral capsule: 1 cap qd      Allergies  No Known Allergies    REVIEW OF SYSTEMS:  Unable to obtain 2/2 sedation.     OBJECTIVE:  ICU Vital Signs Last 24 Hrs  T(C): 37.1 (06 Jan 2024 01:00), Max: 37.3 (05 Jan 2024 12:41)  T(F): 98.7 (06 Jan 2024 01:00), Max: 99.1 (05 Jan 2024 12:41)  HR: 120 (06 Jan 2024 04:52) (73 - 120)  BP: 115/67 (06 Jan 2024 01:00) (81/48 - 115/67)  BP(mean): 60 (05 Jan 2024 13:11) (60 - 62)  RR: 22 (06 Jan 2024 01:00) (15 - 22)  SpO2: 88% (06 Jan 2024 04:52) (88% - 100%)    O2 Parameters below as of 06 Jan 2024 01:00  Patient On (Oxygen Delivery Method): mask, nonrebreather  O2 Flow (L/min): 15    Mode: AC/ CMV (Assist Control/ Continuous Mandatory Ventilation), RR (machine): 20, TV (machine): 480, FiO2: 100, PEEP: 8, ITime: 1    CAPILLARY BLOOD GLUCOSE  POCT Blood Glucose.: 90 mg/dL (06 Jan 2024 03:43)      =================PHYSICAL EXAM=================    GENERAL: Sedated and intubated   EYES: PERRL, no scleral icterus  NECK: No JVD  LUNG: Clear to auscultation bilaterally; No wheeze, crackles or rhonchi  HEART: Regular rate and rhythm; No murmurs, rubs, or gallops  ABDOMEN: Soft, Nontender, Nondistended  EXTREMITIES:  No LE edema, 2+ Peripheral Pulses, No clubbing, cyanosis, or edema  NEUROLOGY: Sedated  SKIN: No rashes or lesions    =================================================    LABS:                        12.3   9.36  )-----------( 128      ( 05 Jan 2024 12:45 )             38.8     Hgb Trend: 12.3<--  01-05    148<H>  |  106  |  18  ----------------------------<  98  4.0   |  36<H>  |  0.74    Ca    8.4      05 Jan 2024 12:45    TPro  7.1  /  Alb  3.3  /  TBili  0.4  /  DBili  x   /  AST  9   /  ALT  9   /  AlkPhos  61  01-05    Creatinine Trend: 0.74<--, 0.74<--, 0.70<--, 0.71<--, 0.63<--, 0.66<--  PT/INR - ( 05 Jan 2024 12:45 )   PT: 13.7 sec;   INR: 1.22 ratio    PTT - ( 05 Jan 2024 12:45 )  PTT:32.4 sec    Urinalysis Basic - ( 05 Jan 2024 12:45 )  Color: x / Appearance: x / SG: x / pH: x  Gluc: 98 mg/dL / Ketone: x  / Bili: x / Urobili: x   Blood: x / Protein: x / Nitrite: x   Leuk Esterase: x / RBC: x / WBC x   Sq Epi: x / Non Sq Epi: x / Bacteria: x    Venous Blood Gas:  01-06 @ 04:20  7.33/72/35/38/58.0  VBG Lactate: 2.1  Venous Blood Gas:  01-05 @ 12:45  7.38/68/49/40/80.9  VBG Lactate: 1.9    Mode: AC/ CMV (Assist Control/ Continuous Mandatory Ventilation), RR (machine): 20, TV (machine): 480, FiO2: 100, PEEP: 8, ITime: 1      HOSPITAL MEDICATIONS:  STANDING:  albuterol/ipratropium for Nebulization 3 milliLiter(s) Nebulizer every 6 hours  azithromycin  IVPB 500 milliGRAM(s) IV Intermittent every 24 hours  cefTRIAXone   IVPB 1000 milliGRAM(s) IV Intermittent every 24 hours  clonazePAM  Tablet 1 milliGRAM(s) Oral at bedtime  divalproex Sprinkle 125 milliGRAM(s) Oral <User Schedule>  divalproex Sprinkle 250 milliGRAM(s) Oral <User Schedule>  fentaNYL   Infusion. 0.5 MICROgram(s)/kG/Hr (3.35 mL/Hr) IV Continuous <Continuous>  lactulose Syrup 20 Gram(s) Oral two times a day  midodrine. 10 milliGRAM(s) Oral <User Schedule>  QUEtiapine 225 milliGRAM(s) Oral <User Schedule>  senna 1 Tablet(s) Oral every 12 hours  tamsulosin 0.4 milliGRAM(s) Oral at bedtime    PRN:  acetaminophen Tablet .. 650 milliGRAM(s) Oral every 6 hours PRN Moderate Pain (4 - 6)  OLANZapine Injectable 2.5 milliGRAM(s) IntraMuscular every 6 hours PRN agitation      MICROBIOLOGY:   RVP negative  Blood cultures pending     RADIOLOGY & ADDITIONAL TESTS:  Chest XR 1/5/24 - Questionable left infiltrate  Chest XR 1/6/24 - New near-complete opacification of the left lung field, suggestive of mucous plug and superimposed atelectasis.      ASSESSMENT AND PLAN:  67 yo M with PMhx of BPH, agitation, ?dementia, dysphagia after CVA now s/p PEG tube, and ?CHF presenting from [facility] for worsening agitation and hypoxia, initially found to have possible L infiltrate on CXR, covered empirically for CAP. Now with near-complete opacification of the left lung field, requiring increasing O2 requirements, and now s/p intubation.     #Neuro  Agitation.   - c/w home seroquel 225mg BID  - c/w home depakote 125mg at 2pm, 250 BID  - Zyprexa IM for severe agitation when pt refusing oral medications  - c/w home Clonazepam for now given Zyprexa administration  - Consider behavioral health consult after extubation for medication management    #Resp  Acute hypoxic respiratory failure  - s/p intubation 1/6 for hypoxia, tachypnea, and AMS despite 15L NRB  -CXR 1/5 with questionable L infiltrate  - CXR 1/6 with new near-complete opacification of the left lung field, suggestive of mucous plug and superimposed atelectasis.  - s/p azithromycin, ceftriaxone --> switched to Zosyn (1/6 - ) and Vanc (1/6 - )   - Consider bronch  - F/u sputum culture   - Duonebs q6  - Chest PT    #CV  Hypotension  - Continue with home Midodrine 10mg q8  - Maintain MAP > 65    #GI  Diet: NPO   - oral meds via PEG tube     Constipation   - Continue home lactulose and senna     #Renal/  - place garcia cath  - collect urine for UA and urine culture   - monitor I/Os    BPH   - Continue with home Tamulosin     Hypernatremia   - Na 148   - D5W @ 50mL/hr   - Continue to monitor     #ID  Aspiration PNA   - Continue Zosyn and Vancomycin   - F/u blood culture  - F/u urine culture  - F/u UA   - F/u urine legionalla   - F/u MRSA PCR  - RVP negative   - procal 0.15    #Endo  No hx of DM   - Monitor glucose    #Heme   DVT ppx   - Lovenox     #Ethics MICU Accept Note    CHIEF COMPLAINT: Hypoxia     HPI / INTERVAL HISTORY:  69 yo M with PMhx of BPH, agitation, ?dementia, dysphagia after CVA now s/p PEG tube, and ?CHF presenting from [facility] for worsening agitation. Per NH, pt was very agitated in the morning there. Intermittent cough with O2 sat down to 89% on RA, was placed on 4 L NC and transferred to ED. He received ceftriaxone x1, azithromycin x1 in ED. CXR showing questionable L infiltrate. He was placed on a nonrebreather, sats increased to 100%, from mid 80s on RA. However as patient was waiting to be transferred to the medicine floors, he became more tachypneic and hypoxic despite NRB. RRT was called and patient was intubated. Transferred to MICU for further care.       PAST MEDICAL & SURGICAL HISTORY:  CVA (cerebrovascular accident)  Dysphagia  BPH (benign prostatic hyperplasia)  Agitation  Schizophrenia  Congestive heart failure (CHF)  CVA (cerebral vascular accident)  Dysphagia  S/P percutaneous endoscopic gastrostomy (PEG) tube placement      HOME MEDICATIONS:  Depakote Sprinkles 125 mg oral delayed release capsule: 1 cap qd  ClonazePAM 1 mg oral tablet: 1 tab qd  QUEtiapine 200 mg oral tablet: 1 tab BID  QUEtiapine 25 mg oral tablet: 1 tab BID  Acetaminophen 325 mg oral tablet: 2 tabs q6h prn for pain   Senna 8.6 mg oral tablet: 1 tab BID  Lactulose 10 g/15 mL oral syrup: 30 milliliter(s) BID   Midodrine 10 mg oral tablet: 1 tab q8h  Melatonin 10 mg oral capsule: 1 cap qd  DuoNeb 0.5 mg-2.5 mg/3 mL inhalation solution: 3mL nebulizer q6 prn   Tamsulosin 0.4 mg oral capsule: 1 cap qd      Allergies  No Known Allergies    REVIEW OF SYSTEMS:  Unable to obtain 2/2 sedation.     OBJECTIVE:  ICU Vital Signs Last 24 Hrs  T(C): 37.1 (06 Jan 2024 01:00), Max: 37.3 (05 Jan 2024 12:41)  T(F): 98.7 (06 Jan 2024 01:00), Max: 99.1 (05 Jan 2024 12:41)  HR: 120 (06 Jan 2024 04:52) (73 - 120)  BP: 115/67 (06 Jan 2024 01:00) (81/48 - 115/67)  BP(mean): 60 (05 Jan 2024 13:11) (60 - 62)  RR: 22 (06 Jan 2024 01:00) (15 - 22)  SpO2: 88% (06 Jan 2024 04:52) (88% - 100%)    O2 Parameters below as of 06 Jan 2024 01:00  Patient On (Oxygen Delivery Method): mask, nonrebreather  O2 Flow (L/min): 15    Mode: AC/ CMV (Assist Control/ Continuous Mandatory Ventilation), RR (machine): 20, TV (machine): 480, FiO2: 100, PEEP: 8, ITime: 1    CAPILLARY BLOOD GLUCOSE  POCT Blood Glucose.: 90 mg/dL (06 Jan 2024 03:43)      =================PHYSICAL EXAM=================    GENERAL: Sedated and intubated   EYES: PERRL, no scleral icterus  NECK: No JVD  LUNG: Clear to auscultation bilaterally; No wheeze, crackles or rhonchi  HEART: Regular rate and rhythm; No murmurs, rubs, or gallops  ABDOMEN: Soft, Nontender, Nondistended  EXTREMITIES:  No LE edema, 2+ Peripheral Pulses, No clubbing, cyanosis, or edema  NEUROLOGY: Sedated  SKIN: No rashes or lesions    =================================================    LABS:                        12.3   9.36  )-----------( 128      ( 05 Jan 2024 12:45 )             38.8     Hgb Trend: 12.3<--  01-05    148<H>  |  106  |  18  ----------------------------<  98  4.0   |  36<H>  |  0.74    Ca    8.4      05 Jan 2024 12:45    TPro  7.1  /  Alb  3.3  /  TBili  0.4  /  DBili  x   /  AST  9   /  ALT  9   /  AlkPhos  61  01-05    Creatinine Trend: 0.74<--, 0.74<--, 0.70<--, 0.71<--, 0.63<--, 0.66<--  PT/INR - ( 05 Jan 2024 12:45 )   PT: 13.7 sec;   INR: 1.22 ratio    PTT - ( 05 Jan 2024 12:45 )  PTT:32.4 sec    Urinalysis Basic - ( 05 Jan 2024 12:45 )  Color: x / Appearance: x / SG: x / pH: x  Gluc: 98 mg/dL / Ketone: x  / Bili: x / Urobili: x   Blood: x / Protein: x / Nitrite: x   Leuk Esterase: x / RBC: x / WBC x   Sq Epi: x / Non Sq Epi: x / Bacteria: x    Venous Blood Gas:  01-06 @ 04:20  7.33/72/35/38/58.0  VBG Lactate: 2.1  Venous Blood Gas:  01-05 @ 12:45  7.38/68/49/40/80.9  VBG Lactate: 1.9    Mode: AC/ CMV (Assist Control/ Continuous Mandatory Ventilation), RR (machine): 20, TV (machine): 480, FiO2: 100, PEEP: 8, ITime: 1      HOSPITAL MEDICATIONS:  STANDING:  albuterol/ipratropium for Nebulization 3 milliLiter(s) Nebulizer every 6 hours  azithromycin  IVPB 500 milliGRAM(s) IV Intermittent every 24 hours  cefTRIAXone   IVPB 1000 milliGRAM(s) IV Intermittent every 24 hours  clonazePAM  Tablet 1 milliGRAM(s) Oral at bedtime  divalproex Sprinkle 125 milliGRAM(s) Oral <User Schedule>  divalproex Sprinkle 250 milliGRAM(s) Oral <User Schedule>  fentaNYL   Infusion. 0.5 MICROgram(s)/kG/Hr (3.35 mL/Hr) IV Continuous <Continuous>  lactulose Syrup 20 Gram(s) Oral two times a day  midodrine. 10 milliGRAM(s) Oral <User Schedule>  QUEtiapine 225 milliGRAM(s) Oral <User Schedule>  senna 1 Tablet(s) Oral every 12 hours  tamsulosin 0.4 milliGRAM(s) Oral at bedtime    PRN:  acetaminophen Tablet .. 650 milliGRAM(s) Oral every 6 hours PRN Moderate Pain (4 - 6)  OLANZapine Injectable 2.5 milliGRAM(s) IntraMuscular every 6 hours PRN agitation      MICROBIOLOGY:   RVP negative  Blood cultures pending     RADIOLOGY & ADDITIONAL TESTS:  Chest XR 1/5/24 - Questionable left infiltrate  Chest XR 1/6/24 - New near-complete opacification of the left lung field, suggestive of mucous plug and superimposed atelectasis.      ASSESSMENT AND PLAN:  69 yo M with PMhx of BPH, agitation, ?dementia, dysphagia after CVA now s/p PEG tube, and ?CHF presenting from [facility] for worsening agitation and hypoxia, initially found to have possible L infiltrate on CXR, covered empirically for CAP. Now with near-complete opacification of the left lung field, requiring increasing O2 requirements, and now s/p intubation.     #Neuro  Agitation.   - c/w home seroquel 225mg BID  - c/w home depakote 125mg at 2pm, 250 BID  - Zyprexa IM for severe agitation when pt refusing oral medications  - c/w home Clonazepam for now given Zyprexa administration  - Consider behavioral health consult after extubation for medication management    #Resp  Acute hypoxic respiratory failure  - s/p intubation 1/6 for hypoxia, tachypnea, and AMS despite 15L NRB  -CXR 1/5 with questionable L infiltrate  - CXR 1/6 with new near-complete opacification of the left lung field, suggestive of mucous plug and superimposed atelectasis.  - s/p azithromycin, ceftriaxone --> switched to Zosyn (1/6 - ) and Vanc (1/6 - )   - Consider bronch  - F/u sputum culture   - Duonebs q6  - Chest PT    #CV  Hypotension  - Continue with home Midodrine 10mg q8  - Maintain MAP > 65    #GI  Diet: NPO   - oral meds via PEG tube     Constipation   - Continue home lactulose and senna     #Renal/  - place garcia cath  - collect urine for UA and urine culture   - monitor I/Os    BPH   - Continue with home Tamulosin     Hypernatremia   - Na 148   - D5W @ 50mL/hr   - Continue to monitor     #ID  Aspiration PNA   - Continue Zosyn and Vancomycin   - F/u blood culture  - F/u urine culture  - F/u UA   - F/u urine legionalla   - F/u MRSA PCR  - RVP negative   - procal 0.15    #Endo  No hx of DM   - Monitor glucose    #Heme   DVT ppx   - Lovenox     #Ethics MICU Accept Note    CHIEF COMPLAINT: Hypoxia     HPI / INTERVAL HISTORY:  69 yo M with PMhx of BPH, agitation, ?dementia, dysphagia after CVA now s/p PEG tube, and ?CHF presenting from [facility] for worsening agitation. Per NH, pt was very agitated in the morning there. Intermittent cough with O2 sat down to 89% on RA, was placed on 4 L NC and transferred to ED. He received ceftriaxone x1, azithromycin x1 in ED. CXR showing questionable L infiltrate. He was placed on a nonrebreather, sats increased to 100%, from mid 80s on RA. However as patient was waiting to be transferred to the medicine floors, he became more tachypneic and hypoxic despite NRB. RRT was called and patient was intubated. Transferred to MICU for further care.       PAST MEDICAL & SURGICAL HISTORY:  CVA (cerebrovascular accident)  Dysphagia  BPH (benign prostatic hyperplasia)  Agitation  Schizophrenia  Congestive heart failure (CHF)  CVA (cerebral vascular accident)  Dysphagia  S/P percutaneous endoscopic gastrostomy (PEG) tube placement      HOME MEDICATIONS:  Depakote Sprinkles 125 mg oral delayed release capsule: 1 cap qd  ClonazePAM 1 mg oral tablet: 1 tab qd  QUEtiapine 200 mg oral tablet: 1 tab BID  QUEtiapine 25 mg oral tablet: 1 tab BID  Acetaminophen 325 mg oral tablet: 2 tabs q6h prn for pain   Senna 8.6 mg oral tablet: 1 tab BID  Lactulose 10 g/15 mL oral syrup: 30 milliliter(s) BID   Midodrine 10 mg oral tablet: 1 tab q8h  Melatonin 10 mg oral capsule: 1 cap qd  DuoNeb 0.5 mg-2.5 mg/3 mL inhalation solution: 3mL nebulizer q6 prn   Tamsulosin 0.4 mg oral capsule: 1 cap qd      Allergies  No Known Allergies    REVIEW OF SYSTEMS:  Unable to obtain 2/2 sedation.     OBJECTIVE:  ICU Vital Signs Last 24 Hrs  T(C): 37.1 (06 Jan 2024 01:00), Max: 37.3 (05 Jan 2024 12:41)  T(F): 98.7 (06 Jan 2024 01:00), Max: 99.1 (05 Jan 2024 12:41)  HR: 120 (06 Jan 2024 04:52) (73 - 120)  BP: 115/67 (06 Jan 2024 01:00) (81/48 - 115/67)  BP(mean): 60 (05 Jan 2024 13:11) (60 - 62)  RR: 22 (06 Jan 2024 01:00) (15 - 22)  SpO2: 88% (06 Jan 2024 04:52) (88% - 100%)    O2 Parameters below as of 06 Jan 2024 01:00  Patient On (Oxygen Delivery Method): mask, nonrebreather  O2 Flow (L/min): 15    Mode: AC/ CMV (Assist Control/ Continuous Mandatory Ventilation), RR (machine): 20, TV (machine): 480, FiO2: 100, PEEP: 8, ITime: 1    CAPILLARY BLOOD GLUCOSE  POCT Blood Glucose.: 90 mg/dL (06 Jan 2024 03:43)      =================PHYSICAL EXAM=================    GENERAL: Sedated and intubated. Frail.   EYES: Constricted and minimally reactive pupils b/l, no scleral icterus  NECK: No JVD  LUNG: Left lung with decreased breath sounds   HEART: Regular rate and rhythm  ABDOMEN: Soft, Nontender, Nondistended  EXTREMITIES: Right tibial IO. No LE edema, 2+ Peripheral Pulses  NEUROLOGY: Sedated  SKIN: No rashes or lesions    =================================================    LABS:                        12.3   9.36  )-----------( 128      ( 05 Jan 2024 12:45 )             38.8     Hgb Trend: 12.3<--  01-05    148<H>  |  106  |  18  ----------------------------<  98  4.0   |  36<H>  |  0.74    Ca    8.4      05 Jan 2024 12:45    TPro  7.1  /  Alb  3.3  /  TBili  0.4  /  DBili  x   /  AST  9   /  ALT  9   /  AlkPhos  61  01-05    Creatinine Trend: 0.74<--, 0.74<--, 0.70<--, 0.71<--, 0.63<--, 0.66<--  PT/INR - ( 05 Jan 2024 12:45 )   PT: 13.7 sec;   INR: 1.22 ratio    PTT - ( 05 Jan 2024 12:45 )  PTT:32.4 sec    Urinalysis Basic - ( 05 Jan 2024 12:45 )  Color: x / Appearance: x / SG: x / pH: x  Gluc: 98 mg/dL / Ketone: x  / Bili: x / Urobili: x   Blood: x / Protein: x / Nitrite: x   Leuk Esterase: x / RBC: x / WBC x   Sq Epi: x / Non Sq Epi: x / Bacteria: x    Venous Blood Gas:  01-06 @ 04:20  7.33/72/35/38/58.0  VBG Lactate: 2.1  Venous Blood Gas:  01-05 @ 12:45  7.38/68/49/40/80.9  VBG Lactate: 1.9    Mode: AC/ CMV (Assist Control/ Continuous Mandatory Ventilation), RR (machine): 20, TV (machine): 480, FiO2: 100, PEEP: 8, ITime: 1      HOSPITAL MEDICATIONS:  STANDING:  albuterol/ipratropium for Nebulization 3 milliLiter(s) Nebulizer every 6 hours  azithromycin  IVPB 500 milliGRAM(s) IV Intermittent every 24 hours  cefTRIAXone   IVPB 1000 milliGRAM(s) IV Intermittent every 24 hours  clonazePAM  Tablet 1 milliGRAM(s) Oral at bedtime  divalproex Sprinkle 125 milliGRAM(s) Oral <User Schedule>  divalproex Sprinkle 250 milliGRAM(s) Oral <User Schedule>  fentaNYL   Infusion. 0.5 MICROgram(s)/kG/Hr (3.35 mL/Hr) IV Continuous <Continuous>  lactulose Syrup 20 Gram(s) Oral two times a day  midodrine. 10 milliGRAM(s) Oral <User Schedule>  QUEtiapine 225 milliGRAM(s) Oral <User Schedule>  senna 1 Tablet(s) Oral every 12 hours  tamsulosin 0.4 milliGRAM(s) Oral at bedtime    PRN:  acetaminophen Tablet .. 650 milliGRAM(s) Oral every 6 hours PRN Moderate Pain (4 - 6)  OLANZapine Injectable 2.5 milliGRAM(s) IntraMuscular every 6 hours PRN agitation      MICROBIOLOGY:   RVP negative  Blood cultures pending     RADIOLOGY & ADDITIONAL TESTS:  Chest XR 1/5/24 - Questionable left infiltrate  Chest XR 1/6/24 - New near-complete opacification of the left lung field, suggestive of mucous plug and superimposed atelectasis.      ASSESSMENT AND PLAN:  69 yo M with PMhx of BPH, agitation, ?dementia, dysphagia after CVA now s/p PEG tube, and ?CHF presenting from [facility] for worsening agitation and hypoxia, initially found to have possible L infiltrate on CXR, covered empirically for CAP. Now with near-complete opacification of the left lung field, requiring increasing O2 requirements, and now s/p intubation.     #Neuro  Agitation.   - c/w home seroquel 225mg BID  - c/w home depakote 125mg at 2pm, 250 BID  - Zyprexa IM for severe agitation when pt refusing oral medications  - c/w home Clonazepam for now given Zyprexa administration  - Consider behavioral health consult after extubation for medication management    #Resp  Acute hypoxic respiratory failure  - s/p intubation 1/6 for hypoxia, tachypnea, and AMS despite 15L NRB  -CXR 1/5 with questionable L infiltrate  - CXR 1/6 with new near-complete opacification of the left lung field, suggestive of mucous plug and superimposed atelectasis.  - s/p azithromycin, ceftriaxone --> switched to Zosyn (1/6 - ) and Vanc (1/6 - )   - Consider bronch  - F/u sputum culture   - Duonebs q6  - Chest PT    #CV  Hypotension  - Continue with home Midodrine 10mg q8  - Maintain MAP > 65    New bifascicular block on ECG   - prior 12/2023 with RBBB   - F/u trop     #GI  Diet: NPO   - oral meds via PEG tube     Constipation   - Continue home lactulose and senna     #Renal/  - place garcia cath  - collect urine for UA and urine culture   - monitor I/Os    BPH   - Continue with home Tamulosin     Hypernatremia   - Na 148   - D5W @ 50mL/hr   - Continue to monitor     #ID  Aspiration PNA   - Continue Zosyn and Vancomycin   - F/u blood culture  - F/u urine culture  - F/u UA   - F/u urine legionalla   - F/u MRSA PCR  - RVP negative   - procal 0.15    #Endo  No hx of DM   - Monitor glucose    #Heme   DVT ppx   - Lovenox     #Ethics MICU Accept Note    CHIEF COMPLAINT: Hypoxia     HPI / INTERVAL HISTORY:  67 yo M with PMhx of BPH, agitation, ?dementia, dysphagia after CVA now s/p PEG tube, and ?CHF presenting from [facility] for worsening agitation. Per NH, pt was very agitated in the morning there. Intermittent cough with O2 sat down to 89% on RA, was placed on 4 L NC and transferred to ED. He received ceftriaxone x1, azithromycin x1 in ED. CXR showing questionable L infiltrate. He was placed on a nonrebreather, sats increased to 100%, from mid 80s on RA. However as patient was waiting to be transferred to the medicine floors, he became more tachypneic and hypoxic despite NRB. RRT was called and patient was intubated. Transferred to MICU for further care.       PAST MEDICAL & SURGICAL HISTORY:  CVA (cerebrovascular accident)  Dysphagia  BPH (benign prostatic hyperplasia)  Agitation  Schizophrenia  Congestive heart failure (CHF)  CVA (cerebral vascular accident)  Dysphagia  S/P percutaneous endoscopic gastrostomy (PEG) tube placement      HOME MEDICATIONS:  Depakote Sprinkles 125 mg oral delayed release capsule: 1 cap qd  ClonazePAM 1 mg oral tablet: 1 tab qd  QUEtiapine 200 mg oral tablet: 1 tab BID  QUEtiapine 25 mg oral tablet: 1 tab BID  Acetaminophen 325 mg oral tablet: 2 tabs q6h prn for pain   Senna 8.6 mg oral tablet: 1 tab BID  Lactulose 10 g/15 mL oral syrup: 30 milliliter(s) BID   Midodrine 10 mg oral tablet: 1 tab q8h  Melatonin 10 mg oral capsule: 1 cap qd  DuoNeb 0.5 mg-2.5 mg/3 mL inhalation solution: 3mL nebulizer q6 prn   Tamsulosin 0.4 mg oral capsule: 1 cap qd      Allergies  No Known Allergies    REVIEW OF SYSTEMS:  Unable to obtain 2/2 sedation.     OBJECTIVE:  ICU Vital Signs Last 24 Hrs  T(C): 37.1 (06 Jan 2024 01:00), Max: 37.3 (05 Jan 2024 12:41)  T(F): 98.7 (06 Jan 2024 01:00), Max: 99.1 (05 Jan 2024 12:41)  HR: 120 (06 Jan 2024 04:52) (73 - 120)  BP: 115/67 (06 Jan 2024 01:00) (81/48 - 115/67)  BP(mean): 60 (05 Jan 2024 13:11) (60 - 62)  RR: 22 (06 Jan 2024 01:00) (15 - 22)  SpO2: 88% (06 Jan 2024 04:52) (88% - 100%)    O2 Parameters below as of 06 Jan 2024 01:00  Patient On (Oxygen Delivery Method): mask, nonrebreather  O2 Flow (L/min): 15    Mode: AC/ CMV (Assist Control/ Continuous Mandatory Ventilation), RR (machine): 20, TV (machine): 480, FiO2: 100, PEEP: 8, ITime: 1    CAPILLARY BLOOD GLUCOSE  POCT Blood Glucose.: 90 mg/dL (06 Jan 2024 03:43)      =================PHYSICAL EXAM=================    GENERAL: Sedated and intubated. Frail.   EYES: Constricted and minimally reactive pupils b/l, no scleral icterus  NECK: No JVD  LUNG: Left lung with decreased breath sounds   HEART: Regular rate and rhythm  ABDOMEN: Soft, Nontender, Nondistended  EXTREMITIES: Right tibial IO. No LE edema, 2+ Peripheral Pulses  NEUROLOGY: Sedated  SKIN: No rashes or lesions    =================================================    LABS:                        12.3   9.36  )-----------( 128      ( 05 Jan 2024 12:45 )             38.8     Hgb Trend: 12.3<--  01-05    148<H>  |  106  |  18  ----------------------------<  98  4.0   |  36<H>  |  0.74    Ca    8.4      05 Jan 2024 12:45    TPro  7.1  /  Alb  3.3  /  TBili  0.4  /  DBili  x   /  AST  9   /  ALT  9   /  AlkPhos  61  01-05    Creatinine Trend: 0.74<--, 0.74<--, 0.70<--, 0.71<--, 0.63<--, 0.66<--  PT/INR - ( 05 Jan 2024 12:45 )   PT: 13.7 sec;   INR: 1.22 ratio    PTT - ( 05 Jan 2024 12:45 )  PTT:32.4 sec    Urinalysis Basic - ( 05 Jan 2024 12:45 )  Color: x / Appearance: x / SG: x / pH: x  Gluc: 98 mg/dL / Ketone: x  / Bili: x / Urobili: x   Blood: x / Protein: x / Nitrite: x   Leuk Esterase: x / RBC: x / WBC x   Sq Epi: x / Non Sq Epi: x / Bacteria: x    Venous Blood Gas:  01-06 @ 04:20  7.33/72/35/38/58.0  VBG Lactate: 2.1  Venous Blood Gas:  01-05 @ 12:45  7.38/68/49/40/80.9  VBG Lactate: 1.9    Mode: AC/ CMV (Assist Control/ Continuous Mandatory Ventilation), RR (machine): 20, TV (machine): 480, FiO2: 100, PEEP: 8, ITime: 1      HOSPITAL MEDICATIONS:  STANDING:  albuterol/ipratropium for Nebulization 3 milliLiter(s) Nebulizer every 6 hours  azithromycin  IVPB 500 milliGRAM(s) IV Intermittent every 24 hours  cefTRIAXone   IVPB 1000 milliGRAM(s) IV Intermittent every 24 hours  clonazePAM  Tablet 1 milliGRAM(s) Oral at bedtime  divalproex Sprinkle 125 milliGRAM(s) Oral <User Schedule>  divalproex Sprinkle 250 milliGRAM(s) Oral <User Schedule>  fentaNYL   Infusion. 0.5 MICROgram(s)/kG/Hr (3.35 mL/Hr) IV Continuous <Continuous>  lactulose Syrup 20 Gram(s) Oral two times a day  midodrine. 10 milliGRAM(s) Oral <User Schedule>  QUEtiapine 225 milliGRAM(s) Oral <User Schedule>  senna 1 Tablet(s) Oral every 12 hours  tamsulosin 0.4 milliGRAM(s) Oral at bedtime    PRN:  acetaminophen Tablet .. 650 milliGRAM(s) Oral every 6 hours PRN Moderate Pain (4 - 6)  OLANZapine Injectable 2.5 milliGRAM(s) IntraMuscular every 6 hours PRN agitation      MICROBIOLOGY:   RVP negative  Blood cultures pending     RADIOLOGY & ADDITIONAL TESTS:  Chest XR 1/5/24 - Questionable left infiltrate  Chest XR 1/6/24 - New near-complete opacification of the left lung field, suggestive of mucous plug and superimposed atelectasis.      ASSESSMENT AND PLAN:  67 yo M with PMhx of BPH, agitation, ?dementia, dysphagia after CVA now s/p PEG tube, and ?CHF presenting from [facility] for worsening agitation and hypoxia, initially found to have possible L infiltrate on CXR, covered empirically for CAP. Now with near-complete opacification of the left lung field, requiring increasing O2 requirements, and now s/p intubation.     #Neuro  Agitation.   - c/w home seroquel 225mg BID  - c/w home depakote 125mg at 2pm, 250 BID  - Zyprexa IM for severe agitation when pt refusing oral medications  - c/w home Clonazepam for now given Zyprexa administration  - Consider behavioral health consult after extubation for medication management    #Resp  Acute hypoxic respiratory failure  - s/p intubation 1/6 for hypoxia, tachypnea, and AMS despite 15L NRB  -CXR 1/5 with questionable L infiltrate  - CXR 1/6 with new near-complete opacification of the left lung field, suggestive of mucous plug and superimposed atelectasis.  - s/p azithromycin, ceftriaxone --> switched to Zosyn (1/6 - ) and Vanc (1/6 - )   - Consider bronch  - F/u sputum culture   - Duonebs q6  - Chest PT    #CV  Hypotension  - Continue with home Midodrine 10mg q8  - Maintain MAP > 65    New bifascicular block on ECG   - prior 12/2023 with RBBB   - F/u trop     #GI  Diet: NPO   - oral meds via PEG tube     Constipation   - Continue home lactulose and senna     #Renal/  - place garcia cath  - collect urine for UA and urine culture   - monitor I/Os    BPH   - Continue with home Tamulosin     Hypernatremia   - Na 148   - D5W @ 50mL/hr   - Continue to monitor     #ID  Aspiration PNA   - Continue Zosyn and Vancomycin   - F/u blood culture  - F/u urine culture  - F/u UA   - F/u urine legionalla   - F/u MRSA PCR  - RVP negative   - procal 0.15    #Endo  No hx of DM   - Monitor glucose    #Heme   DVT ppx   - Lovenox     #Ethics MICU Accept Note    CHIEF COMPLAINT: Hypoxia     HPI / INTERVAL HISTORY:  67 yo M with PMhx of BPH, agitation, ?dementia, dysphagia after CVA now s/p PEG tube, and ?CHF presenting from [facility] for worsening agitation. Per NH, pt was very agitated in the morning there. Intermittent cough with O2 sat down to 89% on RA, was placed on 4 L NC and transferred to ED. He received ceftriaxone x1, azithromycin x1 in ED. CXR showing questionable L infiltrate. He was placed on a nonrebreather, sats increased to 100%, from mid 80s on RA. However as patient was waiting to be transferred to the medicine floors, he became more tachypneic and hypoxic despite NRB. RRT was called for hypoxia to 80%. Nasotracheal suctioning completed with purulent appearing secretions, however remained hypoxic with deteriorating mental status, thus patient was intubated. Transferred to MICU for further care.       PAST MEDICAL & SURGICAL HISTORY:  CVA (cerebrovascular accident)  Dysphagia  BPH (benign prostatic hyperplasia)  Agitation  Schizophrenia  Congestive heart failure (CHF)  CVA (cerebral vascular accident)  Dysphagia  S/P percutaneous endoscopic gastrostomy (PEG) tube placement      HOME MEDICATIONS:  Depakote Sprinkles 125 mg oral delayed release capsule: 1 cap qd  ClonazePAM 1 mg oral tablet: 1 tab qd  QUEtiapine 200 mg oral tablet: 1 tab BID  QUEtiapine 25 mg oral tablet: 1 tab BID  Acetaminophen 325 mg oral tablet: 2 tabs q6h prn for pain   Senna 8.6 mg oral tablet: 1 tab BID  Lactulose 10 g/15 mL oral syrup: 30 milliliter(s) BID   Midodrine 10 mg oral tablet: 1 tab q8h  Melatonin 10 mg oral capsule: 1 cap qd  DuoNeb 0.5 mg-2.5 mg/3 mL inhalation solution: 3mL nebulizer q6 prn   Tamsulosin 0.4 mg oral capsule: 1 cap qd      Allergies  No Known Allergies    REVIEW OF SYSTEMS:  Unable to obtain 2/2 sedation.     OBJECTIVE:  ICU Vital Signs Last 24 Hrs  T(C): 37.1 (06 Jan 2024 01:00), Max: 37.3 (05 Jan 2024 12:41)  T(F): 98.7 (06 Jan 2024 01:00), Max: 99.1 (05 Jan 2024 12:41)  HR: 120 (06 Jan 2024 04:52) (73 - 120)  BP: 115/67 (06 Jan 2024 01:00) (81/48 - 115/67)  BP(mean): 60 (05 Jan 2024 13:11) (60 - 62)  RR: 22 (06 Jan 2024 01:00) (15 - 22)  SpO2: 88% (06 Jan 2024 04:52) (88% - 100%)    O2 Parameters below as of 06 Jan 2024 01:00  Patient On (Oxygen Delivery Method): mask, nonrebreather  O2 Flow (L/min): 15    Mode: AC/ CMV (Assist Control/ Continuous Mandatory Ventilation), RR (machine): 20, TV (machine): 480, FiO2: 100, PEEP: 8, ITime: 1    CAPILLARY BLOOD GLUCOSE  POCT Blood Glucose.: 90 mg/dL (06 Jan 2024 03:43)      =================PHYSICAL EXAM=================    GENERAL: Sedated and intubated. Frail.   EYES: Constricted and minimally reactive pupils b/l, no scleral icterus  NECK: No JVD  LUNG: Left lung with decreased breath sounds   HEART: Regular rate and rhythm  ABDOMEN: Soft, Nontender, Nondistended  EXTREMITIES: Right tibial IO. No LE edema, 2+ Peripheral Pulses  NEUROLOGY: Sedated. Moving bilateral UE against restraints.   SKIN: No rashes or lesions    =================================================    LABS:                        12.3   9.36  )-----------( 128      ( 05 Jan 2024 12:45 )             38.8     Hgb Trend: 12.3<--  01-05    148<H>  |  106  |  18  ----------------------------<  98  4.0   |  36<H>  |  0.74    Ca    8.4      05 Jan 2024 12:45    TPro  7.1  /  Alb  3.3  /  TBili  0.4  /  DBili  x   /  AST  9   /  ALT  9   /  AlkPhos  61  01-05    Creatinine Trend: 0.74<--, 0.74<--, 0.70<--, 0.71<--, 0.63<--, 0.66<--  PT/INR - ( 05 Jan 2024 12:45 )   PT: 13.7 sec;   INR: 1.22 ratio    PTT - ( 05 Jan 2024 12:45 )  PTT:32.4 sec    Urinalysis Basic - ( 05 Jan 2024 12:45 )  Color: x / Appearance: x / SG: x / pH: x  Gluc: 98 mg/dL / Ketone: x  / Bili: x / Urobili: x   Blood: x / Protein: x / Nitrite: x   Leuk Esterase: x / RBC: x / WBC x   Sq Epi: x / Non Sq Epi: x / Bacteria: x    Venous Blood Gas:  01-06 @ 04:20  7.33/72/35/38/58.0  VBG Lactate: 2.1  Venous Blood Gas:  01-05 @ 12:45  7.38/68/49/40/80.9  VBG Lactate: 1.9    Mode: AC/ CMV (Assist Control/ Continuous Mandatory Ventilation), RR (machine): 20, TV (machine): 480, FiO2: 100, PEEP: 8, ITime: 1      HOSPITAL MEDICATIONS:  STANDING:  albuterol/ipratropium for Nebulization 3 milliLiter(s) Nebulizer every 6 hours  azithromycin  IVPB 500 milliGRAM(s) IV Intermittent every 24 hours  cefTRIAXone   IVPB 1000 milliGRAM(s) IV Intermittent every 24 hours  clonazePAM  Tablet 1 milliGRAM(s) Oral at bedtime  divalproex Sprinkle 125 milliGRAM(s) Oral <User Schedule>  divalproex Sprinkle 250 milliGRAM(s) Oral <User Schedule>  fentaNYL   Infusion. 0.5 MICROgram(s)/kG/Hr (3.35 mL/Hr) IV Continuous <Continuous>  lactulose Syrup 20 Gram(s) Oral two times a day  midodrine. 10 milliGRAM(s) Oral <User Schedule>  QUEtiapine 225 milliGRAM(s) Oral <User Schedule>  senna 1 Tablet(s) Oral every 12 hours  tamsulosin 0.4 milliGRAM(s) Oral at bedtime    PRN:  acetaminophen Tablet .. 650 milliGRAM(s) Oral every 6 hours PRN Moderate Pain (4 - 6)  OLANZapine Injectable 2.5 milliGRAM(s) IntraMuscular every 6 hours PRN agitation      MICROBIOLOGY:   RVP negative  Blood cultures pending     RADIOLOGY & ADDITIONAL TESTS:  Chest XR 1/5/24 - Questionable left infiltrate  Chest XR 1/6/24 - New near-complete opacification of the left lung field, suggestive of mucous plug and superimposed atelectasis.      ASSESSMENT AND PLAN:  67 yo M with PMhx of BPH, agitation, ?dementia, dysphagia after CVA now s/p PEG tube, and ?CHF presenting from [facility] for worsening agitation and hypoxia, initially found to have possible L infiltrate on CXR, covered empirically for CAP. Now with near-complete opacification of the left lung field, requiring increasing O2 requirements, and now s/p intubation.     #Neuro  Agitation.   - c/w home seroquel 225mg BID  - c/w home depakote 125mg at 2pm, 250 BID  - Zyprexa IM for severe agitation when pt refusing oral medications  - c/w home Clonazepam for now given Zyprexa administration  - Consider behavioral health consult after extubation for medication management    #Resp  Acute hypoxic respiratory failure  - s/p intubation 1/6 for hypoxia, tachypnea, and AMS despite 15L NRB  -CXR 1/5 with questionable L infiltrate  - CXR 1/6 with new near-complete opacification of the left lung field, suggestive of mucous plug and superimposed atelectasis.  - s/p azithromycin, ceftriaxone --> switched to Zosyn (1/6 - ) and Vanc (1/6 - )   - Consider bronch  - F/u sputum culture   - Duonebs q6  - Chest PT    #CV  Hypotension  - Required pressors after intubation likely 2/2 vasoplegia   - Levophed gtt, wean as tolerated   - Continue with home Midodrine 10mg q8  - Maintain MAP > 65    New bifascicular block on ECG   - prior 12/2023 with RBBB   - F/u trop     #GI  Diet: NPO   - oral meds via PEG tube     Constipation   - Continue home lactulose and senna     #Renal/  - place garcia cath  - collect urine for UA and urine culture   - monitor I/Os    BPH   - Continue with home Tamulosin     Hypernatremia   - Na 148   - D5W @ 50mL/hr   - Continue to monitor     #ID  Aspiration PNA   - Continue Zosyn and Vancomycin   - F/u blood culture  - F/u urine culture  - F/u UA   - F/u urine legionalla   - F/u MRSA PCR  - RVP negative   - procal 0.15    #Endo  No hx of DM   - Monitor glucose    #Heme   DVT ppx   - Lovenox     #Ethics MICU Accept Note    CHIEF COMPLAINT: Hypoxia     HPI / INTERVAL HISTORY:  67 yo M with PMhx of BPH, agitation, ?dementia, dysphagia after CVA now s/p PEG tube, and ?CHF presenting from [facility] for worsening agitation. Per NH, pt was very agitated in the morning there. Intermittent cough with O2 sat down to 89% on RA, was placed on 4 L NC and transferred to ED. He received ceftriaxone x1, azithromycin x1 in ED. CXR showing questionable L infiltrate. He was placed on a nonrebreather, sats increased to 100%, from mid 80s on RA. However as patient was waiting to be transferred to the medicine floors, he became more tachypneic and hypoxic despite NRB. RRT was called for hypoxia to 80%. Nasotracheal suctioning completed with purulent appearing secretions, however remained hypoxic with deteriorating mental status, thus patient was intubated. Transferred to MICU for further care.       PAST MEDICAL & SURGICAL HISTORY:  CVA (cerebrovascular accident)  Dysphagia  BPH (benign prostatic hyperplasia)  Agitation  Schizophrenia  Congestive heart failure (CHF)  CVA (cerebral vascular accident)  Dysphagia  S/P percutaneous endoscopic gastrostomy (PEG) tube placement      HOME MEDICATIONS:  Depakote Sprinkles 125 mg oral delayed release capsule: 1 cap qd  ClonazePAM 1 mg oral tablet: 1 tab qd  QUEtiapine 200 mg oral tablet: 1 tab BID  QUEtiapine 25 mg oral tablet: 1 tab BID  Acetaminophen 325 mg oral tablet: 2 tabs q6h prn for pain   Senna 8.6 mg oral tablet: 1 tab BID  Lactulose 10 g/15 mL oral syrup: 30 milliliter(s) BID   Midodrine 10 mg oral tablet: 1 tab q8h  Melatonin 10 mg oral capsule: 1 cap qd  DuoNeb 0.5 mg-2.5 mg/3 mL inhalation solution: 3mL nebulizer q6 prn   Tamsulosin 0.4 mg oral capsule: 1 cap qd      Allergies  No Known Allergies    REVIEW OF SYSTEMS:  Unable to obtain 2/2 sedation.     OBJECTIVE:  ICU Vital Signs Last 24 Hrs  T(C): 37.1 (06 Jan 2024 01:00), Max: 37.3 (05 Jan 2024 12:41)  T(F): 98.7 (06 Jan 2024 01:00), Max: 99.1 (05 Jan 2024 12:41)  HR: 120 (06 Jan 2024 04:52) (73 - 120)  BP: 115/67 (06 Jan 2024 01:00) (81/48 - 115/67)  BP(mean): 60 (05 Jan 2024 13:11) (60 - 62)  RR: 22 (06 Jan 2024 01:00) (15 - 22)  SpO2: 88% (06 Jan 2024 04:52) (88% - 100%)    O2 Parameters below as of 06 Jan 2024 01:00  Patient On (Oxygen Delivery Method): mask, nonrebreather  O2 Flow (L/min): 15    Mode: AC/ CMV (Assist Control/ Continuous Mandatory Ventilation), RR (machine): 20, TV (machine): 480, FiO2: 100, PEEP: 8, ITime: 1    CAPILLARY BLOOD GLUCOSE  POCT Blood Glucose.: 90 mg/dL (06 Jan 2024 03:43)      =================PHYSICAL EXAM=================    GENERAL: Sedated and intubated. Frail.   EYES: Constricted and minimally reactive pupils b/l, no scleral icterus  NECK: No JVD  LUNG: Left lung with decreased breath sounds   HEART: Regular rate and rhythm  ABDOMEN: Soft, Nontender, Nondistended  EXTREMITIES: Right tibial IO. No LE edema, 2+ Peripheral Pulses  NEUROLOGY: Sedated. Moving bilateral UE against restraints.   SKIN: No rashes or lesions    =================================================    LABS:                        12.3   9.36  )-----------( 128      ( 05 Jan 2024 12:45 )             38.8     Hgb Trend: 12.3<--  01-05    148<H>  |  106  |  18  ----------------------------<  98  4.0   |  36<H>  |  0.74    Ca    8.4      05 Jan 2024 12:45    TPro  7.1  /  Alb  3.3  /  TBili  0.4  /  DBili  x   /  AST  9   /  ALT  9   /  AlkPhos  61  01-05    Creatinine Trend: 0.74<--, 0.74<--, 0.70<--, 0.71<--, 0.63<--, 0.66<--  PT/INR - ( 05 Jan 2024 12:45 )   PT: 13.7 sec;   INR: 1.22 ratio    PTT - ( 05 Jan 2024 12:45 )  PTT:32.4 sec    Urinalysis Basic - ( 05 Jan 2024 12:45 )  Color: x / Appearance: x / SG: x / pH: x  Gluc: 98 mg/dL / Ketone: x  / Bili: x / Urobili: x   Blood: x / Protein: x / Nitrite: x   Leuk Esterase: x / RBC: x / WBC x   Sq Epi: x / Non Sq Epi: x / Bacteria: x    Venous Blood Gas:  01-06 @ 04:20  7.33/72/35/38/58.0  VBG Lactate: 2.1  Venous Blood Gas:  01-05 @ 12:45  7.38/68/49/40/80.9  VBG Lactate: 1.9    Mode: AC/ CMV (Assist Control/ Continuous Mandatory Ventilation), RR (machine): 20, TV (machine): 480, FiO2: 100, PEEP: 8, ITime: 1      HOSPITAL MEDICATIONS:  STANDING:  albuterol/ipratropium for Nebulization 3 milliLiter(s) Nebulizer every 6 hours  azithromycin  IVPB 500 milliGRAM(s) IV Intermittent every 24 hours  cefTRIAXone   IVPB 1000 milliGRAM(s) IV Intermittent every 24 hours  clonazePAM  Tablet 1 milliGRAM(s) Oral at bedtime  divalproex Sprinkle 125 milliGRAM(s) Oral <User Schedule>  divalproex Sprinkle 250 milliGRAM(s) Oral <User Schedule>  fentaNYL   Infusion. 0.5 MICROgram(s)/kG/Hr (3.35 mL/Hr) IV Continuous <Continuous>  lactulose Syrup 20 Gram(s) Oral two times a day  midodrine. 10 milliGRAM(s) Oral <User Schedule>  QUEtiapine 225 milliGRAM(s) Oral <User Schedule>  senna 1 Tablet(s) Oral every 12 hours  tamsulosin 0.4 milliGRAM(s) Oral at bedtime    PRN:  acetaminophen Tablet .. 650 milliGRAM(s) Oral every 6 hours PRN Moderate Pain (4 - 6)  OLANZapine Injectable 2.5 milliGRAM(s) IntraMuscular every 6 hours PRN agitation      MICROBIOLOGY:   RVP negative  Blood cultures pending     RADIOLOGY & ADDITIONAL TESTS:  Chest XR 1/5/24 - Questionable left infiltrate  Chest XR 1/6/24 - New near-complete opacification of the left lung field, suggestive of mucous plug and superimposed atelectasis.      ASSESSMENT AND PLAN:  67 yo M with PMhx of BPH, agitation, ?dementia, dysphagia after CVA now s/p PEG tube, and ?CHF presenting from [facility] for worsening agitation and hypoxia, initially found to have possible L infiltrate on CXR, covered empirically for CAP. Now with near-complete opacification of the left lung field, requiring increasing O2 requirements, and now s/p intubation.     #Neuro  Agitation.   - c/w home seroquel 225mg BID  - c/w home depakote 125mg at 2pm, 250 BID  - Zyprexa IM for severe agitation when pt refusing oral medications  - c/w home Clonazepam for now given Zyprexa administration  - Consider behavioral health consult after extubation for medication management    #Resp  Acute hypoxic respiratory failure  - s/p intubation 1/6 for hypoxia, tachypnea, and AMS despite 15L NRB  -CXR 1/5 with questionable L infiltrate  - CXR 1/6 with new near-complete opacification of the left lung field, suggestive of mucous plug and superimposed atelectasis.  - s/p azithromycin, ceftriaxone --> switched to Zosyn (1/6 - ) and Vanc (1/6 - )   - Consider bronch  - F/u sputum culture   - Duonebs q6  - Chest PT    #CV  Hypotension  - Required pressors after intubation likely 2/2 vasoplegia   - Levophed gtt, wean as tolerated   - Continue with home Midodrine 10mg q8  - Maintain MAP > 65    New bifascicular block on ECG   - prior 12/2023 with RBBB   - F/u trop     #GI  Diet: NPO   - oral meds via PEG tube     Constipation   - Continue home lactulose and senna     #Renal/  - place garcia cath  - collect urine for UA and urine culture   - monitor I/Os    BPH   - Continue with home Tamulosin     Hypernatremia   - Na 148   - D5W @ 50mL/hr   - Continue to monitor     #ID  Aspiration PNA   - Continue Zosyn and Vancomycin   - F/u blood culture  - F/u urine culture  - F/u UA   - F/u urine legionalla   - F/u MRSA PCR  - RVP negative   - procal 0.15    #Endo  No hx of DM   - Monitor glucose    #Heme   DVT ppx   - Lovenox     #Ethics MICU Accept Note    CHIEF COMPLAINT: Hypoxia     HPI / INTERVAL HISTORY:  69 yo M with PMhx of BPH, agitation, ?dementia, dysphagia after CVA now s/p PEG tube, and ?CHF presenting from [facility] for worsening agitation. Per NH, pt was very agitated in the morning there. Intermittent cough with O2 sat down to 89% on RA, was placed on 4 L NC and transferred to ED. He received ceftriaxone x1, azithromycin x1 in ED. CXR showing questionable L infiltrate. He was placed on a nonrebreather, sats increased to 100%, from mid 80s on RA. However as patient was waiting to be transferred to the medicine floors, he became more tachypneic and hypoxic despite NRB. RRT was called for hypoxia to 80%. Nasotracheal suctioning completed with purulent appearing secretions, however remained hypoxic with deteriorating mental status, thus patient was intubated. Transferred to MICU for further care.       PAST MEDICAL & SURGICAL HISTORY:  CVA (cerebrovascular accident)  Dysphagia  BPH (benign prostatic hyperplasia)  Agitation  Schizophrenia  Congestive heart failure (CHF)  CVA (cerebral vascular accident)  Dysphagia  S/P percutaneous endoscopic gastrostomy (PEG) tube placement      HOME MEDICATIONS:  Depakote Sprinkles 125 mg oral delayed release capsule: 1 cap qd  ClonazePAM 1 mg oral tablet: 1 tab qd  QUEtiapine 200 mg oral tablet: 1 tab BID  QUEtiapine 25 mg oral tablet: 1 tab BID  Acetaminophen 325 mg oral tablet: 2 tabs q6h prn for pain   Senna 8.6 mg oral tablet: 1 tab BID  Lactulose 10 g/15 mL oral syrup: 30 milliliter(s) BID   Midodrine 10 mg oral tablet: 1 tab q8h  Melatonin 10 mg oral capsule: 1 cap qd  DuoNeb 0.5 mg-2.5 mg/3 mL inhalation solution: 3mL nebulizer q6 prn   Tamsulosin 0.4 mg oral capsule: 1 cap qd      Allergies  No Known Allergies    REVIEW OF SYSTEMS:  Unable to obtain 2/2 sedation.     OBJECTIVE:  ICU Vital Signs Last 24 Hrs  T(C): 37.1 (06 Jan 2024 01:00), Max: 37.3 (05 Jan 2024 12:41)  T(F): 98.7 (06 Jan 2024 01:00), Max: 99.1 (05 Jan 2024 12:41)  HR: 120 (06 Jan 2024 04:52) (73 - 120)  BP: 115/67 (06 Jan 2024 01:00) (81/48 - 115/67)  BP(mean): 60 (05 Jan 2024 13:11) (60 - 62)  RR: 22 (06 Jan 2024 01:00) (15 - 22)  SpO2: 88% (06 Jan 2024 04:52) (88% - 100%)    O2 Parameters below as of 06 Jan 2024 01:00  Patient On (Oxygen Delivery Method): mask, nonrebreather  O2 Flow (L/min): 15    Mode: AC/ CMV (Assist Control/ Continuous Mandatory Ventilation), RR (machine): 20, TV (machine): 480, FiO2: 100, PEEP: 8, ITime: 1    CAPILLARY BLOOD GLUCOSE  POCT Blood Glucose.: 90 mg/dL (06 Jan 2024 03:43)      =================PHYSICAL EXAM=================    GENERAL: Sedated and intubated. Frail.   EYES: Constricted and minimally reactive pupils b/l, no scleral icterus  NECK: No JVD  LUNG: Left lung with decreased breath sounds   HEART: Regular rate and rhythm  ABDOMEN: Soft, Nontender, Nondistended  EXTREMITIES: Right tibial IO. No LE edema, 2+ Peripheral Pulses  NEUROLOGY: Sedated. Moving bilateral UE against restraints.   SKIN: No rashes or lesions    =================================================    LABS:                        12.3   9.36  )-----------( 128      ( 05 Jan 2024 12:45 )             38.8     Hgb Trend: 12.3<--  01-05    148<H>  |  106  |  18  ----------------------------<  98  4.0   |  36<H>  |  0.74    Ca    8.4      05 Jan 2024 12:45    TPro  7.1  /  Alb  3.3  /  TBili  0.4  /  DBili  x   /  AST  9   /  ALT  9   /  AlkPhos  61  01-05    Creatinine Trend: 0.74<--, 0.74<--, 0.70<--, 0.71<--, 0.63<--, 0.66<--  PT/INR - ( 05 Jan 2024 12:45 )   PT: 13.7 sec;   INR: 1.22 ratio    PTT - ( 05 Jan 2024 12:45 )  PTT:32.4 sec    Urinalysis Basic - ( 05 Jan 2024 12:45 )  Color: x / Appearance: x / SG: x / pH: x  Gluc: 98 mg/dL / Ketone: x  / Bili: x / Urobili: x   Blood: x / Protein: x / Nitrite: x   Leuk Esterase: x / RBC: x / WBC x   Sq Epi: x / Non Sq Epi: x / Bacteria: x    Venous Blood Gas:  01-06 @ 04:20  7.33/72/35/38/58.0  VBG Lactate: 2.1  Venous Blood Gas:  01-05 @ 12:45  7.38/68/49/40/80.9  VBG Lactate: 1.9    Mode: AC/ CMV (Assist Control/ Continuous Mandatory Ventilation), RR (machine): 20, TV (machine): 480, FiO2: 100, PEEP: 8, ITime: 1      HOSPITAL MEDICATIONS:  STANDING:  albuterol/ipratropium for Nebulization 3 milliLiter(s) Nebulizer every 6 hours  azithromycin  IVPB 500 milliGRAM(s) IV Intermittent every 24 hours  cefTRIAXone   IVPB 1000 milliGRAM(s) IV Intermittent every 24 hours  clonazePAM  Tablet 1 milliGRAM(s) Oral at bedtime  divalproex Sprinkle 125 milliGRAM(s) Oral <User Schedule>  divalproex Sprinkle 250 milliGRAM(s) Oral <User Schedule>  fentaNYL   Infusion. 0.5 MICROgram(s)/kG/Hr (3.35 mL/Hr) IV Continuous <Continuous>  lactulose Syrup 20 Gram(s) Oral two times a day  midodrine. 10 milliGRAM(s) Oral <User Schedule>  QUEtiapine 225 milliGRAM(s) Oral <User Schedule>  senna 1 Tablet(s) Oral every 12 hours  tamsulosin 0.4 milliGRAM(s) Oral at bedtime    PRN:  acetaminophen Tablet .. 650 milliGRAM(s) Oral every 6 hours PRN Moderate Pain (4 - 6)  OLANZapine Injectable 2.5 milliGRAM(s) IntraMuscular every 6 hours PRN agitation      MICROBIOLOGY:   RVP negative  Blood cultures pending     RADIOLOGY & ADDITIONAL TESTS:  Chest XR 1/5/24 - Questionable left infiltrate  Chest XR 1/6/24 - New near-complete opacification of the left lung field, suggestive of mucous plug and superimposed atelectasis.      ASSESSMENT AND PLAN:  69 yo M with PMhx of BPH, agitation, ?dementia, dysphagia after CVA now s/p PEG tube, and ?CHF presenting from [facility] for worsening agitation and hypoxia, initially found to have possible L infiltrate on CXR, covered empirically for CAP. Now with near-complete opacification of the left lung field, requiring increasing O2 requirements, and now s/p intubation.     #Neuro  Agitation.   - c/w home seroquel 225mg BID  - c/w home depakote 125mg at 2pm, 250 BID  - Zyprexa IM for severe agitation when pt refusing oral medications  - c/w home Clonazepam for now given Zyprexa administration  - Consider behavioral health consult after extubation for medication management    #Resp  Acute hypoxic respiratory failure  - s/p intubation 1/6 for hypoxia, tachypnea, and AMS despite 15L NRB  -CXR 1/5 with questionable L infiltrate  - CXR 1/6 with new near-complete opacification of the left lung field, suggestive of mucous plug and superimposed atelectasis.  - s/p azithromycin, ceftriaxone --> switched to Zosyn (1/6 - ) and Vanc (1/6 - )   - Consider bronch  - F/u sputum culture   - Duonebs q6  - Chest PT    #CV  Hypotension  - Required pressors after intubation likely 2/2 vasoplegia   - Levophed gtt, wean as tolerated   - Continue with home Midodrine 10mg q8  - Maintain MAP > 65    New bifascicular block on ECG   - prior 12/2023 with RBBB   - F/u trop     #GI  Diet: NPO   - oral meds via PEG tube     Constipation   - Continue home lactulose and senna     #Renal/  - place garcia cath  - collect urine for UA and urine culture   - monitor I/Os    BPH   - Continue with home Tamulosin     Hypernatremia   - Na 148   - D5W @ 50mL/hr   - Continue to monitor     #ID  Aspiration PNA   - Continue Zosyn and Vancomycin   - F/u blood culture  - F/u urine culture  - F/u UA   - F/u urine legionalla   - F/u MRSA PCR  - RVP negative   - procal 0.15    #Endo  No hx of DM   - Monitor glucose    #Heme   DVT ppx   - Lovenox     #Ethics      69 yo M with PMhx of BPH, agitation, ?dementia, dysphagia after CVA now s/p PEG tube, and ?CHF presenting from [facility] for worsening agitation and hypoxia, initially found to have possible L infiltrate on CXR, covered empirically for CAP. Now with near-complete opacification of the left lung field, requiring increasing O2 requirements, and now s/p intubation.   patient with hypoxic respiratory failure in the setting of increased secretions, likely aspiration PNA and possible mucous plus  vent support, titrate FiO2 as tolerated, ABG   sedated eith prop and fentanyl  shock on vasopressors, titrate to maintain MAP >65  pancx, MRSA swab, legionella an, strep an  will broaden coverage to vanco and zosyn  airway clearance, possible bronch   h/o agitation continue Seroquel and Depakote, montior daily EKD    agrcia strict I and O  hypernatremia, will start free water  DVT ppx heparin  Full code  over guarded prognosis  will need to further discuss [facility]    critical care 35minutes  sina juares MICU Accept Note    CHIEF COMPLAINT: Hypoxia     HPI / INTERVAL HISTORY:  69 yo M with PMhx of BPH, agitation, ?dementia, dysphagia after CVA now s/p PEG tube, and ?CHF presenting from [facility] for worsening agitation. Per NH, pt was very agitated in the morning there. Intermittent cough with O2 sat down to 89% on RA, was placed on 4 L NC and transferred to ED. He received ceftriaxone x1, azithromycin x1 in ED. CXR showing questionable L infiltrate. He was placed on a nonrebreather, sats increased to 100%, from mid 80s on RA. However as patient was waiting to be transferred to the medicine floors, he became more tachypneic and hypoxic despite NRB. RRT was called for hypoxia to 80%. Nasotracheal suctioning completed with purulent appearing secretions, however remained hypoxic with deteriorating mental status, thus patient was intubated. Transferred to MICU for further care.       PAST MEDICAL & SURGICAL HISTORY:  CVA (cerebrovascular accident)  Dysphagia  BPH (benign prostatic hyperplasia)  Agitation  Schizophrenia  Congestive heart failure (CHF)  CVA (cerebral vascular accident)  Dysphagia  S/P percutaneous endoscopic gastrostomy (PEG) tube placement      HOME MEDICATIONS:  Depakote Sprinkles 125 mg oral delayed release capsule: 1 cap qd  ClonazePAM 1 mg oral tablet: 1 tab qd  QUEtiapine 200 mg oral tablet: 1 tab BID  QUEtiapine 25 mg oral tablet: 1 tab BID  Acetaminophen 325 mg oral tablet: 2 tabs q6h prn for pain   Senna 8.6 mg oral tablet: 1 tab BID  Lactulose 10 g/15 mL oral syrup: 30 milliliter(s) BID   Midodrine 10 mg oral tablet: 1 tab q8h  Melatonin 10 mg oral capsule: 1 cap qd  DuoNeb 0.5 mg-2.5 mg/3 mL inhalation solution: 3mL nebulizer q6 prn   Tamsulosin 0.4 mg oral capsule: 1 cap qd      Allergies  No Known Allergies    REVIEW OF SYSTEMS:  Unable to obtain 2/2 sedation.     OBJECTIVE:  ICU Vital Signs Last 24 Hrs  T(C): 37.1 (06 Jan 2024 01:00), Max: 37.3 (05 Jan 2024 12:41)  T(F): 98.7 (06 Jan 2024 01:00), Max: 99.1 (05 Jan 2024 12:41)  HR: 120 (06 Jan 2024 04:52) (73 - 120)  BP: 115/67 (06 Jan 2024 01:00) (81/48 - 115/67)  BP(mean): 60 (05 Jan 2024 13:11) (60 - 62)  RR: 22 (06 Jan 2024 01:00) (15 - 22)  SpO2: 88% (06 Jan 2024 04:52) (88% - 100%)    O2 Parameters below as of 06 Jan 2024 01:00  Patient On (Oxygen Delivery Method): mask, nonrebreather  O2 Flow (L/min): 15    Mode: AC/ CMV (Assist Control/ Continuous Mandatory Ventilation), RR (machine): 20, TV (machine): 480, FiO2: 100, PEEP: 8, ITime: 1    CAPILLARY BLOOD GLUCOSE  POCT Blood Glucose.: 90 mg/dL (06 Jan 2024 03:43)      =================PHYSICAL EXAM=================    GENERAL: Sedated and intubated. Frail.   EYES: Constricted and minimally reactive pupils b/l, no scleral icterus  NECK: No JVD  LUNG: Left lung with decreased breath sounds   HEART: Regular rate and rhythm  ABDOMEN: Soft, Nontender, Nondistended  EXTREMITIES: Right tibial IO. No LE edema, 2+ Peripheral Pulses  NEUROLOGY: Sedated. Moving bilateral UE against restraints.   SKIN: No rashes or lesions    =================================================    LABS:                        12.3   9.36  )-----------( 128      ( 05 Jan 2024 12:45 )             38.8     Hgb Trend: 12.3<--  01-05    148<H>  |  106  |  18  ----------------------------<  98  4.0   |  36<H>  |  0.74    Ca    8.4      05 Jan 2024 12:45    TPro  7.1  /  Alb  3.3  /  TBili  0.4  /  DBili  x   /  AST  9   /  ALT  9   /  AlkPhos  61  01-05    Creatinine Trend: 0.74<--, 0.74<--, 0.70<--, 0.71<--, 0.63<--, 0.66<--  PT/INR - ( 05 Jan 2024 12:45 )   PT: 13.7 sec;   INR: 1.22 ratio    PTT - ( 05 Jan 2024 12:45 )  PTT:32.4 sec    Urinalysis Basic - ( 05 Jan 2024 12:45 )  Color: x / Appearance: x / SG: x / pH: x  Gluc: 98 mg/dL / Ketone: x  / Bili: x / Urobili: x   Blood: x / Protein: x / Nitrite: x   Leuk Esterase: x / RBC: x / WBC x   Sq Epi: x / Non Sq Epi: x / Bacteria: x    Venous Blood Gas:  01-06 @ 04:20  7.33/72/35/38/58.0  VBG Lactate: 2.1  Venous Blood Gas:  01-05 @ 12:45  7.38/68/49/40/80.9  VBG Lactate: 1.9    Mode: AC/ CMV (Assist Control/ Continuous Mandatory Ventilation), RR (machine): 20, TV (machine): 480, FiO2: 100, PEEP: 8, ITime: 1      HOSPITAL MEDICATIONS:  STANDING:  albuterol/ipratropium for Nebulization 3 milliLiter(s) Nebulizer every 6 hours  azithromycin  IVPB 500 milliGRAM(s) IV Intermittent every 24 hours  cefTRIAXone   IVPB 1000 milliGRAM(s) IV Intermittent every 24 hours  clonazePAM  Tablet 1 milliGRAM(s) Oral at bedtime  divalproex Sprinkle 125 milliGRAM(s) Oral <User Schedule>  divalproex Sprinkle 250 milliGRAM(s) Oral <User Schedule>  fentaNYL   Infusion. 0.5 MICROgram(s)/kG/Hr (3.35 mL/Hr) IV Continuous <Continuous>  lactulose Syrup 20 Gram(s) Oral two times a day  midodrine. 10 milliGRAM(s) Oral <User Schedule>  QUEtiapine 225 milliGRAM(s) Oral <User Schedule>  senna 1 Tablet(s) Oral every 12 hours  tamsulosin 0.4 milliGRAM(s) Oral at bedtime    PRN:  acetaminophen Tablet .. 650 milliGRAM(s) Oral every 6 hours PRN Moderate Pain (4 - 6)  OLANZapine Injectable 2.5 milliGRAM(s) IntraMuscular every 6 hours PRN agitation      MICROBIOLOGY:   RVP negative  Blood cultures pending     RADIOLOGY & ADDITIONAL TESTS:  Chest XR 1/5/24 - Questionable left infiltrate  Chest XR 1/6/24 - New near-complete opacification of the left lung field, suggestive of mucous plug and superimposed atelectasis.      ASSESSMENT AND PLAN:  69 yo M with PMhx of BPH, agitation, ?dementia, dysphagia after CVA now s/p PEG tube, and ?CHF presenting from [facility] for worsening agitation and hypoxia, initially found to have possible L infiltrate on CXR, covered empirically for CAP. Now with near-complete opacification of the left lung field, requiring increasing O2 requirements, and now s/p intubation.     #Neuro  Agitation.   - c/w home seroquel 225mg BID  - c/w home depakote 125mg at 2pm, 250 BID  - Zyprexa IM for severe agitation when pt refusing oral medications  - c/w home Clonazepam for now given Zyprexa administration  - Consider behavioral health consult after extubation for medication management    #Resp  Acute hypoxic respiratory failure  - s/p intubation 1/6 for hypoxia, tachypnea, and AMS despite 15L NRB  -CXR 1/5 with questionable L infiltrate  - CXR 1/6 with new near-complete opacification of the left lung field, suggestive of mucous plug and superimposed atelectasis.  - s/p azithromycin, ceftriaxone --> switched to Zosyn (1/6 - ) and Vanc (1/6 - )   - Consider bronch  - F/u sputum culture   - Duonebs q6  - Chest PT    #CV  Hypotension  - Required pressors after intubation likely 2/2 vasoplegia   - Levophed gtt, wean as tolerated   - Continue with home Midodrine 10mg q8  - Maintain MAP > 65    New bifascicular block on ECG   - prior 12/2023 with RBBB   - F/u trop     #GI  Diet: NPO   - oral meds via PEG tube     Constipation   - Continue home lactulose and senna     #Renal/  - place garcia cath  - collect urine for UA and urine culture   - monitor I/Os    BPH   - Continue with home Tamulosin     Hypernatremia   - Na 148   - D5W @ 50mL/hr   - Continue to monitor     #ID  Aspiration PNA   - Continue Zosyn and Vancomycin   - F/u blood culture  - F/u urine culture  - F/u UA   - F/u urine legionalla   - F/u MRSA PCR  - RVP negative   - procal 0.15    #Endo  No hx of DM   - Monitor glucose    #Heme   DVT ppx   - Lovenox     #Ethics      69 yo M with PMhx of BPH, agitation, ?dementia, dysphagia after CVA now s/p PEG tube, and ?CHF presenting from [facility] for worsening agitation and hypoxia, initially found to have possible L infiltrate on CXR, covered empirically for CAP. Now with near-complete opacification of the left lung field, requiring increasing O2 requirements, and now s/p intubation.   patient with hypoxic respiratory failure in the setting of increased secretions, likely aspiration PNA and possible mucous plus  vent support, titrate FiO2 as tolerated, ABG   sedated eith prop and fentanyl  shock on vasopressors, titrate to maintain MAP >65  pancx, MRSA swab, legionella an, strep an  will broaden coverage to vanco and zosyn  airway clearance, possible bronch   h/o agitation continue Seroquel and Depakote, montior daily EKD    garcia strict I and O  hypernatremia, will start free water  DVT ppx heparin  Full code  over guarded prognosis  will need to further discuss [facility]    critical care 35minutes  sina juares

## 2024-01-06 NOTE — PATIENT PROFILE ADULT - FUNCTIONAL ASSESSMENT - DAILY ACTIVITY 1.
pt seen bedside, alert and oriented x4. pt responded to questions, verbalized wants/needs and she was able to follow one step directions. Unable to assess patient sedated and orally intubated no family @ bedside.

## 2024-01-06 NOTE — RAPID RESPONSE TEAM SUMMARY - NSADDTLFINDINGSRRT_GEN_ALL_CORE
On my arrival, patient on NRB with O2 sat of 80% with good waveform. He is agitated and not cooperating with non-rebreather. Patient awake, alert, and interactive but in respiratory distress. Initial vitals /69, , RR 30, SpO2 72%. Nasotracheal suctioning completed with purulent appearing secretions. This improved SpO2 temporarily. Placed on HFNC with NRB on top with improvement in O2 sat to high 80s. Patient subsequently with deteriorating mental status. Attempted BiPAP for concern for hypercapnia, though patient without any improvement. Decision made to intubate patient given acute hypoxic respiratory failure likely secondary to aspiration pneumonia/mucous plugging. Anesthesia paged overhead and patient successfully intubated. CXR w/ ETT in place w/ evidence of atelectasis of complete L lung, raising suspicion for mucous plugging event. After intubation, patient continued to desaturate due to vent dyssynchrony. Sedation was titrated and patient was paralyzed. Oxygen saturation improved with the aforementioned interventions. Vasopressors initiated due to shock state likely vasoplegic due to sedation. Patient accepted to the MICU for further management.

## 2024-01-06 NOTE — PATIENT PROFILE ADULT - FALL HARM RISK - HARM RISK INTERVENTIONS
Assistance with ambulation/Communicate Risk of Fall with Harm to all staff/Reinforce activity limits and safety measures with patient and family/Tailored Fall Risk Interventions/Visual Cue: Yellow wristband and red socks/Bed in lowest position, wheels locked, appropriate side rails in place/Call bell, personal items and telephone in reach/Instruct patient to call for assistance before getting out of bed or chair/Non-slip footwear when patient is out of bed/[place] to call system/Physically safe environment - no spills, clutter or unnecessary equipment/Purposeful Proactive Rounding/Room/bathroom lighting operational, light cord in reach Assistance with ambulation/Communicate Risk of Fall with Harm to all staff/Reinforce activity limits and safety measures with patient and family/Tailored Fall Risk Interventions/Visual Cue: Yellow wristband and red socks/Bed in lowest position, wheels locked, appropriate side rails in place/Call bell, personal items and telephone in reach/Instruct patient to call for assistance before getting out of bed or chair/Non-slip footwear when patient is out of bed/[place] to call system/Physically safe environment - no spills, clutter or unnecessary equipment/Purposeful Proactive Rounding/Room/bathroom lighting operational, light cord in reach

## 2024-01-06 NOTE — PROGRESS NOTE ADULT - ASSESSMENT
67 yo M with PMhx of BPH, agitation, ?dementia, dysphagia after CVA now s/p PEG tube, and ?CHF presenting from [facility] for worsening agitation and hypoxia, initially found to have possible L infiltrate on CXR, covered empirically for CAP. Now with near-complete opacification of the left lung field, requiring increasing O2 requirements, and now s/p intubation.     #Neuro  Agitation.   - c/w home seroquel 225mg BID  - c/w home depakote 125mg at 2pm, 250 BID  - Zyprexa IM for severe agitation when pt refusing oral medications  - c/w home Clonazepam for now given Zyprexa administration  - Consider behavioral health consult after extubation for medication management    #Resp  Acute hypoxic respiratory failure  - s/p intubation 1/6 for hypoxia, tachypnea, and AMS despite 15L NRB  -CXR 1/5 with questionable L infiltrate  - CXR 1/6 with new near-complete opacification of the left lung field, suggestive of mucous plug and superimposed atelectasis.  - s/p azithromycin, ceftriaxone --> switched to Zosyn (1/6 - ) and Vanc (1/6 - )   - Consider bronch  - F/u sputum culture   - Duonebs q6  - Chest PT    #CV  Hypotension  - Required pressors after intubation likely 2/2 vasoplegia   - Levophed gtt, wean as tolerated   - Continue with home Midodrine 10mg q8  - Maintain MAP > 65    New bifascicular block on ECG   - prior 12/2023 with RBBB   - F/u trop     #GI  Diet: NPO   - oral meds via PEG tube     Constipation   - Continue home lactulose and senna     #Renal/  - place garcia cath  - collect urine for UA and urine culture   - monitor I/Os    BPH   - Continue with home Tamulosin     Hypernatremia   - Na 148   - D5W @ 50mL/hr   - Continue to monitor     #ID  Aspiration PNA   - Continue Zosyn and Vancomycin   - F/u blood culture  - F/u urine culture  - F/u UA   - F/u urine legionalla   - F/u MRSA PCR  - RVP negative   - procal 0.15    #Endo  No hx of DM   - Monitor glucose    #Heme   DVT ppx   - Lovenox     #Ethics  Full Code 67 yo M with PMhx of BPH, agitation, ?dementia, dysphagia after CVA now s/p PEG tube, and ?CHF presenting from [facility] for worsening agitation and hypoxia, initially found to have possible L infiltrate on CXR, covered empirically for CAP. Now with near-complete opacification of the left lung field, requiring increasing O2 requirements, and now s/p intubation.     #Neuro  Agitation.   - c/w home seroquel 225mg BID  - c/w home depakote 125mg at 2pm, 250 BID  - Zyprexa IM for severe agitation when pt refusing oral medications  - c/w home Clonazepam for now given Zyprexa administration  - Consider behavioral health consult after extubation for medication management    #Resp  Acute hypoxic respiratory failure  - s/p intubation 1/6 for hypoxia, tachypnea, and AMS despite 15L NRB  -CXR 1/5 with questionable L infiltrate  - CXR 1/6 with new near-complete opacification of the left lung field, suggestive of mucous plug and superimposed atelectasis.  - s/p azithromycin, ceftriaxone --> switched to Zosyn (1/6 - ) and Vanc (1/6 - )   - Consider bronch  - F/u sputum culture   - Duonebs q6  - Chest PT    #CV  Hypotension  - Required pressors after intubation likely 2/2 vasoplegia   - Levophed gtt, wean as tolerated   - Continue with home Midodrine 10mg q8  - Maintain MAP > 65    New bifascicular block on ECG   - prior 12/2023 with RBBB   - F/u trop     #GI  Diet: NPO   - oral meds via PEG tube     Constipation   - Continue home lactulose and senna     #Renal/  - place garcia cath  - collect urine for UA and urine culture   - monitor I/Os    BPH   - Continue with home Tamulosin     Hypernatremia   - Na 148   - D5W @ 50mL/hr   - Continue to monitor     #ID  Aspiration PNA   - Continue Zosyn and Vancomycin   - F/u blood culture  - F/u urine culture  - F/u UA   - F/u urine legionalla   - F/u MRSA PCR  - RVP negative   - procal 0.15    #Endo  No hx of DM   - Monitor glucose    #Heme   DVT ppx   - Lovenox     #Ethics  Full Code

## 2024-01-06 NOTE — PATIENT PROFILE ADULT - MEDICATIONS/VISITS
This is a 75 year old female with a history of hypertension, hypertrophic cardiomyopathy, former smoker with 50 py history, bladder cancer metastatic to bone dx 9/2017 s/p ileal conduit on Carboplatin/Etoposide with two recent admissions for fever, most recently 4/14-4/18 when she was found to have E. Coli bacteremia thought to be of urinary source now presenting with fever. This is a 75 year old female with a history of hypertension, hypertrophic cardiomyopathy, former smoker with 50 py history, bladder cancer metastatic to bone dx 9/2017 s/p ileal conduit on Carboplatin/Etoposide with two recent admissions for fever, most recently 4/14-4/18 when she was found to have E. Coli bacteremia thought to be of urinary source now presenting with fever. Again, suspect urinary source. Ultimately, concern that patient may have recurrent translocation from ileal conduit causing transient bacteremia and fevers. no

## 2024-01-06 NOTE — RAPID RESPONSE TEAM SUMMARY - NSSITUATIONBACKGROUNDRRT_GEN_ALL_CORE
69 yo M with PMhx of BPH, agitation, ?dementia, dysphagia after CVA now s/p PEG tube, and ?CHF presenting from [facility] for worsening agitation and hypoxia, found to have possible L infiltrate on CXR. RRT called for hypoxia. 67 yo M with PMhx of BPH, agitation, ?dementia, dysphagia after CVA now s/p PEG tube, and ?CHF presenting from [facility] for worsening agitation and hypoxia, found to have possible L infiltrate on CXR. RRT called for hypoxia.

## 2024-01-07 NOTE — PROGRESS NOTE ADULT - ASSESSMENT
67 yo M with PMhx of BPH, agitation, ?dementia, dysphagia after CVA now s/p PEG tube, and ?CHF presenting from [facility] for worsening agitation and hypoxia, initially found to have possible L infiltrate on CXR, covered empirically for CAP. Now with near-complete opacification of the left lung field, requiring increasing O2 requirements, and now s/p intubation.     #Neuro  Agitation.   - c/w home seroquel 225mg BID  - c/w home depakote 125mg at 2pm, 250 BID  - Zyprexa IM for severe agitation when pt refusing oral medications  - c/w home Clonazepam for now given Zyprexa administration  - Consider behavioral health consult after extubation for medication management    #Resp  Acute hypoxic respiratory failure  - s/p intubation 1/6 for hypoxia, tachypnea, and AMS despite 15L NRB  -CXR 1/5 with questionable L infiltrate  - CXR 1/6 with new near-complete opacification of the left lung field, suggestive of mucous plug and superimposed atelectasis.  - s/p azithromycin, ceftriaxone --> switched to Zosyn (1/6 - ) and Vanc (1/6 - )   - Consider bronch  - F/u sputum culture   - Duonebs q6  - Chest PT    #CV  Hypotension  - Required pressors after intubation likely 2/2 vasoplegia   - Levophed gtt, wean as tolerated   - Continue with home Midodrine 10mg q8  - Maintain MAP > 65    New bifascicular block on ECG   - prior 12/2023 with RBBB   - F/u trop     #GI  Diet: NPO   - oral meds via PEG tube     Constipation   - Continue home lactulose and senna     #Renal/  - place garcia cath  - collect urine for UA and urine culture   - monitor I/Os    BPH   - Continue with home Tamulosin     Hypernatremia   - Na 148   - D5W @ 50mL/hr   - Continue to monitor     #ID  Aspiration PNA   - Continue Zosyn and Vancomycin   - F/u blood culture  - F/u urine culture  - F/u UA   - F/u urine legionalla   - F/u MRSA PCR  - RVP negative   - procal 0.15    #Endo  No hx of DM   - Monitor glucose    #Heme   DVT ppx   - Lovenox     #Ethics  Full Code 69 yo M with PMhx of BPH, agitation, ?dementia, dysphagia after CVA now s/p PEG tube, and ?CHF presenting from [facility] for worsening agitation and hypoxia, initially found to have possible L infiltrate on CXR, covered empirically for CAP. Now with near-complete opacification of the left lung field, requiring increasing O2 requirements, and now s/p intubation.     #Neuro  Agitation.   - c/w home seroquel 225mg BID  - c/w home depakote 125mg at 2pm, 250 BID  - Zyprexa IM for severe agitation when pt refusing oral medications  - c/w home Clonazepam for now given Zyprexa administration  - Consider behavioral health consult after extubation for medication management    #Resp  Acute hypoxic respiratory failure  - s/p intubation 1/6 for hypoxia, tachypnea, and AMS despite 15L NRB  -CXR 1/5 with questionable L infiltrate  - CXR 1/6 with new near-complete opacification of the left lung field, suggestive of mucous plug and superimposed atelectasis.  - s/p azithromycin, ceftriaxone --> switched to Zosyn (1/6 - ) and Vanc (1/6 - )   - Consider bronch  - F/u sputum culture   - Duonebs q6  - Chest PT    #CV  Hypotension  - Required pressors after intubation likely 2/2 vasoplegia   - Levophed gtt, wean as tolerated   - Continue with home Midodrine 10mg q8  - Maintain MAP > 65    New bifascicular block on ECG   - prior 12/2023 with RBBB   - F/u trop     #GI  Diet: NPO   - oral meds via PEG tube     Constipation   - Continue home lactulose and senna     #Renal/  - place garcia cath  - collect urine for UA and urine culture   - monitor I/Os    BPH   - Continue with home Tamulosin     Hypernatremia   - Na 148   - D5W @ 50mL/hr   - Continue to monitor     #ID  Aspiration PNA   - Continue Zosyn and Vancomycin   - F/u blood culture  - F/u urine culture  - F/u UA   - F/u urine legionalla   - F/u MRSA PCR  - RVP negative   - procal 0.15    #Endo  No hx of DM   - Monitor glucose    #Heme   DVT ppx   - Lovenox     #Ethics  Full Code 69 yo M with PMhx of BPH, agitation, ?dementia, dysphagia after CVA now s/p PEG tube, and ?CHF presenting from [facility] for worsening agitation and hypoxia, initially found to have possible L infiltrate on CXR, covered empirically for CAP. Now with near-complete opacification of the left lung field, requiring increasing O2 requirements, and now s/p intubation 1/6 for AHRF.    #Neuro  Agitation.   - c/w home seroquel 225mg BID  - c/w home depakote 125mg at 2pm, 250 BID  - Zyprexa IM for severe agitation when pt refusing oral medications  - c/w home Clonazepam for now given Zyprexa administration  - Consider behavioral health consult after extubation for medication management  - sedated on prop    #Resp  Acute hypoxic respiratory failure  - s/p intubation 1/6 for hypoxia, tachypnea, and AMS despite 15L NRB  -CXR 1/5 with questionable L infiltrate  - CXR 1/6 with new near-complete opacification of the left lung field, suggestive of mucous plug and superimposed atelectasis.  - s/p azithromycin, ceftriaxone --> switched to Zosyn (1/6 - ) and Vanc (1/6 - )   - f/u MRSA swab  - May consider bronch  - sputum culture w/ likely resp lesa; pending speciation  - Duonebs q6  - Chest PT    #CV  Hypotension  - Required pressors after intubation likely 2/2 vasoplegia   - Levophed gtt, wean as tolerated   - Continue with home Midodrine 10mg q8  - Maintain MAP > 65    New bifascicular block on ECG   - prior 12/2023 with RBBB     #GI  Diet: NPO   - oral meds via PEG tube     Constipation   - Continue home lactulose and senna     #Renal/  - garcia cath in place  - UA with trace LE but no bacteria or nitr  - monitor I/Os    BPH   - Continue with home Tamulosin     Hypernatremia , resolved  - Na 148 -> 140  - Continue to monitor     #ID  Aspiration PNA   - Continue Zosyn and Vancomycin   - F/u blood culture, NGTD 24 hrs  - U/A neg  - F/u MRSA PCR  - RVP negative   - procal 0.15    #Endo  No hx of DM   - Monitor glucose    #Heme   DVT ppx   - Lovenox     #Ethics  Full Code 67 yo M with PMhx of BPH, agitation, ?dementia, dysphagia after CVA now s/p PEG tube, and ?CHF presenting from [facility] for worsening agitation and hypoxia, initially found to have possible L infiltrate on CXR, covered empirically for CAP. Now with near-complete opacification of the left lung field, requiring increasing O2 requirements, and now s/p intubation 1/6 for AHRF.    #Neuro  Agitation.   - c/w home seroquel 225mg BID  - c/w home depakote 125mg at 2pm, 250 BID  - Zyprexa IM for severe agitation when pt refusing oral medications  - c/w home Clonazepam for now given Zyprexa administration  - Consider behavioral health consult after extubation for medication management  - sedated on prop    #Resp  Acute hypoxic respiratory failure  - s/p intubation 1/6 for hypoxia, tachypnea, and AMS despite 15L NRB  -CXR 1/5 with questionable L infiltrate  - CXR 1/6 with new near-complete opacification of the left lung field, suggestive of mucous plug and superimposed atelectasis.  - s/p azithromycin, ceftriaxone --> switched to Zosyn (1/6 - ) and Vanc (1/6 - )   - f/u MRSA swab  - May consider bronch  - sputum culture w/ likely resp lesa; pending speciation  - Duonebs q6  - Chest PT    #CV  Hypotension  - Required pressors after intubation likely 2/2 vasoplegia   - Levophed gtt, wean as tolerated   - Continue with home Midodrine 10mg q8  - Maintain MAP > 65    New bifascicular block on ECG   - prior 12/2023 with RBBB     #GI  Diet: NPO   - oral meds via PEG tube     Constipation   - Continue home lactulose and senna     #Renal/  - garcia cath in place  - UA with trace LE but no bacteria or nitr  - monitor I/Os    BPH   - Continue with home Tamulosin     Hypernatremia , resolved  - Na 148 -> 140  - Continue to monitor     #ID  Aspiration PNA   - Continue Zosyn and Vancomycin   - F/u blood culture, NGTD 24 hrs  - U/A neg  - F/u MRSA PCR  - RVP negative   - procal 0.15    #Endo  No hx of DM   - Monitor glucose    #Heme   DVT ppx   - Lovenox     #Ethics  Full Code 67 yo M with PMhx of BPH, agitation, ?dementia, dysphagia after CVA now s/p PEG tube, and ?CHF presenting from [facility] for worsening agitation and hypoxia, initially found to have possible L infiltrate on CXR, covered empirically for CAP. Now with near-complete opacification of the left lung field, requiring increasing O2 requirements, and now s/p intubation 1/6 for AHRF.    #Neuro  Agitation.   - c/w home seroquel 225mg BID  - c/w home depakote 125mg at 2pm, 250 BID  - Zyprexa IM for severe agitation when pt refusing oral medications  - c/w home Clonazepam for now given Zyprexa administration  - Consider behavioral health consult after extubation for medication management  - sedated on prop, wean for SBT trial    #Resp  Acute hypoxic respiratory failure  - s/p intubation 1/6 for hypoxia, tachypnea, and AMS despite 15L NRB  -CXR 1/5 with questionable L infiltrate  - CXR 1/6 with new near-complete opacification of the left lung field, suggestive of mucous plug and superimposed atelectasis.  - s/p azithromycin, ceftriaxone --> switched to Zosyn (1/6 - ) and Vanc (1/6 - )   - f/u MRSA swab  - May consider bronch  - sputum culture w/ likely resp lesa; pending speciation  - Duonebs q6  - Chest PT    #CV  Hypotension  - Required pressors after intubation likely 2/2 vasoplegia   - Levophed gtt, wean as tolerated   - Continue with home Midodrine 10mg q8  - Maintain MAP > 65    New bifascicular block on ECG   - prior 12/2023 with RBBB     #GI  Diet: NPO   - oral meds via PEG tube     Constipation   - Continue home lactulose and senna     #Renal/  - garcia cath in place  - UA with trace LE but no bacteria or nitr  - monitor I/Os    BPH   - Continue with home Tamulosin     Hypernatremia , resolved  - Na 148 -> 140  - Continue to monitor     #ID  Aspiration PNA   - Continue Zosyn and Vancomycin   - F/u blood culture, NGTD 24 hrs  - U/A neg  - F/u MRSA PCR  - RVP negative   - procal 0.15    #Endo  No hx of DM   - Monitor glucose    #Heme   DVT ppx   - Lovenox     #Ethics  Full Code 69 yo M with PMhx of BPH, agitation, ?dementia, dysphagia after CVA now s/p PEG tube, and ?CHF presenting from [facility] for worsening agitation and hypoxia, initially found to have possible L infiltrate on CXR, covered empirically for CAP. Now with near-complete opacification of the left lung field, requiring increasing O2 requirements, and now s/p intubation 1/6 for AHRF.    #Neuro  Agitation.   - c/w home seroquel 225mg BID  - c/w home depakote 125mg at 2pm, 250 BID  - Zyprexa IM for severe agitation when pt refusing oral medications  - c/w home Clonazepam for now given Zyprexa administration  - Consider behavioral health consult after extubation for medication management  - sedated on prop, wean for SBT trial    #Resp  Acute hypoxic respiratory failure  - s/p intubation 1/6 for hypoxia, tachypnea, and AMS despite 15L NRB  -CXR 1/5 with questionable L infiltrate  - CXR 1/6 with new near-complete opacification of the left lung field, suggestive of mucous plug and superimposed atelectasis.  - s/p azithromycin, ceftriaxone --> switched to Zosyn (1/6 - ) and Vanc (1/6 - )   - f/u MRSA swab  - May consider bronch  - sputum culture w/ likely resp elsa; pending speciation  - Duonebs q6  - Chest PT    #CV  Hypotension  - Required pressors after intubation likely 2/2 vasoplegia   - Levophed gtt, wean as tolerated   - Continue with home Midodrine 10mg q8  - Maintain MAP > 65    New bifascicular block on ECG   - prior 12/2023 with RBBB     #GI  Diet: NPO   - oral meds via PEG tube     Constipation   - Continue home lactulose and senna     #Renal/  - garcia cath in place  - UA with trace LE but no bacteria or nitr  - monitor I/Os    BPH   - Continue with home Tamulosin     Hypernatremia , resolved  - Na 148 -> 140  - Continue to monitor     #ID  Aspiration PNA   - Continue Zosyn and Vancomycin   - F/u blood culture, NGTD 24 hrs  - U/A neg  - F/u MRSA PCR  - RVP negative   - procal 0.15    #Endo  No hx of DM   - Monitor glucose    #Heme   DVT ppx   - Lovenox     #Ethics  Full Code

## 2024-01-07 NOTE — PROGRESS NOTE ADULT - SUBJECTIVE AND OBJECTIVE BOX
************************  Edd Zuñiga, MD-PhD  Internal Medicine PGY-2  ************************    SUBJECTIVE:      Patient seen and examined at bedside. No events overnight. Intubated and sedated. Unable to assess ROS.      VITAL SIGNS:  ICU Vital Signs Last 24 Hrs  T(C): 37.6 (07 Jan 2024 04:00), Max: 38.2 (06 Jan 2024 08:30)  T(F): 99.6 (07 Jan 2024 04:00), Max: 100.7 (06 Jan 2024 08:30)  HR: 94 (07 Jan 2024 06:00) (81 - 116)  BP: 115/67 (07 Jan 2024 06:00) (88/54 - 140/72)  BP(mean): 76 (07 Jan 2024 06:00) (56 - 86)  ABP: --  ABP(mean): --  RR: 12 (07 Jan 2024 06:00) (8 - 20)  SpO2: 96% (07 Jan 2024 06:00) (96% - 100%)    O2 Parameters below as of 07 Jan 2024 06:00  Patient On (Oxygen Delivery Method): ventilator    O2 Concentration (%): 40      Mode: AC/ CMV (Assist Control/ Continuous Mandatory Ventilation), RR (machine): 12, TV (machine): 470, FiO2: 40, PEEP: 8, PS: , ITime: 0.96, MAP: 11, PIP: 24  Plateau pressure:   P/F ratio:     01-06 @ 07:01 - 01-07 @ 06:35  --------------------------------------------------------  IN: 3912.9 mL / OUT: 690 mL / NET: 3222.9 mL      CAPILLARY BLOOD GLUCOSE      POCT Blood Glucose.: 90 mg/dL (06 Jan 2024 03:43)    ECG:     PHYSICAL EXAM:    01-06 @ 07:01 - 01-07 @ 06:35  --------------------------------------------------------  IN: 3912.9 mL / OUT: 690 mL / NET: 3222.9 mL    Vital Signs Last 24 Hrs  T(C): 37.6 (07 Jan 2024 04:00), Max: 38.2 (06 Jan 2024 08:30)  T(F): 99.6 (07 Jan 2024 04:00), Max: 100.7 (06 Jan 2024 08:30)  HR: 94 (07 Jan 2024 06:00) (81 - 116)  BP: 115/67 (07 Jan 2024 06:00) (88/54 - 140/72)  BP(mean): 76 (07 Jan 2024 06:00) (56 - 86)  RR: 12 (07 Jan 2024 06:00) (8 - 20)  SpO2: 96% (07 Jan 2024 06:00) (96% - 100%)    Parameters below as of 07 Jan 2024 06:00  Patient On (Oxygen Delivery Method): ventilator    O2 Concentration (%): 40  T(C): 37.6 (01-07-24 @ 04:00), Max: 38.2 (01-06-24 @ 08:30)  HR: 94 (01-07-24 @ 06:00) (81 - 116)  BP: 115/67 (01-07-24 @ 06:00) (88/54 - 140/72)  RR: 12 (01-07-24 @ 06:00) (8 - 20)  SpO2: 96% (01-07-24 @ 06:00) (96% - 100%)    GENERAL: Sedated and intubated. Frail.   EYES: Constricted and minimally reactive pupils b/l, no scleral icterus  NECK: No JVD  LUNG: Left lung with decreased breath sounds   HEART: Regular rate and rhythm  ABDOMEN: PEG tube in place. Soft, Nontender, Nondistended  EXTREMITIES: Right tibial IO. No LE edema, 2+ Peripheral Pulses  NEUROLOGY: Sedated. Moving bilateral UE against restraints.   SKIN: No rashes or lesions     MEDICATIONS:  MEDICATIONS  (STANDING):  albuterol/ipratropium for Nebulization 3 milliLiter(s) Nebulizer every 6 hours  artificial  tears Solution 1 Drop(s) Both EYES two times a day  chlorhexidine 0.12% Liquid 15 milliLiter(s) Oral Mucosa every 12 hours  chlorhexidine 2% Cloths 1 Application(s) Topical daily  clonazePAM  Tablet 1 milliGRAM(s) Oral at bedtime  dexMEDEtomidine Infusion 0.2 MICROgram(s)/kG/Hr (3.35 mL/Hr) IV Continuous <Continuous>  enoxaparin Injectable 40 milliGRAM(s) SubCutaneous every 24 hours  lactulose Syrup 20 Gram(s) Oral two times a day  midodrine. 10 milliGRAM(s) Oral <User Schedule>  norepinephrine Infusion 0.5 MICROgram(s)/kG/Min (31.4 mL/Hr) IV Continuous <Continuous>  piperacillin/tazobactam IVPB.. 3.375 Gram(s) IV Intermittent every 8 hours  propofol Infusion 50 MICROgram(s)/kG/Min (20.1 mL/Hr) IV Continuous <Continuous>  QUEtiapine 225 milliGRAM(s) Oral <User Schedule>  senna 1 Tablet(s) Oral every 12 hours  tamsulosin 0.4 milliGRAM(s) Oral at bedtime  valproic  acid Syrup 250 milliGRAM(s) Oral <User Schedule>  valproic  acid Syrup 125 milliGRAM(s) Oral <User Schedule>  vancomycin  IVPB 1000 milliGRAM(s) IV Intermittent every 12 hours    MEDICATIONS  (PRN):  acetaminophen     Tablet .. 650 milliGRAM(s) Oral every 6 hours PRN Moderate Pain (4 - 6)  OLANZapine Injectable 2.5 milliGRAM(s) IntraMuscular every 6 hours PRN agitation  sodium chloride 0.9% lock flush 10 milliLiter(s) IV Push every 1 hour PRN Pre/post blood products, medications, blood draw, and to maintain line patency      ALLERGIES:  Allergies    No Known Allergies    Intolerances        LABS:                        12.9   25.11 )-----------( 157      ( 07 Jan 2024 00:30 )             39.4     01-07    140  |  101  |  12  ----------------------------<  134<H>  4.3   |  30  |  0.71    Ca    8.2<L>      07 Jan 2024 00:30  Phos  3.5     01-07  Mg     1.90     01-07    TPro  6.5  /  Alb  2.9<L>  /  TBili  0.4  /  DBili  x   /  AST  15  /  ALT  7   /  AlkPhos  65  01-07    PT/INR - ( 06 Jan 2024 05:18 )   PT: 14.5 sec;   INR: 1.30 ratio         PTT - ( 06 Jan 2024 05:18 )  PTT:31.4 sec      Urinalysis Basic - ( 07 Jan 2024 00:30 )    Color: x / Appearance: x / SG: x / pH: x  Gluc: 134 mg/dL / Ketone: x  / Bili: x / Urobili: x   Blood: x / Protein: x / Nitrite: x   Leuk Esterase: x / RBC: x / WBC x   Sq Epi: x / Non Sq Epi: x / Bacteria: x        Culture - Sputum (collected 06 Jan 2024 11:00)  Source: ET Tube ET Tube  Gram Stain (06 Jan 2024 23:35):    Numerous polymorphonuclear leukocytes per low power field    No Squamous epithelial cells per low power field    Rare Gram Positive Cocci in Clusters per oil power field    Rare Gram Positive Rods per oil power field    Rare Yeast like cells per oil power field    Culture - Blood (collected 05 Jan 2024 13:00)  Source: .Blood Blood-Peripheral  Preliminary Report (06 Jan 2024 17:02):    No growth at 24 hours    Culture - Blood (collected 05 Jan 2024 12:45)  Source: .Blood Blood-Peripheral  Preliminary Report (06 Jan 2024 17:02):    No growth at 24 hours        IMAGING & OTHER TESTS:    NNI

## 2024-01-08 NOTE — DIETITIAN INITIAL EVALUATION ADULT - RD TO REMAIN AVAILABLE
Cleopatra Coyne RDN, CDN       pager [phone] or MS Teams/yes Cleopatra Coyne RDN, CDN       pager [phone] or MS Teams/yes

## 2024-01-08 NOTE — DIETITIAN INITIAL EVALUATION ADULT - PERTINENT MEDS FT
MEDICATIONS  (STANDING):  albuterol/ipratropium for Nebulization 3 milliLiter(s) Nebulizer every 6 hours  artificial  tears Solution 1 Drop(s) Both EYES two times a day  chlorhexidine 0.12% Liquid 15 milliLiter(s) Oral Mucosa every 12 hours  chlorhexidine 2% Cloths 1 Application(s) Topical daily  clonazePAM  Tablet 1 milliGRAM(s) Oral at bedtime  dexMEDEtomidine Infusion 0.5 MICROgram(s)/kG/Hr (8.38 mL/Hr) IV Continuous <Continuous>  enoxaparin Injectable 40 milliGRAM(s) SubCutaneous every 24 hours  lactulose Syrup 20 Gram(s) Oral two times a day  midodrine. 10 milliGRAM(s) Oral <User Schedule>  norepinephrine Infusion 0.5 MICROgram(s)/kG/Min (31.4 mL/Hr) IV Continuous <Continuous>  piperacillin/tazobactam IVPB.. 3.375 Gram(s) IV Intermittent every 8 hours  QUEtiapine 225 milliGRAM(s) Oral <User Schedule>  senna 1 Tablet(s) Oral every 12 hours  sodium chloride 3%  Inhalation 4 milliLiter(s) Inhalation every 12 hours  tamsulosin 0.4 milliGRAM(s) Oral at bedtime  valproic  acid Syrup 250 milliGRAM(s) Oral <User Schedule>  valproic  acid Syrup 125 milliGRAM(s) Oral <User Schedule>  vancomycin  IVPB 1250 milliGRAM(s) IV Intermittent every 12 hours    MEDICATIONS  (PRN):  acetaminophen     Tablet .. 650 milliGRAM(s) Oral every 6 hours PRN Moderate Pain (4 - 6)  OLANZapine Injectable 2.5 milliGRAM(s) IntraMuscular every 6 hours PRN agitation  sodium chloride 0.9% lock flush 10 milliLiter(s) IV Push every 1 hour PRN Pre/post blood products, medications, blood draw, and to maintain line patency

## 2024-01-08 NOTE — DIETITIAN INITIAL EVALUATION ADULT - PERTINENT LABORATORY DATA
01-08    140  |  101  |  8   ----------------------------<  117<H>  4.0   |  31  |  0.64    Ca    8.0<L>      08 Jan 2024 00:30  Phos  2.9     01-08  Mg     1.90     01-08    TPro  6.2  /  Alb  2.7<L>  /  TBili  0.5  /  DBili  x   /  AST  10  /  ALT  <5  /  AlkPhos  61  01-08  A1C with Estimated Average Glucose Result: 4.7 % (12-14-23 @ 23:30)   01-08    140  |  101  |  8   ----------------------------<  117<H>  4.0   |  31  |  0.64    Ca    8.0<L>      08 Jan 2024 00:30  Phos  2.9     01-08  Mg     1.90     01-08    A1C with Estimated Average Glucose Result: 4.7 % (12-14-23 @ 23:30)

## 2024-01-08 NOTE — DIETITIAN INITIAL EVALUATION ADULT - [id number]_GEN_A_CORE
No bowel movement since admit (x 3d)    No noted/reported vomiting/diarrhea/constipation or abdominal distention @ this time.

## 2024-01-08 NOTE — PROGRESS NOTE ADULT - ASSESSMENT
67 yo M with PMhx of BPH, agitation, ?dementia, dysphagia after CVA now s/p PEG tube, and ?CHF presenting from [facility] for worsening agitation and hypoxia, initially found to have possible L infiltrate on CXR, covered empirically for CAP. Now with near-complete opacification of the left lung field, requiring increasing O2 requirements, and now s/p intubation 1/6 for AHRF.    #Neuro  Agitation.   - c/w home seroquel 225mg BID  - c/w home depakote 125mg at 2pm, 250 BID  - Zyprexa IM for severe agitation when pt refusing oral medications  - c/w home Clonazepam for now given Zyprexa administration  - Consider behavioral health consult after extubation for medication management  - sedated on prop, wean for SBT trial    #Resp  Acute hypoxic respiratory failure  - s/p intubation 1/6 for hypoxia, tachypnea, and AMS despite 15L NRB  -CXR 1/5 with questionable L infiltrate  - CXR 1/6 with new near-complete opacification of the left lung field, suggestive of mucous plug and superimposed atelectasis.  - s/p azithromycin, ceftriaxone --> switched to Zosyn (1/6 - ) and Vanc (1/6 - )   - f/u MRSA swab  - May consider bronch  - sputum culture w/ likely resp lesa; pending speciation  - Duonebs q6  - Chest PT    #CV  Hypotension  - Required pressors after intubation likely 2/2 vasoplegia   - Levophed gtt, wean as tolerated   - Continue with home Midodrine 10mg q8  - Maintain MAP > 65    New bifascicular block on ECG   - prior 12/2023 with RBBB     #GI  Diet: NPO   - oral meds via PEG tube     Constipation   - Continue home lactulose and senna     #Renal/  - garcia cath in place  - UA with trace LE but no bacteria or nitr  - monitor I/Os    BPH   - Continue with home Tamulosin     Hypernatremia , resolved  - Na 148 -> 140  - Continue to monitor     #ID  Aspiration PNA   - Continue Zosyn and Vancomycin   - F/u blood culture, NGTD 24 hrs  - U/A neg  - F/u MRSA PCR  - RVP negative   - procal 0.15    #Endo  No hx of DM   - Monitor glucose    #Heme   DVT ppx   - Lovenox     #Ethics  Full Code 67 yo M with PMhx of BPH, agitation, ?dementia, dysphagia after CVA now s/p PEG tube, and ?CHF presenting from [facility] for worsening agitation and hypoxia, initially found to have possible L infiltrate on CXR, covered empirically for CAP. Now with near-complete opacification of the left lung field, requiring increasing O2 requirements, and now s/p intubation 1/6 for AHRF.    #Neuro  Agitation.   - c/w home seroquel 225mg BID  - c/w home depakote 125mg at 2pm, 250 BID  - Zyprexa IM for severe agitation when pt refusing oral medications  - c/w home Clonazepam for now given Zyprexa administration  - Consider behavioral health consult after extubation for medication management  - sedated on prop, wean for SBT trial    #Resp  Acute hypoxic respiratory failure  - s/p intubation 1/6 for hypoxia, tachypnea, and AMS despite 15L NRB  -CXR 1/5 with questionable L infiltrate  - CXR 1/6 with new near-complete opacification of the left lung field, suggestive of mucous plug and superimposed atelectasis.  - s/p azithromycin, ceftriaxone --> switched to Zosyn (1/6 - ) and Vanc (1/6 - )   - f/u MRSA swab  - May consider bronch  - sputum culture w/ likely resp lesa; pending speciation  - Duonebs q6  - Chest PT    #CV  Hypotension  - Required pressors after intubation likely 2/2 vasoplegia   - Levophed gtt, wean as tolerated   - Continue with home Midodrine 10mg q8  - Maintain MAP > 65    New bifascicular block on ECG   - prior 12/2023 with RBBB     #GI  Diet: NPO   - oral meds via PEG tube     Constipation   - Continue home lactulose and senna     #Renal/  - garcia cath in place  - UA with trace LE but no bacteria or nitr  - monitor I/Os    BPH   - Continue with home Tamulosin     Hypernatremia , resolved  - Na 148 -> 140  - Continue to monitor     #ID  Aspiration PNA   - Continue Zosyn and Vancomycin   - F/u blood culture, NGTD 24 hrs  - U/A neg  - F/u MRSA PCR  - RVP negative   - procal 0.15    #Endo  No hx of DM   - Monitor glucose    #Heme   DVT ppx   - Lovenox     #Ethics  Full Code

## 2024-01-08 NOTE — DIETITIAN INITIAL EVALUATION ADULT - PROBLEM SELECTOR PLAN 2
Per brother, pt has "breathing problems" at baseline. Per report at NH, pt desatted to 89, was placed on 4 L NC. Pt euvolemic on exam, likely not CHF related. Ddx: URI vs. chronic aspiration from secretions    -CXR 1/5 w/ questionable L infiltrate  -s/p azithromycin, ceftriaxone  -c/w abx for possible pna  -blood cx  -try to transition NRB to NC  -duonebs q6  -chest PT

## 2024-01-08 NOTE — DIETITIAN INITIAL EVALUATION ADULT - REASON FOR ADMISSION
69 yo M W PMHx s/p CVA w dysphagia s/p PEG, agitation.  Presenting from NH for agitation and hypoxia, intubated/sedated (1/6).         67 yo M W PMHx s/p CVA w dysphagia s/p PEG, agitation.  Presenting from NH for agitation and hypoxia, intubated/sedated (1/6).

## 2024-01-08 NOTE — CHART NOTE - NSCHARTNOTEFT_GEN_A_CORE
: Nicanor Linder    INDICATION: Hypoxic Respiratory Failure    PROCEDURE:  [x ] LIMITED ECHO  [x ] LIMITED CHEST  [ ] LIMITED RETROPERITONEAL  [ ] LIMITED ABDOMINAL  [ ] LIMITED DVT  [ ] NEEDLE GUIDANCE VASCULAR  [ ] NEEDLE GUIDANCE THORACENTESIS  [ ] NEEDLE GUIDANCE PARACENTESIS  [ ] NEEDLE GUIDANCE PERICARDIOCENTESIS  [ ] OTHER    FINDINGS: PaccT of 100ms. PV VTi of 12.47cm. Moderate aortic regurgitation. AV VTi 11.07 cm. IVC of 1.84cm.  Densely consolidated left lung with trace pleural effusion. Mobile air bronchograms present. Consolidated right lung seen at the base. B-lines present in b/l lung fields throughout.      INTERPRETATION: Decreased cardiac output with moderate aortic regurgitation. IVC small. Densely consolidated b/l lungs most concerning for infection.

## 2024-01-08 NOTE — DIETITIAN NUTRITION RISK NOTIFICATION - ADDITIONAL COMMENTS/DIETITIAN RECOMMENDATIONS
Please refer to Initial Dietitian Evaluation in documents section for nutritional recommendations. 1/8/2024

## 2024-01-08 NOTE — DIETITIAN INITIAL EVALUATION ADULT - NS FNS DIET ORDER
Diet, NPO with Tube Feed:   Tube Feeding Modality: Gastrostomy  Jevity 1.5 Reid (JEVITY1.5RTH)  Total Volume for 24 Hours (mL): 720  Continuous  Starting Tube Feed Rate {mL per Hour}: 10  Increase Tube Feed Rate by (mL): 10     Every 4 hours  Until Goal Tube Feed Rate (mL per Hour): 30  Tube Feed Duration (in Hours): 24  Tube Feed Start Time: 09:00 (01-08-24 @ 08:48)   Diet, NPO with Tube Feed:   Tube Feeding Modality: Gastrostomy  Jevity 1.5 Reid (JEVITY1.5RTH)  Total Volume for 24 Hours (mL): 720  Continuous  Starting Tube Feed Rate {mL per Hour}: 10  Increase Tube Feed Rate by (mL): 10     Every 4 hours  Until Goal Tube Feed Rate (mL per Hour): 30  Tube Feed Duration (in Hours): 24  Tube Feed Start Time: 09:00 (01-08-24 @ 08:48)    provides:720mL total volume  1080 kcals  46g protein

## 2024-01-08 NOTE — DIETITIAN INITIAL EVALUATION ADULT - PROBLEM SELECTOR PLAN 6
DVT ppx: hold for now given thrombocytopenia. If continues to be stable, can initiate    Diet: pureed diet per prior note, ordered dysphagia screen first    Dispo: [facility] DVT ppx: hold for now given thrombocytopenia. If continues to be stable, can initiate    Diet: pureed diet per prior note, ordered dysphagia screen first    Dispo: [facility]

## 2024-01-08 NOTE — DIETITIAN INITIAL EVALUATION ADULT - FLUID ACCUMULATION
"Patient : Leia Roth Age: 80year old Sex: female   MRN: [id number] Encounter Date: 3/3/2019  Room: O37/O37     History     Chief Complaint   Patient presents with   â¢ Hip Pain   â¢ Abdominal Pain     HPI  6:23 PM Krzysztof Guzmán is a 80year old female who presents to the ED accompanied by her daughter for evaluation of left hip pain which began about a month ago. Her pain radiates into her left upper leg but does not radiate beyond the knee. The pt was evaluated with XRs in the ED last week which showed chronic degenerative changes. She was seen by orthopedic surgery [facility]) and treated with injections. The daughter said ""Orthopedics said the joint should be good. \"" The pt has been using ice packs and heat without relief in her pain. The pt denies recent injury or trauma. The pt reports being more comfortable sitting upright at the side of the bed then lying flat in bed. The pt additionally reports decreased appetite and abd \""aching, like a balloon\"" which began this morning. She has a h/o CKD. The pt denies fever, skin redness, weakness, dizziness, lightheadedness, CP, SOB, or any other associated sx. There are no other modifying factors.     PCP: Regina Ferreira MD    No Known Allergies       Medication List      Prior to Admission Medications      Sig   aspirin 81 MG tablet   81 mg, Oral, DAILY     CALCIUM 600 600 MG Tab  Generic drug:  calcium carbonate   600 mg, Oral, DAILY     lisinopril-hydroCHLOROthiazide 20-12.5 MG per tablet  Commonly known as:  PRINZIDE,ZESTORETIC   1 tablet, Oral, DAILY     Melatonin 3-2 MG Tab   2 mg, Oral, NIGHTLY PRN     Metoprolol Succinate 50 MG Capsule ER 24 Hour Sprinkle   50 mg, Oral, DAILY     polyethylene glycol packet  Commonly known as:  GLYCOLAX, MIRALAX   17 g, Oral, DAILY PRN     traMADol 50 MG tablet  Commonly known as:  ULTRAM   50 mg, Oral, EVERY 4 HOURS PRN            Past Medical History:   Diagnosis Date   â¢ Arthritis    â¢ Bronchitis    â¢ Chronic " pain     lower back pain (takes Advil/ uses heating pad)   â¢ Claustrophobia    â¢ Constipation 2018   â¢ COPD    â¢ Emphysema    â¢ Hyperlipidemia    â¢ Hypertension    â¢ Left breast cysts    â¢ Malignant neoplasm of breast (female), unspecified site 11/08/ 2009    left mod diff infiltrating ductal CA ( 35 sessions radiation done)   â¢ Osteoarthritis gen'l    â¢ Osteoporosis    â¢ Other closed fractures of upper end of humerus 01/01/1996    left humeral head   â¢ Premature ventricular contractions    â¢ Urinary incontinence     stress incont- wears pads       Past Surgical History:   Procedure Laterality Date   â¢ BREAST SURGERY  1972    L breast cyst   â¢ BREAST SURGERY  11/18/2009    re-excision for pos inferior margin   â¢ BREAST SURGERY  11/25/2009    left with SN bx- mod diff infiltrating ductal CA   â¢ COLONOSCOPY DIAGNOSTIC  01/01/2006    Colonoscopy   â¢ EYE SURGERY  2002    cataract   â¢ EYE SURGERY Right 2007    cataract   â¢ FRACTURE SURGERY Left 1996    humeral head fx (fell snowblowing)   â¢ JOINT REPLACEMENT Left 11/2011    TKR   â¢ REMOVAL GALLBLADDER  09/01/2000    Cholecystectomy   â¢ SHOULDER SURG PROC UNLISTED      Unspecified shoulder procedure   â¢ TISSUE ABLATION RECONSTRUCTION ATRIA LMITED  01/01/1996    for V-tach       Family History   Problem Relation Age of Onset   â¢ Diabetes Mother    â¢ Stroke Mother    â¢ Cancer Father         Bladder and liver   â¢ Stroke Father    â¢ COPD Sister         on home 02   â¢ Cancer Other         Cousin-breast   â¢ Cancer Other         second cousins x 2- stomach       Social History     Tobacco Use   â¢ Smoking status: Never Smoker   â¢ Smokeless tobacco: Never Used   Substance Use Topics   â¢ Alcohol use: Yes     Alcohol/week: 0.6 oz     Types: 1 Glasses of wine per week     Comment: Social   â¢ Drug use: No       Review of Systems   Constitutional: Positive for appetite change (decreased). Negative for chills and fever. HENT: Negative for congestion, rhinorrhea and trouble swallowing. "   Eyes: Negative for pain and visual disturbance. Respiratory: Negative for cough, chest tightness and shortness of breath. Cardiovascular: Negative for chest pain, palpitations and leg swelling. Gastrointestinal: Positive for abdominal pain (""aching\""). Negative for constipation, diarrhea, nausea and vomiting. Genitourinary: Negative for difficulty urinating, dysuria, frequency, hematuria, urgency, vaginal bleeding and vaginal discharge. Musculoskeletal: Positive for arthralgias (left hip pain). Negative for back pain, neck pain and neck stiffness. Skin: Negative for rash. Neurological: Negative for dizziness, weakness, light-headedness, numbness and headaches. All other systems reviewed and are negative. Physical Exam     ED Triage Vitals [03/03/19 1801]   ED Triage Vitals Group      Temp 97.7 Â°F (36.5 Â°C)      Pulse 64      Resp 18      /67      SpO2 97 %      EtCO2 mmHg       Height       Weight       Weight Scale Used        Physical Exam   Constitutional: She is oriented to person, place, and time. She appears well-developed and well-nourished. No distress. The pt is able to ambulate with her walker. HENT:   Head: Normocephalic and atraumatic. Eyes: Conjunctivae and EOM are normal. Right eye exhibits no discharge. Left eye exhibits no discharge. Neck: Normal range of motion. Neck supple. Cardiovascular: Normal rate, regular rhythm, normal heart sounds and intact distal pulses. Exam reveals no gallop and no friction rub. No murmur heard. Pulses:       Radial pulses are 2+ on the right side, and 2+ on the left side. Dorsalis pedis pulses are 2+ on the right side, and 2+ on the left side. Posterior tibial pulses are 2+ on the right side, and 2+ on the left side. Pulmonary/Chest: Effort normal and breath sounds normal. No stridor. No respiratory distress. She has no wheezes. She has no rales. She exhibits no tenderness. Abdominal: Soft.  Bowel sounds are " normal. She exhibits no distension and no mass. There is no tenderness. There is no rebound and no guarding. Musculoskeletal: Normal range of motion. She exhibits no edema or tenderness. The pt has full range of motion of the left hip without pain. She has pain with palpation over the left lateral hip. No lumbar spinal tenderness. Neurological: She is alert and oriented to person, place, and time. GCS eye subscore is 4. GCS verbal subscore is 5. GCS motor subscore is 6. Face symmetric. Moves all extremities. Skin: Skin is warm and dry. No rash noted. Psychiatric: She has a normal mood and affect. Nursing note and vitals reviewed.       Procedures    Lab Results     Results for orders placed or performed during the hospital encounter of 03/03/19   Lipase Level   Result Value Ref Range    Lipase 466 (H) 73 - 393 Units/L   Comprehensive Metabolic Panel   Result Value Ref Range    Sodium 132 (L) 135 - 145 mmol/L    Potassium 4.3 3.4 - 5.1 mmol/L    Chloride 97 (L) 98 - 107 mmol/L    Carbon Dioxide 27 21 - 32 mmol/L    Anion Gap 12 10 - 20 mmol/L    Glucose 101 (H) 65 - 99 mg/dL    BUN 29 (H) 6 - 20 mg/dL    Creatinine 1.02 (H) 0.51 - 0.95 mg/dL    GFR Estimate,  57     GFR Estimate, Non African American 49     BUN/Creatinine Ratio 28 (H) 7 - 25    CALCIUM 9.0 8.4 - 10.2 mg/dL    TOTAL BILIRUBIN 0.6 0.2 - 1.0 mg/dL    AST/SGOT 22 <38 Units/L    ALT/SGPT 39 <79 Units/L    ALK PHOSPHATASE 73 45 - 117 Units/L    TOTAL PROTEIN 7.8 6.4 - 8.2 g/dL    Albumin 3.8 3.6 - 5.1 g/dL    GLOBULIN 4.0 2.0 - 4.0 g/dL    A/G Ratio, Serum 1.0 1.0 - 2.4   CBC & Auto Differential   Result Value Ref Range    WBC 10.3 4.2 - 11.0 K/mcL    RBC 4.39 4.00 - 5.20 mil/mcL    HGB 13.3 12.0 - 15.5 g/dL    HCT 40.1 36.0 - 46.5 %    MCV 91.3 78.0 - 100.0 fl    MCH 30.3 26.0 - 34.0 pg    MCHC 33.2 32.0 - 36.5 g/dL    RDW-CV 13.2 11.0 - 15.0 %     140 - 450 K/mcL    NRBC 0 0 /100 WBC    DIFF TYPE AUTOMATED DIFFERENTIAL     Neutrophil 74 %    LYMPH 16 %    MONO 9 %    EOSIN 1 %    BASO 0 %    Percent Immature Granuloctyes 0 %    Absolute Neutrophil 7.6 1.8 - 7.7 K/mcL    Absolute Lymph 1.7 1.0 - 4.0 K/mcL    Absolute Mono 0.9 0.3 - 0.9 K/mcL    Absolute Eos 0.1 0.1 - 0.5 K/mcL    Absolute Baso 0.0 0.0 - 0.3 K/mcL    Absolute Immature Granulocytes 0.0 0 - 0.2 K/mcl   C Reactive Protein   Result Value Ref Range    C-REACTIVE PROTEIN <0.3 <1.0 mg/dL   Sedimentation Rate Westergren   Result Value Ref Range    ESR 24 (H) 0 - 20 mm/hr       EKG Results     EKG Interpretation  Time: 6:23 PM  Rate: 70 BPM  Rhythm: normal sinus rhythm   Abnormality: Nonspecific ST abnormality. When compared with the EKG from 11/26/18, PACs no longer present. QRS duration has decreased. Nonspecific T wave abnormality now evident in the lateral leads.     EKG interpreted by ED physician    Radiology Results     Imaging Results    None         ED Medication Orders (From admission, onward)    Start Ordered     Status Ordering Provider    03/03/19 2113 03/03/19 2112  fentaNYL (SUBLIMAZE) injection 50 mcg  ONCE      Ordered REGULO CARRERO    03/03/19 2022 03/03/19 2021  acetaminophen (TYLENOL) tablet 1,000 mg  ONCE      Last MAR action:  Given Peacete Bibi    03/03/19 1947 03/03/19 1946  fentaNYL (SUBLIMAZE) injection 25 mcg  ONCE      Last MAR action:  Given Peacete Bibi    03/03/19 1938 03/03/19 1937  fentaNYL (SUBLIMAZE) injection 25 mcg  ONCE      Last MAR action:  Given Antonkishate Bibi    03/03/19 1905 03/03/19 1904  fentaNYL (SUBLIMAZE) injection 25 mcg  ONCE      Last MAR action:  Given Antoniette Bibi    03/03/19 1808 03/03/19 1808  morphine injection 4 mg  EVERY 15 MIN PRN      Last MAR action:  Held Colby LANDIN               [facility]    Vitals:    03/03/19 1930 03/03/19 1931 03/03/19 2001 03/03/19 2030   BP: 107/58  125/66 122/66   Pulse:  67 71 70   Resp:  18 14 16   Temp:       TempSrc:       SpO2:  94% 95% 97%         ED Course     6:30 PM Initial Assessment:  I completed the initial evaluation of the pt. I updated the pt on the plan for evaluation with basic labs. I will treat the pt with morphine. The pt is agreeable with the plan for ED evaluation and treatment. 8:51 PM Consultation:  I spoke with Dr. Talat Ortega, hospitalist, regarding the patient's chronic pain problems, clinical presentation, and the pt's continued pain. We further discussed and collaboratively agreed on the plan of care. 9:05 PM Patient Recheck:  I rechecked the pt who is sitting upright in a chair at bedside. She reports continued pain. I updated the pt on the plan for further evaluation and treatment beyond the ED. The pt and her daughter understand and agree with the plan of care. All questions answered. [facility]     Critical Care time spent on this patient outside of billable procedures:  None    Clinical Impression  ED Diagnoses        Final diagnoses    Left hip pain          Intractable pain          Gait instability                Pt to be admitted to Dr. Talat Ortega in stable condition. I have reviewed the information recorded by the scribe for accuracy and agree with its contents.   Granville Cheadle acting as scribe for Dr. Marvel Galarza    Physician: Marvel Galarza MD  Physician #: [id number]  Scribe: Diane Carbajal MD  03/03/19 9343 1+ generalized edema

## 2024-01-08 NOTE — DIETITIAN INITIAL EVALUATION ADULT - PROBLEM SELECTOR PLAN 1
Presents from nursing home for worsening agitation and aggression to staff. Unclear if encephalopathy iso URI? vs. worsening dementia vs. mood disorder      - QTc 472 on admission, cont monitoring EKGs for QTc after antipsychotics  - c/w home seroquel 225mg BID  - c/w home depakote 125mg at 2pm, 250 BID  - Zyprexa IM for severe agitation when pt refusing oral medications  - c/w home Clonazepam for now given Zyprexa administration  - pending UA  - consider D5 for mild hypernatremia 148. Encourage po intake for now  - consider Behavioral health c/s in AM for worsening agitation and medication management

## 2024-01-08 NOTE — DIETITIAN INITIAL EVALUATION ADULT - OTHER INFO
Pt. intubated.  Sedation being weaned.  Spoke w RN and residents physician.  Tube feedings have not started, as of yet.   Pt. intubated.  Sedation being weaned.  Spoke w RN and resident physician.  Tube feedings have not started, as of yet.

## 2024-01-08 NOTE — DIETITIAN INITIAL EVALUATION ADULT - ENTERAL
--- Jevity 1.5 starting  @ 10mL/hr then increase, as tolerated, by 10mL q 4hrs to goal of 50ml/hr x 24hrs.  provides:  1200mL total volume  1800 kcals  77g protein

## 2024-01-08 NOTE — PROGRESS NOTE ADULT - SUBJECTIVE AND OBJECTIVE BOX
Raman Mendez  PGY-2 | Internal Medicine      INTERVAL HPI/OVERNIGHT EVENTS:    SUBJECTIVE: Patient seen and examined at bedside.     CONSTITUTIONAL: No weakness, fevers or chills  EYES/ENT: No visual changes;  No vertigo or throat pain   NECK: No pain or stiffness  RESPIRATORY: No cough, wheezing, hemoptysis; No shortness of breath  CARDIOVASCULAR: No chest pain or palpitations  GASTROINTESTINAL: No abdominal or epigastric pain. No nausea, vomiting, or hematemesis; No diarrhea or constipation. No melena or hematochezia.  GENITOURINARY: No dysuria, frequency or hematuria  NEUROLOGICAL: No numbness or weakness  SKIN: No itching, rashes    OBJECTIVE:    VITAL SIGNS:  ICU Vital Signs Last 24 Hrs  T(C): 37.6 (08 Jan 2024 04:00), Max: 37.9 (07 Jan 2024 20:00)  T(F): 99.7 (08 Jan 2024 04:00), Max: 100.2 (07 Jan 2024 20:00)  HR: 75 (08 Jan 2024 08:00) (75 - 117)  BP: 119/59 (08 Jan 2024 08:00) (88/45 - 161/79)  BP(mean): 72 (08 Jan 2024 08:00) (55 - 98)  ABP: --  ABP(mean): --  RR: 14 (08 Jan 2024 08:00) (12 - 27)  SpO2: 98% (08 Jan 2024 08:00) (92% - 100%)    O2 Parameters below as of 08 Jan 2024 08:00  Patient On (Oxygen Delivery Method): ventilator    O2 Concentration (%): 40      Mode: AC/ CMV (Assist Control/ Continuous Mandatory Ventilation), RR (machine): 12, TV (machine): 480, FiO2: 40, PEEP: 5, PS: 5, ITime: 1.1, MAP: 9, PIP: 23    01-07 @ 07:01  -  01-08 @ 07:00  --------------------------------------------------------  IN: 1622.2 mL / OUT: 1780 mL / NET: -157.8 mL    01-08 @ 07:01 - 01-08 @ 08:22  --------------------------------------------------------  IN: 59.4 mL / OUT: 275 mL / NET: -215.6 mL      CAPILLARY BLOOD GLUCOSE          PHYSICAL EXAM:    General: NAD  HEENT: NC/AT; PERRL, clear conjunctiva  Neck: supple  Respiratory: CTA b/l  Cardiovascular: +S1/S2; RRR  Abdomen: soft, NT/ND; +BS x4  Extremities: WWP, 2+ peripheral pulses b/l; no LE edema  Skin: normal color and turgor; no rash  Neurological:    MEDICATIONS:  MEDICATIONS  (STANDING):  albuterol/ipratropium for Nebulization 3 milliLiter(s) Nebulizer every 6 hours  artificial  tears Solution 1 Drop(s) Both EYES two times a day  chlorhexidine 0.12% Liquid 15 milliLiter(s) Oral Mucosa every 12 hours  chlorhexidine 2% Cloths 1 Application(s) Topical daily  clonazePAM  Tablet 1 milliGRAM(s) Oral at bedtime  dexMEDEtomidine Infusion 0.5 MICROgram(s)/kG/Hr (8.38 mL/Hr) IV Continuous <Continuous>  enoxaparin Injectable 40 milliGRAM(s) SubCutaneous every 24 hours  lactulose Syrup 20 Gram(s) Oral two times a day  midodrine. 10 milliGRAM(s) Oral <User Schedule>  norepinephrine Infusion 0.5 MICROgram(s)/kG/Min (31.4 mL/Hr) IV Continuous <Continuous>  piperacillin/tazobactam IVPB.. 3.375 Gram(s) IV Intermittent every 8 hours  QUEtiapine 225 milliGRAM(s) Oral <User Schedule>  senna 1 Tablet(s) Oral every 12 hours  tamsulosin 0.4 milliGRAM(s) Oral at bedtime  valproic  acid Syrup 250 milliGRAM(s) Oral <User Schedule>  valproic  acid Syrup 125 milliGRAM(s) Oral <User Schedule>  vancomycin  IVPB 1250 milliGRAM(s) IV Intermittent every 12 hours    MEDICATIONS  (PRN):  acetaminophen     Tablet .. 650 milliGRAM(s) Oral every 6 hours PRN Moderate Pain (4 - 6)  OLANZapine Injectable 2.5 milliGRAM(s) IntraMuscular every 6 hours PRN agitation  sodium chloride 0.9% lock flush 10 milliLiter(s) IV Push every 1 hour PRN Pre/post blood products, medications, blood draw, and to maintain line patency      ALLERGIES:  Allergies    No Known Allergies    Intolerances        LABS:                        11.6   18.40 )-----------( 115      ( 08 Jan 2024 00:30 )             34.8     01-08    140  |  101  |  8   ----------------------------<  117<H>  4.0   |  31  |  0.64    Ca    8.0<L>      08 Jan 2024 00:30  Phos  2.9     01-08  Mg     1.90     01-08    TPro  6.2  /  Alb  2.7<L>  /  TBili  0.5  /  DBili  x   /  AST  10  /  ALT  <5  /  AlkPhos  61  01-08      Urinalysis Basic - ( 08 Jan 2024 00:30 )    Color: x / Appearance: x / SG: x / pH: x  Gluc: 117 mg/dL / Ketone: x  / Bili: x / Urobili: x   Blood: x / Protein: x / Nitrite: x   Leuk Esterase: x / RBC: x / WBC x   Sq Epi: x / Non Sq Epi: x / Bacteria: x        RADIOLOGY & ADDITIONAL TESTS: Reviewed. Raman Mendez  PGY-3 | Internal Medicine      INTERVAL HPI/OVERNIGHT EVENTS: No acute events O/N.    SUBJECTIVE: Patient seen and examined at bedside. Patient is intubated and sedated    ROS: Unable to assess     OBJECTIVE:    VITAL SIGNS:  ICU Vital Signs Last 24 Hrs  T(C): 37.6 (08 Jan 2024 04:00), Max: 37.9 (07 Jan 2024 20:00)  T(F): 99.7 (08 Jan 2024 04:00), Max: 100.2 (07 Jan 2024 20:00)  HR: 75 (08 Jan 2024 08:00) (75 - 117)  BP: 119/59 (08 Jan 2024 08:00) (88/45 - 161/79)  BP(mean): 72 (08 Jan 2024 08:00) (55 - 98)  ABP: --  ABP(mean): --  RR: 14 (08 Jan 2024 08:00) (12 - 27)  SpO2: 98% (08 Jan 2024 08:00) (92% - 100%)    O2 Parameters below as of 08 Jan 2024 08:00  Patient On (Oxygen Delivery Method): ventilator    O2 Concentration (%): 40      Mode: AC/ CMV (Assist Control/ Continuous Mandatory Ventilation), RR (machine): 12, TV (machine): 480, FiO2: 40, PEEP: 5, PS: 5, ITime: 1.1, MAP: 9, PIP: 23    01-07 @ 07:01 - 01-08 @ 07:00  --------------------------------------------------------  IN: 1622.2 mL / OUT: 1780 mL / NET: -157.8 mL    01-08 @ 07:01 - 01-08 @ 08:22  --------------------------------------------------------  IN: 59.4 mL / OUT: 275 mL / NET: -215.6 mL      CAPILLARY BLOOD GLUCOSE          PHYSICAL EXAM:    General: NAD  HEENT: NC/AT; PERRL, clear conjunctiva  Neck: supple  Respiratory: CTA b/l  Cardiovascular: +S1/S2; RRR  Abdomen: soft, NT/ND; +BS x4  Extremities: WWP, 2+ peripheral pulses b/l; no LE edema  Skin: normal color and turgor; no rash  Neurological:    MEDICATIONS:  MEDICATIONS  (STANDING):  albuterol/ipratropium for Nebulization 3 milliLiter(s) Nebulizer every 6 hours  artificial  tears Solution 1 Drop(s) Both EYES two times a day  chlorhexidine 0.12% Liquid 15 milliLiter(s) Oral Mucosa every 12 hours  chlorhexidine 2% Cloths 1 Application(s) Topical daily  clonazePAM  Tablet 1 milliGRAM(s) Oral at bedtime  dexMEDEtomidine Infusion 0.5 MICROgram(s)/kG/Hr (8.38 mL/Hr) IV Continuous <Continuous>  enoxaparin Injectable 40 milliGRAM(s) SubCutaneous every 24 hours  lactulose Syrup 20 Gram(s) Oral two times a day  midodrine. 10 milliGRAM(s) Oral <User Schedule>  norepinephrine Infusion 0.5 MICROgram(s)/kG/Min (31.4 mL/Hr) IV Continuous <Continuous>  piperacillin/tazobactam IVPB.. 3.375 Gram(s) IV Intermittent every 8 hours  QUEtiapine 225 milliGRAM(s) Oral <User Schedule>  senna 1 Tablet(s) Oral every 12 hours  tamsulosin 0.4 milliGRAM(s) Oral at bedtime  valproic  acid Syrup 250 milliGRAM(s) Oral <User Schedule>  valproic  acid Syrup 125 milliGRAM(s) Oral <User Schedule>  vancomycin  IVPB 1250 milliGRAM(s) IV Intermittent every 12 hours    MEDICATIONS  (PRN):  acetaminophen     Tablet .. 650 milliGRAM(s) Oral every 6 hours PRN Moderate Pain (4 - 6)  OLANZapine Injectable 2.5 milliGRAM(s) IntraMuscular every 6 hours PRN agitation  sodium chloride 0.9% lock flush 10 milliLiter(s) IV Push every 1 hour PRN Pre/post blood products, medications, blood draw, and to maintain line patency      ALLERGIES:  Allergies    No Known Allergies    Intolerances        LABS:                        11.6   18.40 )-----------( 115      ( 08 Jan 2024 00:30 )             34.8     01-08    140  |  101  |  8   ----------------------------<  117<H>  4.0   |  31  |  0.64    Ca    8.0<L>      08 Jan 2024 00:30  Phos  2.9     01-08  Mg     1.90     01-08    TPro  6.2  /  Alb  2.7<L>  /  TBili  0.5  /  DBili  x   /  AST  10  /  ALT  <5  /  AlkPhos  61  01-08      Urinalysis Basic - ( 08 Jan 2024 00:30 )    Color: x / Appearance: x / SG: x / pH: x  Gluc: 117 mg/dL / Ketone: x  / Bili: x / Urobili: x   Blood: x / Protein: x / Nitrite: x   Leuk Esterase: x / RBC: x / WBC x   Sq Epi: x / Non Sq Epi: x / Bacteria: x        RADIOLOGY & ADDITIONAL TESTS: Reviewed.

## 2024-01-09 NOTE — PROGRESS NOTE ADULT - ASSESSMENT
67 yo M with PMhx of BPH, agitation, ?dementia, dysphagia after CVA now s/p PEG tube, and ?CHF presenting from [facility] for worsening agitation and hypoxia, initially found to have possible L infiltrate on CXR, covered empirically for CAP. Now with near-complete opacification of the left lung field, requiring increasing O2 requirements, and now s/p intubation 1/6 for AHRF.    #Neuro  Agitation.   - c/w home seroquel 225mg BID  - c/w home depakote 125mg at 2pm, 250 BID  - Zyprexa IM for severe agitation when pt refusing oral medications  - c/w home Clonazepam for now given Zyprexa administration  - Consider behavioral health consult after extubation for medication management  - on precedex, wean as tolerated    #Resp  Acute hypoxic respiratory failure  - s/p intubation 1/6 for hypoxia, tachypnea, and AMS despite 15L NRB  -CXR 1/5 with questionable L infiltrate  - CXR 1/6 with new near-complete opacification of the left lung field, suggestive of mucous plug and superimposed atelectasis.   - Repeat 1/6 post extubation with improvement.  - s/p azithromycin, ceftriaxone --> switched to Zosyn (1/6 - ) and Vanc (1/6 - )   - MRSA +  - sputum culture w/ likely resp lesa; ET tube Cx + MRSA  - Duonebs q6 + inhalation  - Chest PT  - IPV  - SBT trial as tolerated    #CV  Hypotension  - Required pressors after intubation likely 2/2 vasoplegia   - Levophed gtt, wean as tolerated   - Continue with home Midodrine 10mg q8  - Maintain MAP > 65    New bifascicular block on ECG   - prior 12/2023 with RBBB     #GI  Diet: NPO w/ tube feeds  - oral meds via PEG tube     Constipation   - Continue home lactulose and senna     #Renal/  - garcia cath in place; TOV today  - UA with trace LE but no bacteria or nitr  - monitor I/Os    BPH   - Continue with home Tamulosin     Hypernatremia , resolved  - Na 148 -> 140  - Continue to monitor     #ID  Aspiration PNA   - Continue Zosyn and Vancomycin as above  - F/u blood culture, NGTD 24 hrs  - U/A neg  - MRSA PCR+; ETT MRSA+  - RVP negative   - procal 0.15    #Endo  No hx of DM   - Monitor glucose    #Heme   DVT ppx   - Lovenox     #Ethics  Full Code 67 yo M with PMhx of BPH, agitation, ?dementia, dysphagia after CVA now s/p PEG tube, and ?CHF presenting from [facility] for worsening agitation and hypoxia, initially found to have possible L infiltrate on CXR, covered empirically for CAP. Now with near-complete opacification of the left lung field, requiring increasing O2 requirements, and now s/p intubation 1/6 for AHRF.    #Neuro  Agitation.   - c/w home seroquel 225mg BID  - c/w home depakote 125mg at 2pm, 250 BID  - Zyprexa IM for severe agitation when pt refusing oral medications  - c/w home Clonazepam for now given Zyprexa administration  - Consider behavioral health consult after extubation for medication management  - on precedex, wean as tolerated    #Resp  Acute hypoxic respiratory failure  - s/p intubation 1/6 for hypoxia, tachypnea, and AMS despite 15L NRB  -CXR 1/5 with questionable L infiltrate  - CXR 1/6 with new near-complete opacification of the left lung field, suggestive of mucous plug and superimposed atelectasis.   - Repeat 1/6 post extubation with improvement.  - s/p azithromycin, ceftriaxone --> switched to Zosyn (1/6 - ) and Vanc (1/6 - )   - MRSA +  - sputum culture w/ likely resp lesa; ET tube Cx + MRSA  - Duonebs q6 + inhalation  - Chest PT  - IPV  - SBT trial as tolerated    #CV  Hypotension  - Required pressors after intubation likely 2/2 vasoplegia   - Levophed gtt, wean as tolerated   - Continue with home Midodrine 10mg q8  - Maintain MAP > 65    New bifascicular block on ECG   - prior 12/2023 with RBBB     #GI  Diet: NPO w/ tube feeds  - oral meds via PEG tube     Constipation   - Continue home lactulose and senna     #Renal/  - garcia cath in place; TOV today  - UA with trace LE but no bacteria or nitr  - monitor I/Os    BPH   - Continue with home Tamulosin     Hypernatremia , resolved  - Na 148 -> 140  - Continue to monitor     #ID  Aspiration PNA   - Continue Zosyn and Vancomycin as above  - F/u blood culture, NGTD 24 hrs  - U/A neg  - MRSA PCR+; ETT MRSA+  - RVP negative   - procal 0.15    #Endo  No hx of DM   - Monitor glucose    #Heme   DVT ppx   - Lovenox     #Ethics  Full Code

## 2024-01-09 NOTE — PROGRESS NOTE ADULT - SUBJECTIVE AND OBJECTIVE BOX
Raman Mendez  PGY-3 | Internal Medicine      INTERVAL HPI/OVERNIGHT EVENTS: JESÚS    SUBJECTIVE: Patient seen and examined at bedside. Patient is intubated and sedated      ROS: Unable to assess      OBJECTIVE:    VITAL SIGNS:  ICU Vital Signs Last 24 Hrs  T(C): 37.1 (09 Jan 2024 04:00), Max: 37.9 (09 Jan 2024 00:00)  T(F): 98.8 (09 Jan 2024 04:00), Max: 100.2 (09 Jan 2024 00:00)  HR: 71 (09 Jan 2024 06:00) (64 - 104)  BP: 108/53 (09 Jan 2024 06:00) (81/39 - 145/77)  BP(mean): 66 (09 Jan 2024 06:00) (48 - 92)  ABP: --  ABP(mean): --  RR: 24 (09 Jan 2024 06:00) (12 - 31)  SpO2: 97% (09 Jan 2024 06:00) (93% - 100%)    O2 Parameters below as of 09 Jan 2024 06:00  Patient On (Oxygen Delivery Method): ventilator, PS/CPAP    O2 Concentration (%): 35      Mode: CPAP with PS, FiO2: 35, PEEP: 5, PS: 5, MAP: 8, PIP: 11    01-08 @ 07:01  -  01-09 @ 07:00  --------------------------------------------------------  IN: 3256.1 mL / OUT: 3205 mL / NET: 51.1 mL      CAPILLARY BLOOD GLUCOSE          PHYSICAL EXAM:    General: NAD  HEENT: NC/AT; PERRL, clear conjunctiva  Neck: supple  Respiratory: CTA b/l  Cardiovascular: +S1/S2; RRR  Abdomen: soft, NT/ND; +BS x4  Extremities: WWP, 2+ peripheral pulses b/l; no LE edema  Skin: normal color and turgor; no rash  Neurological: pupils equal and reactive airline. Responds to sternal rub.    MEDICATIONS:  MEDICATIONS  (STANDING):  albuterol/ipratropium for Nebulization 3 milliLiter(s) Nebulizer every 6 hours  artificial  tears Solution 1 Drop(s) Both EYES two times a day  chlorhexidine 0.12% Liquid 15 milliLiter(s) Oral Mucosa every 12 hours  chlorhexidine 2% Cloths 1 Application(s) Topical daily  clonazePAM  Tablet 1 milliGRAM(s) Oral at bedtime  dexMEDEtomidine Infusion 0.5 MICROgram(s)/kG/Hr (8.38 mL/Hr) IV Continuous <Continuous>  enoxaparin Injectable 40 milliGRAM(s) SubCutaneous every 24 hours  lactulose Syrup 20 Gram(s) Oral two times a day  midodrine. 10 milliGRAM(s) Oral <User Schedule>  norepinephrine Infusion 0.5 MICROgram(s)/kG/Min (31.4 mL/Hr) IV Continuous <Continuous>  piperacillin/tazobactam IVPB.. 3.375 Gram(s) IV Intermittent every 8 hours  QUEtiapine 225 milliGRAM(s) Oral <User Schedule>  senna 1 Tablet(s) Oral every 12 hours  sodium chloride 3%  Inhalation 4 milliLiter(s) Inhalation every 12 hours  tamsulosin 0.4 milliGRAM(s) Oral at bedtime  valproic  acid Syrup 250 milliGRAM(s) Oral <User Schedule>  valproic  acid Syrup 125 milliGRAM(s) Oral <User Schedule>  vancomycin  IVPB 1250 milliGRAM(s) IV Intermittent every 12 hours    MEDICATIONS  (PRN):  acetaminophen     Tablet .. 650 milliGRAM(s) Oral every 6 hours PRN Moderate Pain (4 - 6)  OLANZapine Injectable 2.5 milliGRAM(s) IntraMuscular every 6 hours PRN agitation  sodium chloride 0.9% lock flush 10 milliLiter(s) IV Push every 1 hour PRN Pre/post blood products, medications, blood draw, and to maintain line patency      ALLERGIES:  Allergies    No Known Allergies    Intolerances        LABS:                        11.8   15.42 )-----------( 114      ( 09 Jan 2024 00:15 )             35.4     01-09    143  |  103  |  8   ----------------------------<  125<H>  3.9   |  32<H>  |  0.63    Ca    8.3<L>      09 Jan 2024 00:15  Phos  2.9     01-09  Mg     2.20     01-09    TPro  6.4  /  Alb  2.7<L>  /  TBili  0.5  /  DBili  x   /  AST  10  /  ALT  <5  /  AlkPhos  61  01-09    PT/INR - ( 09 Jan 2024 00:15 )   PT: 14.2 sec;   INR: 1.28 ratio         PTT - ( 09 Jan 2024 00:15 )  PTT:35.1 sec  Urinalysis Basic - ( 09 Jan 2024 00:15 )    Color: x / Appearance: x / SG: x / pH: x  Gluc: 125 mg/dL / Ketone: x  / Bili: x / Urobili: x   Blood: x / Protein: x / Nitrite: x   Leuk Esterase: x / RBC: x / WBC x   Sq Epi: x / Non Sq Epi: x / Bacteria: x        RADIOLOGY & ADDITIONAL TESTS: Reviewed.

## 2024-01-10 NOTE — PROGRESS NOTE ADULT - ASSESSMENT
67 yo M with PMhx of BPH, agitation, ?dementia, dysphagia after CVA now s/p PEG tube, and ?CHF presenting from [facility] for worsening agitation and hypoxia, initially found to have possible L infiltrate on CXR, covered empirically for CAP. Now with near-complete opacification of the left lung field, requiring increasing O2 requirements, and now s/p intubation 1/6 for AHRF.    #Neuro  Agitation.   - c/w home seroquel 225mg BID  - c/w home depakote 125mg at 2pm, 250 BID  - Zyprexa IM for severe agitation when pt refusing oral medications  - c/w home Clonazepam for now given Zyprexa administration  - Consider behavioral health consult after extubation for medication management  - on precedex, wean as tolerated    #Resp  Acute hypoxic respiratory failure  - s/p intubation 1/6 for hypoxia, tachypnea, and AMS despite 15L NRB  -CXR 1/5 with questionable L infiltrate  - CXR 1/6 with new near-complete opacification of the left lung field, suggestive of mucous plug and superimposed atelectasis.   - Repeat 1/6 post extubation with improvement.  - s/p azithromycin, ceftriaxone --> switched to Zosyn (1/6 - 1/9), now on Vanc (1/6 - )   - ET tube Cx + MRSA  - Duonebs q6 + inhalation  - Chest PT  - IPV  - SBT trial as tolerated    #CV  Hypotension  - Required pressors after intubation likely 2/2 vasoplegia   - Levophed gtt, wean as tolerated   - Continue with home Midodrine 20mg q8  - Maintain MAP > 65    New bifascicular block on ECG   - prior 12/2023 with RBBB     #GI  Diet: NPO w/ tube feeds  - oral meds via PEG tube     Constipation   - Continue home lactulose and senna     #Renal/  - garcia cath in place; TOV today  - UA with trace LE but no bacteria or nitr  - monitor I/Os    BPH   - Continue with home Tamsulosin     #ID  Aspiration PNA   - Continue Zosyn and Vancomycin as above  - F/u blood culture, NGTD 24 hrs  - U/A neg  - MRSA PCR+; ETT MRSA+  - RVP negative   - procal 0.15    #Endo  No hx of DM   - Monitor glucose    #Heme   DVT ppx   - Lovenox     #Ethics  Full Code 67 yo M with PMhx of BPH, agitation, ?dementia, dysphagia after CVA now s/p PEG tube, and ?CHF presenting from [facility] for worsening agitation and hypoxia, initially found to have possible L infiltrate on CXR, covered empirically for CAP. Now with near-complete opacification of the left lung field, requiring increasing O2 requirements, and now s/p intubation 1/6 for AHRF.    #Neuro  Agitation.   - c/w home seroquel 225mg BID  - c/w home depakote 125mg at 2pm, 250 BID  - Zyprexa IM for severe agitation when pt refusing oral medications  - c/w home Clonazepam for now given Zyprexa administration  - Consider behavioral health consult after extubation for medication management  - on precedex, wean as tolerated    #Resp  Acute hypoxic respiratory failure  - s/p intubation 1/6 for hypoxia, tachypnea, and AMS despite 15L NRB  -CXR 1/5 with questionable L infiltrate  - CXR 1/6 with new near-complete opacification of the left lung field, suggestive of mucous plug and superimposed atelectasis.   - Repeat 1/6 post extubation with improvement.  - s/p azithromycin, ceftriaxone --> switched to Zosyn (1/6 - 1/9), now on Vanc (1/6 - )   - ET tube Cx + MRSA  - Duonebs q6 + inhalation  - Chest PT  - IPV  - SBT trial as tolerated    #CV  Hypotension  - Required pressors after intubation likely 2/2 vasoplegia   - Levophed gtt, wean as tolerated   - Continue with home Midodrine 20mg q8  - Maintain MAP > 65    New bifascicular block on ECG   - prior 12/2023 with RBBB     #GI  Diet: NPO w/ tube feeds  - oral meds via PEG tube     Constipation   - Continue home lactulose and senna     #Renal/  - garcia cath in place; TOV today  - UA with trace LE but no bacteria or nitr  - monitor I/Os    BPH   - Continue with home Tamsulosin     #ID  Aspiration PNA   - Continue Zosyn and Vancomycin as above  - F/u blood culture, NGTD 24 hrs  - U/A neg  - MRSA PCR+; ETT MRSA+  - RVP negative   - procal 0.15    #Endo  No hx of DM   - Monitor glucose    #Heme   DVT ppx   - Lovenox     #Ethics  Full Code

## 2024-01-10 NOTE — PROGRESS NOTE ADULT - SUBJECTIVE AND OBJECTIVE BOX
Ramansherly Mendez  PGY-3 | Internal Medicine      INTERVAL HPI/OVERNIGHT EVENTS: No acute events O/N.    SUBJECTIVE: Patient seen and examined at bedside. No acute events O/N.    No acute events O/N.     OBJECTIVE:    VITAL SIGNS:  ICU Vital Signs Last 24 Hrs  T(C): 36.9 (10 [place] 2024 04:00), Max: 37.8 (09 Jan 2024 12:00)  T(F): 98.4 (10 [place] 2024 04:00), Max: 100.1 (09 Jan 2024 20:00)  HR: 62 (10 [place] 2024 07:17) (58 - 108)  BP: 139/75 (10 [place] 2024 07:00) (87/64 - 139/75)  BP(mean): 87 (10 [place] 2024 07:00) (62 - 90)  ABP: --  ABP(mean): --  RR: 12 (10 [place] 2024 07:00) (12 - 34)  SpO2: 99% (10 [place] 2024 07:17) (92% - 100%)    O2 Parameters below as of 10 [place] 2024 04:00  Patient On (Oxygen Delivery Method): ventilator    O2 Concentration (%): 40      Mode: AC/ CMV (Assist Control/ Continuous Mandatory Ventilation), RR (machine): 12, TV (machine): 480, FiO2: 40, PEEP: 5, ITime: 0.96, MAP: 9, PIP: 21    01-09 @ 07:01  -  01-10 @ 07:00  --------------------------------------------------------  IN: 2244.3 mL / OUT: 1475 mL / NET: 769.3 mL      CAPILLARY BLOOD GLUCOSE          PHYSICAL EXAM:    General: NAD  HEENT: NC/AT; PERRL, clear conjunctiva  Neck: supple  Respiratory: CTA b/l  Cardiovascular: +S1/S2; RRR  Abdomen: soft, NT/ND; +BS x4  Extremities: WWP, 2+ peripheral pulses b/l; no LE edema  Skin: normal color and turgor; no rash  Neurological: pupils equal and reactive airline. Responds to sternal rub.    MEDICATIONS:  MEDICATIONS  (STANDING):  albuterol/ipratropium for Nebulization 3 milliLiter(s) Nebulizer every 6 hours  artificial  tears Solution 1 Drop(s) Both EYES two times a day  chlorhexidine 0.12% Liquid 15 milliLiter(s) Oral Mucosa every 12 hours  chlorhexidine 2% Cloths 1 Application(s) Topical daily  clonazePAM  Tablet 1 milliGRAM(s) Oral at bedtime  dexMEDEtomidine Infusion 0.5 MICROgram(s)/kG/Hr (8.38 mL/Hr) IV Continuous <Continuous>  enoxaparin Injectable 40 milliGRAM(s) SubCutaneous every 24 hours  lactulose Syrup 20 Gram(s) Oral two times a day  midodrine 20 milliGRAM(s) Oral every 8 hours  mupirocin 2% Ointment 1 Application(s) Topical two times a day  norepinephrine Infusion 0.5 MICROgram(s)/kG/Min (31.4 mL/Hr) IV Continuous <Continuous>  QUEtiapine 225 milliGRAM(s) Oral <User Schedule>  senna 1 Tablet(s) Oral every 12 hours  sodium chloride 3%  Inhalation 4 milliLiter(s) Inhalation every 12 hours  tamsulosin 0.4 milliGRAM(s) Oral at bedtime  valproic  acid Syrup 250 milliGRAM(s) Oral <User Schedule>  valproic  acid Syrup 125 milliGRAM(s) Oral <User Schedule>  vancomycin  IVPB 1250 milliGRAM(s) IV Intermittent every 12 hours    MEDICATIONS  (PRN):  acetaminophen     Tablet .. 650 milliGRAM(s) Oral every 6 hours PRN Moderate Pain (4 - 6)  OLANZapine Injectable 2.5 milliGRAM(s) IntraMuscular every 6 hours PRN agitation  sodium chloride 0.9% lock flush 10 milliLiter(s) IV Push every 1 hour PRN Pre/post blood products, medications, blood draw, and to maintain line patency      ALLERGIES:  Allergies    No Known Allergies    Intolerances        LABS:                        11.7   13.01 )-----------( 130      ( 10 [place] 2024 00:40 )             35.8     01-10    142  |  104  |  9   ----------------------------<  113<H>  3.9   |  31  |  0.68    Ca    8.3<L>      10 [place] 2024 00:40  Phos  2.6     01-10  Mg     2.10     01-10    TPro  6.0  /  Alb  2.6<L>  /  TBili  0.4  /  DBili  x   /  AST  11  /  ALT  8   /  AlkPhos  51  01-10    PT/INR - ( 09 Jan 2024 00:15 )   PT: 14.2 sec;   INR: 1.28 ratio         PTT - ( 09 Jan 2024 00:15 )  PTT:35.1 sec  Urinalysis Basic - ( 10 [place] 2024 00:40 )    Color: x / Appearance: x / SG: x / pH: x  Gluc: 113 mg/dL / Ketone: x  / Bili: x / Urobili: x   Blood: x / Protein: x / Nitrite: x   Leuk Esterase: x / RBC: x / WBC x   Sq Epi: x / Non Sq Epi: x / Bacteria: x        RADIOLOGY & ADDITIONAL TESTS: Reviewed.

## 2024-01-11 NOTE — PROGRESS NOTE ADULT - ASSESSMENT
69 yo M with PMhx of BPH, agitation, ?dementia, dysphagia after CVA now s/p PEG tube, and ?CHF presenting from [facility] for worsening agitation and hypoxia, initially found to have possible L infiltrate on CXR, covered empirically for CAP. Now with near-complete opacification of the left lung field, requiring increasing O2 requirements, and now s/p intubation 1/6 for AHRF.    #Neuro  Agitation.   - c/w home seroquel 225mg BID  - c/w home depakote 125mg at 2pm, 250 BID  - Zyprexa IM for severe agitation when pt refusing oral medications  - c/w home Clonazepam for now given Zyprexa administration  - Consider behavioral health consult after extubation for medication management  - on precedex, wean as tolerated    #Resp  Acute hypoxic respiratory failure  - s/p intubation 1/6 for hypoxia, tachypnea, and AMS despite 15L NRB  -CXR 1/5 with questionable L infiltrate  - CXR 1/6 with new near-complete opacification of the left lung field, suggestive of mucous plug and superimposed atelectasis.   - Repeat 1/6 post extubation with improvement.  - ET tube Cx + MRSA  - s/p azithromycin, ceftriaxone --> switched to Zosyn (1/6 - 1/9), now on Vanc (1/6 - )   - Duonebs q6 + inhalation  - Chest PT  - IPV  - SBT trial as tolerated    #CV  Hypotension  - Required pressors after intubation likely 2/2 vasoplegia   - Levophed gtt, wean as tolerated   - Continue with home Midodrine 20mg q8  - Maintain MAP > 65    New bifascicular block on ECG   - prior 12/2023 with RBBB     #GI  Diet: NPO w/ tube feeds  - oral meds via PEG tube     Constipation   - Continue home lactulose and senna     #Renal/  - garcia cath in place; TOV today  - UA with trace LE but no bacteria or nitr  - monitor I/Os    BPH   - Continue with home Tamsulosin     #ID  Aspiration PNA   - Continue Zosyn and Vancomycin as above  - F/u blood culture, NGTD 24 hrs  - U/A neg  - MRSA PCR+; ETT MRSA+  - RVP negative   - procal 0.15    #Endo  No hx of DM   - Monitor glucose    #Heme   DVT ppx   - Lovenox     #Ethics  Full Code  Brother HCP, updated 69 yo M with PMhx of BPH, agitation, ?dementia, dysphagia after CVA now s/p PEG tube, and ?CHF presenting from [facility] for worsening agitation and hypoxia, initially found to have possible L infiltrate on CXR, covered empirically for CAP. Now with near-complete opacification of the left lung field, requiring increasing O2 requirements, and now s/p intubation 1/6 for AHRF.    #Neuro  Agitation.   - c/w home seroquel 225mg BID  - c/w home depakote 125mg at 2pm, 250 BID  - Zyprexa IM for severe agitation when pt refusing oral medications  - c/w home Clonazepam for now given Zyprexa administration  - Consider behavioral health consult after extubation for medication management  - on precedex, wean as tolerated    #Resp  Acute hypoxic respiratory failure  - s/p intubation 1/6 for hypoxia, tachypnea, and AMS despite 15L NRB  -CXR 1/5 with questionable L infiltrate  - CXR 1/6 with new near-complete opacification of the left lung field, suggestive of mucous plug and superimposed atelectasis.   - Repeat 1/6 post extubation with improvement.  - ET tube Cx + MRSA  - s/p azithromycin, ceftriaxone --> switched to Zosyn (1/6 - 1/9), now on Vanc (1/6 - )   - Duonebs q6 + inhalation  - Chest PT  - IPV  - SBT trial as tolerated    #CV  Hypotension  - Required pressors after intubation likely 2/2 vasoplegia   - Levophed gtt, wean as tolerated   - Continue with home Midodrine 20mg q8  - Maintain MAP > 65    New bifascicular block on ECG   - prior 12/2023 with RBBB     #GI  Diet: NPO w/ tube feeds  - oral meds via PEG tube     Constipation   - Continue home lactulose and senna     #Renal/  - garcia cath in place; TOV today  - UA with trace LE but no bacteria or nitr  - monitor I/Os    BPH   - Continue with home Tamsulosin     #ID  Aspiration PNA   - Continue Zosyn and Vancomycin as above  - F/u blood culture, NGTD 24 hrs  - U/A neg  - MRSA PCR+; ETT MRSA+  - RVP negative   - procal 0.15    #Endo  No hx of DM   - Monitor glucose    #Heme   DVT ppx   - Lovenox     #Ethics  Full Code  Brother HCP, updated

## 2024-01-11 NOTE — PROGRESS NOTE ADULT - SUBJECTIVE AND OBJECTIVE BOX
Raman Mendez  PGY-3 | Internal Medicine      INTERVAL HPI/OVERNIGHT EVENTS: O/N decreased precedex, pt agitated. Increased precedex again.    SUBJECTIVE: Patient seen and examined at bedside. Patient is intubated and sedated    CONSTITUTIONAL: No weakness, fevers or chills  EYES/ENT: No visual changes;  No vertigo or throat pain   NECK: No pain or stiffness  RESPIRATORY: No cough, wheezing, hemoptysis; No shortness of breath  CARDIOVASCULAR: No chest pain or palpitations  GASTROINTESTINAL: No abdominal or epigastric pain. No nausea, vomiting, or hematemesis; No diarrhea or constipation. No melena or hematochezia.  GENITOURINARY: No dysuria, frequency or hematuria  NEUROLOGICAL: No numbness or weakness  SKIN: No itching, rashes    OBJECTIVE:    VITAL SIGNS:  ICU Vital Signs Last 24 Hrs  T(C): 36.4 (11 Jan 2024 04:00), Max: 37.8 (10 [place] 2024 16:00)  T(F): 97.6 (11 Jan 2024 04:00), Max: 100 (10 [place] 2024 16:00)  HR: 56 (11 Jan 2024 07:01) (54 - 112)  BP: 150/68 (11 Jan 2024 06:00) (87/72 - 150/68)  BP(mean): 89 (11 Jan 2024 06:00) (56 - 92)  ABP: --  ABP(mean): --  RR: 16 (11 Jan 2024 06:00) (12 - 29)  SpO2: 100% (11 Jan 2024 07:01) (93% - 100%)    O2 Parameters below as of 11 Jan 2024 06:00  Patient On (Oxygen Delivery Method): ventilator, CPAP    O2 Concentration (%): 40      Mode: CPAP with PS, RR (machine): 15, FiO2: 40, PEEP: 5, PS: 10, ITime: 1, MAP: 9, PIP: 15    01-10 @ 07:01  -  01-11 @ 07:00  --------------------------------------------------------  IN: 1900.9 mL / OUT: 1805 mL / NET: 95.9 mL      CAPILLARY BLOOD GLUCOSE          PHYSICAL EXAM:  General: NAD  HEENT: NC/AT; PERRL, clear conjunctiva  Neck: supple  Respiratory: CTA b/l  Cardiovascular: +S1/S2; RRR  Abdomen: soft, NT/ND; +BS x4  Extremities: WWP, 2+ peripheral pulses b/l; no LE edema  Skin: normal color and turgor; no rash  Neurological: pupils equal and reactive airline. Responds to sternal rub.    MEDICATIONS:  MEDICATIONS  (STANDING):  albuterol/ipratropium for Nebulization 3 milliLiter(s) Nebulizer every 6 hours  artificial  tears Solution 1 Drop(s) Both EYES two times a day  chlorhexidine 0.12% Liquid 15 milliLiter(s) Oral Mucosa every 12 hours  chlorhexidine 2% Cloths 1 Application(s) Topical daily  clonazePAM  Tablet 1 milliGRAM(s) Oral at bedtime  dexMEDEtomidine Infusion 0.5 MICROgram(s)/kG/Hr (8.38 mL/Hr) IV Continuous <Continuous>  enoxaparin Injectable 40 milliGRAM(s) SubCutaneous every 24 hours  lactulose Syrup 20 Gram(s) Oral two times a day  midodrine 20 milliGRAM(s) Oral every 8 hours  mupirocin 2% Ointment 1 Application(s) Topical two times a day  norepinephrine Infusion 0.5 MICROgram(s)/kG/Min (31.4 mL/Hr) IV Continuous <Continuous>  QUEtiapine 225 milliGRAM(s) Oral <User Schedule>  senna 1 Tablet(s) Oral every 12 hours  sodium chloride 3%  Inhalation 4 milliLiter(s) Inhalation every 12 hours  tamsulosin 0.4 milliGRAM(s) Oral at bedtime  valproic  acid Syrup 250 milliGRAM(s) Oral <User Schedule>  valproic  acid Syrup 125 milliGRAM(s) Oral <User Schedule>  vancomycin  IVPB 1250 milliGRAM(s) IV Intermittent every 12 hours    MEDICATIONS  (PRN):  acetaminophen     Tablet .. 650 milliGRAM(s) Oral every 6 hours PRN Moderate Pain (4 - 6)  OLANZapine Injectable 2.5 milliGRAM(s) IntraMuscular every 6 hours PRN agitation  sodium chloride 0.9% lock flush 10 milliLiter(s) IV Push every 1 hour PRN Pre/post blood products, medications, blood draw, and to maintain line patency      ALLERGIES:  Allergies    No Known Allergies    Intolerances        LABS:                        11.3   10.49 )-----------( 134      ( 11 Jan 2024 00:45 )             34.1     01-11    144  |  105  |  10  ----------------------------<  103<H>  3.9   |  31  |  0.73    Ca    8.1<L>      11 Jan 2024 00:45  Phos  3.1     01-11  Mg     2.00     01-11    TPro  6.2  /  Alb  2.4<L>  /  TBili  0.4  /  DBili  x   /  AST  10  /  ALT  8   /  AlkPhos  48  01-11      Urinalysis Basic - ( 11 Jan 2024 00:45 )    Color: x / Appearance: x / SG: x / pH: x  Gluc: 103 mg/dL / Ketone: x  / Bili: x / Urobili: x   Blood: x / Protein: x / Nitrite: x   Leuk Esterase: x / RBC: x / WBC x   Sq Epi: x / Non Sq Epi: x / Bacteria: x        RADIOLOGY & ADDITIONAL TESTS: Reviewed.

## 2024-01-12 NOTE — CONSULT NOTE ADULT - PROBLEM SELECTOR RECOMMENDATION 9
- CXRAY 1/5- Complete left lung opacification, new from 1/6/2024 , likely secondary to left lung collapse.  - s/p extubation on 1/11  - currently on HFNC  - management as per primary team

## 2024-01-12 NOTE — CONSULT NOTE ADULT - ASSESSMENT
68M PMH Dementia, s/p CVA, and dysphagia s/p PEG who presents with acute hypoxemic respiratory failure requiring mechanical ventilation, found to have MRSA pneumonia with left lung whiteout, which improved with intubation. Now with septic shock, improved. Extubated 1/11.  Palliative Care consulted for complex decision making  related to goals of care discussions

## 2024-01-12 NOTE — CONSULT NOTE ADULT - TIME BILLING
Time spent for extensive review of the physical chart, electronic medical record, and documentation to obtain collateral information including but not limited to:  [x] Inpatient records (ED, H&P, primary team, and consultants if applicable, care coordination)  [x] Inpatient values/results (biomarkers, immunoassays, imaging, and microbiology results)  [x] Current or proposed treatment plans  [x] Pharmacotherapy review  [x] Discussion and coordinating care with primary team and interdisciplinary staff and floor staff  [x] Discussion including counseling/ education with the surrogate decision maker, or family  [x] Spent 16 minutes separately discussing goals of care/ advanced care planning with  family

## 2024-01-12 NOTE — CONSULT NOTE ADULT - SUBJECTIVE AND OBJECTIVE BOX
Date of Service 01-12-24 @ 17:33    HPI:  69 yo M with PMhx of BPH, agitation, ?dementia, dysphagia after CVA now s/p PEG tube, and ?CHF presenting from [facility] for worsening agitation today. Per NH, pt was very agitated in the morning there. Pt coughs from time to time. At the NH, pt's O2 went down to 89% on RA, was placed on 4 L NC and transferred to ED. On exam, pt endorsed SOB but stated that he wants to go back home. A&Ox2 (name, in hospital).    In ED, pt received ceftriaxone x1, azithromycin x1. CXR showing questionable L infiltrate. Pt refusing NRB in ED. (05 Jan 2024 17:55)    PERTINENT PM/SXH:   CVA (cerebrovascular accident)  Dysphagia  BPH (benign prostatic hyperplasia)  Agitation  Schizophrenia  Congestive heart failure (CHF)  CVA (cerebral vascular accident)  Dysphagia  S/P percutaneous endoscopic gastrostomy (PEG) tube placement    FAMILY HISTORY: unable to obtain due to encephalopathy     ITEMS NOT CHECKED ARE NOT PRESENT    SOCIAL HISTORY:   Significant other/partner[ ]  Children[ ]  Mandaeism/Spirituality:  Substance hx:  [ ]   Tobacco hx:  [ ]   Alcohol hx: [ ]   Home Opioid hx:  [ ] I-Stop Reference No:  Living Situation: [ ]Home  [ x]Long term care  [ ]Rehab [ ]Other      ADVANCE DIRECTIVES:    MOLST  [ ]  Living Will  [ ]   DECISION MAKER(s):  [ ] Health Care Proxy(s)  [ x] Surrogate(s)  [ ] Guardian           Name(s): Phone Number(s):  Brother Frederick Krishroit: [phone]    BASELINE (I)ADL(s) (prior to admission):  [place]: [ ]Total  [ ] Moderate [x ]Dependent    Allergies    No Known Allergies    Intolerances    MEDICATIONS  (STANDING):  acetylcysteine 10%  Inhalation 4 milliLiter(s) Inhalation every 12 hours  albuterol/ipratropium for Nebulization 3 milliLiter(s) Nebulizer every 6 hours  artificial  tears Solution 1 Drop(s) Both EYES two times a day  chlorhexidine 2% Cloths 1 Application(s) Topical daily  enoxaparin Injectable 40 milliGRAM(s) SubCutaneous every 24 hours  lactulose Syrup 20 Gram(s) Oral three times a day  midodrine 30 milliGRAM(s) Oral every 8 hours  mupirocin 2% Ointment 1 Application(s) Topical two times a day  norepinephrine Infusion 0.5 MICROgram(s)/kG/Min (31.4 mL/Hr) IV Continuous <Continuous>  polyethylene glycol 3350 17 Gram(s) Oral two times a day  QUEtiapine 225 milliGRAM(s) Oral <User Schedule>  senna 1 Tablet(s) Oral every 12 hours  sodium chloride 3%  Inhalation 4 milliLiter(s) Inhalation every 12 hours  tamsulosin 0.4 milliGRAM(s) Oral at bedtime  valproic  acid Syrup 250 milliGRAM(s) Oral <User Schedule>  valproic  acid Syrup 125 milliGRAM(s) Oral <User Schedule>  vancomycin  IVPB 1000 milliGRAM(s) IV Intermittent every 12 hours  vancomycin  IVPB        MEDICATIONS  (PRN):  acetaminophen     Tablet .. 650 milliGRAM(s) Oral every 6 hours PRN Moderate Pain (4 - 6)  OLANZapine Injectable 2.5 milliGRAM(s) IntraMuscular every 6 hours PRN agitation  sodium chloride 0.9% lock flush 10 milliLiter(s) IV Push every 1 hour PRN Pre/post blood products, medications, blood draw, and to maintain line patency        ITEMS UNCHECKED ARE NOT PRESENT     PRESENT SYMPTOMS: [x ]Unable to self-report due to altered mental status  [ ] CPOT [ x] PAINADs [ x] RDOS  Source if other than patient:  [ ]Family   [ ]Team     Pain: [ ]yes [ ]no  QOL impact -   Location -                    Aggravating factors -  Quality -  Radiation -  Timing-  Severity (0-10 scale):  Minimal acceptable level / Pain goal (0-10 scale):     CPOT:    https://www.sccm.org/getattachment/syw83c62-5w5z-9e8j-[email or web address]/Critical-Care-Pain-Observation-Tool-(CPOT)    Dyspnea:                           [ ]Mild [ ]Moderate [ ]Severe  Anxiety:                             [ ]Mild [ ]Moderate [ ]Severe  Agitation:                          [ ]Mild [ ]Moderate [ ]Severe  Fatigue:                             [ ]Mild [ ]Moderate [ ]Severe  Nausea:                             [ ]Mild [ ]Moderate [ ]Severe  Loss of appetite:              [ ]Mild [ ]Moderate [ ]Severe  Constipation:                   [ ]Mild [ ]Moderate [ ]Severe  Diarrhea:                          [ ]Mild [ ]Moderate [ ]Severe      PCSSQ[Palliative Care Spiritual Screening Question]   Severity (0-10):  Score of 4 or > indicate consideration of Chaplaincy referral.  Chaplaincy Referral: [ ] yes [ ] refused [ ] following [ x] deferred    Caregiver [place]? : [ ] yes [ ] no [ ] Declined   [x ] Deferred            Social work referral [ ] Patient & Family Centered Care Referral [ ]     Anticipatory Grief present?:  [ ] yes [ ] no  [x ] Deferred                  Social work referral [ ] Chaplaincy Referral[ ]    Other Symptoms:  [ ]All other review of systems negative   [ x] Unable to obtain due to poor mentation     PHYSICAL EXAM:  Vital Signs Last 24 Hrs  T(C): 37.6 (12 Jan 2024 16:00), Max: 37.6 (12 Jan 2024 16:00)  T(F): 99.6 (12 Jan 2024 16:00), Max: 99.6 (12 Jan 2024 16:00)  HR: 110 (12 Jan 2024 17:00) (85 - 125)  BP: 135/73 (12 Jan 2024 17:00) (72/40 - 136/65)  BP(mean): 89 (12 Jan 2024 17:00) (48 - 106)  RR: 23 (12 Jan 2024 17:00) (17 - 31)  SpO2: 98% (12 Jan 2024 17:00) (91% - 100%)    Parameters below as of 12 Jan 2024 16:00  Patient On (Oxygen Delivery Method): nasal cannula, high flow  O2 Flow (L/min): 40  O2 Concentration (%): 40     I&O's Summary    11 Jan 2024 07:01  -  12 Jan 2024 07:00  --------------------------------------------------------  IN: 1491.3 mL / OUT: 1635 mL / NET: -143.7 mL    12 Jan 2024 07:01  -  12 Jan 2024 17:33  --------------------------------------------------------  IN: 838.6 mL / OUT: 330 mL / NET: 508.6 mL        GENERAL:  [ x]Awake  [x ]Oriented x1 (self)   [ ]Lethargic  [ ]Cachexia  [ ]Unarousable  [ x]Verbal - mumbling incoherently  [ ]Non-Verbal   Behavioral:   [ ] Anxiety  [ ] Delirium [ ] Agitation [ ] Calm  [x ] Other-encephalopathy   HEENT:  [ x]Normal  [ ] Temporal Wasting  [ ]Dry mouth   [ ]ET Tube/Trach  [ ]Oral lesions  [ ] Mucositis  PULMONARY:   [ ]Clear [ x]Tachypnea  [ ]Audible excessive secretions   [ ]Rhonchi        [ ]Right [ ]Left [ ]Bilateral  [ ]Crackles        [ ]Right [ ]Left [ ]Bilateral  [ ]Wheezing     [ ]Right [ ]Left [ ]Bilateral  [ ]Diminished breath sounds [ ]right [ ]left [ ]bilateral  CARDIOVASCULAR:    [ ]Regular [ ]Irregular [ x]Tachy  [ ]Joey [ ]Murmur [ ]Other  GASTROINTESTINAL:  [ x]Soft  [ ]Distended   [ ]+BS  [ x]Non tender [ ]Tender  [ ]PEG [ ]OGT/ NGT  Last BM:   GENITOURINARY:  [ ]Normal [ x] Incontinent   [ ]Oliguria/Anuria   [ ][place]  MUSCULOSKELETAL:   [ ]Normal   [ ]Weakness  [ x]Bed/Wheelchair bound [ ]Edema  [  ] amputation  [  ] contraction  NEUROLOGIC:   [ ]No focal deficits  [ ]Cognitive impairment  [ ]Dysphagia [ ]Dysarthria [ ]Paresis [ x]Other -encephalopathy  SKIN: See Nursing Skin Assessment for further details  [ ]Normal    [ ]Rash  [ ]Pressure ulcer(s)       Present on admission [ ]y [ ]n   [  ]  Wound    [  ] hyperpigmentation    CRITICAL CARE:  [ ] Shock Present  [ ]Septic [ ]Cardiogenic [ ]Neurologic [ ]Hypovolemic  [ ]  Vasopressors [ ]  Inotropes   [x ]Respiratory failure present [ ]Mechanical ventilation [ ]Non-invasive ventilatory support [x ]High flow    [ ]Acute  [ ]Chronic [ ]Hypoxic  [ ]Hypercarbic [ ]Other  [ ]Other organ failure     LABS:  reviewed                         11.4   11.32 )-----------( 127      ( 12 Jan 2024 00:40 )             35.7   01-12    146<H>  |  108<H>  |  11  ----------------------------<  122<H>  3.5   |  30  |  0.77    Ca    8.3<L>      12 Jan 2024 00:40  Phos  2.6     01-12  Mg     2.00     01-12    TPro  6.4  /  Alb  2.5<L>  /  TBili  0.4  /  DBili  x   /  AST  16  /  ALT  7   /  AlkPhos  48  01-12  PT/INR - ( 12 Jan 2024 00:40 )   PT: 17.0 sec;   INR: 1.52 ratio         PTT - ( 12 Jan 2024 00:40 )  PTT:33.4 sec  Urinalysis Basic - ( 12 Jan 2024 00:40 )    Color: x / Appearance: x / SG: x / pH: x  Gluc: 122 mg/dL / Ketone: x  / Bili: x / Urobili: x   Blood: x / Protein: x / Nitrite: x   Leuk Esterase: x / RBC: x / WBC x   Sq Epi: x / Non Sq Epi: x / Bacteria: x      CAPILLARY BLOOD GLUCOSE          RADIOLOGY & ADDITIONAL STUDIES: reviewed     PROTEIN CALORIE MALNUTRITION PRESENT: [ ]mild [ ]moderate [ ]severe [ ]underweight [ ]morbid obesity  https://www.andeal.org/vault/2440/web/files/ONC/Table_Clinical%20Characteristics%20to%20Document%20Malnutrition-White%20JV%20et%20al%202012.pdf    Height (cm): 167.6 (01-05-24 @ 11:59), 167.6 (12-28-23 @ 19:19), 167.6 (12-15-23 @ 04:29)  Weight (kg): 67 (01-06-24 @ 05:01), 70.3 (12-15-23 @ 04:29)  BMI (kg/m2): 23.9 (01-06-24 @ 05:01), 25 (01-05-24 @ 11:59), 25 (12-28-23 @ 19:19)    [x ]PPSV2 < or = to 30% [ ]significant weight loss  [ ]poor nutritional intake  [ ]anasarca [ ]Artificial Nutrition      REFERRALS:   [ ]Chaplaincy  [ ]Hospice  [ ]Child Life  [ ]Social Work  [x ]Case management [ ]Holistic Therapy      Date of Service 01-12-24 @ 17:33    HPI:  67 yo M with PMhx of BPH, agitation, ?dementia, dysphagia after CVA now s/p PEG tube, and ?CHF presenting from [facility] for worsening agitation today. Per NH, pt was very agitated in the morning there. Pt coughs from time to time. At the NH, pt's O2 went down to 89% on RA, was placed on 4 L NC and transferred to ED. On exam, pt endorsed SOB but stated that he wants to go back home. A&Ox2 (name, in hospital).    In ED, pt received ceftriaxone x1, azithromycin x1. CXR showing questionable L infiltrate. Pt refusing NRB in ED. (05 Jan 2024 17:55)    PERTINENT PM/SXH:   CVA (cerebrovascular accident)  Dysphagia  BPH (benign prostatic hyperplasia)  Agitation  Schizophrenia  Congestive heart failure (CHF)  CVA (cerebral vascular accident)  Dysphagia  S/P percutaneous endoscopic gastrostomy (PEG) tube placement    FAMILY HISTORY: unable to obtain due to encephalopathy     ITEMS NOT CHECKED ARE NOT PRESENT    SOCIAL HISTORY:   Significant other/partner[ ]  Children[ ]  Caodaism/Spirituality:  Substance hx:  [ ]   Tobacco hx:  [ ]   Alcohol hx: [ ]   Home Opioid hx:  [ ] I-Stop Reference No:  Living Situation: [ ]Home  [ x]Long term care  [ ]Rehab [ ]Other      ADVANCE DIRECTIVES:    MOLST  [ ]  Living Will  [ ]   DECISION MAKER(s):  [ ] Health Care Proxy(s)  [ x] Surrogate(s)  [ ] Guardian           Name(s): Phone Number(s):  Brother Frederick Krishroit: [phone]    BASELINE (I)ADL(s) (prior to admission):  [place]: [ ]Total  [ ] Moderate [x ]Dependent    Allergies    No Known Allergies    Intolerances    MEDICATIONS  (STANDING):  acetylcysteine 10%  Inhalation 4 milliLiter(s) Inhalation every 12 hours  albuterol/ipratropium for Nebulization 3 milliLiter(s) Nebulizer every 6 hours  artificial  tears Solution 1 Drop(s) Both EYES two times a day  chlorhexidine 2% Cloths 1 Application(s) Topical daily  enoxaparin Injectable 40 milliGRAM(s) SubCutaneous every 24 hours  lactulose Syrup 20 Gram(s) Oral three times a day  midodrine 30 milliGRAM(s) Oral every 8 hours  mupirocin 2% Ointment 1 Application(s) Topical two times a day  norepinephrine Infusion 0.5 MICROgram(s)/kG/Min (31.4 mL/Hr) IV Continuous <Continuous>  polyethylene glycol 3350 17 Gram(s) Oral two times a day  QUEtiapine 225 milliGRAM(s) Oral <User Schedule>  senna 1 Tablet(s) Oral every 12 hours  sodium chloride 3%  Inhalation 4 milliLiter(s) Inhalation every 12 hours  tamsulosin 0.4 milliGRAM(s) Oral at bedtime  valproic  acid Syrup 250 milliGRAM(s) Oral <User Schedule>  valproic  acid Syrup 125 milliGRAM(s) Oral <User Schedule>  vancomycin  IVPB 1000 milliGRAM(s) IV Intermittent every 12 hours  vancomycin  IVPB        MEDICATIONS  (PRN):  acetaminophen     Tablet .. 650 milliGRAM(s) Oral every 6 hours PRN Moderate Pain (4 - 6)  OLANZapine Injectable 2.5 milliGRAM(s) IntraMuscular every 6 hours PRN agitation  sodium chloride 0.9% lock flush 10 milliLiter(s) IV Push every 1 hour PRN Pre/post blood products, medications, blood draw, and to maintain line patency        ITEMS UNCHECKED ARE NOT PRESENT     PRESENT SYMPTOMS: [x ]Unable to self-report due to altered mental status  [ ] CPOT [ x] PAINADs [ x] RDOS  Source if other than patient:  [ ]Family   [ ]Team     Pain: [ ]yes [ ]no  QOL impact -   Location -                    Aggravating factors -  Quality -  Radiation -  Timing-  Severity (0-10 scale):  Minimal acceptable level / Pain goal (0-10 scale):     CPOT:    https://www.sccm.org/getattachment/smi81t35-9u9d-9e6q-[email or web address]/Critical-Care-Pain-Observation-Tool-(CPOT)    Dyspnea:                           [ ]Mild [ ]Moderate [ ]Severe  Anxiety:                             [ ]Mild [ ]Moderate [ ]Severe  Agitation:                          [ ]Mild [ ]Moderate [ ]Severe  Fatigue:                             [ ]Mild [ ]Moderate [ ]Severe  Nausea:                             [ ]Mild [ ]Moderate [ ]Severe  Loss of appetite:              [ ]Mild [ ]Moderate [ ]Severe  Constipation:                   [ ]Mild [ ]Moderate [ ]Severe  Diarrhea:                          [ ]Mild [ ]Moderate [ ]Severe      PCSSQ[Palliative Care Spiritual Screening Question]   Severity (0-10):  Score of 4 or > indicate consideration of Chaplaincy referral.  Chaplaincy Referral: [ ] yes [ ] refused [ ] following [ x] deferred    Caregiver [place]? : [ ] yes [ ] no [ ] Declined   [x ] Deferred            Social work referral [ ] Patient & Family Centered Care Referral [ ]     Anticipatory Grief present?:  [ ] yes [ ] no  [x ] Deferred                  Social work referral [ ] Chaplaincy Referral[ ]    Other Symptoms:  [ ]All other review of systems negative   [ x] Unable to obtain due to poor mentation     PHYSICAL EXAM:  Vital Signs Last 24 Hrs  T(C): 37.6 (12 Jan 2024 16:00), Max: 37.6 (12 Jan 2024 16:00)  T(F): 99.6 (12 Jan 2024 16:00), Max: 99.6 (12 Jan 2024 16:00)  HR: 110 (12 Jan 2024 17:00) (85 - 125)  BP: 135/73 (12 Jan 2024 17:00) (72/40 - 136/65)  BP(mean): 89 (12 Jan 2024 17:00) (48 - 106)  RR: 23 (12 Jan 2024 17:00) (17 - 31)  SpO2: 98% (12 Jan 2024 17:00) (91% - 100%)    Parameters below as of 12 Jan 2024 16:00  Patient On (Oxygen Delivery Method): nasal cannula, high flow  O2 Flow (L/min): 40  O2 Concentration (%): 40     I&O's Summary    11 Jan 2024 07:01  -  12 Jan 2024 07:00  --------------------------------------------------------  IN: 1491.3 mL / OUT: 1635 mL / NET: -143.7 mL    12 Jan 2024 07:01  -  12 Jan 2024 17:33  --------------------------------------------------------  IN: 838.6 mL / OUT: 330 mL / NET: 508.6 mL        GENERAL:  [ x]Awake  [x ]Oriented x1 (self)   [ ]Lethargic  [ ]Cachexia  [ ]Unarousable  [ x]Verbal - mumbling incoherently  [ ]Non-Verbal   Behavioral:   [ ] Anxiety  [ ] Delirium [ ] Agitation [ ] Calm  [x ] Other-encephalopathy   HEENT:  [ x]Normal  [ ] Temporal Wasting  [ ]Dry mouth   [ ]ET Tube/Trach  [ ]Oral lesions  [ ] Mucositis  PULMONARY:   [ ]Clear [ x]Tachypnea  [ ]Audible excessive secretions   [ ]Rhonchi        [ ]Right [ ]Left [ ]Bilateral  [ ]Crackles        [ ]Right [ ]Left [ ]Bilateral  [ ]Wheezing     [ ]Right [ ]Left [ ]Bilateral  [ ]Diminished breath sounds [ ]right [ ]left [ ]bilateral  CARDIOVASCULAR:    [ ]Regular [ ]Irregular [ x]Tachy  [ ]Joey [ ]Murmur [ ]Other  GASTROINTESTINAL:  [ x]Soft  [ ]Distended   [ ]+BS  [ x]Non tender [ ]Tender  [ ]PEG [ ]OGT/ NGT  Last BM:   GENITOURINARY:  [ ]Normal [ x] Incontinent   [ ]Oliguria/Anuria   [ ][place]  MUSCULOSKELETAL:   [ ]Normal   [ ]Weakness  [ x]Bed/Wheelchair bound [ ]Edema  [  ] amputation  [  ] contraction  NEUROLOGIC:   [ ]No focal deficits  [ ]Cognitive impairment  [ ]Dysphagia [ ]Dysarthria [ ]Paresis [ x]Other -encephalopathy  SKIN: See Nursing Skin Assessment for further details  [ ]Normal    [ ]Rash  [ ]Pressure ulcer(s)       Present on admission [ ]y [ ]n   [  ]  Wound    [  ] hyperpigmentation    CRITICAL CARE:  [ ] Shock Present  [ ]Septic [ ]Cardiogenic [ ]Neurologic [ ]Hypovolemic  [ ]  Vasopressors [ ]  Inotropes   [x ]Respiratory failure present [ ]Mechanical ventilation [ ]Non-invasive ventilatory support [x ]High flow    [ ]Acute  [ ]Chronic [ ]Hypoxic  [ ]Hypercarbic [ ]Other  [ ]Other organ failure     LABS:  reviewed                         11.4   11.32 )-----------( 127      ( 12 Jan 2024 00:40 )             35.7   01-12    146<H>  |  108<H>  |  11  ----------------------------<  122<H>  3.5   |  30  |  0.77    Ca    8.3<L>      12 Jan 2024 00:40  Phos  2.6     01-12  Mg     2.00     01-12    TPro  6.4  /  Alb  2.5<L>  /  TBili  0.4  /  DBili  x   /  AST  16  /  ALT  7   /  AlkPhos  48  01-12  PT/INR - ( 12 Jan 2024 00:40 )   PT: 17.0 sec;   INR: 1.52 ratio         PTT - ( 12 Jan 2024 00:40 )  PTT:33.4 sec  Urinalysis Basic - ( 12 Jan 2024 00:40 )    Color: x / Appearance: x / SG: x / pH: x  Gluc: 122 mg/dL / Ketone: x  / Bili: x / Urobili: x   Blood: x / Protein: x / Nitrite: x   Leuk Esterase: x / RBC: x / WBC x   Sq Epi: x / Non Sq Epi: x / Bacteria: x      CAPILLARY BLOOD GLUCOSE          RADIOLOGY & ADDITIONAL STUDIES: reviewed     PROTEIN CALORIE MALNUTRITION PRESENT: [ ]mild [ ]moderate [ ]severe [ ]underweight [ ]morbid obesity  https://www.andeal.org/vault/2440/web/files/ONC/Table_Clinical%20Characteristics%20to%20Document%20Malnutrition-White%20JV%20et%20al%202012.pdf    Height (cm): 167.6 (01-05-24 @ 11:59), 167.6 (12-28-23 @ 19:19), 167.6 (12-15-23 @ 04:29)  Weight (kg): 67 (01-06-24 @ 05:01), 70.3 (12-15-23 @ 04:29)  BMI (kg/m2): 23.9 (01-06-24 @ 05:01), 25 (01-05-24 @ 11:59), 25 (12-28-23 @ 19:19)    [x ]PPSV2 < or = to 30% [ ]significant weight loss  [ ]poor nutritional intake  [ ]anasarca [ ]Artificial Nutrition      REFERRALS:   [ ]Chaplaincy  [ ]Hospice  [ ]Child Life  [ ]Social Work  [x ]Case management [ ]Holistic Therapy

## 2024-01-12 NOTE — CONSULT NOTE ADULT - PROBLEM SELECTOR RECOMMENDATION 6
Case reviewed with primary team   Thank you for allowing us to participate in your patient's care. Please page [phone] for any questions/concerns. Case reviewed with primary team   Thank you for allowing us to participate in your patient's care. Please page [phone] for any questions/concerns.

## 2024-01-12 NOTE — CONSULT NOTE ADULT - PROBLEM SELECTOR RECOMMENDATION 5
Counseled brothmonserrat Mack on advanced directives for code status preferences as patient with high risk of worsening respiratory failure despite extubation yesterday and remains guarded clinical status at this time   Encouraged Frederick to continue to consider wishes for advanced directives for patient.  Emotional support provided  16 minutes spent on goals of care / advanced care planning

## 2024-01-12 NOTE — CHART NOTE - NSCHARTNOTEFT_GEN_A_CORE
NUTRITION FOLLOW-UP      67 yo M W PMHx s/p CVA w dysphagia s/p PEG, agitation.  Pt. admitted for acute hypoxic respiratory failure s/p intubation/sedation.  Pt. extubated yesterday (1/11), hence tube feeding was held.  Has since resumed with Jevity 1.5 via PEG.  Observed @ rate of _______mL/hr @ this time.    Bowel movement (1/11)... loose.  No noted/reported vomiting or abdominal distention.            _________________Diet___________________  Diet, NPO with Tube Feed:   Tube Feeding Modality: Gastrostomy  Jevity 1.5 Ried (JEVITY1.5RTH)  Total Volume for 24 Hours (mL): 1200  Continuous  Starting Tube Feed Rate {mL per Hour}: 10  Increase Tube Feed Rate by (mL): 10     Every 4 hours  Until Goal Tube Feed Rate (mL per Hour): 50  Tube Feed Duration (in Hours): 24  Tube Feed Start Time: 07:00 (01-09-24 @ 07:13) [Active]    provides:  1200mL total volume  1800 kcals  77g protein      Weight:                    Height:    66" /167.6cm             Ideal Body Weight: 142lbs /64.5kg  69.0kg (1/12)  72.9kg (1/9)  69.1kg (1/7)      _____Estimated Energy Needs (based on current weight 69.0kg)_____  25-30 KCals/kg = 6497-3398 KCals/d  1.2-1.4g protein/kg = 83-97g protein/d        Edema: 1+ generalized  Skin: No pressure injuries.        ________________________Pertinent Medications____________   MEDICATIONS  (STANDING):  acetylcysteine 10%  Inhalation 4 milliLiter(s) Inhalation every 12 hours  albuterol/ipratropium for Nebulization 3 milliLiter(s) Nebulizer every 6 hours  artificial  tears Solution 1 Drop(s) Both EYES two times a day  chlorhexidine 2% Cloths 1 Application(s) Topical daily  clonazePAM  Tablet 1 milliGRAM(s) Oral at bedtime  enoxaparin Injectable 40 milliGRAM(s) SubCutaneous every 24 hours  lactulose Syrup 20 Gram(s) Oral three times a day  midodrine 30 milliGRAM(s) Oral every 8 hours  mupirocin 2% Ointment 1 Application(s) Topical two times a day  norepinephrine Infusion 0.5 MICROgram(s)/kG/Min (31.4 mL/Hr) IV Continuous <Continuous>  polyethylene glycol 3350 17 Gram(s) Oral two times a day  QUEtiapine 225 milliGRAM(s) Oral <User Schedule>  senna 1 Tablet(s) Oral every 12 hours  sodium chloride 3%  Inhalation 4 milliLiter(s) Inhalation every 12 hours  tamsulosin 0.4 milliGRAM(s) Oral at bedtime  valproic  acid Syrup 250 milliGRAM(s) Oral <User Schedule>  valproic  acid Syrup 125 milliGRAM(s) Oral <User Schedule>  vancomycin  IVPB      vancomycin  IVPB 1000 milliGRAM(s) IV Intermittent every 12 hours    MEDICATIONS  (PRN):  acetaminophen     Tablet .. 650 milliGRAM(s) Oral every 6 hours PRN Moderate Pain (4 - 6)  OLANZapine Injectable 2.5 milliGRAM(s) IntraMuscular every 6 hours PRN agitation  sodium chloride 0.9% lock flush 10 milliLiter(s) IV Push every 1 hour PRN Pre/post blood products, medications, blood draw, and to maintain line patency          _________________________Pertinent Labs____________________     01-12    146<H>  |  108<H>  |  11  ----------------------------<  122<H>  3.5   |  30  |  0.77    Ca    8.3<L>      12 Jan 2024 00:40  Phos  2.6     01-12  Mg     2.00     01-12    TPro  6.4  /  Alb  2.5<L>  /  TBili  0.4  /  DBili  x   /  AST  16  /  ALT  7   /  AlkPhos  48  01-12                                                                   11.4   11.32 )-----------( 127      ( 12 Jan 2024 00:40 )             35.7         PLAN/RECOMMENDATIONS:    1)  2) Obtain daily weights  3) Monitor tolerance to TF, GI status, electrolytes, glucose     RDN remains available and will f/u PRN.          Cleopatra Coyne, ELIN, CDN       pager [phone] or MS Teams NUTRITION FOLLOW-UP      69 yo M W PMHx s/p CVA w dysphagia s/p PEG, agitation.  Pt. admitted for acute hypoxic respiratory failure s/p intubation/sedation.  Pt. extubated yesterday (1/11), hence tube feeding was held.  Has since resumed with Jevity 1.5 via PEG.  Observed @ rate of _______mL/hr @ this time.    Bowel movement (1/11)... loose.  No noted/reported vomiting or abdominal distention.            _________________Diet___________________  Diet, NPO with Tube Feed:   Tube Feeding Modality: Gastrostomy  Jevity 1.5 Reid (JEVITY1.5RTH)  Total Volume for 24 Hours (mL): 1200  Continuous  Starting Tube Feed Rate {mL per Hour}: 10  Increase Tube Feed Rate by (mL): 10     Every 4 hours  Until Goal Tube Feed Rate (mL per Hour): 50  Tube Feed Duration (in Hours): 24  Tube Feed Start Time: 07:00 (01-09-24 @ 07:13) [Active]    provides:  1200mL total volume  1800 kcals  77g protein      Weight:                    Height:    66" /167.6cm             Ideal Body Weight: 142lbs /64.5kg  69.0kg (1/12)  72.9kg (1/9)  69.1kg (1/7)      _____Estimated Energy Needs (based on current weight 69.0kg)_____  25-30 KCals/kg = 0152-5026 KCals/d  1.2-1.4g protein/kg = 83-97g protein/d        Edema: 1+ generalized  Skin: No pressure injuries.        ________________________Pertinent Medications____________   MEDICATIONS  (STANDING):  acetylcysteine 10%  Inhalation 4 milliLiter(s) Inhalation every 12 hours  albuterol/ipratropium for Nebulization 3 milliLiter(s) Nebulizer every 6 hours  artificial  tears Solution 1 Drop(s) Both EYES two times a day  chlorhexidine 2% Cloths 1 Application(s) Topical daily  clonazePAM  Tablet 1 milliGRAM(s) Oral at bedtime  enoxaparin Injectable 40 milliGRAM(s) SubCutaneous every 24 hours  lactulose Syrup 20 Gram(s) Oral three times a day  midodrine 30 milliGRAM(s) Oral every 8 hours  mupirocin 2% Ointment 1 Application(s) Topical two times a day  norepinephrine Infusion 0.5 MICROgram(s)/kG/Min (31.4 mL/Hr) IV Continuous <Continuous>  polyethylene glycol 3350 17 Gram(s) Oral two times a day  QUEtiapine 225 milliGRAM(s) Oral <User Schedule>  senna 1 Tablet(s) Oral every 12 hours  sodium chloride 3%  Inhalation 4 milliLiter(s) Inhalation every 12 hours  tamsulosin 0.4 milliGRAM(s) Oral at bedtime  valproic  acid Syrup 250 milliGRAM(s) Oral <User Schedule>  valproic  acid Syrup 125 milliGRAM(s) Oral <User Schedule>  vancomycin  IVPB      vancomycin  IVPB 1000 milliGRAM(s) IV Intermittent every 12 hours    MEDICATIONS  (PRN):  acetaminophen     Tablet .. 650 milliGRAM(s) Oral every 6 hours PRN Moderate Pain (4 - 6)  OLANZapine Injectable 2.5 milliGRAM(s) IntraMuscular every 6 hours PRN agitation  sodium chloride 0.9% lock flush 10 milliLiter(s) IV Push every 1 hour PRN Pre/post blood products, medications, blood draw, and to maintain line patency          _________________________Pertinent Labs____________________     01-12    146<H>  |  108<H>  |  11  ----------------------------<  122<H>  3.5   |  30  |  0.77    Ca    8.3<L>      12 Jan 2024 00:40  Phos  2.6     01-12  Mg     2.00     01-12    TPro  6.4  /  Alb  2.5<L>  /  TBili  0.4  /  DBili  x   /  AST  16  /  ALT  7   /  AlkPhos  48  01-12                                                                   11.4   11.32 )-----------( 127      ( 12 Jan 2024 00:40 )             35.7         PLAN/RECOMMENDATIONS:    1)  2) Obtain daily weights  3) Monitor tolerance to TF, GI status, electrolytes, glucose     RDN remains available and will f/u PRN.          Cleopatra Coyne, ELIN, CDN       pager [phone] or MS Teams NUTRITION FOLLOW-UP      67 yo M W PMHx s/p CVA w dysphagia s/p PEG, agitation.  Pt. admitted for acute hypoxic respiratory failure s/p intubation/sedation.  Pt. extubated yesterday (1/11), hence tube feeding was held.  Has since resumed with Jevity 1.5 via PEG.  Observed @ goal rate of 50mL/hr @ this time.  Bowel movement (1/11)... loose.  Pt. without any noted/reported intolerance to TF @ this time (no nausea/vomiting/diarrhea/constipation or abdominal distention).    Current enteral regimen remains appropriate.  No significant weight changes.    Pt. alert, although, confused, disoriented and restless.  Unable to answer nutrition related questions and education with Pt. not feasible.    _________________Diet___________________  Diet, NPO with Tube Feed:   Tube Feeding Modality: Gastrostomy  Jevity 1.5 Reid (JEVITY1.5RTH)  Total Volume for 24 Hours (mL): 1200  Continuous  Starting Tube Feed Rate {mL per Hour}: 10  Increase Tube Feed Rate by (mL): 10     Every 4 hours  Until Goal Tube Feed Rate (mL per Hour): 50  Tube Feed Duration (in Hours): 24  Tube Feed Start Time: 07:00 (01-09-24 @ 07:13) [Active]    provides:  1200mL total volume  1800 kcals  77g protein      Weight:                    Height:    66" /167.6cm             Ideal Body Weight: 142lbs /64.5kg  69.0kg (1/12)  72.9kg (1/9)  69.1kg (1/7)      _____Estimated Energy Needs (based on current weight 69.0kg)_____  25-30 KCals/kg = 1169-2081 KCals/d  1.2-1.4g protein/kg = 83-97g protein/d        Edema: 1+ generalized  Skin: No pressure injuries.        ________________________Pertinent Medications____________   MEDICATIONS  (STANDING):  acetylcysteine 10%  Inhalation 4 milliLiter(s) Inhalation every 12 hours  albuterol/ipratropium for Nebulization 3 milliLiter(s) Nebulizer every 6 hours  artificial  tears Solution 1 Drop(s) Both EYES two times a day  chlorhexidine 2% Cloths 1 Application(s) Topical daily  clonazePAM  Tablet 1 milliGRAM(s) Oral at bedtime  enoxaparin Injectable 40 milliGRAM(s) SubCutaneous every 24 hours  lactulose Syrup 20 Gram(s) Oral three times a day  midodrine 30 milliGRAM(s) Oral every 8 hours  mupirocin 2% Ointment 1 Application(s) Topical two times a day  norepinephrine Infusion 0.5 MICROgram(s)/kG/Min (31.4 mL/Hr) IV Continuous <Continuous>  polyethylene glycol 3350 17 Gram(s) Oral two times a day  QUEtiapine 225 milliGRAM(s) Oral <User Schedule>  senna 1 Tablet(s) Oral every 12 hours  sodium chloride 3%  Inhalation 4 milliLiter(s) Inhalation every 12 hours  tamsulosin 0.4 milliGRAM(s) Oral at bedtime  valproic  acid Syrup 250 milliGRAM(s) Oral <User Schedule>  valproic  acid Syrup 125 milliGRAM(s) Oral <User Schedule>  vancomycin  IVPB      vancomycin  IVPB 1000 milliGRAM(s) IV Intermittent every 12 hours    MEDICATIONS  (PRN):  acetaminophen     Tablet .. 650 milliGRAM(s) Oral every 6 hours PRN Moderate Pain (4 - 6)  OLANZapine Injectable 2.5 milliGRAM(s) IntraMuscular every 6 hours PRN agitation  sodium chloride 0.9% lock flush 10 milliLiter(s) IV Push every 1 hour PRN Pre/post blood products, medications, blood draw, and to maintain line patency          _________________________Pertinent Labs____________________     01-12    146<H>  |  108<H>  |  11  ----------------------------<  122<H>  3.5   |  30  |  0.77    Ca    8.3<L>      12 Jan 2024 00:40  Phos  2.6     01-12  Mg     2.00     01-12    TPro  6.4  /  Alb  2.5<L>  /  TBili  0.4  /  DBili  x   /  AST  16  /  ALT  7   /  AlkPhos  48  01-12                                                                   11.4   11.32 )-----------( 127      ( 12 Jan 2024 00:40 )             35.7     ________NUTRITION Dx_________    Pt. remains severely malnourished     PLAN/RECOMMENDATIONS:    1) Continue Jevity 1.5 @ 50mL/hr x 24hrs.  2) Obtain daily weights  3) Monitor tolerance to TF, GI status, electrolytes, glucose     RDN remains available and will f/u PRN.          Cleopatra Coyne, RDN, CDN       pager [phone] or MS Teams NUTRITION FOLLOW-UP      69 yo M W PMHx s/p CVA w dysphagia s/p PEG, agitation.  Pt. admitted for acute hypoxic respiratory failure s/p intubation/sedation.  Pt. extubated yesterday (1/11), hence tube feeding was held.  Has since resumed with Jevity 1.5 via PEG.  Observed @ goal rate of 50mL/hr @ this time.  Bowel movement (1/11)... loose.  Pt. without any noted/reported intolerance to TF @ this time (no nausea/vomiting/diarrhea/constipation or abdominal distention).    Current enteral regimen remains appropriate.  No significant weight changes.    Pt. alert, although, confused, disoriented and restless.  Unable to answer nutrition related questions and education with Pt. not feasible.    _________________Diet___________________  Diet, NPO with Tube Feed:   Tube Feeding Modality: Gastrostomy  Jevity 1.5 Reid (JEVITY1.5RTH)  Total Volume for 24 Hours (mL): 1200  Continuous  Starting Tube Feed Rate {mL per Hour}: 10  Increase Tube Feed Rate by (mL): 10     Every 4 hours  Until Goal Tube Feed Rate (mL per Hour): 50  Tube Feed Duration (in Hours): 24  Tube Feed Start Time: 07:00 (01-09-24 @ 07:13) [Active]    provides:  1200mL total volume  1800 kcals  77g protein      Weight:                    Height:    66" /167.6cm             Ideal Body Weight: 142lbs /64.5kg  69.0kg (1/12)  72.9kg (1/9)  69.1kg (1/7)      _____Estimated Energy Needs (based on current weight 69.0kg)_____  25-30 KCals/kg = 4024-4522 KCals/d  1.2-1.4g protein/kg = 83-97g protein/d        Edema: 1+ generalized  Skin: No pressure injuries.        ________________________Pertinent Medications____________   MEDICATIONS  (STANDING):  acetylcysteine 10%  Inhalation 4 milliLiter(s) Inhalation every 12 hours  albuterol/ipratropium for Nebulization 3 milliLiter(s) Nebulizer every 6 hours  artificial  tears Solution 1 Drop(s) Both EYES two times a day  chlorhexidine 2% Cloths 1 Application(s) Topical daily  clonazePAM  Tablet 1 milliGRAM(s) Oral at bedtime  enoxaparin Injectable 40 milliGRAM(s) SubCutaneous every 24 hours  lactulose Syrup 20 Gram(s) Oral three times a day  midodrine 30 milliGRAM(s) Oral every 8 hours  mupirocin 2% Ointment 1 Application(s) Topical two times a day  norepinephrine Infusion 0.5 MICROgram(s)/kG/Min (31.4 mL/Hr) IV Continuous <Continuous>  polyethylene glycol 3350 17 Gram(s) Oral two times a day  QUEtiapine 225 milliGRAM(s) Oral <User Schedule>  senna 1 Tablet(s) Oral every 12 hours  sodium chloride 3%  Inhalation 4 milliLiter(s) Inhalation every 12 hours  tamsulosin 0.4 milliGRAM(s) Oral at bedtime  valproic  acid Syrup 250 milliGRAM(s) Oral <User Schedule>  valproic  acid Syrup 125 milliGRAM(s) Oral <User Schedule>  vancomycin  IVPB      vancomycin  IVPB 1000 milliGRAM(s) IV Intermittent every 12 hours    MEDICATIONS  (PRN):  acetaminophen     Tablet .. 650 milliGRAM(s) Oral every 6 hours PRN Moderate Pain (4 - 6)  OLANZapine Injectable 2.5 milliGRAM(s) IntraMuscular every 6 hours PRN agitation  sodium chloride 0.9% lock flush 10 milliLiter(s) IV Push every 1 hour PRN Pre/post blood products, medications, blood draw, and to maintain line patency          _________________________Pertinent Labs____________________     01-12    146<H>  |  108<H>  |  11  ----------------------------<  122<H>  3.5   |  30  |  0.77    Ca    8.3<L>      12 Jan 2024 00:40  Phos  2.6     01-12  Mg     2.00     01-12    TPro  6.4  /  Alb  2.5<L>  /  TBili  0.4  /  DBili  x   /  AST  16  /  ALT  7   /  AlkPhos  48  01-12                                                                   11.4   11.32 )-----------( 127      ( 12 Jan 2024 00:40 )             35.7     ________NUTRITION Dx_________    Pt. remains severely malnourished     PLAN/RECOMMENDATIONS:    1) Continue Jevity 1.5 @ 50mL/hr x 24hrs.  2) Obtain daily weights  3) Monitor tolerance to TF, GI status, electrolytes, glucose     RDN remains available and will f/u PRN.          Cleopatra Coyne, RDN, CDN       pager [phone] or MS Teams

## 2024-01-12 NOTE — PROGRESS NOTE ADULT - ASSESSMENT
67 yo M with PMhx of BPH, agitation, ?dementia, dysphagia after CVA now s/p PEG tube, and ?CHF presenting from [facility] for worsening agitation and hypoxia, initially found to have possible L infiltrate on CXR, covered empirically for CAP. Now with near-complete opacification of the left lung field, requiring increasing O2 requirements, and now s/p intubation 1/6 for AHRF.    #Neuro  Agitation.   - c/w home seroquel 225mg BID  - c/w home depakote 125mg at 2pm, 250 BID  - Zyprexa IM for severe agitation when pt refusing oral medications  - c/w home Clonazepam for now given Zyprexa administration  - Consider behavioral health consult after extubation for medication management  - on precedex, wean as tolerated    #Resp  Acute hypoxic respiratory failure  - s/p intubation 1/6 for hypoxia, tachypnea, and AMS despite 15L NRB  - CXR 1/6 with new near-complete opacification of the left lung field, suggestive of mucous plug and superimposed atelectasis s/p intubation 1/6  - ET tube Cx + MRSA s/p azithromycin, ceftriaxone --> switched to Zosyn (1/6 - 1/9), now on Vanc (1/6 - )   - Now extubated 1/12. Post extubation Xray with L lung collapse. Will continue with aggressive chest PT  - Duonebs q6 + inhalation  - Chest PT, IPV  - SBT trial as tolerated    #CV  Hypotension  - Required pressors after intubation likely 2/2 vasoplegia   - Levophed gtt, wean as tolerated   - Continue with home Midodrine 20mg q8  - Maintain MAP > 65    New bifascicular block on ECG   - prior 12/2023 with RBBB     #GI  Diet: NPO w/ tube feeds  - oral meds via PEG tube     Constipation   - Continue home lactulose and senna     #Renal/  - garcia cath in place; TOV today  - UA with trace LE but no bacteria or nitr  - monitor I/Os    BPH   - Continue with home Tamsulosin     #ID  Aspiration PNA   - Continue Zosyn and Vancomycin as above  - F/u blood culture, NGTD 24 hrs  - U/A neg  - MRSA PCR+; ETT MRSA+  - RVP negative   - procal 0.15    #Endo  No hx of DM   - Monitor glucose    #Heme   DVT ppx   - Lovenox     #Ethics  Full Code  Brother HCP, updated 67 yo M with PMhx of BPH, agitation, ?dementia, dysphagia after CVA now s/p PEG tube, and ?CHF presenting from [facility] for worsening agitation and hypoxia, initially found to have possible L infiltrate on CXR, covered empirically for CAP. Now with near-complete opacification of the left lung field, requiring increasing O2 requirements, and now s/p intubation 1/6 for AHRF.    #Neuro  Agitation.   - c/w home seroquel 225mg BID  - c/w home depakote 125mg at 2pm, 250 BID  - Zyprexa IM for severe agitation when pt refusing oral medications  - c/w home Clonazepam for now given Zyprexa administration  - Consider behavioral health consult after extubation for medication management  - on precedex, wean as tolerated    #Resp  Acute hypoxic respiratory failure  - s/p intubation 1/6 for hypoxia, tachypnea, and AMS despite 15L NRB  - CXR 1/6 with new near-complete opacification of the left lung field, suggestive of mucous plug and superimposed atelectasis s/p intubation 1/6  - ET tube Cx + MRSA s/p azithromycin, ceftriaxone --> switched to Zosyn (1/6 - 1/9), now on Vanc (1/6 - )   - Now extubated 1/12. Post extubation Xray with L lung collapse. Will continue with aggressive chest PT  - Duonebs q6 + inhalation  - Chest PT, IPV  - SBT trial as tolerated    #CV  Hypotension  - Required pressors after intubation likely 2/2 vasoplegia   - Levophed gtt, wean as tolerated   - Continue with home Midodrine 20mg q8  - Maintain MAP > 65    New bifascicular block on ECG   - prior 12/2023 with RBBB     #GI  Diet: NPO w/ tube feeds  - oral meds via PEG tube     Constipation   - Continue home lactulose and senna     #Renal/  - garcia cath in place; TOV today  - UA with trace LE but no bacteria or nitr  - monitor I/Os    BPH   - Continue with home Tamsulosin     #ID  Aspiration PNA   - Continue Zosyn and Vancomycin as above  - F/u blood culture, NGTD 24 hrs  - U/A neg  - MRSA PCR+; ETT MRSA+  - RVP negative   - procal 0.15    #Endo  No hx of DM   - Monitor glucose    #Heme   DVT ppx   - Lovenox     #Ethics  Full Code  Brother HCP, updated 67 yo M with PMhx of BPH, agitation, ?dementia, dysphagia after CVA now s/p PEG tube, and ?CHF presenting from [facility] for worsening agitation and hypoxia, initially found to have possible L infiltrate on CXR, covered empirically for CAP. Now with near-complete opacification of the left lung field, requiring increasing O2 requirements, and now s/p intubation 1/6 for AHRF.    #Neuro  Agitation.   - c/w home seroquel 225mg BID  - c/w home depakote 125mg at 2pm, 250 BID  - Zyprexa IM for severe agitation when pt refusing oral medications  - c/w home Clonazepam for now given Zyprexa administration  - Consider behavioral health consult after extubation for medication management  - on precedex, wean as tolerated    #Resp  Acute hypoxic respiratory failure  - s/p intubation 1/6 for hypoxia, tachypnea, and AMS despite 15L NRB  - CXR 1/6 with new near-complete opacification of the left lung field, suggestive of mucous plug and superimposed atelectasis s/p intubation 1/6  - ET tube Cx + MRSA s/p azithromycin, ceftriaxone --> switched to Zosyn (1/6 - 1/9), now on Vanc (1/6 - )   - Now extubated 1/12. Post extubation Xray with L lung collapse. Will continue with aggressive chest PT w/ chest vest  - Duonebs q6 + inhalation  - Chest PT, IPV  - SBT trial as tolerated    #CV  Hypotension  - Required pressors after intubation likely 2/2 vasoplegia   - Levophed gtt, wean as tolerated   - Continue with home Midodrine 20mg q8  - Maintain MAP > 65    New bifascicular block on ECG   - prior 12/2023 with RBBB     #GI  Diet: NPO w/ tube feeds  - oral meds via PEG tube     Constipation   - Continue home lactulose and senna     #Renal/  - garcia cath in place; TOV today  - UA with trace LE but no bacteria or nitr  - monitor I/Os    BPH   - Continue with home Tamsulosin     #ID  Aspiration PNA   - Continue Zosyn and Vancomycin as above  - F/u blood culture, NGTD 24 hrs  - U/A neg  - MRSA PCR+; ETT MRSA+  - RVP negative   - procal 0.15    #Endo  No hx of DM   - Monitor glucose    #Heme   DVT ppx   - Lovenox     #Ethics  Full Code  Brother HCP, updated  Palliative c/s 67 yo M with PMhx of BPH, agitation, ?dementia, dysphagia after CVA now s/p PEG tube, and ?CHF presenting from [facility] for worsening agitation and hypoxia, initially found to have possible L infiltrate on CXR, covered empirically for CAP. Now with near-complete opacification of the left lung field, requiring increasing O2 requirements, and now s/p intubation 1/6 for AHRF.    #Neuro  Agitation.   - c/w home seroquel 225mg BID  - c/w home depakote 125mg at 2pm, 250 BID  - Zyprexa IM for severe agitation when pt refusing oral medications  - c/w home Clonazepam for now given Zyprexa administration  - Consider behavioral health consult after extubation for medication management  - on precedex, wean as tolerated    #Resp  Acute hypoxic respiratory failure  - s/p intubation 1/6 for hypoxia, tachypnea, and AMS despite 15L NRB  - CXR 1/6 with new near-complete opacification of the left lung field, suggestive of mucous plug and superimposed atelectasis s/p intubation 1/6  - ET tube Cx + MRSA s/p azithromycin, ceftriaxone --> switched to Zosyn (1/6 - 1/9), now on Vanc (1/6 - )   - Now extubated 1/12. Post extubation Xray with L lung collapse. Will continue with aggressive chest PT w/ chest vest  - Duonebs q6 + inhalation  - Chest PT, IPV  - SBT trial as tolerated    #CV  Hypotension  - Required pressors after intubation likely 2/2 vasoplegia   - Levophed gtt, wean as tolerated   - Continue with home Midodrine 20mg q8  - Maintain MAP > 65    New bifascicular block on ECG   - prior 12/2023 with RBBB     #GI  Diet: NPO w/ tube feeds  - oral meds via PEG tube     Constipation   - Continue home lactulose and senna     #Renal/  - garcia cath in place; TOV today  - UA with trace LE but no bacteria or nitr  - monitor I/Os    BPH   - Continue with home Tamsulosin     #ID  Aspiration PNA   - Continue Zosyn and Vancomycin as above  - F/u blood culture, NGTD 24 hrs  - U/A neg  - MRSA PCR+; ETT MRSA+  - RVP negative   - procal 0.15    #Endo  No hx of DM   - Monitor glucose    #Heme   DVT ppx   - Lovenox     #Ethics  Full Code  Brother HCP, updated  Palliative c/s

## 2024-01-12 NOTE — PROGRESS NOTE ADULT - SUBJECTIVE AND OBJECTIVE BOX
Raman Mendez  PGY-3 | Internal Medicine      INTERVAL HPI/OVERNIGHT EVENTS: Extubated yesterday. No acute events O/N.    SUBJECTIVE: Patient seen and examined at bedside.     OBJECTIVE:    VITAL SIGNS:  ICU Vital Signs Last 24 Hrs  T(C): 37.4 (12 Jan 2024 04:00), Max: 37.8 (11 Jan 2024 16:00)  T(F): 99.3 (12 Jan 2024 04:00), Max: 100 (11 Jan 2024 16:00)  HR: 90 (12 Jan 2024 07:24) (61 - 123)  BP: 110/61 (12 Jan 2024 07:00) (72/40 - 155/67)  BP(mean): 71 (12 Jan 2024 07:00) (48 - 106)  ABP: --  ABP(mean): --  RR: 18 (12 Jan 2024 07:24) (15 - 31)  SpO2: 97% (12 Jan 2024 07:24) (91% - 100%)    O2 Parameters below as of 12 Jan 2024 07:24  Patient On (Oxygen Delivery Method): nasal cannula, high flow  O2 Flow (L/min): 50  O2 Concentration (%): 45      Mode: standby    01-11 @ 07:01  -  01-12 @ 07:00  --------------------------------------------------------  IN: 1491.3 mL / OUT: 1635 mL / NET: -143.7 mL      CAPILLARY BLOOD GLUCOSE          PHYSICAL EXAM:    General: NAD  HEENT: NC/AT; PERRL, clear conjunctiva  Neck: supple  Respiratory: CTA b/l  Cardiovascular: +S1/S2; RRR  Abdomen: soft, NT/ND; +BS x4  Extremities: WWP, 2+ peripheral pulses b/l; no LE edema  Skin: normal color and turgor; no rash  Neurological: pupils equal and reactive airline. follows commands    MEDICATIONS:  MEDICATIONS  (STANDING):  albuterol/ipratropium for Nebulization 3 milliLiter(s) Nebulizer every 6 hours  artificial  tears Solution 1 Drop(s) Both EYES two times a day  chlorhexidine 2% Cloths 1 Application(s) Topical daily  clonazePAM  Tablet 1 milliGRAM(s) Oral at bedtime  dexMEDEtomidine Infusion 0.5 MICROgram(s)/kG/Hr (8.38 mL/Hr) IV Continuous <Continuous>  enoxaparin Injectable 40 milliGRAM(s) SubCutaneous every 24 hours  lactulose Syrup 20 Gram(s) Oral three times a day  midodrine 20 milliGRAM(s) Oral every 8 hours  mupirocin 2% Ointment 1 Application(s) Topical two times a day  norepinephrine Infusion 0.5 MICROgram(s)/kG/Min (31.4 mL/Hr) IV Continuous <Continuous>  polyethylene glycol 3350 17 Gram(s) Oral two times a day  QUEtiapine 225 milliGRAM(s) Oral <User Schedule>  senna 1 Tablet(s) Oral every 12 hours  sodium chloride 3%  Inhalation 4 milliLiter(s) Inhalation every 12 hours  tamsulosin 0.4 milliGRAM(s) Oral at bedtime  valproic  acid Syrup 250 milliGRAM(s) Oral <User Schedule>  valproic  acid Syrup 125 milliGRAM(s) Oral <User Schedule>  vancomycin  IVPB 1000 milliGRAM(s) IV Intermittent every 12 hours  vancomycin  IVPB        MEDICATIONS  (PRN):  acetaminophen     Tablet .. 650 milliGRAM(s) Oral every 6 hours PRN Moderate Pain (4 - 6)  OLANZapine Injectable 2.5 milliGRAM(s) IntraMuscular every 6 hours PRN agitation  sodium chloride 0.9% lock flush 10 milliLiter(s) IV Push every 1 hour PRN Pre/post blood products, medications, blood draw, and to maintain line patency      ALLERGIES:  Allergies    No Known Allergies    Intolerances        LABS:                        11.4   11.32 )-----------( 127      ( 12 Jan 2024 00:40 )             35.7     01-12    146<H>  |  108<H>  |  11  ----------------------------<  122<H>  3.5   |  30  |  0.77    Ca    8.3<L>      12 Jan 2024 00:40  Phos  2.6     01-12  Mg     2.00     01-12    TPro  6.4  /  Alb  2.5<L>  /  TBili  0.4  /  DBili  x   /  AST  16  /  ALT  7   /  AlkPhos  48  01-12    PT/INR - ( 12 Jan 2024 00:40 )   PT: 17.0 sec;   INR: 1.52 ratio         PTT - ( 12 Jan 2024 00:40 )  PTT:33.4 sec  Urinalysis Basic - ( 12 Jan 2024 00:40 )    Color: x / Appearance: x / SG: x / pH: x  Gluc: 122 mg/dL / Ketone: x  / Bili: x / Urobili: x   Blood: x / Protein: x / Nitrite: x   Leuk Esterase: x / RBC: x / WBC x   Sq Epi: x / Non Sq Epi: x / Bacteria: x        RADIOLOGY & ADDITIONAL TESTS: Reviewed.

## 2024-01-12 NOTE — CHART NOTE - NSCHARTNOTEFT_GEN_A_CORE
: Nicanor Linder    INDICATION: Critical Illness, oral secretions    PROCEDURE:  [ ] LIMITED ECHO  [x ] LIMITED CHEST  [ ] LIMITED RETROPERITONEAL  [ ] LIMITED ABDOMINAL  [ ] LIMITED DVT  [ ] NEEDLE GUIDANCE VASCULAR  [ ] NEEDLE GUIDANCE THORACENTESIS  [ ] NEEDLE GUIDANCE PARACENTESIS  [ ] NEEDLE GUIDANCE PERICARDIOCENTESIS  [ ] OTHER    FINDINGS: B/L b-lines present; moreso on the left side.      INTERPRETATION: Continued active pneumonia        Images uploaded to [employer] : Nicanor Linder    INDICATION: Critical Illness, oral secretions    PROCEDURE:  [ ] LIMITED ECHO  [x ] LIMITED CHEST  [ ] LIMITED RETROPERITONEAL  [ ] LIMITED ABDOMINAL  [ ] LIMITED DVT  [ ] NEEDLE GUIDANCE VASCULAR  [ ] NEEDLE GUIDANCE THORACENTESIS  [ ] NEEDLE GUIDANCE PARACENTESIS  [ ] NEEDLE GUIDANCE PERICARDIOCENTESIS  [ ] OTHER    FINDINGS: B/L b-lines present; moreso on the left side.      INTERPRETATION: Continued active pneumonia        Images uploaded to [employer]

## 2024-01-13 NOTE — PROGRESS NOTE ADULT - ASSESSMENT
69 yo M with PMhx of BPH, agitation, ?dementia, dysphagia after CVA now s/p PEG tube, and ?CHF presenting from [facility] for worsening agitation and hypoxia, initially found to have possible L infiltrate on CXR, covered empirically for CAP. Now with near-complete opacification of the left lung field, requiring increasing O2 requirements, and now s/p intubation 1/6 for AHRF.    #Neuro  Agitation.   - c/w home seroquel 225mg BID  - c/w home depakote 125mg at 2pm, 250 BID  - Zyprexa IM for severe agitation when pt refusing oral medications  - c/w home Clonazepam for now given Zyprexa administration  - Consider behavioral health consult after extubation for medication management  - on precedex, wean as tolerated    #Resp  Acute hypoxic respiratory failure  - s/p intubation 1/6 for hypoxia, tachypnea, and AMS despite 15L NRB  - CXR 1/6 with new near-complete opacification of the left lung field, suggestive of mucous plug and superimposed atelectasis s/p intubation 1/6  - ET tube Cx + MRSA s/p azithromycin, ceftriaxone --> switched to Zosyn (1/6 - 1/9), now on Vanc (1/6 - )   - Now extubated 1/12. Post extubation Xray with L lung collapse. Will continue with aggressive chest PT w/ chest vest  - Duonebs q6 + inhalation  - Chest PT, IPV  - SBT trial as tolerated    #CV  Hypotension  - Required pressors after intubation likely 2/2 vasoplegia   - Levophed gtt, wean as tolerated   - Continue with home Midodrine 20mg q8  - Maintain MAP > 65    New bifascicular block on ECG   - prior 12/2023 with RBBB     #GI  Diet: NPO w/ tube feeds  - oral meds via PEG tube     Constipation   - Continue home lactulose and senna     #Renal/  - garcia cath in place; TOV today  - UA with trace LE but no bacteria or nitr  - monitor I/Os    BPH   - Continue with home Tamsulosin     #ID  Aspiration PNA   - Continue Zosyn and Vancomycin as above  - F/u blood culture, NGTD 24 hrs  - U/A neg  - MRSA PCR+; ETT MRSA+  - RVP negative   - procal 0.15    #Endo  No hx of DM   - Monitor glucose    #Heme   DVT ppx   - Lovenox     #Ethics  Full Code  Brother HCP, updated  Palliative c/s 67 yo M with PMhx of BPH, agitation, ?dementia, dysphagia after CVA now s/p PEG tube, and ?CHF presenting from [facility] for worsening agitation and hypoxia, initially found to have possible L infiltrate on CXR, covered empirically for CAP. Now with near-complete opacification of the left lung field, requiring increasing O2 requirements, and now s/p intubation 1/6 for AHRF.    #Neuro  Agitation.   - c/w home seroquel 225mg BID  - c/w home depakote 125mg at 2pm, 250 BID  - Zyprexa IM for severe agitation when pt refusing oral medications  - c/w home Clonazepam for now given Zyprexa administration  - Consider behavioral health consult after extubation for medication management  - on precedex, wean as tolerated    #Resp  Acute hypoxic respiratory failure  - s/p intubation 1/6 for hypoxia, tachypnea, and AMS despite 15L NRB  - CXR 1/6 with new near-complete opacification of the left lung field, suggestive of mucous plug and superimposed atelectasis s/p intubation 1/6  - ET tube Cx + MRSA s/p azithromycin, ceftriaxone --> switched to Zosyn (1/6 - 1/9), now on Vanc (1/6 - )   - Now extubated 1/12. Post extubation Xray with L lung collapse. Will continue with aggressive chest PT w/ chest vest  - Duonebs q6 + inhalation  - Chest PT, IPV  - SBT trial as tolerated    #CV  Hypotension  - Required pressors after intubation likely 2/2 vasoplegia   - Levophed gtt, wean as tolerated   - Continue with home Midodrine 20mg q8  - Maintain MAP > 65    New bifascicular block on ECG   - prior 12/2023 with RBBB     #GI  Diet: NPO w/ tube feeds  - oral meds via PEG tube     Constipation   - Continue home lactulose and senna     #Renal/  - garcia cath in place; TOV today  - UA with trace LE but no bacteria or nitr  - monitor I/Os    BPH   - Continue with home Tamsulosin     #ID  Aspiration PNA   - Continue Zosyn and Vancomycin as above  - F/u blood culture, NGTD 24 hrs  - U/A neg  - MRSA PCR+; ETT MRSA+  - RVP negative   - procal 0.15    #Endo  No hx of DM   - Monitor glucose    #Heme   DVT ppx   - Lovenox     #Ethics  Full Code  Brother HCP, updated  Palliative c/s 69 yo M with PMhx of BPH, agitation, ?dementia, dysphagia after CVA now s/p PEG tube, and ?CHF presenting from [facility] for worsening agitation and hypoxia, initially found to have possible L infiltrate on CXR, covered empirically for CAP. Now with near-complete opacification of the left lung field, requiring increasing O2 requirements, and now s/p intubation 1/6 for AHRF.    #Neuro  Agitation.   - c/w home seroquel 225mg BID  - c/w home depakote 125mg at 2pm, 250 BID  - Zyprexa IM for severe agitation when pt refusing oral medications  - c/w home Clonazepam for now given Zyprexa administration  - Consider behavioral health consult after extubation for medication management  - on precedex, wean as tolerated    #Resp  Acute hypoxic respiratory failure  - s/p intubation 1/6 for hypoxia, tachypnea, and AMS despite 15L NRB  - CXR 1/6 with new near-complete opacification of the left lung field, suggestive of mucous plug and superimposed atelectasis s/p intubation 1/6  - ET tube Cx + MRSA s/p azithromycin, ceftriaxone --> switched to Zosyn (1/6 - 1/9), now on Vanc (1/6 - )   - Now extubated 1/12. Post extubation Xray with L lung collapse. Will continue with aggressive chest PT w/ chest vest  - Duonebs q6 + inhalation  - Chest PT, IPV  - SBT trial as tolerated    #CV  Hypotension  - Required pressors after intubation likely 2/2 vasoplegia   - Levophed gtt, wean as tolerated   - Continue with home Midodrine 20mg q8  - Maintain MAP > 65    New bifascicular block on ECG   - prior 12/2023 with RBBB     #GI  Diet: NPO w/ tube feeds  - oral meds via PEG tube     Constipation   - Continue home lactulose and senna     #Renal/  - garcia cath in place; TOV today  - UA with trace LE but no bacteria or nitr  - monitor I/Os    Hypernatremia  - Now improved on free water 200cc q6h, continue    BPH   - Continue with home Tamsulosin     #ID  Aspiration PNA   - Continue Zosyn and Vancomycin as above  - F/u blood culture, NGTD 24 hrs  - U/A neg  - MRSA PCR+; ETT MRSA+  - RVP negative   - procal 0.15    #Endo  No hx of DM   - Monitor glucose    #Heme   DVT ppx   - Lovenox     #Ethics  Full Code  Brother HCP, updated  Palliative c/s 69 yo M with PMhx of BPH, agitation, ?dementia, dysphagia after CVA now s/p PEG tube, and ?CHF presenting from [facility] for worsening agitation and hypoxia, initially found to have possible L infiltrate on CXR, covered empirically for CAP. Now with near-complete opacification of the left lung field, requiring increasing O2 requirements, and now s/p intubation 1/6 for AHRF.    #Neuro  Agitation.   - c/w home seroquel 225mg BID  - c/w home depakote 125mg at 2pm, 250 BID  - Zyprexa IM for severe agitation when pt refusing oral medications  - c/w home Clonazepam for now given Zyprexa administration  - Consider behavioral health consult after extubation for medication management  - on precedex, wean as tolerated    #Resp  Acute hypoxic respiratory failure  - s/p intubation 1/6 for hypoxia, tachypnea, and AMS despite 15L NRB  - CXR 1/6 with new near-complete opacification of the left lung field, suggestive of mucous plug and superimposed atelectasis s/p intubation 1/6  - ET tube Cx + MRSA s/p azithromycin, ceftriaxone --> switched to Zosyn (1/6 - 1/9), now on Vanc (1/6 - )   - Now extubated 1/12. Post extubation Xray with L lung collapse. Will continue with aggressive chest PT w/ chest vest  - Duonebs q6 + inhalation  - Chest PT, IPV  - SBT trial as tolerated    #CV  Hypotension  - Required pressors after intubation likely 2/2 vasoplegia   - Levophed gtt, wean as tolerated   - Continue with home Midodrine 20mg q8  - Maintain MAP > 65    New bifascicular block on ECG   - prior 12/2023 with RBBB     #GI  Diet: NPO w/ tube feeds  - oral meds via PEG tube     Constipation   - Continue home lactulose and senna     #Renal/  - garcia cath in place; TOV today  - UA with trace LE but no bacteria or nitr  - monitor I/Os    Hypernatremia  - Now improved on free water 200cc q6h, continue    BPH   - Continue with home Tamsulosin     #ID  Aspiration PNA   - Continue Zosyn and Vancomycin as above  - F/u blood culture, NGTD 24 hrs  - U/A neg  - MRSA PCR+; ETT MRSA+  - RVP negative   - procal 0.15    #Endo  No hx of DM   - Monitor glucose    #Heme   DVT ppx   - Lovenox     #Ethics  Full Code  Brother HCP, updated  Palliative c/s

## 2024-01-13 NOTE — PROGRESS NOTE ADULT - SUBJECTIVE AND OBJECTIVE BOX
Raman Mendez  PGY-2 | Internal Medicine      INTERVAL HPI/OVERNIGHT EVENTS:    SUBJECTIVE: Patient seen and examined at bedside.     CONSTITUTIONAL: No weakness, fevers or chills  EYES/ENT: No visual changes;  No vertigo or throat pain   NECK: No pain or stiffness  RESPIRATORY: No cough, wheezing, hemoptysis; No shortness of breath  CARDIOVASCULAR: No chest pain or palpitations  GASTROINTESTINAL: No abdominal or epigastric pain. No nausea, vomiting, or hematemesis; No diarrhea or constipation. No melena or hematochezia.  GENITOURINARY: No dysuria, frequency or hematuria  NEUROLOGICAL: No numbness or weakness  SKIN: No itching, rashes    OBJECTIVE:    VITAL SIGNS:  ICU Vital Signs Last 24 Hrs  T(C): 37.3 (13 Jan 2024 08:00), Max: 37.6 (12 Jan 2024 16:00)  T(F): 99.2 (13 Jan 2024 08:00), Max: 99.6 (12 Jan 2024 16:00)  HR: 59 (13 Jan 2024 12:00) (59 - 142)  BP: 93/58 (13 Jan 2024 13:00) (93/47 - 140/61)  BP(mean): 65 (13 Jan 2024 13:00) (59 - 113)  ABP: --  ABP(mean): --  RR: 16 (13 Jan 2024 13:00) (16 - 35)  SpO2: 99% (13 Jan 2024 13:00) (84% - 100%)    O2 Parameters below as of 13 Jan 2024 11:25  Patient On (Oxygen Delivery Method): nasal cannula, high flow  O2 Flow (L/min): 40  O2 Concentration (%): 80          01-12 @ 07:01  -  01-13 @ 07:00  --------------------------------------------------------  IN: 2911.7 mL / OUT: 1055 mL / NET: 1856.7 mL    01-13 @ 07:01  -  01-13 @ 13:06  --------------------------------------------------------  IN: 778.6 mL / OUT: 200 mL / NET: 578.6 mL      CAPILLARY BLOOD GLUCOSE          PHYSICAL EXAM:    General: NAD  HEENT: NC/AT; PERRL, clear conjunctiva  Neck: supple  Respiratory: CTA b/l  Cardiovascular: +S1/S2; RRR  Abdomen: soft, NT/ND; +BS x4  Extremities: WWP, 2+ peripheral pulses b/l; no LE edema  Skin: normal color and turgor; no rash  Neurological:    MEDICATIONS:  MEDICATIONS  (STANDING):  acetylcysteine 10%  Inhalation 4 milliLiter(s) Inhalation every 12 hours  albuterol/ipratropium for Nebulization 3 milliLiter(s) Nebulizer every 6 hours  artificial  tears Solution 1 Drop(s) Both EYES two times a day  chlorhexidine 2% Cloths 1 Application(s) Topical daily  clonazePAM  Tablet 1 milliGRAM(s) Oral at bedtime  dexMEDEtomidine Infusion 0.2 MICROgram(s)/kG/Hr (3.35 mL/Hr) IV Continuous <Continuous>  enoxaparin Injectable 40 milliGRAM(s) SubCutaneous every 24 hours  lactulose Syrup 20 Gram(s) Oral three times a day  midodrine 30 milliGRAM(s) Oral every 8 hours  mupirocin 2% Ointment 1 Application(s) Topical two times a day  norepinephrine Infusion 0.5 MICROgram(s)/kG/Min (31.4 mL/Hr) IV Continuous <Continuous>  polyethylene glycol 3350 17 Gram(s) Oral two times a day  QUEtiapine 225 milliGRAM(s) Oral <User Schedule>  senna 1 Tablet(s) Oral every 12 hours  sodium chloride 3%  Inhalation 4 milliLiter(s) Inhalation every 12 hours  tamsulosin 0.4 milliGRAM(s) Oral at bedtime  valproic  acid Syrup 250 milliGRAM(s) Oral <User Schedule>  valproic  acid Syrup 125 milliGRAM(s) Oral <User Schedule>  vancomycin  IVPB 1000 milliGRAM(s) IV Intermittent every 12 hours  vancomycin  IVPB        MEDICATIONS  (PRN):  acetaminophen     Tablet .. 650 milliGRAM(s) Oral every 6 hours PRN Moderate Pain (4 - 6)  OLANZapine Injectable 2.5 milliGRAM(s) IntraMuscular every 6 hours PRN agitation  sodium chloride 0.9% lock flush 10 milliLiter(s) IV Push every 1 hour PRN Pre/post blood products, medications, blood draw, and to maintain line patency      ALLERGIES:  Allergies    No Known Allergies    Intolerances        LABS:                        11.1   10.38 )-----------( 127      ( 13 Jan 2024 02:10 )             33.9     01-13    146<H>  |  108<H>  |  10  ----------------------------<  106<H>  3.7   |  30  |  0.68    Ca    8.1<L>      13 Jan 2024 02:10  Phos  3.0     01-13  Mg     2.00     01-13    TPro  6.5  /  Alb  2.8<L>  /  TBili  0.3  /  DBili  x   /  AST  15  /  ALT  8   /  AlkPhos  43  01-13    PT/INR - ( 13 Jan 2024 02:10 )   PT: 16.7 sec;   INR: 1.50 ratio         PTT - ( 13 Jan 2024 02:10 )  PTT:32.4 sec  Urinalysis Basic - ( 13 Jan 2024 02:10 )    Color: x / Appearance: x / SG: x / pH: x  Gluc: 106 mg/dL / Ketone: x  / Bili: x / Urobili: x   Blood: x / Protein: x / Nitrite: x   Leuk Esterase: x / RBC: x / WBC x   Sq Epi: x / Non Sq Epi: x / Bacteria: x        RADIOLOGY & ADDITIONAL TESTS: Reviewed. Raman Mendez  PGY-3 | Internal Medicine      INTERVAL HPI/OVERNIGHT EVENTS: JESÚS    SUBJECTIVE: Patient seen and examined at bedside.     OBJECTIVE:    VITAL SIGNS:  ICU Vital Signs Last 24 Hrs  T(C): 37.3 (13 Jan 2024 08:00), Max: 37.6 (12 Jan 2024 16:00)  T(F): 99.2 (13 Jan 2024 08:00), Max: 99.6 (12 Jan 2024 16:00)  HR: 59 (13 Jan 2024 12:00) (59 - 142)  BP: 93/58 (13 Jan 2024 13:00) (93/47 - 140/61)  BP(mean): 65 (13 Jan 2024 13:00) (59 - 113)  ABP: --  ABP(mean): --  RR: 16 (13 Jan 2024 13:00) (16 - 35)  SpO2: 99% (13 Jan 2024 13:00) (84% - 100%)    O2 Parameters below as of 13 Jan 2024 11:25  Patient On (Oxygen Delivery Method): nasal cannula, high flow  O2 Flow (L/min): 40  O2 Concentration (%): 80          01-12 @ 07:01 - 01-13 @ 07:00  --------------------------------------------------------  IN: 2911.7 mL / OUT: 1055 mL / NET: 1856.7 mL    01-13 @ 07:01 - 01-13 @ 13:06  --------------------------------------------------------  IN: 778.6 mL / OUT: 200 mL / NET: 578.6 mL      CAPILLARY BLOOD GLUCOSE          PHYSICAL EXAM:    General: NAD  HEENT: NC/AT; PERRL, clear conjunctiva  Neck: supple  Respiratory: CTA b/l  Cardiovascular: +S1/S2; RRR  Abdomen: soft, NT/ND; +BS x4  Extremities: WWP, 2+ peripheral pulses b/l; no LE edema  Skin: normal color and turgor; no rash  Neurological: pupils equal and reactive airline. follows commands    MEDICATIONS:  MEDICATIONS  (STANDING):  acetylcysteine 10%  Inhalation 4 milliLiter(s) Inhalation every 12 hours  albuterol/ipratropium for Nebulization 3 milliLiter(s) Nebulizer every 6 hours  artificial  tears Solution 1 Drop(s) Both EYES two times a day  chlorhexidine 2% Cloths 1 Application(s) Topical daily  clonazePAM  Tablet 1 milliGRAM(s) Oral at bedtime  dexMEDEtomidine Infusion 0.2 MICROgram(s)/kG/Hr (3.35 mL/Hr) IV Continuous <Continuous>  enoxaparin Injectable 40 milliGRAM(s) SubCutaneous every 24 hours  lactulose Syrup 20 Gram(s) Oral three times a day  midodrine 30 milliGRAM(s) Oral every 8 hours  mupirocin 2% Ointment 1 Application(s) Topical two times a day  norepinephrine Infusion 0.5 MICROgram(s)/kG/Min (31.4 mL/Hr) IV Continuous <Continuous>  polyethylene glycol 3350 17 Gram(s) Oral two times a day  QUEtiapine 225 milliGRAM(s) Oral <User Schedule>  senna 1 Tablet(s) Oral every 12 hours  sodium chloride 3%  Inhalation 4 milliLiter(s) Inhalation every 12 hours  tamsulosin 0.4 milliGRAM(s) Oral at bedtime  valproic  acid Syrup 250 milliGRAM(s) Oral <User Schedule>  valproic  acid Syrup 125 milliGRAM(s) Oral <User Schedule>  vancomycin  IVPB 1000 milliGRAM(s) IV Intermittent every 12 hours  vancomycin  IVPB        MEDICATIONS  (PRN):  acetaminophen     Tablet .. 650 milliGRAM(s) Oral every 6 hours PRN Moderate Pain (4 - 6)  OLANZapine Injectable 2.5 milliGRAM(s) IntraMuscular every 6 hours PRN agitation  sodium chloride 0.9% lock flush 10 milliLiter(s) IV Push every 1 hour PRN Pre/post blood products, medications, blood draw, and to maintain line patency      ALLERGIES:  Allergies    No Known Allergies    Intolerances        LABS:                        11.1   10.38 )-----------( 127      ( 13 Jan 2024 02:10 )             33.9     01-13    146<H>  |  108<H>  |  10  ----------------------------<  106<H>  3.7   |  30  |  0.68    Ca    8.1<L>      13 Jan 2024 02:10  Phos  3.0     01-13  Mg     2.00     01-13    TPro  6.5  /  Alb  2.8<L>  /  TBili  0.3  /  DBili  x   /  AST  15  /  ALT  8   /  AlkPhos  43  01-13    PT/INR - ( 13 Jan 2024 02:10 )   PT: 16.7 sec;   INR: 1.50 ratio         PTT - ( 13 Jan 2024 02:10 )  PTT:32.4 sec  Urinalysis Basic - ( 13 Jan 2024 02:10 )    Color: x / Appearance: x / SG: x / pH: x  Gluc: 106 mg/dL / Ketone: x  / Bili: x / Urobili: x   Blood: x / Protein: x / Nitrite: x   Leuk Esterase: x / RBC: x / WBC x   Sq Epi: x / Non Sq Epi: x / Bacteria: x        RADIOLOGY & ADDITIONAL TESTS: Reviewed.

## 2024-01-14 NOTE — PROGRESS NOTE ADULT - SUBJECTIVE AND OBJECTIVE BOX
INTERVAL HPI/OVERNIGHT EVENTS:    SUBJECTIVE: Patient seen and examined at bedside.       VITAL SIGNS:  ICU Vital Signs Last 24 Hrs  T(C): 36.4 (14 Jan 2024 04:00), Max: 37.3 (13 Jan 2024 08:00)  T(F): 97.6 (14 Jan 2024 04:00), Max: 99.2 (13 Jan 2024 08:00)  HR: 112 (14 Jan 2024 07:12) (50 - 142)  BP: 93/57 (14 Jan 2024 06:00) (76/42 - 140/61)  BP(mean): 64 (14 Jan 2024 06:00) (50 - 113)  ABP: --  ABP(mean): --  RR: 31 (14 Jan 2024 07:12) (16 - 31)  SpO2: 94% (14 Jan 2024 07:12) (84% - 100%)    O2 Parameters below as of 14 Jan 2024 07:12  Patient On (Oxygen Delivery Method): nasal cannula, high flow  O2 Flow (L/min): 40  O2 Concentration (%): 70        Plateau pressure:   P/F ratio:     01-13 @ 07:01  -  01-14 @ 07:00  --------------------------------------------------------  IN: 3473.1 mL / OUT: 1135 mL / NET: 2338.1 mL      CAPILLARY BLOOD GLUCOSE        ECG:    PHYSICAL EXAM:    General:   HEENT:   Neck:   Respiratory:   Cardiovascular:   Abdomen:   Extremities:  Neurological:    MEDICATIONS:  MEDICATIONS  (STANDING):  acetylcysteine 10%  Inhalation 4 milliLiter(s) Inhalation every 12 hours  albuterol/ipratropium for Nebulization 3 milliLiter(s) Nebulizer every 6 hours  artificial  tears Solution 1 Drop(s) Both EYES two times a day  chlorhexidine 2% Cloths 1 Application(s) Topical daily  clonazePAM  Tablet 1 milliGRAM(s) Oral at bedtime  dexMEDEtomidine Infusion 0.2 MICROgram(s)/kG/Hr (3.35 mL/Hr) IV Continuous <Continuous>  enoxaparin Injectable 40 milliGRAM(s) SubCutaneous every 24 hours  lactulose Syrup 20 Gram(s) Oral three times a day  midodrine 30 milliGRAM(s) Oral every 8 hours  mupirocin 2% Ointment 1 Application(s) Topical two times a day  norepinephrine Infusion 0.5 MICROgram(s)/kG/Min (31.4 mL/Hr) IV Continuous <Continuous>  polyethylene glycol 3350 17 Gram(s) Oral two times a day  QUEtiapine 225 milliGRAM(s) Oral <User Schedule>  senna 1 Tablet(s) Oral every 12 hours  sodium chloride 3%  Inhalation 4 milliLiter(s) Inhalation every 12 hours  tamsulosin 0.4 milliGRAM(s) Oral at bedtime  valproic  acid Syrup 250 milliGRAM(s) Oral <User Schedule>  valproic  acid Syrup 125 milliGRAM(s) Oral <User Schedule>  vancomycin  IVPB      vancomycin  IVPB 1000 milliGRAM(s) IV Intermittent every 12 hours    MEDICATIONS  (PRN):  acetaminophen     Tablet .. 650 milliGRAM(s) Oral every 6 hours PRN Moderate Pain (4 - 6)  OLANZapine Injectable 2.5 milliGRAM(s) IntraMuscular every 6 hours PRN agitation  sodium chloride 0.9% lock flush 10 milliLiter(s) IV Push every 1 hour PRN Pre/post blood products, medications, blood draw, and to maintain line patency      ALLERGIES:  Allergies    No Known Allergies    Intolerances        LABS:                        11.6   9.11  )-----------( 165      ( 14 Jan 2024 01:30 )             34.7     01-14    142  |  104  |  10  ----------------------------<  107<H>  4.4   |  31  |  0.61    Ca    8.5      14 Jan 2024 01:30  Phos  3.7     01-14  Mg     2.10     01-14    TPro  6.7  /  Alb  2.7<L>  /  TBili  0.3  /  DBili  x   /  AST  13  /  ALT  8   /  AlkPhos  45  01-14    PT/INR - ( 14 Jan 2024 01:30 )   PT: 15.1 sec;   INR: 1.35 ratio         PTT - ( 14 Jan 2024 01:30 )  PTT:34.0 sec  Urinalysis Basic - ( 14 Jan 2024 01:30 )    Color: x / Appearance: x / SG: x / pH: x  Gluc: 107 mg/dL / Ketone: x  / Bili: x / Urobili: x   Blood: x / Protein: x / Nitrite: x   Leuk Esterase: x / RBC: x / WBC x   Sq Epi: x / Non Sq Epi: x / Bacteria: x        RADIOLOGY & ADDITIONAL TESTS: Reviewed.   INTERVAL HPI/OVERNIGHT EVENTS:    SUBJECTIVE: Patient seen and examined at bedside.       VITAL SIGNS:  ICU Vital Signs Last 24 Hrs  T(C): 36.4 (14 Jan 2024 04:00), Max: 37.3 (13 Jan 2024 08:00)  T(F): 97.6 (14 Jan 2024 04:00), Max: 99.2 (13 Jan 2024 08:00)  HR: 112 (14 Jan 2024 07:12) (50 - 142)  BP: 93/57 (14 Jan 2024 06:00) (76/42 - 140/61)  BP(mean): 64 (14 Jan 2024 06:00) (50 - 113)  ABP: --  ABP(mean): --  RR: 31 (14 Jan 2024 07:12) (16 - 31)  SpO2: 94% (14 Jan 2024 07:12) (84% - 100%)    O2 Parameters below as of 14 Jan 2024 07:12  Patient On (Oxygen Delivery Method): nasal cannula, high flow  O2 Flow (L/min): 40  O2 Concentration (%): 70        Plateau pressure:   P/F ratio:     01-13 @ 07:01  -  01-14 @ 07:00  --------------------------------------------------------  IN: 3473.1 mL / OUT: 1135 mL / NET: 2338.1 mL      CAPILLARY BLOOD GLUCOSE        ECG:    PHYSICAL EXAM:    General: NAD  HEENT: NC/AT; PERRL, clear conjunctiva  Neck: supple  Respiratory: CTA b/l  Cardiovascular: +S1/S2; RRR  Abdomen: soft, NT/ND; +BS x4  Extremities: WWP, 2+ peripheral pulses b/l; no LE edema  Skin: normal color and turgor; no rash  Neurological: pupils equal and reactive airline. follows command    MEDICATIONS:  MEDICATIONS  (STANDING):  acetylcysteine 10%  Inhalation 4 milliLiter(s) Inhalation every 12 hours  albuterol/ipratropium for Nebulization 3 milliLiter(s) Nebulizer every 6 hours  artificial  tears Solution 1 Drop(s) Both EYES two times a day  chlorhexidine 2% Cloths 1 Application(s) Topical daily  clonazePAM  Tablet 1 milliGRAM(s) Oral at bedtime  dexMEDEtomidine Infusion 0.2 MICROgram(s)/kG/Hr (3.35 mL/Hr) IV Continuous <Continuous>  enoxaparin Injectable 40 milliGRAM(s) SubCutaneous every 24 hours  lactulose Syrup 20 Gram(s) Oral three times a day  midodrine 30 milliGRAM(s) Oral every 8 hours  mupirocin 2% Ointment 1 Application(s) Topical two times a day  norepinephrine Infusion 0.5 MICROgram(s)/kG/Min (31.4 mL/Hr) IV Continuous <Continuous>  polyethylene glycol 3350 17 Gram(s) Oral two times a day  QUEtiapine 225 milliGRAM(s) Oral <User Schedule>  senna 1 Tablet(s) Oral every 12 hours  sodium chloride 3%  Inhalation 4 milliLiter(s) Inhalation every 12 hours  tamsulosin 0.4 milliGRAM(s) Oral at bedtime  valproic  acid Syrup 250 milliGRAM(s) Oral <User Schedule>  valproic  acid Syrup 125 milliGRAM(s) Oral <User Schedule>  vancomycin  IVPB      vancomycin  IVPB 1000 milliGRAM(s) IV Intermittent every 12 hours    MEDICATIONS  (PRN):  acetaminophen     Tablet .. 650 milliGRAM(s) Oral every 6 hours PRN Moderate Pain (4 - 6)  OLANZapine Injectable 2.5 milliGRAM(s) IntraMuscular every 6 hours PRN agitation  sodium chloride 0.9% lock flush 10 milliLiter(s) IV Push every 1 hour PRN Pre/post blood products, medications, blood draw, and to maintain line patency      ALLERGIES:  Allergies    No Known Allergies    Intolerances        LABS:                        11.6   9.11  )-----------( 165      ( 14 Jan 2024 01:30 )             34.7     01-14    142  |  104  |  10  ----------------------------<  107<H>  4.4   |  31  |  0.61    Ca    8.5      14 Jan 2024 01:30  Phos  3.7     01-14  Mg     2.10     01-14    TPro  6.7  /  Alb  2.7<L>  /  TBili  0.3  /  DBili  x   /  AST  13  /  ALT  8   /  AlkPhos  45  01-14    PT/INR - ( 14 Jan 2024 01:30 )   PT: 15.1 sec;   INR: 1.35 ratio         PTT - ( 14 Jan 2024 01:30 )  PTT:34.0 sec  Urinalysis Basic - ( 14 Jan 2024 01:30 )    Color: x / Appearance: x / SG: x / pH: x  Gluc: 107 mg/dL / Ketone: x  / Bili: x / Urobili: x   Blood: x / Protein: x / Nitrite: x   Leuk Esterase: x / RBC: x / WBC x   Sq Epi: x / Non Sq Epi: x / Bacteria: x        RADIOLOGY & ADDITIONAL TESTS: Reviewed.

## 2024-01-14 NOTE — PROGRESS NOTE ADULT - ASSESSMENT
67 yo M with PMhx of BPH, agitation, ?dementia, dysphagia after CVA now s/p PEG tube, and ?CHF presenting from [facility] [address] for worsening agitation and hypoxia, initially found to have possible L infiltrate on CXR, covered empirically for CAP. Now with near-complete opacification of the left lung field, requiring increasing O2 requirements, and now s/p intubation 1/6 for AHRF.    #Neuro  Agitation.   - c/w home seroquel 225mg BID  - c/w home depakote 125mg at 2pm, 250 BID  - Zyprexa IM for severe agitation when pt refusing oral medications  - c/w home Clonazepam for now given Zyprexa administration  - Consider behavioral health consult after extubation for medication management  - on precedex, wean as tolerated    #Resp  Acute hypoxic respiratory failure  - s/p intubation 1/6 for hypoxia, tachypnea, and AMS despite 15L NRB  - CXR 1/6 with new near-complete opacification of the left lung field, suggestive of mucous plug and superimposed atelectasis s/p intubation 1/6  - ET tube Cx + MRSA s/p azithromycin, ceftriaxone --> switched to Zosyn (1/6 - 1/9), now on Vanc (1/6 - )   - Now extubated 1/12. Post extubation Xray with L lung collapse. Will continue with aggressive chest PT w/ chest vest  - Duonebs q6 + inhalation  - Chest PT, IPV  - SBT trial as tolerated    #CV  Hypotension  - Required pressors after intubation likely 2/2 vasoplegia   - Levophed gtt, wean as tolerated   - Continue with home Midodrine 20mg q8  - Maintain MAP > 65    New bifascicular block on ECG   - prior 12/2023 with RBBB     #GI  Diet: NPO w/ tube feeds  - oral meds via PEG tube     Constipation   - Continue home lactulose and senna     #Renal/  - garcia cath in place; TOV today  - UA with trace LE but no bacteria or nitr  - monitor I/Os    Hypernatremia  - Now improved on free water 200cc q6h, continue    BPH   - Continue with home Tamsulosin     #ID  Aspiration PNA   - Continue Zosyn and Vancomycin as above  - F/u blood culture, NGTD 24 hrs  - U/A neg  - MRSA PCR+; ETT MRSA+  - RVP negative   - procal 0.15    #Endo  No hx of DM   - Monitor glucose    #Heme   DVT ppx   - Lovenox     #Ethics  Full Code  Brother HCP, updated  Palliative c/s 67 yo M with PMhx of BPH, agitation, ?dementia, dysphagia after CVA now s/p PEG tube, and ?CHF presenting from [facility] for worsening agitation and hypoxia, initially found to have possible L infiltrate on CXR, covered empirically for CAP. Now with near-complete opacification of the left lung field, requiring increasing O2 requirements, and now s/p intubation 1/6 for AHRF.    #Neuro  Agitation.   - c/w home seroquel 225mg BID  - c/w home depakote 125mg at 2pm, 250 BID  - Zyprexa IM for severe agitation when pt refusing oral medications  - c/w home Clonazepam for now given Zyprexa administration  - Consider behavioral health consult after extubation for medication management  - on precedex, wean as tolerated    #Resp  Acute hypoxic respiratory failure  - s/p intubation 1/6 for hypoxia, tachypnea, and AMS despite 15L NRB  - CXR 1/6 with new near-complete opacification of the left lung field, suggestive of mucous plug and superimposed atelectasis s/p intubation 1/6  - ET tube Cx + MRSA s/p azithromycin, ceftriaxone --> switched to Zosyn (1/6 - 1/9), now on Vanc (1/6 - )   - Now extubated 1/12. Post extubation Xray with L lung collapse. Will continue with aggressive chest PT w/ chest vest  - Duonebs q6 + inhalation  - Chest PT, IPV  - SBT trial as tolerated    #CV  Hypotension  - Required pressors after intubation likely 2/2 vasoplegia   - Levophed gtt, wean as tolerated   - Continue with home Midodrine 20mg q8  - Maintain MAP > 65    New bifascicular block on ECG   - prior 12/2023 with RBBB     #GI  Diet: NPO w/ tube feeds  - oral meds via PEG tube     Constipation   - Continue home lactulose and senna     #Renal/  - garcia cath in place; TOV today  - UA with trace LE but no bacteria or nitr  - monitor I/Os    Hypernatremia  - Now improved on free water 200cc q6h, continue    BPH   - Continue with home Tamsulosin     #ID  Aspiration PNA   - Continue Zosyn and Vancomycin as above  - F/u blood culture, NGTD 24 hrs  - U/A neg  - MRSA PCR+; ETT MRSA+  - RVP negative   - procal 0.15    #Endo  No hx of DM   - Monitor glucose    #Heme   DVT ppx   - Lovenox     #Ethics  Full Code  Brother HCP, updated  Palliative c/s 69 yo M with PMhx of BPH, agitation, ?dementia, dysphagia after CVA now s/p PEG tube, and ?CHF presenting from [facility] for worsening agitation and hypoxia, initially found to have possible L infiltrate on CXR, covered empirically for CAP. Now with near-complete opacification of the left lung field, requiring increasing O2 requirements, and now s/p intubation 1/6 for AHRF.    #Neuro  Agitation.   - c/w home seroquel 225mg BID  - c/w home depakote 125mg at 2pm, 250 BID  - Zyprexa IM for severe agitation when pt refusing oral medications  - c/w home Clonazepam for now given Zyprexa administration  - Consider behavioral health consult after extubation for medication management  - dc'ed precedex     #Resp  Acute hypoxic respiratory failure  - s/p intubation 1/6 for hypoxia, tachypnea, and AMS despite 15L NRB  - CXR 1/6 with new near-complete opacification of the left lung field, suggestive of mucous plug and superimposed atelectasis s/p intubation 1/6  - ET tube Cx + MRSA s/p azithromycin, ceftriaxone --> switched to Zosyn (1/6 - 1/9), now on Vanc (1/6 - )   - Now extubated 1/12. Post extubation Xray with L lung collapse. Will continue with aggressive chest PT w/ chest vest  - Duonebs q6 + inhalation  - transitioned from HFNC to NC at 6L     #CV  Hypotension  - Required pressors after intubation likely 2/2 vasoplegia   - Levophed gtt, wean as tolerated   - increased Midodrine to 30mg q8, started droxidopa 200mg TID  - Maintain MAP > 65    New bifascicular block on ECG   - prior 12/2023 with RBBB     #GI  Diet: NPO w/ tube feeds  - oral meds via PEG tube     Constipation   - Continue home lactulose and senna     #Renal/  - garcia cath in place; TOV today  - UA with trace LE but no bacteria or nitrites   - monitor I/Os    Hypernatremia  - Now improved on free water 200cc q6h, continue    BPH   - Continue with home Tamsulosin     #ID  Aspiration PNA   - Continue with Vancomycin as above  - F/u blood culture, NGTD  - U/A neg  - MRSA PCR+; ETT MRSA+  - RVP negative   - procal 0.15    #Endo  No hx of DM   - Monitor glucose    #Heme   DVT ppx   - Lovenox     #Ethics  Full Code  Brother HCP, updated  Palliative c/s 69 yo M with PMhx of BPH, agitation, ?dementia, dysphagia after CVA now s/p PEG tube, and ?CHF presenting from [facility] for worsening agitation and hypoxia, initially found to have possible L infiltrate on CXR, covered empirically for CAP. Now with near-complete opacification of the left lung field, requiring increasing O2 requirements, and now s/p intubation 1/6 for AHRF.    #Neuro  Agitation.   - c/w home seroquel 225mg BID  - c/w home depakote 125mg at 2pm, 250 BID  - Zyprexa IM for severe agitation when pt refusing oral medications  - c/w home Clonazepam for now given Zyprexa administration  - Consider behavioral health consult after extubation for medication management  - dc'ed precedex     #Resp  Acute hypoxic respiratory failure  - s/p intubation 1/6 for hypoxia, tachypnea, and AMS despite 15L NRB  - CXR 1/6 with new near-complete opacification of the left lung field, suggestive of mucous plug and superimposed atelectasis s/p intubation 1/6  - ET tube Cx + MRSA s/p azithromycin, ceftriaxone --> switched to Zosyn (1/6 - 1/9), now on Vanc (1/6 - )   - Now extubated 1/12. Post extubation Xray with L lung collapse. Will continue with aggressive chest PT w/ chest vest  - Duonebs q6 + inhalation  - transitioned from HFNC to NC at 6L     #CV  Hypotension  - Required pressors after intubation likely 2/2 vasoplegia   - Levophed gtt, wean as tolerated   - increased Midodrine to 30mg q8, started droxidopa 200mg TID  - Maintain MAP > 65    New bifascicular block on ECG   - prior 12/2023 with RBBB     #GI  Diet: NPO w/ tube feeds  - oral meds via PEG tube     Constipation   - Continue home lactulose and senna     #Renal/  - garcia cath in place; TOV today  - UA with trace LE but no bacteria or nitrites   - monitor I/Os    Hypernatremia  - Now improved on free water 200cc q6h, continue    BPH   - Continue with home Tamsulosin     #ID  Aspiration PNA   - Continue with Vancomycin as above  - F/u blood culture, NGTD  - U/A neg  - MRSA PCR+; ETT MRSA+  - RVP negative   - procal 0.15    #Endo  No hx of DM   - Monitor glucose    #Heme   DVT ppx   - Lovenox     #Ethics  Full Code  Brother HCP, updated  Palliative c/s

## 2024-01-15 NOTE — PROGRESS NOTE ADULT - ASSESSMENT
67 yo M with PMhx of BPH, agitation, ?dementia, dysphagia after CVA now s/p PEG tube, and ?CHF presenting from [facility] for worsening agitation and hypoxia, initially found to have possible L infiltrate on CXR, covered empirically for CAP. Now with near-complete opacification of the left lung field, requiring increasing O2 requirements, and now s/p intubation 1/6 for AHRF.    #Neuro  Agitation.   - c/w home seroquel 225mg BID  - c/w home depakote 125mg at 2pm, 250 BID  - Zyprexa IM for severe agitation when pt refusing oral medications  - c/w home Clonazepam for now given Zyprexa administration  - Consider behavioral health consult after extubation for medication management  - dc'ed precedex     #Resp  Acute hypoxic respiratory failure  - s/p intubation 1/6 for hypoxia, tachypnea, and AMS despite 15L NRB  - CXR 1/6 with new near-complete opacification of the left lung field, suggestive of mucous plug and superimposed atelectasis s/p intubation 1/6  - ET tube Cx + MRSA s/p azithromycin, ceftriaxone --> switched to Zosyn (1/6 - 1/9), now on Vanc (1/6 - )   - Now extubated 1/12. Post extubation Xray with L lung collapse. Will continue with aggressive chest PT w/ chest vest  - Duonebs q6 + inhalation  - transitioned from HFNC to NC at 6L     #CV  Hypotension  - Required pressors after intubation likely 2/2 vasoplegia   - Levophed gtt, wean as tolerated   - increased Midodrine to 30mg q8, increase droxidopa 300mg TID  - Maintain MAP > 65    New bifascicular block on ECG   - prior 12/2023 with RBBB     #GI  Diet: NPO w/ tube feeds  - oral meds via PEG tube     Constipation   - Continue home lactulose and senna     #Renal/  - garcia cath in place; TOV today  - UA with trace LE but no bacteria or nitrites   - monitor I/Os    Hypernatremia  - Now improved on free water 200cc q6h, continue    BPH   - Continue with home Tamsulosin     #ID  Aspiration PNA   - Continue with Vancomycin as above  - F/u blood culture, NGTD  - U/A neg  - MRSA PCR+; ETT MRSA+  - RVP negative   - procal 0.15    #Endo  No hx of DM   - Monitor glucose    #Heme   DVT ppx   - Lovenox     #Ethics  Full Code  Brother HCP, updated  Palliative c/s 67 yo M with PMhx of BPH, agitation, ?dementia, dysphagia after CVA now s/p PEG tube, and ?CHF presenting from [facility] for worsening agitation and hypoxia, initially found to have possible L infiltrate on CXR, covered empirically for CAP. Now with near-complete opacification of the left lung field, requiring increasing O2 requirements, and now s/p intubation 1/6 for AHRF.    #Neuro  Agitation.   - c/w home seroquel 225mg BID  - c/w home depakote 125mg at 2pm, 250 BID  - Zyprexa IM for severe agitation when pt refusing oral medications  - c/w home Clonazepam for now given Zyprexa administration  - Consider behavioral health consult after extubation for medication management  - dc'ed precedex     #Resp  Acute hypoxic respiratory failure  - s/p intubation 1/6 for hypoxia, tachypnea, and AMS despite 15L NRB  - CXR 1/6 with new near-complete opacification of the left lung field, suggestive of mucous plug and superimposed atelectasis s/p intubation 1/6  - ET tube Cx + MRSA s/p azithromycin, ceftriaxone --> switched to Zosyn (1/6 - 1/9), now on Vanc (1/6 - )   - Now extubated 1/12. Post extubation Xray with L lung collapse. Will continue with aggressive chest PT w/ chest vest  - Duonebs q6 + inhalation  - transitioned from HFNC to NC at 6L     #CV  Hypotension  - Required pressors after intubation likely 2/2 vasoplegia   - Levophed gtt, wean as tolerated   - increased Midodrine to 30mg q8, increase droxidopa 300mg TID  - Maintain MAP > 65    New bifascicular block on ECG   - prior 12/2023 with RBBB     #GI  Diet: NPO w/ tube feeds  - oral meds via PEG tube     Constipation   - Continue home lactulose and senna     #Renal/  - garcia cath in place; TOV today  - UA with trace LE but no bacteria or nitrites   - monitor I/Os    Hypernatremia  - Now improved on free water 200cc q6h, continue    BPH   - Continue with home Tamsulosin     #ID  Aspiration PNA   - Continue with Vancomycin as above  - F/u blood culture, NGTD  - U/A neg  - MRSA PCR+; ETT MRSA+  - RVP negative   - procal 0.15    #Endo  No hx of DM   - Monitor glucose    #Heme   DVT ppx   - Lovenox     #Ethics  Full Code  Brother HCP, updated  Palliative c/s 67 yo M with PMhx of BPH, agitation, ?dementia, dysphagia after CVA now s/p PEG tube, and ?CHF presenting from [facility] for worsening agitation and hypoxia, initially found to have possible L infiltrate on CXR, covered empirically for CAP. Now with near-complete opacification of the left lung field, requiring increasing O2 requirements, and now s/p intubation 1/6 for AHRF.    #Neuro  Agitation.   - c/w home seroquel 225mg BID  - c/w home depakote 125mg at 2pm, 250 BID  - Zyprexa IM for severe agitation when pt refusing oral medications  - c/w home Clonazepam for now given Zyprexa administration  - Consider behavioral health consult after extubation for medication management  - dc'ed precedex     #Resp  Acute hypoxic respiratory failure  - s/p intubation 1/6 for hypoxia, tachypnea, and AMS despite 15L NRB  - CXR 1/6 with new near-complete opacification of the left lung field, suggestive of mucous plug and superimposed atelectasis s/p intubation 1/6  - ET tube Cx + MRSA s/p azithromycin, ceftriaxone --> switched to Zosyn (1/6 - 1/9), now on Vanc (1/6 - )   - Now extubated 1/12. Post extubation Xray with L lung collapse. Will continue with aggressive chest PT w/ chest vest  - Duonebs q6 + inhalation  - transitioned from HFNC to NC at 6L     #CV  Hypotension  - Required pressors after intubation likely 2/2 vasoplegia   - Levophed gtt, wean as tolerated   - increased Midodrine to 30mg q8, increase droxidopa 300mg TID  - Maintain MAP > 65    New bifascicular block on ECG   - prior 12/2023 with RBBB     #GI  Diet: NPO w/ tube feeds  - oral meds via PEG tube     Constipation   - Continue home lactulose and senna     #Renal/  - garcia cath in place; TOV today  - UA with trace LE but no bacteria or nitrites   - monitor I/Os    Hypernatremia  - Now improved on free water 200cc q6h, continue    BPH   - Continue with home Tamsulosin     #ID  Aspiration PNA   - Continue with Vancomycin as above  - F/u blood culture, NGTD  - U/A neg  - MRSA PCR+; ETT MRSA+  - RVP negative   - procal 0.15    #Endo  No hx of DM   - Monitor glucose    #Heme   DVT ppx   - Lovenox     #Ethics  Full Code  Brother HCP, updated  Palliative c/s 67 yo M with PMhx of BPH, agitation, ?dementia, dysphagia after CVA now s/p PEG tube, and ?CHF presenting from [facility] for worsening agitation and hypoxia, initially found to have possible L infiltrate on CXR, covered empirically for CAP. Now with near-complete opacification of the left lung field, requiring increasing O2 requirements, and now s/p intubation 1/6 for AHRF.    #Neuro  Agitation.   - c/w home seroquel 225mg BID  - c/w home depakote 125mg at 2pm, 250 BID  - Zyprexa IM for severe agitation when pt refusing oral medications  - c/w home Clonazepam for now given Zyprexa administration  - Consider behavioral health consult after extubation for medication management  - dc'ed precedex     #Resp  Acute hypoxic respiratory failure  - s/p intubation 1/6 for hypoxia, tachypnea, and AMS despite 15L NRB  - CXR 1/6 with new near-complete opacification of the left lung field, suggestive of mucous plug and superimposed atelectasis s/p intubation 1/6  - ET tube Cx + MRSA s/p azithromycin, ceftriaxone --> switched to Zosyn (1/6 - 1/9), now on Vanc (1/6 - )   - Now extubated 1/12. Post extubation Xray with L lung collapse. Will continue with aggressive chest PT w/ chest vest  - Duonebs q6 + inhalation  - transitioned from HFNC to NC at 6L   - Start nasal bipap    #CV  Hypotension  - Required pressors after intubation likely 2/2 vasoplegia   - Levophed gtt, wean as tolerated   - increased Midodrine to 30mg q8, increase droxidopa 300mg TID  - 500cc bolus x1 today  - Maintain MAP > 65    New bifascicular block on ECG   - prior 12/2023 with RBBB     #GI  Diet: NPO w/ tube feeds  - oral meds via PEG tube     Constipation   - Continue home lactulose and senna     #Renal/  - garcia cath in place; TOV today  - UA with trace LE but no bacteria or nitrites   - monitor I/Os    Hypernatremia  - Now improved on free water 200cc q6h, continue    BPH   - Continue with home Tamsulosin     #ID  Aspiration PNA   - Continue with Vancomycin as above  - F/u blood culture, NGTD  - U/A neg  - MRSA PCR+; ETT MRSA+  - RVP negative   - procal 0.15    #Endo  No hx of DM   - Monitor glucose    #Heme   DVT ppx   - Lovenox     #Ethics  Full Code  Brother HCP, updated  Palliative c/s 69 yo M with PMhx of BPH, agitation, ?dementia, dysphagia after CVA now s/p PEG tube, and ?CHF presenting from [facility] for worsening agitation and hypoxia, initially found to have possible L infiltrate on CXR, covered empirically for CAP. Now with near-complete opacification of the left lung field, requiring increasing O2 requirements, and now s/p intubation 1/6 for AHRF.    #Neuro  Agitation.   - c/w home seroquel 225mg BID  - c/w home depakote 125mg at 2pm, 250 BID  - Zyprexa IM for severe agitation when pt refusing oral medications  - c/w home Clonazepam for now given Zyprexa administration  - Consider behavioral health consult after extubation for medication management  - dc'ed precedex     #Resp  Acute hypoxic respiratory failure  - s/p intubation 1/6 for hypoxia, tachypnea, and AMS despite 15L NRB  - CXR 1/6 with new near-complete opacification of the left lung field, suggestive of mucous plug and superimposed atelectasis s/p intubation 1/6  - ET tube Cx + MRSA s/p azithromycin, ceftriaxone --> switched to Zosyn (1/6 - 1/9), now on Vanc (1/6 - )   - Now extubated 1/12. Post extubation Xray with L lung collapse. Will continue with aggressive chest PT w/ chest vest  - Duonebs q6 + inhalation  - transitioned from HFNC to NC at 6L   - Start nasal bipap    #CV  Hypotension  - Required pressors after intubation likely 2/2 vasoplegia   - Levophed gtt, wean as tolerated   - increased Midodrine to 30mg q8, increase droxidopa 300mg TID  - 500cc bolus x1 today  - Maintain MAP > 65    New bifascicular block on ECG   - prior 12/2023 with RBBB     #GI  Diet: NPO w/ tube feeds  - oral meds via PEG tube     Constipation   - Continue home lactulose and senna     #Renal/  - garcia cath in place; TOV today  - UA with trace LE but no bacteria or nitrites   - monitor I/Os    Hypernatremia  - Now improved on free water 200cc q6h, continue    BPH   - Continue with home Tamsulosin     #ID  Aspiration PNA   - Continue with Vancomycin as above  - F/u blood culture, NGTD  - U/A neg  - MRSA PCR+; ETT MRSA+  - RVP negative   - procal 0.15    #Endo  No hx of DM   - Monitor glucose    #Heme   DVT ppx   - Lovenox     #Ethics  Full Code  Brother HCP, updated  Palliative c/s 69 yo M with PMhx of BPH, agitation, ?dementia, dysphagia after CVA now s/p PEG tube, and ?CHF presenting from [facility] for worsening agitation and hypoxia, initially found to have possible L infiltrate on CXR, covered empirically for CAP. Now with near-complete opacification of the left lung field, requiring increasing O2 requirements, and now s/p intubation 1/6 for AHRF.    #Neuro  Agitation.   - c/w home seroquel 225mg BID  - c/w home depakote 125mg at 2pm, 250 BID  - Zyprexa IM for severe agitation when pt refusing oral medications  - c/w home Clonazepam for now given Zyprexa administration  - Consider behavioral health consult after extubation for medication management  - dc'ed precedex     #Resp  Acute hypoxic respiratory failure  - s/p intubation 1/6 for hypoxia, tachypnea, and AMS despite 15L NRB  - CXR 1/6 with new near-complete opacification of the left lung field, suggestive of mucous plug and superimposed atelectasis s/p intubation 1/6  - ET tube Cx + MRSA s/p azithromycin, ceftriaxone --> switched to Zosyn (1/6 - 1/9), now on Vanc (1/6 - )   - Now extubated 1/12. Post extubation Xray with L lung collapse. Will continue with aggressive chest PT w/ chest vest  - Duonebs q6 + inhalation  - transitioned from HFNC to NC at 6L   - Start nasal bipap    #CV  Hypotension  - Required pressors after intubation likely 2/2 vasoplegia   - Levophed gtt, wean as tolerated   - increased Midodrine to 30mg q8, increase droxidopa 300mg TID  - 500cc bolus x1 today  - Maintain MAP > 65    New bifascicular block on ECG   - prior 12/2023 with RBBB     #GI  Diet: NPO w/ tube feeds  - oral meds via PEG tube     Constipation   - Continue home lactulose and senna     #Renal/  - garcia cath in place; TOV today  - UA with trace LE but no bacteria or nitrites   - monitor I/Os    Hypernatremia  - Now improved on free water 200cc q6h, continue    BPH   - Continue with home Tamsulosin     #ID  Aspiration PNA   - Continue with Vancomycin as above  - F/u blood culture, NGTD  - U/A neg  - MRSA PCR+; ETT MRSA+  - RVP negative   - procal 0.15    #Endo  No hx of DM   - Monitor glucose    #Heme   DVT ppx   - Lovenox     #Ethics  Full Code  Brother HCP, updated  Palliative c/s

## 2024-01-15 NOTE — PROGRESS NOTE ADULT - SUBJECTIVE AND OBJECTIVE BOX
Raman Mendez  PGY-3 | Internal Medicine      INTERVAL HPI/OVERNIGHT EVENTS: Hypotensive requiring levophed overnight.    SUBJECTIVE: Patient seen and examined at bedside.       OBJECTIVE:    VITAL SIGNS:  ICU Vital Signs Last 24 Hrs  T(C): 36.8 (15 [place]rett 2024 04:00), Max: 37.6 (14 Jan 2024 12:00)  T(F): 98.2 (15 [place]rett 2024 04:00), Max: 99.7 (14 Jan 2024 20:00)  HR: 68 (15 [place]rett 2024 06:55) (68 - 137)  BP: 100/47 (15 Everett 2024 06:00) (56/26 - 143/90)  BP(mean): 59 (15 [place]rett 2024 06:00) (31 - 103)  ABP: --  ABP(mean): --  RR: 17 (15 [place]rett 2024 06:55) (17 - 41)  SpO2: 98% (15 Everett 2024 06:55) (91% - 100%)    O2 Parameters below as of 15 Everett 2024 06:55  Patient On (Oxygen Delivery Method): nasal cannula w/ humidification  O2 Flow (L/min): 6            01-14 @ 07:01  -  01-15 @ 07:00  --------------------------------------------------------  IN: 1924.7 mL / OUT: 1720 mL / NET: 204.7 mL      CAPILLARY BLOOD GLUCOSE          PHYSICAL EXAM:    General: NAD  HEENT: NC/AT; PERRL, clear conjunctiva  Neck: supple  Respiratory: CTA b/l  Cardiovascular: +S1/S2; RRR  Abdomen: soft, NT/ND; +BS x4  Extremities: WWP, 2+ peripheral pulses b/l; no LE edema  Skin: normal color and turgor; no rash  Neurological: pupils equal and reactive airline. follows commands    MEDICATIONS:  MEDICATIONS  (STANDING):  acetylcysteine 10%  Inhalation 4 milliLiter(s) Inhalation every 12 hours  albuterol/ipratropium for Nebulization 3 milliLiter(s) Nebulizer every 6 hours  artificial  tears Solution 1 Drop(s) Both EYES two times a day  chlorhexidine 2% Cloths 1 Application(s) Topical daily  clonazePAM  Tablet 1 milliGRAM(s) Oral at bedtime  droxidopa 200 milliGRAM(s) Oral three times a day  enoxaparin Injectable 40 milliGRAM(s) SubCutaneous every 24 hours  lactulose Syrup 20 Gram(s) Oral three times a day  midodrine 30 milliGRAM(s) Oral every 8 hours  mupirocin 2% Ointment 1 Application(s) Topical two times a day  norepinephrine Infusion 0.5 MICROgram(s)/kG/Min (31.4 mL/Hr) IV Continuous <Continuous>  polyethylene glycol 3350 17 Gram(s) Oral two times a day  QUEtiapine 225 milliGRAM(s) Oral <User Schedule>  senna 1 Tablet(s) Oral every 12 hours  sodium chloride 3%  Inhalation 4 milliLiter(s) Inhalation every 12 hours  tamsulosin 0.4 milliGRAM(s) Oral at bedtime  valproic  acid Syrup 250 milliGRAM(s) Oral <User Schedule>  valproic  acid Syrup 125 milliGRAM(s) Oral <User Schedule>  vancomycin  IVPB      vancomycin  IVPB 1000 milliGRAM(s) IV Intermittent every 12 hours    MEDICATIONS  (PRN):  acetaminophen     Tablet .. 650 milliGRAM(s) Oral every 6 hours PRN Moderate Pain (4 - 6)  OLANZapine Injectable 2.5 milliGRAM(s) IntraMuscular every 6 hours PRN agitation  sodium chloride 0.9% lock flush 10 milliLiter(s) IV Push every 1 hour PRN Pre/post blood products, medications, blood draw, and to maintain line patency      ALLERGIES:  Allergies    No Known Allergies    Intolerances        LABS:                        10.5   10.93 )-----------( 149      ( 15 [place] 2024 00:20 )             31.8     01-15    140  |  102  |  9   ----------------------------<  107<H>  3.9   |  32<H>  |  0.69    Ca    7.8<L>      15 [place]rett 2024 00:20  Phos  3.2     01-15  Mg     2.00     01-15    TPro  6.3  /  Alb  2.5<L>  /  TBili  0.3  /  DBili  x   /  AST  14  /  ALT  <5  /  AlkPhos  39<L>  01-15    PT/INR - ( 15 [place] 2024 00:20 )   PT: 15.3 sec;   INR: 1.38 ratio         PTT - ( 15 [place]rett 2024 00:20 )  PTT:32.3 sec  Urinalysis Basic - ( 15 [place] 2024 00:20 )    Color: x / Appearance: x / SG: x / pH: x  Gluc: 107 mg/dL / Ketone: x  / Bili: x / Urobili: x   Blood: x / Protein: x / Nitrite: x   Leuk Esterase: x / RBC: x / WBC x   Sq Epi: x / Non Sq Epi: x / Bacteria: x        RADIOLOGY & ADDITIONAL TESTS: Reviewed.

## 2024-01-16 NOTE — PROGRESS NOTE ADULT - ASSESSMENT
67 yo M with PMhx of BPH, agitation, ?dementia, dysphagia after CVA now s/p PEG tube, and ?CHF presenting from [facility] for worsening agitation and hypoxia, initially found to have possible L infiltrate on CXR, covered empirically for CAP. Now with near-complete opacification of the left lung field, requiring increasing O2 requirements, and now s/p intubation 1/6 for AHRF.    #Neuro  Agitation.   - c/w home seroquel 225mg BID  - c/w home depakote 125mg at 2pm, 250 BID  - Zyprexa IM for severe agitation when pt refusing oral medications  - c/w home Clonazepam for now given Zyprexa administration  - dc'ed precedex     #Resp  Acute hypoxic respiratory failure  - s/p intubation 1/6 for hypoxia, tachypnea, and AMS despite 15L NRB  - CXR 1/6 with new near-complete opacification of the left lung field, suggestive of mucous plug and superimposed atelectasis s/p intubation 1/6  - ET tube Cx + MRSA s/p azithromycin, ceftriaxone --> switched to Zosyn (1/6 - 1/9), now on Vanc (1/6 - 1/16)   - Now extubated 1/12. Post extubation Xray with L lung collapse. Will continue with aggressive chest PT w/ chest vest  - Duonebs q6 + inhalation  - transitioned from HFNC to NC at 6L     #CV  Hypotension  - Required pressors after intubation likely 2/2 vasoplegia   - Levophed gtt, wean as tolerated   - increased Midodrine to 30mg q8, droxidopa 300mg TID  - 500cc bolus x1 today  - Maintain MAP > 65    New bifascicular block on ECG   - prior 12/2023 with RBBB     #GI  Diet: NPO w/ tube feeds  - oral meds via PEG tube     Constipation   - Continue home lactulose and senna     #Renal/  - UA with trace LE but no bacteria or nitrites   - monitor I/Os    Hypernatremia  - Now improved    BPH   - Continue with home Tamsulosin     #ID  Aspiration PNA   - Continue with Vancomycin as above  - F/u blood culture, NGTD  - U/A neg  - MRSA PCR+; ETT MRSA+  - RVP negative   - procal 0.15    #Endo  No hx of DM   - Monitor glucose    #Heme   DVT ppx   - Lovenox     #Ethics  Full Code  Brother HCP, updated  Palliative c/s 69 yo M with PMhx of BPH, agitation, ?dementia, dysphagia after CVA now s/p PEG tube, and ?CHF presenting from [facility] for worsening agitation and hypoxia, initially found to have possible L infiltrate on CXR, covered empirically for CAP. Now with near-complete opacification of the left lung field, requiring increasing O2 requirements, and now s/p intubation 1/6 for AHRF.    #Neuro  Agitation.   - c/w home seroquel 225mg BID  - c/w home depakote 125mg at 2pm, 250 BID  - Zyprexa IM for severe agitation when pt refusing oral medications  - c/w home Clonazepam for now given Zyprexa administration  - dc'ed precedex     #Resp  Acute hypoxic respiratory failure  - s/p intubation 1/6 for hypoxia, tachypnea, and AMS despite 15L NRB  - CXR 1/6 with new near-complete opacification of the left lung field, suggestive of mucous plug and superimposed atelectasis s/p intubation 1/6  - ET tube Cx + MRSA s/p azithromycin, ceftriaxone --> switched to Zosyn (1/6 - 1/9), now on Vanc (1/6 - 1/16)   - Now extubated 1/12. Post extubation Xray with L lung collapse. Will continue with aggressive chest PT w/ chest vest  - Duonebs q6 + inhalation  - transitioned from HFNC to NC at 6L     #CV  Hypotension  - Required pressors after intubation likely 2/2 vasoplegia   - Levophed gtt, wean as tolerated   - increased Midodrine to 30mg q8, droxidopa 300mg TID  - 500cc bolus x1 today  - Maintain MAP > 65    New bifascicular block on ECG   - prior 12/2023 with RBBB     #GI  Diet: NPO w/ tube feeds  - oral meds via PEG tube     Constipation   - Continue home lactulose and senna     #Renal/  - UA with trace LE but no bacteria or nitrites   - monitor I/Os    Hypernatremia  - Now improved    BPH   - Continue with home Tamsulosin     #ID  Aspiration PNA   - Continue with Vancomycin as above  - F/u blood culture, NGTD  - U/A neg  - MRSA PCR+; ETT MRSA+  - RVP negative   - procal 0.15    #Endo  No hx of DM   - Monitor glucose    #Heme   DVT ppx   - Lovenox     #Ethics  Full Code  Brother HCP, updated  Palliative c/s

## 2024-01-16 NOTE — PROGRESS NOTE ADULT - SUBJECTIVE AND OBJECTIVE BOX
Ramanhellen Mendez  PGY-3 | Internal Medicine      INTERVAL HPI/OVERNIGHT EVENTS: Overnight HFNC    SUBJECTIVE: Patient seen and examined at bedside.     ROS: Unable to assess due to mental status    OBJECTIVE:    VITAL SIGNS:  ICU Vital Signs Last 24 Hrs  T(C): 37 (16 Jan 2024 08:00), Max: 37.2 (15 [place] 2024 16:00)  T(F): 98.6 (16 Jan 2024 08:00), Max: 99 (15 [place] 2024 16:00)  HR: 120 (16 Jan 2024 12:00) (70 - 145)  BP: 97/55 (16 Jan 2024 12:00) (84/49 - 136/69)  BP(mean): 65 (16 Jan 2024 12:00) (54 - 91)  ABP: --  ABP(mean): --  RR: 22 (16 Jan 2024 12:00) (12 - 36)  SpO2: 100% (16 Jan 2024 12:00) (84% - 100%)    O2 Parameters below as of 16 Jan 2024 12:00  Patient On (Oxygen Delivery Method): nasal cannula, high flow              01-15 @ 07:01 - 01-16 @ 07:00  --------------------------------------------------------  IN: 2438 mL / OUT: 2575 mL / NET: -137 mL    01-16 @ 07:01 - 01-16 @ 12:43  --------------------------------------------------------  IN: 200 mL / OUT: 440 mL / NET: -240 mL      CAPILLARY BLOOD GLUCOSE          PHYSICAL EXAM:    General: NAD  HEENT: NC/AT; PERRL, clear conjunctiva  Neck: supple  Respiratory: CTA b/l  Cardiovascular: +S1/S2; RRR  Abdomen: soft, NT/ND; +BS x4  Extremities: WWP, 2+ peripheral pulses b/l; no LE edema  Skin: normal color and turgor; no rash  Neurological: pupils equal and reactive airline. follows commands. A&Ox1    MEDICATIONS:  MEDICATIONS  (STANDING):  acetylcysteine 10%  Inhalation 4 milliLiter(s) Inhalation every 12 hours  albuterol/ipratropium for Nebulization 3 milliLiter(s) Nebulizer every 6 hours  artificial  tears Solution 1 Drop(s) Both EYES two times a day  chlorhexidine 2% Cloths 1 Application(s) Topical daily  clonazePAM  Tablet 1 milliGRAM(s) Oral at bedtime  droxidopa 300 milliGRAM(s) Oral three times a day  enoxaparin Injectable 40 milliGRAM(s) SubCutaneous every 24 hours  lactulose Syrup 20 Gram(s) Oral three times a day  midodrine 30 milliGRAM(s) Oral every 8 hours  mupirocin 2% Ointment 1 Application(s) Topical two times a day  polyethylene glycol 3350 17 Gram(s) Oral two times a day  potassium chloride   Powder 20 milliEquivalent(s) Oral once  QUEtiapine 225 milliGRAM(s) Oral <User Schedule>  senna 1 Tablet(s) Oral every 12 hours  sodium chloride 3%  Inhalation 4 milliLiter(s) Inhalation every 12 hours  tamsulosin 0.4 milliGRAM(s) Oral at bedtime  valproic  acid Syrup 250 milliGRAM(s) Oral <User Schedule>  valproic  acid Syrup 125 milliGRAM(s) Oral <User Schedule>  vancomycin  IVPB      vancomycin  IVPB 1000 milliGRAM(s) IV Intermittent every 12 hours    MEDICATIONS  (PRN):  acetaminophen     Tablet .. 650 milliGRAM(s) Oral every 6 hours PRN Moderate Pain (4 - 6)  OLANZapine Injectable 2.5 milliGRAM(s) IntraMuscular every 6 hours PRN agitation  sodium chloride 0.9% lock flush 10 milliLiter(s) IV Push every 1 hour PRN Pre/post blood products, medications, blood draw, and to maintain line patency      ALLERGIES:  Allergies    No Known Allergies    Intolerances        LABS:                        10.8   13.14 )-----------( 160      ( 16 Jan 2024 02:59 )             32.9     01-16    139  |  102  |  8   ----------------------------<  91  3.9   |  31  |  0.63    Ca    8.0<L>      16 Jan 2024 02:59  Phos  3.4     01-16  Mg     2.10     01-16    TPro  6.4  /  Alb  2.5<L>  /  TBili  0.3  /  DBili  x   /  AST  16  /  ALT  8   /  AlkPhos  41  01-16    PT/INR - ( 16 Jan 2024 02:59 )   PT: 16.3 sec;   INR: 1.47 ratio         PTT - ( 16 Jan 2024 02:59 )  PTT:32.5 sec  Urinalysis Basic - ( 16 Jan 2024 02:59 )    Color: x / Appearance: x / SG: x / pH: x  Gluc: 91 mg/dL / Ketone: x  / Bili: x / Urobili: x   Blood: x / Protein: x / Nitrite: x   Leuk Esterase: x / RBC: x / WBC x   Sq Epi: x / Non Sq Epi: x / Bacteria: x        RADIOLOGY & ADDITIONAL TESTS: Reviewed.

## 2024-01-16 NOTE — CHART NOTE - NSCHARTNOTEFT_GEN_A_CORE
Sign out received from MICU on patient. Patient to be transferred to RCU pending room availability. MICU to cover patient overnight. D/w covering Resident. Sign out received from MICU on patient. Patient to be transferred to RCU pending room availability. MICU to cover patient overnight. D/w covering MICU Resident.

## 2024-01-16 NOTE — CHART NOTE - NSCHARTNOTEFT_GEN_A_CORE
MICU Transfer Note    Transfer from: MICU    Transfer to: ( ) Medicine    ( ) Telemetry     (x ) RCU        ( ) Palliative         ( ) Stroke Unit          ( ) __________________    Accepting physician: Dr. Alex      MICU COURSE:  69 yo M with PMhx of BPH, agitation, ?dementia, dysphagia after CVA now s/p PEG tube, and ?CHF presenting from [facility] for worsening agitation and hypoxia, initially found to have possible L infiltrate on CXR, covered empirically for CAP. Course complicated by near-complete opacification of the left lung field, requiring increasing O2 requirements and intubation 1/6. ET tube Cx+ for MRSA. Pt initiated on vancomycin. Pt in shock state requiring IV pressor support, which were able to be weaned to oral regimen. Pt started on aggressive pulmonary regimen with HTS+Duoneb, IPV. Pt able to be extubated 1/11. Post extubation, pt with another episode of L lung mucous plug. However, pt stable on HFNC. Pt continued on aggressive chest PT. Repeat Xray with improvement in mucous plug.     During MICU stay, pt with episodes of agitation requiring zyprexa and IM haldol. Attempted to call facility to obtain hx regarding baseline mental status but unable to reach the covering nurse or provider. Pt continued on home anti-psychotic medications.    Pt is currently HDS and stable to be transitioned to medical floors        ASSESSMENT & PLAN:   69 yo M with PMhx of BPH, agitation, ?dementia, dysphagia after CVA now s/p PEG tube, and ?CHF presenting from [facility] for worsening agitation and hypoxia, initially found to have possible L infiltrate on CXR, covered empirically for CAP. Now with near-complete opacification of the left lung field, requiring increasing O2 requirements, and now s/p intubation 1/6 for AHRF.    #Neuro  Agitation.   - c/w home seroquel 225mg BID  - c/w home depakote 125mg at 2pm, 250 BID  - Zyprexa IM for severe agitation when pt refusing oral medications  - c/w home Clonazepam for now given Zyprexa administration  - dc'ed precedex     #Resp  Acute hypoxic respiratory failure  - s/p intubation 1/6 for hypoxia, tachypnea, and AMS despite 15L NRB  - CXR 1/6 with new near-complete opacification of the left lung field, suggestive of mucous plug and superimposed atelectasis s/p intubation 1/6  - ET tube Cx + MRSA s/p azithromycin, ceftriaxone --> switched to Zosyn (1/6 - 1/9), now on Vanc (1/6 - 1/16)   - Now extubated 1/12. Post extubation Xray with L lung collapse. Will continue with aggressive chest PT w/ chest vest  - Duonebs q6 + inhalation  - transitioned from HFNC to NC at 6L     #CV  Hypotension  - Required pressors after intubation likely 2/2 vasoplegia   - Levophed gtt, wean as tolerated   - increased Midodrine to 30mg q8, droxidopa 300mg TID  - Maintain MAP > 65    New bifascicular block on ECG   - prior 12/2023 with RBBB     #GI  Diet: NPO w/ tube feeds  - oral meds via PEG tube     Constipation   - Continue home lactulose and senna     #Renal/  - UA with trace LE but no bacteria or nitrites   - monitor I/Os    Hypernatremia  - Now improved    BPH   - Continue with home Tamsulosin     #ID  Aspiration PNA   - Continue with Vancomycin as above  - F/u blood culture, NGTD  - U/A neg  - MRSA PCR+; ETT MRSA+  - RVP negative   - procal 0.15    #Endo  No hx of DM   - Monitor glucose    #Heme   DVT ppx   - Lovenox     #Ethics  Full Code  Brother HCP, updated  Palliative c/s    For Followup:  [] palliative recs  [] Reach out to facility to obtain baseline mental status  [] c/w aggressive airway PT  [] wean of midodrine and droxidopa as tolerated        Vitals  T(F): 99.3 (01-16-24 @ 12:00), Max: 99.3 (01-16-24 @ 12:00)  HR: 120 (01-16-24 @ 12:00) (70 - 145)  BP: 97/55 (01-16-24 @ 12:00) (84/49 - 136/69)  BP(mean): 65 (01-16-24 @ 12:00) (54 - 91)  ABP: --  ABP(mean): --  RR: 22 (01-16-24 @ 12:00) (12 - 36)  SpO2: 100% (01-16-24 @ 12:00) (84% - 100%)  I/O Summary 24H    IN: 2438 mL / OUT: 2575 mL / NET: -137 mL        MEDICATIONS  (STANDING):  acetylcysteine 10%  Inhalation 4 milliLiter(s) Inhalation every 12 hours  albuterol/ipratropium for Nebulization 3 milliLiter(s) Nebulizer every 6 hours  artificial  tears Solution 1 Drop(s) Both EYES two times a day  chlorhexidine 2% Cloths 1 Application(s) Topical daily  clonazePAM  Tablet 1 milliGRAM(s) Oral at bedtime  droxidopa 300 milliGRAM(s) Oral three times a day  enoxaparin Injectable 40 milliGRAM(s) SubCutaneous every 24 hours  lactulose Syrup 20 Gram(s) Oral three times a day  midodrine 30 milliGRAM(s) Oral every 8 hours  mupirocin 2% Ointment 1 Application(s) Topical two times a day  polyethylene glycol 3350 17 Gram(s) Oral two times a day  potassium chloride   Powder 20 milliEquivalent(s) Oral once  QUEtiapine 225 milliGRAM(s) Oral <User Schedule>  senna 1 Tablet(s) Oral every 12 hours  sodium chloride 3%  Inhalation 4 milliLiter(s) Inhalation every 12 hours  tamsulosin 0.4 milliGRAM(s) Oral at bedtime  valproic  acid Syrup 125 milliGRAM(s) Oral <User Schedule>  valproic  acid Syrup 250 milliGRAM(s) Oral <User Schedule>  vancomycin  IVPB      vancomycin  IVPB 1000 milliGRAM(s) IV Intermittent every 12 hours    MEDICATIONS  (PRN):  acetaminophen     Tablet .. 650 milliGRAM(s) Oral every 6 hours PRN Moderate Pain (4 - 6)  OLANZapine Injectable 2.5 milliGRAM(s) IntraMuscular every 6 hours PRN agitation  sodium chloride 0.9% lock flush 10 milliLiter(s) IV Push every 1 hour PRN Pre/post blood products, medications, blood draw, and to maintain line patency        LABS  CBC 01-16-24 @ 02:59                        10.8   13.14 )-----------( 160                   32.9       Hgb trend: 10.8 <-- , 10.5 <-- , 11.6 <--   WBC trend: 13.14 <-- , 10.93 <-- , 9.11 <--     CMP 01-16-24 @ 02:59    139  |  102  |  8   ----------------------------<  91  3.9   |  31  |  0.63    Ca    8.0<L>      01-16-24 @ 02:59  Phos  3.4     01-16  Mg     2.10     01-16    TPro  6.4  /  Alb  2.5<L>  /  TBili  0.3  /  DBili  x   /  AST  16  /  ALT  8   /  AlkPhos  41  01-16      Serum Cr trend: 0.63 <-- , 0.69 <-- , 0.61 <-- , 0.64 <--   PT/INR - ( 16 Jan 2024 02:59 )   PT: 16.3 sec;   INR: 1.47 ratio         PTT - ( 16 Jan 2024 02:59 ):32.5 sec MICU Transfer Note    Transfer from: MICU    Transfer to: ( ) Medicine    ( ) Telemetry     (x ) RCU        ( ) Palliative         ( ) Stroke Unit          ( ) __________________    Accepting physician: Dr. Alex      MICU COURSE:  67 yo M with PMhx of BPH, agitation, ?dementia, dysphagia after CVA now s/p PEG tube, and ?CHF presenting from [facility] for worsening agitation and hypoxia, initially found to have possible L infiltrate on CXR, covered empirically for CAP. Course complicated by near-complete opacification of the left lung field, requiring increasing O2 requirements and intubation 1/6. ET tube Cx+ for MRSA. Pt initiated on vancomycin. Pt in shock state requiring IV pressor support, which were able to be weaned to oral regimen. Pt started on aggressive pulmonary regimen with HTS+Duoneb, IPV. Pt able to be extubated 1/11. Post extubation, pt with another episode of L lung mucous plug. However, pt stable on HFNC. Pt continued on aggressive chest PT. Repeat Xray with improvement in mucous plug.     During MICU stay, pt with episodes of agitation requiring zyprexa and IM haldol. Attempted to call facility to obtain hx regarding baseline mental status but unable to reach the covering nurse or provider. Pt continued on home anti-psychotic medications.    Pt is currently HDS and stable to be transitioned to medical floors        ASSESSMENT & PLAN:   67 yo M with PMhx of BPH, agitation, ?dementia, dysphagia after CVA now s/p PEG tube, and ?CHF presenting from [facility] for worsening agitation and hypoxia, initially found to have possible L infiltrate on CXR, covered empirically for CAP. Now with near-complete opacification of the left lung field, requiring increasing O2 requirements, and now s/p intubation 1/6 for AHRF.    #Neuro  Agitation.   - c/w home seroquel 225mg BID  - c/w home depakote 125mg at 2pm, 250 BID  - Zyprexa IM for severe agitation when pt refusing oral medications  - c/w home Clonazepam for now given Zyprexa administration  - dc'ed precedex     #Resp  Acute hypoxic respiratory failure  - s/p intubation 1/6 for hypoxia, tachypnea, and AMS despite 15L NRB  - CXR 1/6 with new near-complete opacification of the left lung field, suggestive of mucous plug and superimposed atelectasis s/p intubation 1/6  - ET tube Cx + MRSA s/p azithromycin, ceftriaxone --> switched to Zosyn (1/6 - 1/9), now on Vanc (1/6 - 1/16)   - Now extubated 1/12. Post extubation Xray with L lung collapse. Will continue with aggressive chest PT w/ chest vest  - Duonebs q6 + inhalation  - transitioned from HFNC to NC at 6L     #CV  Hypotension  - Required pressors after intubation likely 2/2 vasoplegia   - Levophed gtt, wean as tolerated   - increased Midodrine to 30mg q8, droxidopa 300mg TID  - Maintain MAP > 65    New bifascicular block on ECG   - prior 12/2023 with RBBB     #GI  Diet: NPO w/ tube feeds  - oral meds via PEG tube     Constipation   - Continue home lactulose and senna     #Renal/  - UA with trace LE but no bacteria or nitrites   - monitor I/Os    Hypernatremia  - Now improved    BPH   - Continue with home Tamsulosin     #ID  Aspiration PNA   - Continue with Vancomycin as above  - F/u blood culture, NGTD  - U/A neg  - MRSA PCR+; ETT MRSA+  - RVP negative   - procal 0.15    #Endo  No hx of DM   - Monitor glucose    #Heme   DVT ppx   - Lovenox     #Ethics  Full Code  Brother HCP, updated  Palliative c/s    For Followup:  [] palliative recs  [] Reach out to facility to obtain baseline mental status  [] c/w aggressive airway PT  [] wean of midodrine and droxidopa as tolerated        Vitals  T(F): 99.3 (01-16-24 @ 12:00), Max: 99.3 (01-16-24 @ 12:00)  HR: 120 (01-16-24 @ 12:00) (70 - 145)  BP: 97/55 (01-16-24 @ 12:00) (84/49 - 136/69)  BP(mean): 65 (01-16-24 @ 12:00) (54 - 91)  ABP: --  ABP(mean): --  RR: 22 (01-16-24 @ 12:00) (12 - 36)  SpO2: 100% (01-16-24 @ 12:00) (84% - 100%)  I/O Summary 24H    IN: 2438 mL / OUT: 2575 mL / NET: -137 mL        MEDICATIONS  (STANDING):  acetylcysteine 10%  Inhalation 4 milliLiter(s) Inhalation every 12 hours  albuterol/ipratropium for Nebulization 3 milliLiter(s) Nebulizer every 6 hours  artificial  tears Solution 1 Drop(s) Both EYES two times a day  chlorhexidine 2% Cloths 1 Application(s) Topical daily  clonazePAM  Tablet 1 milliGRAM(s) Oral at bedtime  droxidopa 300 milliGRAM(s) Oral three times a day  enoxaparin Injectable 40 milliGRAM(s) SubCutaneous every 24 hours  lactulose Syrup 20 Gram(s) Oral three times a day  midodrine 30 milliGRAM(s) Oral every 8 hours  mupirocin 2% Ointment 1 Application(s) Topical two times a day  polyethylene glycol 3350 17 Gram(s) Oral two times a day  potassium chloride   Powder 20 milliEquivalent(s) Oral once  QUEtiapine 225 milliGRAM(s) Oral <User Schedule>  senna 1 Tablet(s) Oral every 12 hours  sodium chloride 3%  Inhalation 4 milliLiter(s) Inhalation every 12 hours  tamsulosin 0.4 milliGRAM(s) Oral at bedtime  valproic  acid Syrup 125 milliGRAM(s) Oral <User Schedule>  valproic  acid Syrup 250 milliGRAM(s) Oral <User Schedule>  vancomycin  IVPB      vancomycin  IVPB 1000 milliGRAM(s) IV Intermittent every 12 hours    MEDICATIONS  (PRN):  acetaminophen     Tablet .. 650 milliGRAM(s) Oral every 6 hours PRN Moderate Pain (4 - 6)  OLANZapine Injectable 2.5 milliGRAM(s) IntraMuscular every 6 hours PRN agitation  sodium chloride 0.9% lock flush 10 milliLiter(s) IV Push every 1 hour PRN Pre/post blood products, medications, blood draw, and to maintain line patency        LABS  CBC 01-16-24 @ 02:59                        10.8   13.14 )-----------( 160                   32.9       Hgb trend: 10.8 <-- , 10.5 <-- , 11.6 <--   WBC trend: 13.14 <-- , 10.93 <-- , 9.11 <--     CMP 01-16-24 @ 02:59    139  |  102  |  8   ----------------------------<  91  3.9   |  31  |  0.63    Ca    8.0<L>      01-16-24 @ 02:59  Phos  3.4     01-16  Mg     2.10     01-16    TPro  6.4  /  Alb  2.5<L>  /  TBili  0.3  /  DBili  x   /  AST  16  /  ALT  8   /  AlkPhos  41  01-16      Serum Cr trend: 0.63 <-- , 0.69 <-- , 0.61 <-- , 0.64 <--   PT/INR - ( 16 Jan 2024 02:59 )   PT: 16.3 sec;   INR: 1.47 ratio         PTT - ( 16 Jan 2024 02:59 ):32.5 sec MICU Transfer Note    Transfer from: MICU    Transfer to: ( ) Medicine    ( ) Telemetry     (x ) RCU        ( ) Palliative         ( ) Stroke Unit          ( ) __________________    Accepting physician: Dr. Alex      MICU COURSE:  67 yo M with PMhx of BPH, agitation, ?dementia, dysphagia after CVA now s/p PEG tube, and ?CHF presenting from [facility] for worsening agitation and hypoxia, initially found to have possible L infiltrate on CXR, covered empirically for CAP. Course complicated by near-complete opacification of the left lung field, requiring increasing O2 requirements and intubation 1/6. ET tube Cx+ for MRSA. Pt initiated on vancomycin. Pt in shock state requiring IV pressor support, which were able to be weaned to oral regimen. Pt started on aggressive pulmonary regimen with HTS+Duoneb, IPV. Pt able to be extubated 1/11. Post extubation, pt with another episode of L lung mucous plug. However, pt stable on HFNC. Pt continued on aggressive chest PT. Repeat Xray with improvement in mucous plug.     During MICU stay, pt with episodes of agitation requiring zyprexa and IM haldol. Called facility to obtain baseline status, pt agitated at baseline. Facility confirmed anti-psychotic medications and reported pt is always trying to get out of bed. He is A&Ox1 at baseline.    Pt is currently HDS and stable to be transitioned to medical floors        ASSESSMENT & PLAN:   67 yo M with PMhx of BPH, agitation, ?dementia, dysphagia after CVA now s/p PEG tube, and ?CHF presenting from [facility] for worsening agitation and hypoxia, initially found to have possible L infiltrate on CXR, covered empirically for CAP. Now with near-complete opacification of the left lung field, requiring increasing O2 requirements, and now s/p intubation 1/6 for AHRF.    #Neuro  Agitation.   - c/w home seroquel 225mg BID  - c/w home depakote 125mg at 2pm, 250 BID  - Zyprexa IM for severe agitation when pt refusing oral medications  - c/w home Clonazepam for now given Zyprexa administration  - dc'ed precedex     #Resp  Acute hypoxic respiratory failure  - s/p intubation 1/6 for hypoxia, tachypnea, and AMS despite 15L NRB  - CXR 1/6 with new near-complete opacification of the left lung field, suggestive of mucous plug and superimposed atelectasis s/p intubation 1/6  - ET tube Cx + MRSA s/p azithromycin, ceftriaxone --> switched to Zosyn (1/6 - 1/9), now on Vanc (1/6 - 1/16)   - Now extubated 1/12. Post extubation Xray with L lung collapse. Will continue with aggressive chest PT w/ chest vest  - Duonebs q6 + inhalation  - transitioned from HFNC to NC at 6L     #CV  Hypotension  - Required pressors after intubation likely 2/2 vasoplegia   - Levophed gtt, wean as tolerated   - increased Midodrine to 30mg q8, droxidopa 300mg TID  - Maintain MAP > 65    New bifascicular block on ECG   - prior 12/2023 with RBBB     #GI  Diet: NPO w/ tube feeds  - oral meds via PEG tube     Constipation   - Continue home lactulose and senna     #Renal/  - UA with trace LE but no bacteria or nitrites   - monitor I/Os    Hypernatremia  - Now improved    BPH   - Continue with home Tamsulosin     #ID  Aspiration PNA   - Continue with Vancomycin as above  - F/u blood culture, NGTD  - U/A neg  - MRSA PCR+; ETT MRSA+  - RVP negative   - procal 0.15    #Endo  No hx of DM   - Monitor glucose    #Heme   DVT ppx   - Lovenox     #Ethics  Full Code  Brother HCP, updated  Palliative c/s    For Followup:  [] palliative recs  [] c/w aggressive airway PT  [] wean of midodrine and droxidopa as tolerated        Vitals  T(F): 99.3 (01-16-24 @ 12:00), Max: 99.3 (01-16-24 @ 12:00)  HR: 120 (01-16-24 @ 12:00) (70 - 145)  BP: 97/55 (01-16-24 @ 12:00) (84/49 - 136/69)  BP(mean): 65 (01-16-24 @ 12:00) (54 - 91)  ABP: --  ABP(mean): --  RR: 22 (01-16-24 @ 12:00) (12 - 36)  SpO2: 100% (01-16-24 @ 12:00) (84% - 100%)  I/O Summary 24H    IN: 2438 mL / OUT: 2575 mL / NET: -137 mL        MEDICATIONS  (STANDING):  acetylcysteine 10%  Inhalation 4 milliLiter(s) Inhalation every 12 hours  albuterol/ipratropium for Nebulization 3 milliLiter(s) Nebulizer every 6 hours  artificial  tears Solution 1 Drop(s) Both EYES two times a day  chlorhexidine 2% Cloths 1 Application(s) Topical daily  clonazePAM  Tablet 1 milliGRAM(s) Oral at bedtime  droxidopa 300 milliGRAM(s) Oral three times a day  enoxaparin Injectable 40 milliGRAM(s) SubCutaneous every 24 hours  lactulose Syrup 20 Gram(s) Oral three times a day  midodrine 30 milliGRAM(s) Oral every 8 hours  mupirocin 2% Ointment 1 Application(s) Topical two times a day  polyethylene glycol 3350 17 Gram(s) Oral two times a day  potassium chloride   Powder 20 milliEquivalent(s) Oral once  QUEtiapine 225 milliGRAM(s) Oral <User Schedule>  senna 1 Tablet(s) Oral every 12 hours  sodium chloride 3%  Inhalation 4 milliLiter(s) Inhalation every 12 hours  tamsulosin 0.4 milliGRAM(s) Oral at bedtime  valproic  acid Syrup 125 milliGRAM(s) Oral <User Schedule>  valproic  acid Syrup 250 milliGRAM(s) Oral <User Schedule>  vancomycin  IVPB      vancomycin  IVPB 1000 milliGRAM(s) IV Intermittent every 12 hours    MEDICATIONS  (PRN):  acetaminophen     Tablet .. 650 milliGRAM(s) Oral every 6 hours PRN Moderate Pain (4 - 6)  OLANZapine Injectable 2.5 milliGRAM(s) IntraMuscular every 6 hours PRN agitation  sodium chloride 0.9% lock flush 10 milliLiter(s) IV Push every 1 hour PRN Pre/post blood products, medications, blood draw, and to maintain line patency        LABS  CBC 01-16-24 @ 02:59                        10.8   13.14 )-----------( 160                   32.9       Hgb trend: 10.8 <-- , 10.5 <-- , 11.6 <--   WBC trend: 13.14 <-- , 10.93 <-- , 9.11 <--     CMP 01-16-24 @ 02:59    139  |  102  |  8   ----------------------------<  91  3.9   |  31  |  0.63    Ca    8.0<L>      01-16-24 @ 02:59  Phos  3.4     01-16  Mg     2.10     01-16    TPro  6.4  /  Alb  2.5<L>  /  TBili  0.3  /  DBili  x   /  AST  16  /  ALT  8   /  AlkPhos  41  01-16      Serum Cr trend: 0.63 <-- , 0.69 <-- , 0.61 <-- , 0.64 <--   PT/INR - ( 16 Jan 2024 02:59 )   PT: 16.3 sec;   INR: 1.47 ratio         PTT - ( 16 Jan 2024 02:59 ):32.5 sec MICU Transfer Note    Transfer from: MICU    Transfer to: ( ) Medicine    ( ) Telemetry     (x ) RCU        ( ) Palliative         ( ) Stroke Unit          ( ) __________________    Accepting physician: Dr. Alex      MICU COURSE:  67 yo M with PMhx of BPH, agitation, ?dementia, dysphagia after CVA now s/p PEG tube, and ?CHF presenting from [facility] for worsening agitation and hypoxia, initially found to have possible L infiltrate on CXR, covered empirically for CAP. Course complicated by near-complete opacification of the left lung field, requiring increasing O2 requirements and intubation 1/6. ET tube Cx+ for MRSA. Pt initiated on vancomycin. Pt in shock state requiring IV pressor support, which were able to be weaned to oral regimen. Pt started on aggressive pulmonary regimen with HTS+Duoneb, IPV. Pt able to be extubated 1/11. Post extubation, pt with another episode of L lung mucous plug. However, pt stable on HFNC. Pt continued on aggressive chest PT. Repeat Xray with improvement in mucous plug.     During MICU stay, pt with episodes of agitation requiring zyprexa and IM haldol. Called facility to obtain baseline status, pt agitated at baseline. Facility confirmed anti-psychotic medications and reported pt is always trying to get out of bed. He is A&Ox1 at baseline.    Pt is currently HDS and stable to be transitioned to medical floors        ASSESSMENT & PLAN:   67 yo M with PMhx of BPH, agitation, ?dementia, dysphagia after CVA now s/p PEG tube, and ?CHF presenting from [facility] for worsening agitation and hypoxia, initially found to have possible L infiltrate on CXR, covered empirically for CAP. Now with near-complete opacification of the left lung field, requiring increasing O2 requirements, and now s/p intubation 1/6 for AHRF.    #Neuro  Agitation.   - c/w home seroquel 225mg BID  - c/w home depakote 125mg at 2pm, 250 BID  - Zyprexa IM for severe agitation when pt refusing oral medications  - c/w home Clonazepam for now given Zyprexa administration  - dc'ed precedex     #Resp  Acute hypoxic respiratory failure  - s/p intubation 1/6 for hypoxia, tachypnea, and AMS despite 15L NRB  - CXR 1/6 with new near-complete opacification of the left lung field, suggestive of mucous plug and superimposed atelectasis s/p intubation 1/6  - ET tube Cx + MRSA s/p azithromycin, ceftriaxone --> switched to Zosyn (1/6 - 1/9), now on Vanc (1/6 - 1/16)   - Now extubated 1/12. Post extubation Xray with L lung collapse. Will continue with aggressive chest PT w/ chest vest  - Duonebs q6 + inhalation  - transitioned from HFNC to NC at 6L     #CV  Hypotension  - Required pressors after intubation likely 2/2 vasoplegia   - Levophed gtt, wean as tolerated   - increased Midodrine to 30mg q8, droxidopa 300mg TID  - Maintain MAP > 65    New bifascicular block on ECG   - prior 12/2023 with RBBB     #GI  Diet: NPO w/ tube feeds  - oral meds via PEG tube     Constipation   - Continue home lactulose and senna     #Renal/  - UA with trace LE but no bacteria or nitrites   - monitor I/Os    Hypernatremia  - Now improved    BPH   - Continue with home Tamsulosin     #ID  Aspiration PNA   - Continue with Vancomycin as above  - F/u blood culture, NGTD  - U/A neg  - MRSA PCR+; ETT MRSA+  - RVP negative   - procal 0.15    #Endo  No hx of DM   - Monitor glucose    #Heme   DVT ppx   - Lovenox     #Ethics  Full Code  Brother HCP, updated  Palliative c/s    For Followup:  [] palliative recs  [] c/w aggressive airway PT  [] wean of midodrine and droxidopa as tolerated        Vitals  T(F): 99.3 (01-16-24 @ 12:00), Max: 99.3 (01-16-24 @ 12:00)  HR: 120 (01-16-24 @ 12:00) (70 - 145)  BP: 97/55 (01-16-24 @ 12:00) (84/49 - 136/69)  BP(mean): 65 (01-16-24 @ 12:00) (54 - 91)  ABP: --  ABP(mean): --  RR: 22 (01-16-24 @ 12:00) (12 - 36)  SpO2: 100% (01-16-24 @ 12:00) (84% - 100%)  I/O Summary 24H    IN: 2438 mL / OUT: 2575 mL / NET: -137 mL        MEDICATIONS  (STANDING):  acetylcysteine 10%  Inhalation 4 milliLiter(s) Inhalation every 12 hours  albuterol/ipratropium for Nebulization 3 milliLiter(s) Nebulizer every 6 hours  artificial  tears Solution 1 Drop(s) Both EYES two times a day  chlorhexidine 2% Cloths 1 Application(s) Topical daily  clonazePAM  Tablet 1 milliGRAM(s) Oral at bedtime  droxidopa 300 milliGRAM(s) Oral three times a day  enoxaparin Injectable 40 milliGRAM(s) SubCutaneous every 24 hours  lactulose Syrup 20 Gram(s) Oral three times a day  midodrine 30 milliGRAM(s) Oral every 8 hours  mupirocin 2% Ointment 1 Application(s) Topical two times a day  polyethylene glycol 3350 17 Gram(s) Oral two times a day  potassium chloride   Powder 20 milliEquivalent(s) Oral once  QUEtiapine 225 milliGRAM(s) Oral <User Schedule>  senna 1 Tablet(s) Oral every 12 hours  sodium chloride 3%  Inhalation 4 milliLiter(s) Inhalation every 12 hours  tamsulosin 0.4 milliGRAM(s) Oral at bedtime  valproic  acid Syrup 125 milliGRAM(s) Oral <User Schedule>  valproic  acid Syrup 250 milliGRAM(s) Oral <User Schedule>  vancomycin  IVPB      vancomycin  IVPB 1000 milliGRAM(s) IV Intermittent every 12 hours    MEDICATIONS  (PRN):  acetaminophen     Tablet .. 650 milliGRAM(s) Oral every 6 hours PRN Moderate Pain (4 - 6)  OLANZapine Injectable 2.5 milliGRAM(s) IntraMuscular every 6 hours PRN agitation  sodium chloride 0.9% lock flush 10 milliLiter(s) IV Push every 1 hour PRN Pre/post blood products, medications, blood draw, and to maintain line patency        LABS  CBC 01-16-24 @ 02:59                        10.8   13.14 )-----------( 160                   32.9       Hgb trend: 10.8 <-- , 10.5 <-- , 11.6 <--   WBC trend: 13.14 <-- , 10.93 <-- , 9.11 <--     CMP 01-16-24 @ 02:59    139  |  102  |  8   ----------------------------<  91  3.9   |  31  |  0.63    Ca    8.0<L>      01-16-24 @ 02:59  Phos  3.4     01-16  Mg     2.10     01-16    TPro  6.4  /  Alb  2.5<L>  /  TBili  0.3  /  DBili  x   /  AST  16  /  ALT  8   /  AlkPhos  41  01-16      Serum Cr trend: 0.63 <-- , 0.69 <-- , 0.61 <-- , 0.64 <--   PT/INR - ( 16 Jan 2024 02:59 )   PT: 16.3 sec;   INR: 1.47 ratio         PTT - ( 16 Jan 2024 02:59 ):32.5 sec

## 2024-01-16 NOTE — PROGRESS NOTE ADULT - PROBLEM SELECTOR PLAN 1
- CXRAY 1/5- Complete left lung opacification, new from 1/6/2024 , likely secondary to left lung collapse.  - s/p extubation on 1/11  - currently on HFNC  - management as per primary team.

## 2024-01-16 NOTE — PROGRESS NOTE ADULT - ATTENDING COMMENTS
68M PMH Dementia, s/p CVA, and dysphagia s/p PEG who presents with acute hypoxemic respiratory failure requiring mechanical ventilation, found to have MRSA pneumonia with left lung whiteout, which improved with intubation. Now with septic shock, improved.    - Sedation with dexmedetomidine. Continue valproic acid, quetiapine, and clonazepam  - Septic shock, continue norepinephrine for goal MAP>65. Also on midodrine 10 mg q8h  - Currently doing well on pressure support 5/5 FiO2 30%. Continues to have thick secretions, not currently ready for extubation given significant hypoxemia during suctioning. Continue albuterol, ipratropium, and hypertonic saline nebs for airway clearance along with IPV and chest PT  - Tube feeds as tolerates, bowel regimen  - Stable kidney function and lytes, strict I/O's, close monitoring if kidney function and lytes  - Continue vancomycin for MRSA pneumonia. D/c Zosyn. Monitor vanc trough  - DVT ppx with enoxaparin  - Seymour removed, condom cath placed, continue tamsulosin for BPH  - Overall prognosis guarded, full code    CC time spent: 35 min
68M PMH Dementia, s/p CVA, and dysphagia s/p PEG who presents with acute hypoxemic respiratory failure requiring mechanical ventilation, found to have MRSA pneumonia with left lung whiteout, which improved with intubation. Now with septic shock, improved. Extubated 1/11.    - Keep off dexmedetomidine s/p extubation 1/11. Continue valproic acid, quetiapine, and clonazepam  - Septic shock, continue norepinephrine for goal MAP>65. Midodrine 20 mg q8h  - Passed SBT, extubated to face tent with development of hypoxemia. Now on high flow nasal cannula, taper for goal SpO2>90%. Airway clearance therapy with albuterol, ipratropium, and hypertonic saline nebs along with chest PT, consider chest vest. Consider BiPAP 10/5 at night given chronic mild hypercapnia  - If respiratory status remains stable, restart PEG feeds as tolerates, bowel regimen  - Stable kidney function and lytes, strict I/O's, close monitoring if kidney function and lytes  - Continue vancomycin for MRSA pneumonia, monitor troughs  - DVT ppx with enoxaparin  - No lines or garcia  - Overall prognosis guarded, full code    CC time spent: 35 min
ROMELIA MEYER is a 68y Male with PMH of  Dementia s/p CVA, dysphagia (PEG Tube), Admitted for AHRF. MICU admission on 1/5 for mechanical ventilation management after likely aspiration event.       #Dementia at baseline s/p CVA  #PEG Tube  #Acute Hypoxic Respiratory Failure  VC 20/480/8/100  - CPOT 0 / RASS -3  - Wean sedation sbt today  #Aspiration pneumonia,   - continue airway clearance  - continue vanc/zosyn, follow MRSA   #Septic Shock  - Wean as tolerated  #POCUS within 24hrs   #DVT ppx - enox  #GOC - full code, discussion today        Manas Oconnor MD  Interventional Pulmonology & Critical Care Medicine
68M PMH Dementia, s/p CVA, and dysphagia s/p PEG who presents with acute hypoxemic respiratory failure requiring mechanical ventilation, found to have MRSA pneumonia with left lung whiteout, which improved with intubation. Now with septic shock, improved.    - Wean off dexmedetomidine for vent weaning. Continue valproic acid, quetiapine, and clonazepam  - Septic shock, continue norepinephrine for goal MAP>65. Also on midodrine 20 mg q8h  - Pressure support trial as tolerated, currently on 10/5, will decrease as tolerates and hope for extubation soon. Airway clearance therapy with albuterol, ipratropium, and hypertonic saline nebs along with IPV and chest PT  - Tube feeds as tolerates, bowel regimen  - Stable kidney function and lytes, strict I/O's, close monitoring if kidney function and lytes  - Continue vancomycin for MRSA pneumonia, trough therapeutic  - DVT ppx with enoxaparin  - No lines or garcia  - Overall prognosis guarded, full code    CC time spent: 35 min
68M PMH Dementia, s/p CVA, and dysphagia s/p PEG who presents with acute hypoxemic respiratory failure requiring mechanical ventilation, found to have MRSA pneumonia with left lung whiteout, which improved with intubation. Now with septic shock, improved. Extubated 1/11. Post-extubation with hypoxemia and left lung atelectasis, improved with high flow nasal cannula.    - Off sedation. Continue home valproic acid, quetiapine, and clonazepam  - Septic shock, continue norepinephrine for goal MAP>65. Midodrine 20 mg q8h  - Continue HFNC 40 LPM, 40% FiO2. Taper for goal SpO2>90%. Airway clearance therapy with albuterol, ipratropium, and hypertonic saline nebs along with chest PT and chest vest  - PEG feeds as tolerates, bowel regimen  - Stable kidney function and lytes, strict I/O's, close monitoring if kidney function and lytes  - Continue vancomycin for MRSA pneumonia, monitor troughs  - DVT ppx with enoxaparin  - No lines or garcia  - Overall prognosis guarded, full code    CC time spent: 35 min
68M PMH Dementia, s/p CVA, and dysphagia s/p PEG who presents with acute hypoxemic respiratory failure requiring mechanical ventilation, found to have Staphylococcal aureus pneumonia with left lung whiteout, which improved with intubation. Now with septic shock.    - Sedation with dexmedetomidine. Continue valproic acid, quetiapine, and clonazepam  - Septic shock, continue norepinephrine for goal MAP>65. Also on midodrine 10 mg q8h  - Currently doing well on pressure support 5/5 FiO2 30%. Has thick secretions via ETT. Start albuterol, ipratropium, and hypertonic saline nebs for airway clearance along with IPV and chest PT  - Tube feeds as tolerates, bowel regimen  - Stable kidney function and lytes, strict I/O's, close monitoring if kidney function and lytes  - Continue vancomycin and zosyn for Staphylococcus aureus pneumonia. Follow-up sensitivities. Monitor vanc trough  - DVT ppx with enoxaparin  - D/c garcia, trial of void, continue tamsulosin for BPH  - Overall prognosis guarded, full code    CC time spent: 35 min
68M PMH Dementia, s/p CVA, and dysphagia s/p PEG who presents with acute hypoxemic respiratory failure requiring mechanical ventilation, found to have MRSA pneumonia with left lung whiteout, which improved with intubation. Now with septic shock, improved. Extubated 1/11. Post-extubation with hypoxemia and left lung atelectasis, improved with high flow nasal cannula.    - Continue home valproic acid, quetiapine, and clonazepam, restarted on prn precedex for agitation 1/13. May require transition off clonazepam due to worsening delirium although pt on medication at home  - Septic shock, continue norepinephrine for goal MAP>65. Midodrine 20 mg q8h. fluids today for possible hypovolemia  - Continue HFNC 40 LPM, 40% FiO2. Taper for goal SpO2>90%. Airway clearance therapy with albuterol, ipratropium, and hypertonic saline nebs along with chest PT and chest vest  - PEG feeds as tolerates, bowel regimen  - Stable kidney function, strict I/O's, close monitoring if kidney function and lytes. FWF and resuscitation for hypernatremia  - Continue vancomycin for MRSA pneumonia, monitor troughs  - DVT ppx with enoxaparin  - No lines or garcia  - Overall prognosis guarded, full code
Agree with above. Seen and examined with team on rounds. Critically ill on high flow NC. On treatment for pneumonia. Thick secretions. Mental status still not improved. Continue on high flow, on feeds and abx. Supportive care. Frequent suctioning.
ROMELIA MEYER is a 68y Male with PMH of  Dementia s/p CVA, dysphagia (PEG Tube), Admitted for AHRF. MICU admission on 1/5 for mechanical ventilation management after likely aspiration event.     WBC 25 up-trending  new left arm weakness (not noted on admission ) CT head     #Dementia at baseline s/p CVA  #PEG Tube  #Acute Hypoxic Respiratory Failure  VC 20/480/8/100  - CPOT 0 / RASS -3  - Wean sedation sbt today -- mental status   #Aspiration pneumonia, wbc continues to rise however likely secondary to mucus plug from yesterday, will re-evaluate with pocus and CXR today.   - continue airway clearance  - continue vanc/zosyn, follow MRSA   #Septic Shock  - Wean as tolerated  #POCUS within 24hrs   #DVT ppx - enox  #GOC - full code,        Manas Oconnor MD  Interventional Pulmonology & Critical Care Medicine .
68M PMH Dementia, s/p CVA, and dysphagia s/p PEG who presents with acute hypoxemic respiratory failure requiring mechanical ventilation, found to have MRSA pneumonia with left lung whiteout, which improved with intubation. Now with septic shock, improved. Extubated 1/11. Post-extubation with hypoxemia and left lung atelectasis, improved with high flow nasal cannula.    - Continue home valproic acid, quetiapine, and clonazepam. Off dexmedetomidine  - Septic shock, wean norepinephrine for goal MAP>65. Continue midodrine 30 mg q8h, add droxidopa 200 mg TID  - Doing well with high flow nasal cannula 40 LPM, 40% FiO2. Wean to NC@6 LPM as tolerates. Goal SpO2>90%. Airway clearance therapy with albuterol, ipratropium, and hypertonic saline nebs along with chest PT and chest vest  - PEG feeds as tolerates, bowel regimen  - Stable kidney function and lytes, strict I/O's, close monitoring if kidney function and lytes  - Continue vancomycin for MRSA pneumonia, monitor troughs  - DVT ppx with enoxaparin  - No lines or garcia  - Overall prognosis guarded, full code    CC time spent: 35 min
68M PMH Dementia, s/p CVA, and dysphagia s/p PEG who presents with acute hypoxemic respiratory failure requiring mechanical ventilation, found to have MRSA pneumonia with left lung whiteout, which improved with intubation. Extubated 1/11. Post-extubation with hypoxemia and left lung atelectasis, improved with high flow nasal cannula. Now with improving septic shock    - Continue home valproic acid, quetiapine, and clonazepam. Off dexmedetomidine. Required olanzapine 5 mg IM and haloperidol 2.5 mg IM today  - Septic shock, wean norepinephrine for goal MAP>65. Continue midodrine 30 mg q8h, increase droxidopa 300 mg TID  - Improved hypoxemia, continue NC@4-6 LPM, goal SpO2>90%. Airway clearance therapy with albuterol, ipratropium, Mucomyst, and hypertonic saline nebs along with chest PT and chest vest  - PEG feeds as tolerates, bowel regimen  - Stable kidney function and lytes, strict I/O's, close monitoring of kidney function and lytes  - Continue vancomycin for MRSA pneumonia day 9/10, monitor troughs  - DVT ppx with enoxaparin  - No lines or garcia  - Overall prognosis guarded, full code, palliative care eval    CC time spent: 35 min

## 2024-01-16 NOTE — PROGRESS NOTE ADULT - SUBJECTIVE AND OBJECTIVE BOX
Date of Service      SUBJECTIVE AND OBJECTIVE:  Patient seen and examined at bedside. Patient being followed up for :     INTERVAL HPI/OVERNIGHT EVENTS:      Allergies    No Known Allergies    Intolerances    MEDICATIONS  (STANDING):  acetylcysteine 10%  Inhalation 4 milliLiter(s) Inhalation every 12 hours  albuterol/ipratropium for Nebulization 3 milliLiter(s) Nebulizer every 6 hours  artificial  tears Solution 1 Drop(s) Both EYES every 6 hours  chlorhexidine 2% Cloths 1 Application(s) Topical daily  clonazePAM  Tablet 1 milliGRAM(s) Oral at bedtime  droxidopa 300 milliGRAM(s) Oral three times a day  enoxaparin Injectable 40 milliGRAM(s) SubCutaneous every 24 hours  lactulose Syrup 20 Gram(s) Oral three times a day  midodrine 30 milliGRAM(s) Oral every 8 hours  polyethylene glycol 3350 17 Gram(s) Oral two times a day  QUEtiapine 225 milliGRAM(s) Oral <User Schedule>  senna 1 Tablet(s) Oral every 12 hours  sodium chloride 3%  Inhalation 4 milliLiter(s) Inhalation every 12 hours  tamsulosin 0.4 milliGRAM(s) Oral at bedtime  valproic  acid Syrup 250 milliGRAM(s) Oral <User Schedule>  valproic  acid Syrup 125 milliGRAM(s) Oral <User Schedule>    MEDICATIONS  (PRN):  acetaminophen     Tablet .. 650 milliGRAM(s) Oral every 6 hours PRN Moderate Pain (4 - 6)  OLANZapine Injectable 2.5 milliGRAM(s) IntraMuscular every 6 hours PRN agitation  sodium chloride 0.9% lock flush 10 milliLiter(s) IV Push every 1 hour PRN Pre/post blood products, medications, blood draw, and to maintain line patency      ITEMS UNCHECKED ARE NOT PRESENT    PRESENT SYMPTOMS: [ ]Unable to self-report due to altered mental status- see [ ] CPOT [ ] PAINADS [ ] RDOS  Source if other than patient:  [ ]Family   [ ]Team     Pain:  [ ]yes [ ]no  QOL impact -   Location -                    Aggravating factors -  Quality -  Radiation -  Timing-  Severity (0-10 scale):  Minimal acceptable level / Pain Goal (0-10 scale):     Dyspnea:                           [ ]Mild [ ]Moderate [ ]Severe    Anxiety:                             [ ]Mild [ ]Moderate [ ]Severe  Agitation:                          [ ]Mild [ ]Moderate [ ]Severe  Fatigue:                             [ ]Mild [ ]Moderate [ ]Severe  Nausea:                             [ ]Mild [ ]Moderate [ ]Severe  Loss of appetite:              [ ]Mild [ ]Moderate [ ]Severe  Constipation:                   [ ]Mild [ ]Moderate [ ]Severe  Diarrhea:                          [ ]Mild [ ]Moderate [ ]Severe  Pruritus:                            [ ]Mild [ ]Moderate [ ]Severe  Depression:                      [ ]Mild [ ]Moderate [ ]Severe    CPOT:    https://www.[facility].org/getattachment/[email or web address]/Critical-Care-Pain-Observation-Tool-(CPOT)    PCSSQ[Palliative Care Spiritual Screening Question]   Severity (0-10):  Score of 4 or > indicate consideration of Chaplaincy referral.  Chaplaincy Referral: [ ] yes [ ] refused [ ] following [x ] deferred    Caregiver [place]? : [ ] yes [ ] no [ ] Declined [x ] Deferred              Social work referral [ ] Patient & Family Centered Care Referral [ ]     Anticipatory Grief present?:  [ ] yes [ ] no  [ x] Deferred                  Social work referral [ ] Chaplaincy Referral[ ]    Other Symptoms:  [ ]All other review of systems negative   [ ] Unable to obtain due to poor mentation     PHYSICAL EXAM:  Vital Signs Last 24 Hrs  T(C): 36.7 (16 Jan 2024 20:07), Max: 37.4 (16 Jan 2024 12:00)  T(F): 98 (16 Jan 2024 20:07), Max: 99.3 (16 Jan 2024 12:00)  HR: 81 (16 Jan 2024 20:07) (70 - 145)  BP: 113/67 (16 Jan 2024 20:07) (84/49 - 135/61)  BP(mean): 72 (16 Jan 2024 16:00) (54 - 76)  RR: 20 (16 Jan 2024 20:07) (12 - 36)  SpO2: 90% (16 Jan 2024 20:07) (84% - 100%)    Parameters below as of 16 Jan 2024 20:07  Patient On (Oxygen Delivery Method): nasal cannula, high flow  O2 Flow (L/min): 40       I&O's Summary    15 [place] 2024 07:01  -  16 Jan 2024 07:00  --------------------------------------------------------  IN: 2438 mL / OUT: 2575 mL / NET: -137 mL    16 Jan 2024 07:01  -  16 Jan 2024 22:28  --------------------------------------------------------  IN: 1090 mL / OUT: 1010 mL / NET: 80 mL         GENERAL:  [ ]Alert  [ ]Oriented x   [ ]Lethargic  [ ]Cachexia  [ ]Unarousable  [ ]Verbal  [ ]Non-Verbal  [ ] No Distress  Behavioral:   [ ] Anxiety  [ ] Delirium [ ] Agitation [ ] Calm  [ ] Other  HEENT:  [ ]Normal  [ ] Temporal Wasting  [ ]Dry mouth   [ ]ET Tube/Trach  [ ]Oral lesions  [ ] Mucositis  PULMONARY:   [ ]Clear [ ]Tachypnea  [ ]Audible excessive secretions   [ ]Rhonchi        [ ]Right [ ]Left [ ]Bilateral  [ ]Crackles        [ ]Right [ ]Left [ ]Bilateral  [ ]Wheezing     [ ]Right [ ]Left [ ]Bilateral  [ ]Diminished breath sounds [ ]right [ ]left [ ]bilateral  CARDIOVASCULAR:    [ ]Regular [ ]Irregular [ ]Tachy  [ ]Joey [ ]Murmur [ ]Other  GASTROINTESTINAL:  [ ]Soft  [ ]Distended   [ ]+BS  [ ]Non tender [ ]Tender  [ ]PEG [ ]OGT/ NGT  Last BM:   GENITOURINARY:  [ ]Normal [ ] Incontinent   [ ]Oliguria/Anuria   [ ][place]our  MUSCULOSKELETAL:   [ ]Normal   [ ]Weakness  [ ]Bed/Wheelchair bound [ ]Edema  [  ] amputation  [  ] contraction  NEUROLOGIC:   [ ]No focal deficits  [ ]Cognitive impairment  [ ]Dysphagia [ ]Dysarthria [ ]Paresis [ ]Other   SKIN: See Nursing Skin Assessment for further details  [ ]Normal    [ ]Rash  [ ]Pressure ulcer(s)       Present on admission [ ]y [ ]n   [  ]  Wound    [  ] hyperpigmentation    CRITICAL CARE:  [ ]Shock Present  [ ]Septic [ ]Cardiogenic [ ]Neurologic [ ]Hypovolemic  [ ]Vasopressors [ ]Inotropes  [ ]Respiratory failure present [ ]Mechanical Ventilation [ ]Non-invasive ventilatory support [ ]High-Flow   [ ]Acute  [ ]Chronic [ ]Hypoxic  [ ]Hypercarbic [ ]Other  [ ]Other organ failure     LABS:  reviewed                         10.8   13.14 )-----------( 160      ( 16 Jan 2024 02:59 )             32.9   01-16    139  |  102  |  8   ----------------------------<  91  3.9   |  31  |  0.63    Ca    8.0<L>      16 Jan 2024 02:59  Phos  3.4     01-16  Mg     2.10     01-16    TPro  6.4  /  Alb  2.5<L>  /  TBili  0.3  /  DBili  x   /  AST  16  /  ALT  8   /  AlkPhos  41  01-16  PT/INR - ( 16 Jan 2024 02:59 )   PT: 16.3 sec;   INR: 1.47 ratio         PTT - ( 16 Jan 2024 02:59 )  PTT:32.5 sec  Urinalysis Basic - ( 16 Jan 2024 02:59 )    Color: x / Appearance: x / SG: x / pH: x  Gluc: 91 mg/dL / Ketone: x  / Bili: x / Urobili: x   Blood: x / Protein: x / Nitrite: x   Leuk Esterase: x / RBC: x / WBC x   Sq Epi: x / Non Sq Epi: x / Bacteria: x      CAPILLARY BLOOD GLUCOSE              RADIOLOGY & ADDITIONAL STUDIES: reviewed     Protein Calorie Malnutrition Present: [ ]mild [ ]moderate [ ]severe [ ]underweight [ ]morbid obesity  https://www.andeal.org/vault/1060/web/files/ONC/Table_Clinical%20Characteristics%20to%20Document%20Malnutrition-White%20JV%20et%20al%202012.pdf    Height (cm): 167.6 (01-05-24 @ 11:59), 167.6 (12-28-23 @ 19:19), 167.6 (12-15-23 @ 04:29)  Weight (kg): 67 (01-06-24 @ 05:01), 70.3 (12-15-23 @ 04:29)  BMI (kg/m2): 23.9 (01-06-24 @ 05:01), 25 (01-05-24 @ 11:59), 25 (12-28-23 @ 19:19)    [ ]PPSV2 < or = 30%  [ ]significant weight loss [ ]poor nutritional intake [ ]anasarca   [ ]Artificial Nutrition    REFERRALS:   [ ]Chaplaincy  [ ]Hospice  [ ]Child Life  [ ]Social Work  [ ]Case management [ ]Holistic Therapy          Date of Service  : 1/16/2024    SUBJECTIVE AND OBJECTIVE:  Patient seen and examined at bedside. Patient awake ; AAOx1.    INTERVAL HPI/OVERNIGHT EVENTS:  Patient weaned off vasopressors.     Allergies    No Known Allergies    Intolerances    MEDICATIONS  (STANDING):  acetylcysteine 10%  Inhalation 4 milliLiter(s) Inhalation every 12 hours  albuterol/ipratropium for Nebulization 3 milliLiter(s) Nebulizer every 6 hours  artificial  tears Solution 1 Drop(s) Both EYES every 6 hours  chlorhexidine 2% Cloths 1 Application(s) Topical daily  clonazePAM  Tablet 1 milliGRAM(s) Oral at bedtime  droxidopa 300 milliGRAM(s) Oral three times a day  enoxaparin Injectable 40 milliGRAM(s) SubCutaneous every 24 hours  lactulose Syrup 20 Gram(s) Oral three times a day  midodrine 30 milliGRAM(s) Oral every 8 hours  polyethylene glycol 3350 17 Gram(s) Oral two times a day  QUEtiapine 225 milliGRAM(s) Oral <User Schedule>  senna 1 Tablet(s) Oral every 12 hours  sodium chloride 3%  Inhalation 4 milliLiter(s) Inhalation every 12 hours  tamsulosin 0.4 milliGRAM(s) Oral at bedtime  valproic  acid Syrup 250 milliGRAM(s) Oral <User Schedule>  valproic  acid Syrup 125 milliGRAM(s) Oral <User Schedule>    MEDICATIONS  (PRN):  acetaminophen     Tablet .. 650 milliGRAM(s) Oral every 6 hours PRN Moderate Pain (4 - 6)  OLANZapine Injectable 2.5 milliGRAM(s) IntraMuscular every 6 hours PRN agitation  sodium chloride 0.9% lock flush 10 milliLiter(s) IV Push every 1 hour PRN Pre/post blood products, medications, blood draw, and to maintain line patency      ITEMS UNCHECKED ARE NOT PRESENT    PRESENT SYMPTOMS: [x ]Unable to self-report due to altered mental status- see [ ] CPOT [ x] PAINADS [ x] RDOS  Source if other than patient:  [ ]Family   [ ]Team     Pain:  [ ]yes [ ]no  QOL impact -   Location -                    Aggravating factors -  Quality -  Radiation -  Timing-  Severity (0-10 scale):  Minimal acceptable level / Pain Goal (0-10 scale):     Dyspnea:                           [ ]Mild [ ]Moderate [ ]Severe  Anxiety:                             [ ]Mild [ ]Moderate [ ]Severe  Agitation:                          [ ]Mild [ ]Moderate [ ]Severe  Fatigue:                             [ ]Mild [ ]Moderate [ ]Severe  Nausea:                             [ ]Mild [ ]Moderate [ ]Severe  Loss of appetite:              [ ]Mild [ ]Moderate [ ]Severe  Constipation:                   [ ]Mild [ ]Moderate [ ]Severe  Diarrhea:                          [ ]Mild [ ]Moderate [ ]Severe    CPOT:    https://www.[facility].org/getattachment/[email or web address]/Critical-Care-Pain-Observation-Tool-(CPOT)    PCSSQ[Palliative Care Spiritual Screening Question]   Severity (0-10):  Score of 4 or > indicate consideration of Chaplaincy referral.  Chaplaincy Referral: [ ] yes [ ] refused [ ] following [x ] deferred    Caregiver [place]? : [ ] yes [ ] no [ ] Declined [x ] Deferred              Social work referral [ ] Patient & Family Centered Care Referral [ ]     Anticipatory Grief present?:  [ ] yes [ ] no  [ x] Deferred                  Social work referral [ ] Chaplaincy Referral[ ]    Other Symptoms:  [ ]All other review of systems negative   [x ] Unable to obtain due to poor mentation     PHYSICAL EXAM:  Vital Signs Last 24 Hrs  T(C): 36.7 (16 Jan 2024 20:07), Max: 37.4 (16 Jan 2024 12:00)  T(F): 98 (16 Jan 2024 20:07), Max: 99.3 (16 Jan 2024 12:00)  HR: 81 (16 Jan 2024 20:07) (70 - 145)  BP: 113/67 (16 Jan 2024 20:07) (84/49 - 135/61)  BP(mean): 72 (16 Jan 2024 16:00) (54 - 76)  RR: 20 (16 Jan 2024 20:07) (12 - 36)  SpO2: 90% (16 Jan 2024 20:07) (84% - 100%)    Parameters below as of 16 Jan 2024 20:07  Patient On (Oxygen Delivery Method): nasal cannula, high flow  O2 Flow (L/min): 40       I&O's Summary    15 [place] 2024 07:01  -  16 Jan 2024 07:00  --------------------------------------------------------  IN: 2438 mL / OUT: 2575 mL / NET: -137 mL    16 Jan 2024 07:01  -  16 Jan 2024 22:28  --------------------------------------------------------  IN: 1090 mL / OUT: 1010 mL / NET: 80 mL       GENERAL:  [ x]Awake  [x ]Oriented x1 (self)   [ ]Lethargic  [ ]Cachexia  [ ]Unarousable  [ x]Verbal - mumbling incoherently  [ ]Non-Verbal   Behavioral:   [ ] Anxiety  [ x] Delirium [ ] Agitation [ ] Calm  [x ] Other-encephalopathy   HEENT:  [ x]Normal  [ ] Temporal Wasting  [ ]Dry mouth   [ ]ET Tube/Trach  [ ]Oral lesions  [ ] Mucositis  PULMONARY:   [ ]Clear [ x]Tachypnea  [ ]Audible excessive secretions   [ ]Rhonchi        [ ]Right [ ]Left [ ]Bilateral  [ ]Crackles        [ ]Right [ ]Left [ ]Bilateral  [ ]Wheezing     [ ]Right [ ]Left [ ]Bilateral  [ ]Diminished breath sounds [ ]right [ ]left [ ]bilateral  CARDIOVASCULAR:    [ ]Regular [ ]Irregular [ x]Tachy  [ ]Joey [ ]Murmur [ ]Other  GASTROINTESTINAL:  [ x]Soft  [ ]Distended   [ ]+BS  [ x]Non tender [ ]Tender  [ ]PEG [ ]OGT/ NGT  Last BM: 1/15  GENITOURINARY:  [ ]Normal [ x] Incontinent   [ ]Oliguria/Anuria   [ ]Seymour  MUSCULOSKELETAL:   [ ]Normal   [ ]Weakness  [ x]Bed/Wheelchair bound [ ]Edema  [  ] amputation  [  ] contraction  NEUROLOGIC:   [ ]No focal deficits  [ ]Cognitive impairment  [ ]Dysphagia [ ]Dysarthria [ ]Paresis [ x]Other -encephalopathy  SKIN: See Nursing Skin Assessment for further details  [ ]Normal    [ ]Rash  [ ]Pressure ulcer(s)       Present on admission [ ]y [ ]n   [  ]  Wound    [  ] hyperpigmentation    CRITICAL CARE:  [ ]Shock Present  [ ]Septic [ ]Cardiogenic [ ]Neurologic [ ]Hypovolemic  [ ]Vasopressors [ ]Inotropes  [ x]Respiratory failure present [ ]Mechanical Ventilation [ ]Non-invasive ventilatory support [x ]High-Flow   [ ]Acute  [ ]Chronic [x ]Hypoxic  [ ]Hypercarbic [ ]Other  [ ]Other organ failure     LABS:  reviewed                         10.8   13.14 )-----------( 160      ( 16 Jan 2024 02:59 )             32.9   01-16    139  |  102  |  8   ----------------------------<  91  3.9   |  31  |  0.63    Ca    8.0<L>      16 Jan 2024 02:59  Phos  3.4     01-16  Mg     2.10     01-16    TPro  6.4  /  Alb  2.5<L>  /  TBili  0.3  /  DBili  x   /  AST  16  /  ALT  8   /  AlkPhos  41  01-16  PT/INR - ( 16 Jan 2024 02:59 )   PT: 16.3 sec;   INR: 1.47 ratio         PTT - ( 16 Jan 2024 02:59 )  PTT:32.5 sec  Urinalysis Basic - ( 16 Jan 2024 02:59 )    Color: x / Appearance: x / SG: x / pH: x  Gluc: 91 mg/dL / Ketone: x  / Bili: x / Urobili: x   Blood: x / Protein: x / Nitrite: x   Leuk Esterase: x / RBC: x / WBC x   Sq Epi: x / Non Sq Epi: x / Bacteria: x      CAPILLARY BLOOD GLUCOSE              RADIOLOGY & ADDITIONAL STUDIES: reviewed     Protein Calorie Malnutrition Present: [ ]mild [ ]moderate [ ]severe [ ]underweight [ ]morbid obesity  https://www.andeal.org/vault/2440/web/files/ONC/Table_Clinical%20Characteristics%20to%20Document%20Malnutrition-White%20JV%20et%20al%202012.pdf    Height (cm): 167.6 (01-05-24 @ 11:59), 167.6 (12-28-23 @ 19:19), 167.6 (12-15-23 @ 04:29)  Weight (kg): 67 (01-06-24 @ 05:01), 70.3 (12-15-23 @ 04:29)  BMI (kg/m2): 23.9 (01-06-24 @ 05:01), 25 (01-05-24 @ 11:59), 25 (12-28-23 @ 19:19)    [ ]PPSV2 < or = 30%  [ ]significant weight loss [ ]poor nutritional intake [ ]anasarca   [ ]Artificial Nutrition    REFERRALS:   [ ]Chaplaincy  [ ]Hospice  [ ]Child Life  [ ]Social Work  [ x]Case management [ ]Holistic Therapy

## 2024-01-17 NOTE — PROGRESS NOTE ADULT - ASSESSMENT
67 yo M with PMhx of BPH, agitation, ?dementia, dysphagia after CVA now s/p PEG tube, and ?CHF presenting from [facility] for worsening agitation and hypoxia, initially found to have possible L infiltrate on CXR, covered empirically for CAP. Course complicated by near-complete opacification of the left lung field, requiring increasing O2 requirements and intubation 1/6. ET tube Cx+ for MRSA. Pt initiated on vancomycin. Pt in shock state requiring IV pressor support, which were able to be weaned to oral regimen. Pt started on aggressive pulmonary regimen with HTS+Duoneb, IPV. Pt able to be extubated 1/11. Post extubation, pt with another episode of L lung mucous plug. However, pt stable on HFNC. Pt continued on aggressive chest PT. Repeat Xray with improvement in mucous plug.     During MICU stay, pt with episodes of agitation requiring zyprexa and IM haldol. Called facility to obtain baseline status, pt agitated at baseline. Facility confirmed anti-psychotic medications and reported pt is always trying to get out of bed. He is A&Ox1 at baseline.    #Neuro  Agitation.   - c/w home seroquel 225mg BID  - c/w home depakote 125mg at 2pm, 250 BID  - Zyprexa IM for severe agitation when pt refusing oral medications  - c/w home Clonazepam for now given Zyprexa administration  - dc'ed precedex     #Resp  Acute hypoxic respiratory failure  - s/p intubation 1/6 for hypoxia, tachypnea, and AMS despite 15L NRB  - CXR 1/6 with new near-complete opacification of the left lung field, suggestive of mucous plug and superimposed atelectasis s/p intubation 1/6  - ET tube Cx + MRSA s/p azithromycin, ceftriaxone --> switched to Zosyn (1/6 - 1/9), now on Vanc (1/6 - 1/16)   - Now extubated 1/12. Post extubation Xray with L lung collapse. Will continue with aggressive chest PT w/ chest vest  - Duonebs q6 + inhalation  - transitioned from HFNC to NC at 6L     #CV  Hypotension  - Required pressors after intubation likely 2/2 vasoplegia   - Levophed gtt, wean as tolerated   - increased Midodrine to 30mg q8, droxidopa 300mg TID  - Maintain MAP > 65    New bifascicular block on ECG   - prior 12/2023 with RBBB     #GI  Diet: NPO w/ tube feeds  - oral meds via PEG tube     Constipation   - Continue home lactulose and senna     #Renal/  - UA with trace LE but no bacteria or nitrites   - monitor I/Os    Hypernatremia  - Now improved    BPH   - Continue with home Tamsulosin     #ID  Aspiration PNA   - Continue with Vancomycin as above  - F/u blood culture, NGTD  - U/A neg  - MRSA PCR+; ETT MRSA+  - RVP negative   - procal 0.15    #Endo  No hx of DM   - Monitor glucose    #Heme   DVT ppx   - Lovenox     #Ethics  Full Code  Brother HCP, updated  Palliative c/s

## 2024-01-17 NOTE — PROGRESS NOTE ADULT - SUBJECTIVE AND OBJECTIVE BOX
CHIEF COMPLAINT: Patient is a 68y old  Male who presents with a chief complaint of agitation.      Interval Events:      REVIEW OF SYSTEMS:  [ ] All other systems negative  [ ] Unable to assess ROS because ________        OBJECTIVE:  ICU Vital Signs Last 24 Hrs  T(C): 36 (17 Jan 2024 05:41), Max: 37.4 (16 Jan 2024 12:00)  T(F): 96.8 (17 Jan 2024 05:41), Max: 99.3 (16 Jan 2024 12:00)  HR: 86 (17 Jan 2024 05:41) (70 - 145)  BP: 103/63 (17 Jan 2024 05:41) (94/68 - 135/61)  BP(mean): 72 (16 Jan 2024 16:00) (65 - 76)  RR: 20 (17 Jan 2024 05:41) (14 - 36)  SpO2: 96% (17 Jan 2024 05:41) (84% - 100%)      O2 Parameters below as of 17 Jan 2024 05:41  Patient On (Oxygen Delivery Method): nasal cannula, high flow  O2 Flow (L/min): 55  O2 Concentration (%): 90      01-15 @ 07:01  -  01-16 @ 07:00  --------------------------------------------------------  IN: 2438 mL / OUT: 2575 mL / NET: -137 mL    01-16 @ 07:01  -  01-17 @ 06:58  --------------------------------------------------------  IN: 2090 mL / OUT: 1460 mL / NET: 630 mL      CAPILLARY BLOOD GLUCOSE      HOSPITAL MEDICATIONS:  MEDICATIONS  (STANDING):  acetylcysteine 10%  Inhalation 4 milliLiter(s) Inhalation every 12 hours  albuterol/ipratropium for Nebulization 3 milliLiter(s) Nebulizer every 6 hours  artificial  tears Solution 1 Drop(s) Both EYES every 6 hours  chlorhexidine 2% Cloths 1 Application(s) Topical daily  clonazePAM  Tablet 1 milliGRAM(s) Oral at bedtime  droxidopa 300 milliGRAM(s) Oral three times a day  enoxaparin Injectable 40 milliGRAM(s) SubCutaneous every 24 hours  lactulose Syrup 20 Gram(s) Oral three times a day  midodrine 30 milliGRAM(s) Oral every 8 hours  polyethylene glycol 3350 17 Gram(s) Oral two times a day  QUEtiapine 225 milliGRAM(s) Oral <User Schedule>  senna 1 Tablet(s) Oral every 12 hours  sodium chloride 3%  Inhalation 4 milliLiter(s) Inhalation every 12 hours  tamsulosin 0.4 milliGRAM(s) Oral at bedtime  valproic  acid Syrup 250 milliGRAM(s) Oral <User Schedule>  valproic  acid Syrup 125 milliGRAM(s) Oral <User Schedule>    MEDICATIONS  (PRN):  acetaminophen     Tablet .. 650 milliGRAM(s) Oral every 6 hours PRN Moderate Pain (4 - 6)  OLANZapine Injectable 2.5 milliGRAM(s) IntraMuscular every 6 hours PRN agitation  sodium chloride 0.9% lock flush 10 milliLiter(s) IV Push every 1 hour PRN Pre/post blood products, medications, blood draw, and to maintain line patency      LABS:                        10.8   13.14 )-----------( 160      ( 16 Jan 2024 02:59 )             32.9     01-16    139  |  102  |  8   ----------------------------<  91  3.9   |  31  |  0.63    Ca    8.0<L>      16 Jan 2024 02:59  Phos  3.4     01-16  Mg     2.10     01-16    TPro  6.4  /  Alb  2.5<L>  /  TBili  0.3  /  DBili  x   /  AST  16  /  ALT  8   /  AlkPhos  41  01-16    PT/INR - ( 16 Jan 2024 02:59 )   PT: 16.3 sec;   INR: 1.47 ratio         PTT - ( 16 Jan 2024 02:59 )  PTT:32.5 sec  Urinalysis Basic - ( 16 Jan 2024 02:59 )    Color: x / Appearance: x / SG: x / pH: x  Gluc: 91 mg/dL / Ketone: x  / Bili: x / Urobili: x   Blood: x / Protein: x / Nitrite: x   Leuk Esterase: x / RBC: x / WBC x   Sq Epi: x / Non Sq Epi: x / Bacteria: x        Venous Blood Gas:  01-16 @ 02:59  7.37/65/66/38/90.2  VBG Lactate: 0.6      PAST MEDICAL & SURGICAL HISTORY:  CVA (cerebrovascular accident)      Dysphagia      BPH (benign prostatic hyperplasia)      Agitation      Schizophrenia      Congestive heart failure (CHF)      CVA (cerebral vascular accident)      Dysphagia      S/P percutaneous endoscopic gastrostomy (PEG) tube placement      FAMILY HISTORY:      Social History:  Been there at nursing home since 8/2022. (05 Jan 2024 17:55)      RADIOLOGY:  [ ] Reviewed and interpreted by me

## 2024-01-18 NOTE — PROGRESS NOTE ADULT - ASSESSMENT
67 yo M with PMhx of BPH, agitation, ?dementia, dysphagia after CVA now s/p PEG tube, and ?CHF presenting from [facility] for worsening agitation and hypoxia, initially found to have possible L infiltrate on CXR, covered empirically for CAP. Course complicated by near-complete opacification of the left lung field, requiring increasing O2 requirements and intubation 1/6. ET tube Cx+ for MRSA. Pt initiated on vancomycin. Pt in shock state requiring IV pressor support, which were able to be weaned to oral regimen. Pt started on aggressive pulmonary regimen with HTS+Duoneb, IPV. Pt able to be extubated 1/11. Post extubation, pt with another episode of L lung mucous plug. However, pt stable on HFNC. Pt continued on aggressive chest PT. Repeat Xray with improvement in mucous plug.     During MICU stay, pt with episodes of agitation requiring zyprexa and IM haldol. Called facility to obtain baseline status, pt agitated at baseline. Facility confirmed anti-psychotic medications and reported pt is always trying to get out of bed. He is A&Ox1 at baseline.    #Neuro  Agitation.   - c/w home seroquel 225mg BID  - c/w home depakote 125mg at 2pm, 250 BID  - Zyprexa IM for severe agitation when pt refusing oral medications  - c/w home Clonazepam for now given Zyprexa administration  - dc'ed precedex     #Resp  Acute hypoxic respiratory failure  - s/p intubation 1/6 for hypoxia, tachypnea, and AMS despite 15L NRB  - CXR 1/6 with new near-complete opacification of the left lung field, suggestive of mucous plug and superimposed atelectasis s/p intubation 1/6  - ET tube Cx + MRSA s/p azithromycin, ceftriaxone --> switched to Zosyn (1/6 - 1/9), now on Vanc (1/6 - 1/16)   - Now extubated 1/12. Post extubation Xray with L lung collapse. Will continue with aggressive chest PT w/ chest vest  - Duonebs q6 + inhalation  - transitioned from HFNC to NC at 6L     #CV  Hypotension  - Required pressors after intubation likely 2/2 vasoplegia   - Levophed gtt, wean as tolerated   - increased Midodrine to 30mg q8, droxidopa 300mg TID  - Maintain MAP > 65    New bifascicular block on ECG   - prior 12/2023 with RBBB     #GI  Diet: NPO w/ tube feeds  - oral meds via PEG tube     Constipation   - Continue home lactulose and senna     #Renal/  - UA with trace LE but no bacteria or nitrites   - monitor I/Os    Hypernatremia  - Now improved    BPH   - Continue with home Tamsulosin     #ID  Aspiration PNA   - Continue with Vancomycin as above  - F/u blood culture, NGTD  - U/A neg  - MRSA PCR+; ETT MRSA+  - RVP negative   - procal 0.15    #Endo  No hx of DM   - Monitor glucose    #Heme   DVT ppx   - Lovenox     #Ethics  Full Code  Brother HCP, updated  Palliative c/s 67 yo M with PMhx of BPH, agitation, ?dementia, dysphagia after CVA now s/p PEG tube, and ?CHF presenting from [facility] for worsening agitation and hypoxia, initially found to have possible L infiltrate on CXR, covered empirically for CAP. Course complicated by near-complete opacification of the left lung field, requiring increasing O2 requirements and intubation 1/6. ET tube Cx+ for MRSA. Pt initiated on vancomycin. Pt in shock state requiring IV pressor support, which were able to be weaned to oral regimen. Pt started on aggressive pulmonary regimen with HTS+Duoneb, IPV. Pt able to be extubated 1/11. Post extubation, pt with another episode of L lung mucous plug. However, pt stable on HFNC. Pt continued on aggressive chest PT. Repeat Xray with improvement in mucous plug.     During MICU stay, pt with episodes of agitation requiring zyprexa and IM haldol. Called facility to obtain baseline status, pt agitated at baseline. Facility confirmed anti-psychotic medications and reported pt is always trying to get out of bed. He is A&Ox1 at baseline.    #Neuro  Agitation.   - c/w home seroquel 225mg BID  - c/w home depakote 125mg at 2pm, 250 BID  - Zyprexa IM for severe agitation when pt refusing oral medications  - c/w home Clonazepam for now given Zyprexa administration  - Klonopin increased 1/17 from qhs to BID    #Resp  Acute hypoxic respiratory failure  - s/p intubation 1/6 for hypoxia, tachypnea, and AMS despite 15L NRB  - CXR 1/6 with new near-complete opacification of the left lung field, suggestive of mucous plug and superimposed atelectasis s/p intubation 1/6  - ET tube Cx + MRSA s/p azithromycin, ceftriaxone --> switched to Zosyn (1/6 - 1/9), now on Vanc (1/6 - 1/16)   - Now extubated 1/12. Post extubation Xray with L lung collapse. Will continue with aggressive chest PT w/ chest vest  - Duonebs q6 + inhalation  - C/W HFNC 50L/60% and wean as tolerated    #CV  Hypotension  - Required pressors after intubation likely 2/2 vasoplegia   - Now off pressors  - increased Midodrine to 30mg q8, droxidopa 300mg TID  - Wean as tolerated- unable to wean 1/18 - pt's SBP high 90s to low 100s    New bifascicular block on ECG   - prior 12/2023 with RBBB     #GI  Diet: NPO w/ tube feeds  - oral meds via PEG tube     Constipation   - Continue home lactulose and senna   - Monitor BMs    #Renal/  - UA with trace LE but no bacteria or nitrites   - monitor I/Os    Hypernatremia (resolved)  - STable    BPH   - Continue with home Tamsulosin     #ID  Aspiration PNA   - Continue with Vancomycin as above- completed  - F/u blood culture, NGTD  - U/A neg  - MRSA PCR+; ETT MRSA+  - RVP negative     #Endo  No hx of DM   - Monitor glucose    #Heme   DVT ppx   - Lovenox qd    #Ethics  Full Code  Palliative c/s

## 2024-01-18 NOTE — PROGRESS NOTE ADULT - SUBJECTIVE AND OBJECTIVE BOX
CHIEF COMPLAINT: Patient is a 68y old Male who presents with a chief complaint of agitation.      Interval Events:      REVIEW OF SYSTEMS:  [ ] All other systems negative  [ ] Unable to assess ROS because ________      OBJECTIVE:  ICU Vital Signs Last 24 Hrs  T(C): 36.8 (18 Jan 2024 06:00), Max: 36.8 (17 Jan 2024 20:00)  T(F): 98.2 (18 Jan 2024 06:00), Max: 98.3 (17 Jan 2024 20:00)  HR: 92 (18 Jan 2024 06:00) (87 - 130)  BP: 97/60 (18 Jan 2024 06:00) (97/60 - 123/73)  RR: 20 (18 Jan 2024 06:00) (18 - 20)  SpO2: 100% (18 Jan 2024 06:00) (95% - 100%)      O2 Parameters below as of 18 Jan 2024 06:00  Patient On (Oxygen Delivery Method): nasal cannula, high flow      01-17 @ 07:01  -  01-18 @ 07:00  --------------------------------------------------------  IN: 450 mL / OUT: 2500 mL / NET: -2050 mL      CAPILLARY BLOOD GLUCOSE      HOSPITAL MEDICATIONS:  MEDICATIONS  (STANDING):  acetylcysteine 10%  Inhalation 4 milliLiter(s) Inhalation every 12 hours  albuterol/ipratropium for Nebulization 3 milliLiter(s) Nebulizer every 6 hours  artificial  tears Solution 1 Drop(s) Both EYES every 6 hours  chlorhexidine 2% Cloths 1 Application(s) Topical daily  clonazePAM  Tablet 1 milliGRAM(s) Oral two times a day  droxidopa 300 milliGRAM(s) Oral three times a day  enoxaparin Injectable 40 milliGRAM(s) SubCutaneous every 24 hours  lactulose Syrup 20 Gram(s) Oral three times a day  midodrine 30 milliGRAM(s) Oral every 8 hours  polyethylene glycol 3350 17 Gram(s) Oral two times a day  QUEtiapine 225 milliGRAM(s) Oral <User Schedule>  senna 1 Tablet(s) Oral every 12 hours  sodium chloride 3%  Inhalation 4 milliLiter(s) Inhalation every 12 hours  tamsulosin 0.4 milliGRAM(s) Oral at bedtime  valproic  acid Syrup 250 milliGRAM(s) Oral <User Schedule>  valproic  acid Syrup 125 milliGRAM(s) Oral <User Schedule>    MEDICATIONS  (PRN):  acetaminophen     Tablet .. 650 milliGRAM(s) Oral every 6 hours PRN Moderate Pain (4 - 6)  OLANZapine 2.5 milliGRAM(s) Oral every 6 hours PRN Agitation  OLANZapine Injectable 2.5 milliGRAM(s) IntraMuscular every 6 hours PRN agitation  sodium chloride 0.9% lock flush 10 milliLiter(s) IV Push every 1 hour PRN Pre/post blood products, medications, blood draw, and to maintain line patency      LABS:                        11.0   10.24 )-----------( 152      ( 17 Jan 2024 05:45 )             33.9     01-17    142  |  100  |  10  ----------------------------<  88  3.9   |  35<H>  |  0.73    Ca    8.4      17 Jan 2024 05:45  Phos  4.0     01-17  Mg     2.30     01-17      TPro  7.0  /  Alb  2.9<L>  /  TBili  0.2  /  DBili  x   /  AST  13  /  ALT  6   /  AlkPhos  43  01-17      Urinalysis Basic - ( 17 Jan 2024 05:45 )      Color: x / Appearance: x / SG: x / pH: x  Gluc: 88 mg/dL / Ketone: x  / Bili: x / Urobili: x   Blood: x / Protein: x / Nitrite: x   Leuk Esterase: x / RBC: x / WBC x   Sq Epi: x / Non Sq Epi: x / Bacteria: x      PAST MEDICAL & SURGICAL HISTORY:  CVA (cerebrovascular accident)      Dysphagia      BPH (benign prostatic hyperplasia)      Agitation      Schizophrenia      Congestive heart failure (CHF)      CVA (cerebral vascular accident)      Dysphagia      S/P percutaneous endoscopic gastrostomy (PEG) tube placement        FAMILY HISTORY:      Social History:  Been there at nursing home since 8/2022. (05 Jan 2024 17:55)      RADIOLOGY:  [ ] Reviewed and interpreted by me CHIEF COMPLAINT: Patient is a 68y old Male who presents with a chief complaint of agitation.      Interval Events: No interval events noted overnight.      REVIEW OF SYSTEMS:  [x] Unable to assess ROS because of poor mental status.      OBJECTIVE:  ICU Vital Signs Last 24 Hrs  T(C): 36.8 (18 Jan 2024 06:00), Max: 36.8 (17 Jan 2024 20:00)  T(F): 98.2 (18 Jan 2024 06:00), Max: 98.3 (17 Jan 2024 20:00)  HR: 92 (18 Jan 2024 06:00) (87 - 130)  BP: 97/60 (18 Jan 2024 06:00) (97/60 - 123/73)  RR: 20 (18 Jan 2024 06:00) (18 - 20)  SpO2: 100% (18 Jan 2024 06:00) (95% - 100%)      O2 Parameters below as of 18 Jan 2024 06:00  Patient On (Oxygen Delivery Method): nasal cannula, high flow      01-17 @ 07:01  -  01-18 @ 07:00  --------------------------------------------------------  IN: 450 mL / OUT: 2500 mL / NET: -2050 mL      CAPILLARY BLOOD GLUCOSE      HOSPITAL MEDICATIONS:  MEDICATIONS  (STANDING):  acetylcysteine 10%  Inhalation 4 milliLiter(s) Inhalation every 12 hours  albuterol/ipratropium for Nebulization 3 milliLiter(s) Nebulizer every 6 hours  artificial  tears Solution 1 Drop(s) Both EYES every 6 hours  chlorhexidine 2% Cloths 1 Application(s) Topical daily  clonazePAM  Tablet 1 milliGRAM(s) Oral two times a day  droxidopa 300 milliGRAM(s) Oral three times a day  enoxaparin Injectable 40 milliGRAM(s) SubCutaneous every 24 hours  lactulose Syrup 20 Gram(s) Oral three times a day  midodrine 30 milliGRAM(s) Oral every 8 hours  polyethylene glycol 3350 17 Gram(s) Oral two times a day  QUEtiapine 225 milliGRAM(s) Oral <User Schedule>  senna 1 Tablet(s) Oral every 12 hours  sodium chloride 3%  Inhalation 4 milliLiter(s) Inhalation every 12 hours  tamsulosin 0.4 milliGRAM(s) Oral at bedtime  valproic  acid Syrup 250 milliGRAM(s) Oral <User Schedule>  valproic  acid Syrup 125 milliGRAM(s) Oral <User Schedule>    MEDICATIONS  (PRN):  acetaminophen     Tablet .. 650 milliGRAM(s) Oral every 6 hours PRN Moderate Pain (4 - 6)  OLANZapine 2.5 milliGRAM(s) Oral every 6 hours PRN Agitation  OLANZapine Injectable 2.5 milliGRAM(s) IntraMuscular every 6 hours PRN agitation  sodium chloride 0.9% lock flush 10 milliLiter(s) IV Push every 1 hour PRN Pre/post blood products, medications, blood draw, and to maintain line patency      LABS:                          11.5   10.26 )-----------( 180      ( 18 Jan 2024 06:25 )             35.2     01-18    141  |  102  |  10  ----------------------------<  110<H>  4.3   |  31  |  0.68    Ca    8.3<L>      18 Jan 2024 06:25  Phos  3.2     01-18  Mg     2.20     01-18    TPro  7.0  /  Alb  2.9<L>  /  TBili  0.2  /  DBili  x   /  AST  13  /  ALT  6   /  AlkPhos  43  01-17                    PAST MEDICAL & SURGICAL HISTORY:  CVA (cerebrovascular accident)      Dysphagia      BPH (benign prostatic hyperplasia)      Agitation      Schizophrenia      Congestive heart failure (CHF)      CVA (cerebral vascular accident)      Dysphagia      S/P percutaneous endoscopic gastrostomy (PEG) tube placement      FAMILY HISTORY:      Social History:  Been there at nursing home since 8/2022. (05 Jan 2024 17:55)      RADIOLOGY:  [ ] Reviewed and interpreted by me

## 2024-01-19 NOTE — BH CONSULTATION LIAISON ASSESSMENT NOTE - NSBHCHARTREVIEWVS_PSY_A_CORE FT
Vital Signs Last 24 Hrs  T(C): 36.4 (19 Jan 2024 06:00), Max: 36.4 (19 Jan 2024 06:00)  T(F): 97.5 (19 Jan 2024 06:00), Max: 97.5 (19 Jan 2024 06:00)  HR: 101 (19 Jan 2024 10:36) (86 - 101)  BP: 125/97 (19 Jan 2024 06:00) (125/97 - 125/97)  BP(mean): --  RR: 20 (19 Jan 2024 10:36) (18 - 20)  SpO2: 97% (19 Jan 2024 10:36) (92% - 100%)    Parameters below as of 19 Jan 2024 10:36  Patient On (Oxygen Delivery Method): nasal cannula, high flow  O2 Flow (L/min): 50  O2 Concentration (%): 100

## 2024-01-19 NOTE — BH CONSULTATION LIAISON ASSESSMENT NOTE - NSBHROSSTATEMENT_PSY_A_CORE
Amb to triage[facility].  [facility] to R submandibular area.  Pt had a CT done which showed multiple pulmonary lesions concerning for cavitary lesions vs septic emboli. Pt denies sob, chest pain.     .

## 2024-01-19 NOTE — PROGRESS NOTE ADULT - ASSESSMENT
67 YO M with PMHx of dementia, agitation, CVA, dysphagia with PEG, and BPH who presented [facility] for worsening agitation and progressive hypoxia. Patient found with left lung infiltrate and admitted to medicine for AHRF second to CAP with course complicated by mucous plug requiring ETT (1/6-1/12) and ultimately transferred to RCU (1/16).  69 YO M with PMHx of dementia, agitation, CVA, dysphagia with PEG, and BPH who presented [facility] for worsening agitation and progressive hypoxia. Patient found with left lung infiltrate and admitted to medicine for AHRF second to CAP with course complicated by mucous plug requiring ETT (1/6-1/12) and ultimately transferred to RCU (1/16).   NEUROLOGY  # Hx of TBI with neurocognitive disorder and agitation   - Agitation and aggression noted in house with multiple readmissions for agitation.   - Continued on home seroquel 225mg BID, depakote 250/125/250, klonopin 1 BID and zyprexa PO vs IM PRN doses however over sedation and poor airway protection.   - Case discussed at length with  and last admission TFT, B12, folic acid, B1, and CTH WNL and this admission infection tx. Plan to adjust regimen as needed/ able.    - Continue on Seroquel 100mg TID   - Continue on Klonopin 1mg BID  - Continue on Depakote 250/125/250 and check level tomorrow   - Continue on Zyprexa 2.5mg Q6H IM PRN     CARDIOVASCULAR  # Septic vs vasoplegic shock  - ECHO (1/8) with EF 62, normal LVSF and moderate to severe AR.   - IV pressors weaned off in ICU and continued on Midodrine 30 and Droxi 300   - Wean off pressors as able.     RESPIRATORY  # AHRF second to aspiration vs recurrent mucous plug   - Presented from NH for witnessed aspiration and started on ABX however course complicated by left lung mucous plug requiring ETT (1/6-1/12)   - Course complicated by recurrent left lung mucous plug   - Continue on HFNC 80/50 and wean as tolerated   - Continue on Albuterol, Mucomyst, HTS and Vest     HEENT   # Poor oral hygiene   - Biotene BID and RN mouth care     GI  # Dysphagia with PEG   - Continue on PEG with lactulose 20, miralax and senna     RENAL  # Hx of BPH   - Seymour removed in ICU and passed TOV   - Continue on flomax   - Monitor renal function and UOP     INFECTIOUS DISEASE  # MRSA PNA   - RVP/ COVID (1/5) negative   - SCx (1/6) with MRSA   - MRSA PCR (1/6) POSITIVE   - Completed bactroban and Vanco (1/6-1/16) in ICU   - Recurrent left lung mucous plug however no fever spike or leukocytosis so far. Monitor OFF ABX for now.     HEME  # Normocytic anemia   - HH stable 11s and monitoring for now  - DVT PPX with lovenox     ENDOCRINE  - No hx of A1C   - Monitor BG     ETHICS/ GOC    - FULL CODE     DISPO - Back to NH

## 2024-01-19 NOTE — PROGRESS NOTE ADULT - SUBJECTIVE AND OBJECTIVE BOX
CHIEF COMPLAINT: Patient is a 68y old  Male who presents with a chief complaint of agitation (18 Jan 2024 07:07)      INTERVAL EVENTS:    REVIEW OF SYSTEMS: Seen by bedside during AM rounds and       Arterial Blood Gas:  01-19 @ 08:00  7.40/63/89/39/98.1/11.8  ABG lactate: --      OBJECTIVE:  ICU Vital Signs Last 24 Hrs  T(C): 36.4 (19 Jan 2024 06:00), Max: 36.4 (19 Jan 2024 06:00)  T(F): 97.5 (19 Jan 2024 06:00), Max: 97.5 (19 Jan 2024 06:00)  HR: 93 (19 Jan 2024 07:35) (86 - 106)  BP: 125/97 (19 Jan 2024 06:00) (125/97 - 125/97)  BP(mean): --  ABP: --  ABP(mean): --  RR: 20 (19 Jan 2024 07:35) (18 - 20)  SpO2: 93% (19 Jan 2024 07:35) (93% - 100%)    O2 Parameters below as of 19 Jan 2024 07:35  Patient On (Oxygen Delivery Method): nasal cannula, high flow  O2 Flow (L/min): 50  O2 Concentration (%): 100          01-18 @ 07:01  -  01-19 @ 07:00  --------------------------------------------------------  IN: 1200 mL / OUT: 725 mL / NET: 475 mL      CAPILLARY BLOOD GLUCOSE      POCT Blood Glucose.: 81 mg/dL (19 Jan 2024 07:26)      HOSPITAL MEDICATIONS:  MEDICATIONS  (STANDING):  acetylcysteine 20%  Inhalation 4 milliLiter(s) Inhalation every 6 hours  albuterol    0.083% 2.5 milliGRAM(s) Nebulizer every 6 hours  artificial  tears Solution 1 Drop(s) Both EYES every 6 hours  chlorhexidine 2% Cloths 1 Application(s) Topical daily  clonazePAM  Tablet 1 milliGRAM(s) Oral two times a day  droxidopa 300 milliGRAM(s) Oral three times a day  enoxaparin Injectable 40 milliGRAM(s) SubCutaneous every 24 hours  lactulose Syrup 20 Gram(s) Oral three times a day  midodrine 30 milliGRAM(s) Oral every 8 hours  polyethylene glycol 3350 17 Gram(s) Oral two times a day  QUEtiapine 225 milliGRAM(s) Oral <User Schedule>  senna 1 Tablet(s) Oral every 12 hours  sodium chloride 3%  Inhalation 4 milliLiter(s) Inhalation every 6 hours  tamsulosin 0.4 milliGRAM(s) Oral at bedtime  valproic  acid Syrup 250 milliGRAM(s) Oral <User Schedule>  valproic  acid Syrup 125 milliGRAM(s) Oral <User Schedule>    MEDICATIONS  (PRN):  acetaminophen     Tablet .. 650 milliGRAM(s) Oral every 6 hours PRN Moderate Pain (4 - 6)  OLANZapine Injectable 2.5 milliGRAM(s) IntraMuscular every 6 hours PRN agitation      PHYSICAL EXAMINATION    LABS:                        11.5   10.91 )-----------( 196      ( 19 Jan 2024 08:00 )             34.3     01-19    143  |  102  |  11  ----------------------------<  106<H>  4.3   |  33<H>  |  0.62    Ca    8.7      19 Jan 2024 08:00  Phos  3.6     01-19  Mg     2.20     01-19        Urinalysis Basic - ( 19 Jan 2024 08:00 )    Color: x / Appearance: x / SG: x / pH: x  Gluc: 106 mg/dL / Ketone: x  / Bili: x / Urobili: x   Blood: x / Protein: x / Nitrite: x   Leuk Esterase: x / RBC: x / WBC x   Sq Epi: x / Non Sq Epi: x / Bacteria: x      Arterial Blood Gas:  01-19 @ 08:00  7.40/63/89/39/98.1/11.8  ABG lactate: --        PAST MEDICAL & SURGICAL HISTORY:  CVA (cerebrovascular accident)      Dysphagia      BPH (benign prostatic hyperplasia)      Agitation      Schizophrenia      Congestive heart failure (CHF)      CVA (cerebral vascular accident)      Dysphagia      S/P percutaneous endoscopic gastrostomy (PEG) tube placement          FAMILY HISTORY:      Social History:  Been there at nursing home since 8/2022. (05 Jan 2024 17:55)      RADIOLOGY:  [ ] Reviewed and interpreted by me    PULMONARY FUNCTION TESTS:    EKG: CHIEF COMPLAINT: Patient is a 68y old  Male who presents with a chief complaint of agitation (18 Jan 2024 07:07)      INTERVAL EVENTS:  - Desatted overnight with recurrent mucous plug on left. SPO2 increased to HFNC 100/60 and  with SPO2 84. Aggressive NT suctioning with copious thick secretions noted and able to be weaned off NRB   - Aggressive airway clearance continued and HFNC down 80/50  - Agitation noted and post seroquel noted with over sedation and poor airway protection.   - Agitation medications adjusted and will monitor.     REVIEW OF SYSTEMS: Seen by bedside during AM rounds and unable to assess ROS second to AOx0       Arterial Blood Gas:  01-19 @ 08:00  7.40/63/89/39/98.1/11.8  ABG lactate: --      OBJECTIVE:  ICU Vital Signs Last 24 Hrs  T(C): 36.4 (19 Jan 2024 06:00), Max: 36.4 (19 Jan 2024 06:00)  T(F): 97.5 (19 Jan 2024 06:00), Max: 97.5 (19 Jan 2024 06:00)  HR: 93 (19 Jan 2024 07:35) (86 - 106)  BP: 125/97 (19 Jan 2024 06:00) (125/97 - 125/97)  BP(mean): --  ABP: --  ABP(mean): --  RR: 20 (19 Jan 2024 07:35) (18 - 20)  SpO2: 93% (19 Jan 2024 07:35) (93% - 100%)    O2 Parameters below as of 19 Jan 2024 07:35  Patient On (Oxygen Delivery Method): nasal cannula, high flow  O2 Flow (L/min): 50  O2 Concentration (%): 100          01-18 @ 07:01  -  01-19 @ 07:00  --------------------------------------------------------  IN: 1200 mL / OUT: 725 mL / NET: 475 mL      CAPILLARY BLOOD GLUCOSE  POCT Blood Glucose.: 81 mg/dL (19 Jan 2024 07:26)      HOSPITAL MEDICATIONS:  MEDICATIONS  (STANDING):  acetylcysteine 20%  Inhalation 4 milliLiter(s) Inhalation every 6 hours  albuterol    0.083% 2.5 milliGRAM(s) Nebulizer every 6 hours  artificial  tears Solution 1 Drop(s) Both EYES every 6 hours  chlorhexidine 2% Cloths 1 Application(s) Topical daily  clonazePAM  Tablet 1 milliGRAM(s) Oral two times a day  droxidopa 300 milliGRAM(s) Oral three times a day  enoxaparin Injectable 40 milliGRAM(s) SubCutaneous every 24 hours  lactulose Syrup 20 Gram(s) Oral three times a day  midodrine 30 milliGRAM(s) Oral every 8 hours  polyethylene glycol 3350 17 Gram(s) Oral two times a day  QUEtiapine 225 milliGRAM(s) Oral <User Schedule>  senna 1 Tablet(s) Oral every 12 hours  sodium chloride 3%  Inhalation 4 milliLiter(s) Inhalation every 6 hours  tamsulosin 0.4 milliGRAM(s) Oral at bedtime  valproic  acid Syrup 250 milliGRAM(s) Oral <User Schedule>  valproic  acid Syrup 125 milliGRAM(s) Oral <User Schedule>    MEDICATIONS  (PRN):  acetaminophen     Tablet .. 650 milliGRAM(s) Oral every 6 hours PRN Moderate Pain (4 - 6)  OLANZapine Injectable 2.5 milliGRAM(s) IntraMuscular every 6 hours PRN agitation      PHYSICAL EXAMINATION  General: NAD   HEENT: Oropharynx secretion   Cards: S1/S2, no murmurs   Pulm: Rhonchi bilaterally (R<L) with diminished breath sounds. No wheezes.   Abdomen: Soft, nondistended and nontender. BS (+) PEG present.   Extremities: No pedal edema. TEZ of BL upper and lower extremities.  Neurology: AOx0 with agitation with no acute focal neurological deficits     LABS:                        11.5   10.91 )-----------( 196      ( 19 Jan 2024 08:00 )             34.3     01-19    143  |  102  |  11  ----------------------------<  106<H>  4.3   |  33<H>  |  0.62    Ca    8.7      19 Jan 2024 08:00  Phos  3.6     01-19  Mg     2.20     01-19        Urinalysis Basic - ( 19 Jan 2024 08:00 )  Color: x / Appearance: x / SG: x / pH: x  Gluc: 106 mg/dL / Ketone: x  / Bili: x / Urobili: x   Blood: x / Protein: x / Nitrite: x   Leuk Esterase: x / RBC: x / WBC x   Sq Epi: x / Non Sq Epi: x / Bacteria: x      Arterial Blood Gas:  01-19 @ 08:00  7.40/63/89/39/98.1/11.8  ABG lactate: --        PAST MEDICAL & SURGICAL HISTORY:  CVA (cerebrovascular accident)      Dysphagia      BPH (benign prostatic hyperplasia)      Agitation      Schizophrenia      Congestive heart failure (CHF)      CVA (cerebral vascular accident)      Dysphagia      S/P percutaneous endoscopic gastrostomy (PEG) tube placement          FAMILY HISTORY:      Social History:  Been there at nursing home since 8/2022. (05 Jan 2024 17:55)      RADIOLOGY:  [ ] Reviewed and interpreted by me    PULMONARY FUNCTION TESTS:    EKG:

## 2024-01-19 NOTE — BH CONSULTATION LIAISON ASSESSMENT NOTE - HPI (INCLUDE ILLNESS QUALITY, SEVERITY, DURATION, TIMING, CONTEXT, MODIFYING FACTORS, ASSOCIATED SIGNS AND SYMPTOMS)
Patient is a 68 years old male with the past hx of  BPH, agitation, as per chart ?dementia, dysphagia after CVA now s/p PEG tube, and ?CHF presenting from [facility] for worsening agitation, now medically admitted for treatment of community-acquired pneumonia c/b mucous plug requiring ETT.  Psychiatry consulted for agitation/medication management.    Exam limited as patient unable to meaningfully engage in interview and follow commands. On exam, patient AAOx0-1?, psychomotorally agitated, attempting to remove indwelling catheter, and non-responsive to questions/prompts. PE remarkable for rigidity in UE/LE and mild tremors in UE.      Per primary team, patient increasingly sedated with current medication regimen and there is concern for possible aspiration and inability to expunge mucus (2/2 to excessive sedation). Patient received multiple Zyprexa IMs, due to agitation, and becomes obtunded when Seroquel is administered. Patient is combative towards staff and difficult to verbally redirect.  Patient is a 68 years old male with the past hx of  BPH, agitation, as per chart ?dementia, dysphagia after CVA now s/p PEG tube, and ?CHF presenting from [facility] for worsening agitation, now medically admitted for treatment of community-acquired pneumonia c/b mucous plug requiring ETT.  Psychiatry consulted for agitation/medication management.    Exam limited as patient unable to meaningfully engage in interview and follow commands. On exam, patient AAOx0-1?, psychomotorally agitated, attempting to remove indwelling catheter, and non-responsive to questions/prompts. PE remarkable for rigidity in UE/LE and mild tremors in UE.      Per primary team, patient increasingly sedated with current medication regimen and there is concern for possible aspiration and inability to expunge mucus (2/2 to excessive sedation). Patient received multiple Zyprexa IMs, due to agitation, and becomes obtunded when Seroquel is administered. Patient is combative towards staff and difficult to verbally redirect.     Spoke to patient's brother, Frederick. Reports patient was functional until TBI in June 2022. Previously diagnosed with Asperger's in 2009. No known previous psych hx.

## 2024-01-19 NOTE — BH CONSULTATION LIAISON ASSESSMENT NOTE - CURRENT MEDICATION
MEDICATIONS  (STANDING):  acetylcysteine 20%  Inhalation 4 milliLiter(s) Inhalation every 6 hours  albuterol    0.083% 2.5 milliGRAM(s) Nebulizer every 6 hours  artificial  tears Solution 1 Drop(s) Both EYES every 6 hours  chlorhexidine 2% Cloths 1 Application(s) Topical daily  clonazePAM  Tablet 1 milliGRAM(s) Oral two times a day  droxidopa 300 milliGRAM(s) Oral three times a day  enoxaparin Injectable 40 milliGRAM(s) SubCutaneous every 24 hours  lactulose Syrup 20 Gram(s) Oral three times a day  midodrine 30 milliGRAM(s) Oral every 8 hours  polyethylene glycol 3350 17 Gram(s) Oral two times a day  QUEtiapine 200 milliGRAM(s) Oral <User Schedule>  senna 1 Tablet(s) Oral every 12 hours  sodium chloride 3%  Inhalation 4 milliLiter(s) Inhalation every 6 hours  tamsulosin 0.4 milliGRAM(s) Oral at bedtime  valproic  acid Syrup 250 milliGRAM(s) Oral <User Schedule>  valproic  acid Syrup 125 milliGRAM(s) Oral <User Schedule>    MEDICATIONS  (PRN):  acetaminophen     Tablet .. 650 milliGRAM(s) Oral every 6 hours PRN Moderate Pain (4 - 6)  OLANZapine Injectable 2.5 milliGRAM(s) IntraMuscular every 6 hours PRN agitation

## 2024-01-19 NOTE — BH CONSULTATION LIAISON ASSESSMENT NOTE - SUMMARY
Patient is a 68 years old male with the past hx of  BPH, agitation, as per chart ?dementia, dysphagia after CVA now s/p PEG tube, and ?CHF presenting from [facility] for worsening agitation, now medically admitted for treatment of community-acquired pneumonia c/b mucous plug requiring ETT.  Psychiatry consulted for agitation/medication management.    Patient's agitation likely 2/2 possible executive and memory dysfunction of unclear etiology. Patient with hx of TBI and possible dementia, now presentation with acute agitation and aggression and inattention, requiring multiple PRNs. Patient now requiring med management for agitation given concerns for excessive sedation, which may be further complicating medical course. Presentation also c/b by possible superimposed delirious process 2/2 to acute medical condition.    Recommendations:  - Observation: as per primary team  -   - Medications:   Decrease Seroquel to 100mg TID, Hold if QTC >500  		c/w Clonazepam 1 mg BID. PLEASE HOLD FOR   		c/w Depakote 250mg BID and 125 mg Q1:00pm (will likely increase pending depakote level)  - Repeat Depakote level/CBC/CMP, given low albumin and concerns for potential Depakote toxicity   - PRN for agitation: Zyprexa 2.5 mg Q 6 hours PO/IM. Hold if QTC >500  - Supportive treatment of delirium: 1) Minimize use of benzodiazepines, opioids, anticholinergics, and other deliriogenic agents whenever possible.  2) Maintain sleep wake cycle.  3) Provide frequent reorientation and redirection.  4) Family member at bedside if possible. 5) Provide corrective lenses/hearing aids if required  Patient is a 68 years old male with the past hx of  BPH, agitation, as per chart ?dementia, dysphagia after CVA now s/p PEG tube, and ?CHF presenting from [facility] for worsening agitation, now medically admitted for treatment of community-acquired pneumonia c/b mucous plug requiring ETT.  Psychiatry consulted for agitation/medication management.    Patient's agitation likely 2/2 possible executive and memory dysfunction of unclear etiology. Patient with hx of TBI and possible dementia, now presentation with acute agitation and aggression and inattention, requiring multiple PRNs. Patient now requiring med management for agitation given concerns for excessive sedation, which may be further complicating medical course. Presentation also c/b by possible superimposed delirious process 2/2 to acute medical condition.    Recommendations:  - Observation: as per primary team  -   - Medications:   Decrease Seroquel to 100mg TID, Hold if QTC >500  		c/w Clonazepam 1 mg BID. PLEASE HOLD FOR EXCESSIVE SEDATION, HYPOTENSION, BRADYCARDIA, RESPIRATORY DISTRESS  		c/w Depakote 250mg BID and 125 mg Q1:00pm (will likely increase pending depakote level)  - Repeat Depakote level/CBC/CMP, given low albumin and concerns for potential Depakote toxicity   - PRN for agitation: Zyprexa 2.5 mg Q 6 hours PO/IM. Hold if QTC >500  - Supportive treatment of delirium: 1) Minimize use of benzodiazepines, opioids, anticholinergics, and other deliriogenic agents whenever possible.  2) Maintain sleep wake cycle.  3) Provide frequent reorientation and redirection.  4) Family member at bedside if possible. 5) Provide corrective lenses/hearing aids if required  Patient is a 68 years old male with the past hx of  BPH, agitation, as per chart ?dementia, dysphagia after CVA now s/p PEG tube, and ?CHF presenting from [facility] for worsening agitation, now medically admitted for treatment of community-acquired pneumonia c/b mucous plug requiring ETT.  Psychiatry consulted for agitation/medication management.    Patient's agitation likely 2/2 possible executive and memory dysfunction of unclear etiology. Patient with hx of TBI and possible dementia, now presentation with acute agitation and aggression and inattention, requiring multiple PRNs. Patient now requiring med management for agitation given concerns for excessive sedation, which may be further complicating medical course. Presentation also c/b by possible superimposed delirious process 2/2 to acute medical condition.    Recommendations:  - Observation: as per primary team  - WOULD STRONGLY ENCOURAGE USE OF POSEY distractible vest/tool (with supervision) as this can mitigate restlessness  - can consider use of mittens to decrease PRNs  - Medications:   Decrease Seroquel to 100mg TID, Hold if QTC >500  		c/w Clonazepam 1 mg BID. PLEASE HOLD FOR EXCESSIVE SEDATION, HYPOTENSION, BRADYCARDIA, RESPIRATORY DISTRESS  		c/w Depakote 250mg BID and 125 mg Q1:00pm (will likely increase pending depakote level)  - Repeat Depakote level/CBC/CMP, given low albumin and concerns for potential Depakote toxicity   - PRN for agitation: Zyprexa 2.5 mg Q 6 hours PO/IM. Hold if QTC >500  - Supportive treatment of delirium: 1) Minimize use of benzodiazepines, opioids, anticholinergics, and other deliriogenic agents whenever possible.  2) Maintain sleep wake cycle.  3) Provide frequent reorientation and redirection.  4) Family member at bedside if possible. 5) Provide corrective lenses/hearing aids if required

## 2024-01-19 NOTE — BH CONSULTATION LIAISON ASSESSMENT NOTE - NSBHATTESTCOMMENTATTENDFT_PSY_A_CORE
Patient seen this afternoon. He is restless, picking at his garcia.  He cannot follow commands. He is alert. Exam very limited. Patient has increased tone in UE but spontaneously moves all extremities.    Agree with decrease of seroquel while spacing out dosing as this may allow better overall coverage.  Can still use PRNs as per above.  Please check VPA level, LFTs, CBC tomorrow AM before VPA dose.    Please call with questions.

## 2024-01-20 NOTE — PROGRESS NOTE ADULT - SUBJECTIVE AND OBJECTIVE BOX
CHIEF COMPLAINT: Patient is a 68y old  Male who presents with a chief complaint of agitation (19 Jan 2024 09:21)      INTERVAL EVENTS:    REVIEW OF SYSTEMS: Seen by bedside during AM rounds and       Arterial Blood Gas:  01-19 @ 08:00  7.40/63/89/39/98.1/11.8  ABG lactate: --      OBJECTIVE:  ICU Vital Signs Last 24 Hrs  T(C): 36.1 (20 Jan 2024 08:00), Max: 37.3 (20 Jan 2024 04:00)  T(F): 97 (20 Jan 2024 08:00), Max: 99.1 (20 Jan 2024 04:00)  HR: 91 (20 Jan 2024 08:00) (88 - 113)  BP: 107/65 (20 Jan 2024 08:00) (104/68 - 109/70)  BP(mean): 78 (20 Jan 2024 08:00) (78 - 79)  ABP: --  ABP(mean): --  RR: 20 (20 Jan 2024 08:00) (16 - 20)  SpO2: 100% (20 Jan 2024 08:00) (92% - 100%)    O2 Parameters below as of 20 Jan 2024 08:00  Patient On (Oxygen Delivery Method): nasal cannula, high flow  O2 Flow (L/min): 50  O2 Concentration (%): 60          CAPILLARY BLOOD GLUCOSE      POCT Blood Glucose.: 81 mg/dL (19 Jan 2024 07:26)      HOSPITAL MEDICATIONS:  MEDICATIONS  (STANDING):  acetylcysteine 20%  Inhalation 4 milliLiter(s) Inhalation every 6 hours  albuterol    0.083% 2.5 milliGRAM(s) Nebulizer every 6 hours  artificial  tears Solution 1 Drop(s) Both EYES every 6 hours  chlorhexidine 2% Cloths 1 Application(s) Topical daily  clonazePAM  Tablet 1 milliGRAM(s) Oral <User Schedule>  droxidopa 300 milliGRAM(s) Oral three times a day  enoxaparin Injectable 40 milliGRAM(s) SubCutaneous every 24 hours  lactulose Syrup 20 Gram(s) Oral three times a day  midodrine 30 milliGRAM(s) Oral every 8 hours  polyethylene glycol 3350 17 Gram(s) Oral two times a day  QUEtiapine 100 milliGRAM(s) Oral <User Schedule>  senna 1 Tablet(s) Oral every 12 hours  sodium chloride 3%  Inhalation 4 milliLiter(s) Inhalation every 6 hours  tamsulosin 0.4 milliGRAM(s) Oral at bedtime  valproic  acid Syrup 250 milliGRAM(s) Oral <User Schedule>  valproic  acid Syrup 125 milliGRAM(s) Oral <User Schedule>    MEDICATIONS  (PRN):  acetaminophen     Tablet .. 650 milliGRAM(s) Oral every 6 hours PRN Moderate Pain (4 - 6)  OLANZapine Injectable 2.5 milliGRAM(s) IntraMuscular every 6 hours PRN agitation      PHYSICAL EXAMINATION    LABS:                        11.8   10.18 )-----------( 194      ( 20 Jan 2024 07:02 )             36.7     01-20    146<H>  |  103  |  11  ----------------------------<  101<H>  4.5   |  35<H>  |  0.70    Ca    8.8      20 Jan 2024 07:02  Phos  3.5     01-20  Mg     2.10     01-20    TPro  7.5  /  Alb  3.2<L>  /  TBili  <0.2  /  DBili  <0.2  /  AST  11  /  ALT  5   /  AlkPhos  46  01-20      Urinalysis Basic - ( 20 Jan 2024 07:02 )    Color: x / Appearance: x / SG: x / pH: x  Gluc: 101 mg/dL / Ketone: x  / Bili: x / Urobili: x   Blood: x / Protein: x / Nitrite: x   Leuk Esterase: x / RBC: x / WBC x   Sq Epi: x / Non Sq Epi: x / Bacteria: x      Arterial Blood Gas:  01-19 @ 08:00  7.40/63/89/39/98.1/11.8  ABG lactate: --    Venous Blood Gas:  01-20 @ 07:02  7.34/73/47/39/70.4  VBG Lactate: 2.3      PAST MEDICAL & SURGICAL HISTORY:  CVA (cerebrovascular accident)      Dysphagia      BPH (benign prostatic hyperplasia)      Agitation      Schizophrenia      Congestive heart failure (CHF)      CVA (cerebral vascular accident)      Dysphagia      S/P percutaneous endoscopic gastrostomy (PEG) tube placement          FAMILY HISTORY:      Social History:  Been there at nursing home since 8/2022. (05 Jan 2024 17:55)      RADIOLOGY:  [ ] Reviewed and interpreted by me    PULMONARY FUNCTION TESTS:    EKG: CHIEF COMPLAINT: Patient is a 68y old  Male who presents with a chief complaint of agitation (19 Jan 2024 09:21)      INTERVAL EVENTS:  - Agitation overnight requiring Zyprexa PRN dose   - VPA level high normal and BH decreased dose to 125/125/250 today   - Nocturnal schedule continues with poor ability to of airway clearance during day continues.  following along closely and will discuss further medication adjustments.     REVIEW OF SYSTEMS: Seen by bedside during AM rounds and unable to assess ROS given baseline TBI hx      Arterial Blood Gas:  01-19 @ 08:00  7.40/63/89/39/98.1/11.8  ABG lactate: --      OBJECTIVE:  ICU Vital Signs Last 24 Hrs  T(C): 36.1 (20 Jan 2024 08:00), Max: 37.3 (20 Jan 2024 04:00)  T(F): 97 (20 Jan 2024 08:00), Max: 99.1 (20 Jan 2024 04:00)  HR: 91 (20 Jan 2024 08:00) (88 - 113)  BP: 107/65 (20 Jan 2024 08:00) (104/68 - 109/70)  BP(mean): 78 (20 Jan 2024 08:00) (78 - 79)  ABP: --  ABP(mean): --  RR: 20 (20 Jan 2024 08:00) (16 - 20)  SpO2: 100% (20 Jan 2024 08:00) (92% - 100%)    O2 Parameters below as of 20 Jan 2024 08:00  Patient On (Oxygen Delivery Method): nasal cannula, high flow  O2 Flow (L/min): 50  O2 Concentration (%): 60          CAPILLARY BLOOD GLUCOSE  POCT Blood Glucose.: 81 mg/dL (19 Jan 2024 07:26)      HOSPITAL MEDICATIONS:  MEDICATIONS  (STANDING):  acetylcysteine 20%  Inhalation 4 milliLiter(s) Inhalation every 6 hours  albuterol    0.083% 2.5 milliGRAM(s) Nebulizer every 6 hours  artificial  tears Solution 1 Drop(s) Both EYES every 6 hours  chlorhexidine 2% Cloths 1 Application(s) Topical daily  clonazePAM  Tablet 1 milliGRAM(s) Oral <User Schedule>  droxidopa 300 milliGRAM(s) Oral three times a day  enoxaparin Injectable 40 milliGRAM(s) SubCutaneous every 24 hours  lactulose Syrup 20 Gram(s) Oral three times a day  midodrine 30 milliGRAM(s) Oral every 8 hours  polyethylene glycol 3350 17 Gram(s) Oral two times a day  QUEtiapine 100 milliGRAM(s) Oral <User Schedule>  senna 1 Tablet(s) Oral every 12 hours  sodium chloride 3%  Inhalation 4 milliLiter(s) Inhalation every 6 hours  tamsulosin 0.4 milliGRAM(s) Oral at bedtime  valproic  acid Syrup 250 milliGRAM(s) Oral <User Schedule>  valproic  acid Syrup 125 milliGRAM(s) Oral <User Schedule>    MEDICATIONS  (PRN):  acetaminophen     Tablet .. 650 milliGRAM(s) Oral every 6 hours PRN Moderate Pain (4 - 6)  OLANZapine Injectable 2.5 milliGRAM(s) IntraMuscular every 6 hours PRN agitation      PHYSICAL EXAMINATION  General: NAD   HEENT: Oropharynx secretion   Cards: S1/S2, no murmurs   Pulm: Rhonchi bilaterally (R<L) with diminished breath sounds. No wheezes.   Abdomen: Soft, nondistended and nontender. BS (+) PEG present.   Extremities: No pedal edema. TEZ of BL upper and lower extremities.  Neurology: AOx0 with agitation. Does not follow commands but eyes open , tracks, and combative with staff/ sitter. No acute focal neurological deficits     LABS:                        11.8   10.18 )-----------( 194      ( 20 Jan 2024 07:02 )             36.7     01-20    146<H>  |  103  |  11  ----------------------------<  101<H>  4.5   |  35<H>  |  0.70    Ca    8.8      20 Jan 2024 07:02  Phos  3.5     01-20  Mg     2.10     01-20    TPro  7.5  /  Alb  3.2<L>  /  TBili  <0.2  /  DBili  <0.2  /  AST  11  /  ALT  5   /  AlkPhos  46  01-20      Urinalysis Basic - ( 20 Jan 2024 07:02 )  Color: x / Appearance: x / SG: x / pH: x  Gluc: 101 mg/dL / Ketone: x  / Bili: x / Urobili: x   Blood: x / Protein: x / Nitrite: x   Leuk Esterase: x / RBC: x / WBC x   Sq Epi: x / Non Sq Epi: x / Bacteria: x      Arterial Blood Gas:  01-19 @ 08:00  7.40/63/89/39/98.1/11.8  ABG lactate: --    Venous Blood Gas:  01-20 @ 07:02  7.34/73/47/39/70.4  VBG Lactate: 2.3      PAST MEDICAL & SURGICAL HISTORY:  CVA (cerebrovascular accident)      Dysphagia      BPH (benign prostatic hyperplasia)      Agitation      Schizophrenia      Congestive heart failure (CHF)      CVA (cerebral vascular accident)      Dysphagia      S/P percutaneous endoscopic gastrostomy (PEG) tube placement          FAMILY HISTORY:      Social History:  Been there at nursing home since 8/2022. (05 Jan 2024 17:55)      RADIOLOGY:  [ ] Reviewed and interpreted by me    PULMONARY FUNCTION TESTS:    EKG:

## 2024-01-20 NOTE — CHART NOTE - NSCHARTNOTEFT_GEN_A_CORE
Patient's labs were drawn this morning to monitor VPA level and liver function.    LIVER FUNCTIONS - ( 20 Jan 2024 07:02 )  Alb: 3.2 g/dL / Pro: 7.5 g/dL / ALK PHOS: 46 U/L / ALT: 5 U/L / AST: 11 U/L / GGT: x           VPA: 49.4  alpha: 13   corrected VPA level: 98.8 - high end of normal; not toxic     Plan:  - Discussed with primary team and requested they lower VPA dose to 125mg 8am, 125mg midday, and 250mg HS - to address high end of normal VPA level   - Next VPA level to be drawn Monday  - Monitor for signs of depakote toxicity and call CL if any noted Patient's labs were drawn this morning to monitor VPA level and liver function..    LIVER FUNCTIONS - ( 20 Jan 2024 07:02 )  Alb: 3.2 g/dL / Pro: 7.5 g/dL / ALK PHOS: 46 U/L / ALT: 5 U/L / AST: 11 U/L / GGT: x           VPA: 49.4  corrected VPA level : 98.8 - high end of normal; not toxic     Plan:  - Discussed with primary team and requested they lower VPA dose to 125mg 8am, 125mg midday, and 250mg HS - to address high end of normal VPA level   - Next VPA level to be drawn Monday  - Monitor for signs of depakote toxicity and call CL #[id number] if any noted      Attending addendum:  Handoff received from Dr. Marin, , chart reviewed, case d/w resident, agree with above recs

## 2024-01-20 NOTE — PROGRESS NOTE ADULT - ASSESSMENT
69 YO M with PMHx of dementia, agitation, CVA, dysphagia with PEG, and BPH who presented [facility] for worsening agitation and progressive hypoxia. Patient found with left lung infiltrate and admitted to medicine for AHRF second to CAP with course complicated by mucous plug requiring ETT (1/6-1/12) and ultimately transferred to RCU (1/16).   NEUROLOGY  # Hx of TBI with neurocognitive disorder and agitation   - Agitation and aggression noted in house with multiple readmissions for agitation.   - Continued on home seroquel 225mg BID, depakote 250/125/250, klonopin 1 BID and zyprexa PO vs IM PRN doses however over sedation and poor airway protection.   - Case discussed at length with  and last admission TFT, B12, folic acid, B1, and CTH WNL and this admission infection tx. Plan to adjust regimen as needed/ able.    - Continue on Seroquel 100mg TID   - Continue on Klonopin 1mg BID  - Continue on Depakote 250/125/250 and check level tomorrow   - Continue on Zyprexa 2.5mg Q6H IM PRN     CARDIOVASCULAR  # Septic vs vasoplegic shock  - ECHO (1/8) with EF 62, normal LVSF and moderate to severe AR.   - IV pressors weaned off in ICU and continued on Midodrine 30 and Droxi 300   - Wean off pressors as able.     RESPIRATORY  # AHRF second to aspiration vs recurrent mucous plug   - Presented from NH for witnessed aspiration and started on ABX however course complicated by left lung mucous plug requiring ETT (1/6-1/12)   - Course complicated by recurrent left lung mucous plug   - Continue on HFNC 80/50 and wean as tolerated   - Continue on Albuterol, Mucomyst, HTS and Vest     HEENT   # Poor oral hygiene   - Biotene BID and RN mouth care     GI  # Dysphagia with PEG   - Continue on PEG with lactulose 20, miralax and senna     RENAL  # Hx of BPH   - Seymour removed in ICU and passed TOV   - Continue on flomax   - Monitor renal function and UOP     INFECTIOUS DISEASE  # MRSA PNA   - RVP/ COVID (1/5) negative   - SCx (1/6) with MRSA   - MRSA PCR (1/6) POSITIVE   - Completed bactroban and Vanco (1/6-1/16) in ICU   - Recurrent left lung mucous plug however no fever spike or leukocytosis so far. Monitor OFF ABX for now.     HEME  # Normocytic anemia   - HH stable 11s and monitoring for now  - DVT PPX with lovenox     ENDOCRINE  - No hx of A1C   - Monitor BG     ETHICS/ GOC    - FULL CODE     DISPO - Back to NH   69 YO M with PMHx of dementia, agitation, CVA, dysphagia with PEG, and BPH who presented [facility] for worsening agitation and progressive hypoxia. Patient found with left lung infiltrate and admitted to medicine for AHRF second to CAP with course complicated by mucous plug requiring ETT (1/6-1/12) and ultimately transferred to RCU (1/16).   NEUROLOGY  # Hx of TBI with neurocognitive disorder and agitation   - Agitation and aggression noted in house with multiple readmissions for agitation.   - Continued on home seroquel 225mg BID, depakote 250/125/250, klonopin 1 BID and zyprexa PO vs IM PRN doses however over sedation during the day with poor airway protection and night time insomnia noted   - Case discussed at length with  and last admission TFT, B12, folic acid, B1, and CTH WNL and this admission infection tx.   - Continue on Klonopin 1mg BID.   - Continue on Seroquel 100mg TID (decreased 1/19) however may need to decrease day time doses and increase evening dose.   - Continue on Depakote 125/125/250 (decreased 1/20) and check level on Monday.   - Continue on Zyprexa 2.5mg Q6H IM PRN.   - Plan to adjust regimen as needed/ able.      CARDIOVASCULAR  # Septic vs vasoplegic shock  - ECHO (1/8) with EF 62, normal LVSF and moderate to severe AR.   - IV pressors weaned off in ICU and continued on Midodrine 30 and Droxi 300   - Wean off pressors as able.     RESPIRATORY  # AHRF second to aspiration vs recurrent mucous plug   - Presented from NH for witnessed aspiration and started on ABX however course complicated by left lung mucous plug requiring ETT (1/6-1/12)   - Course complicated by recurrent left lung mucous plug and attempted aggressive airway clearance with no recruitment noted. Despite persistent left lung plug able to wean HFNC without recurrent respiratory distress.  - Continue on HFNC 50/50 and wean as tolerated   - Continue on Albuterol, Mucomyst, HTS and Vest   - Consider CT chest to evaluate airway for obstruction leading to collapse when on less supplement O2    HEENT   # Poor oral hygiene   - Biotene BID and RN mouth care     GI  # Dysphagia with PEG   - Continue on PEG with lactulose 20, miralax and senna     RENAL  # Hx of BPH   - Seymour removed in ICU and passed TOV   - Continue on flomax   - Monitor renal function and UOP     INFECTIOUS DISEASE  # MRSA PNA   - RVP/ COVID (1/5) negative   - SCx (1/6) with MRSA   - MRSA PCR (1/6) POSITIVE   - Completed bactroban and Vanco (1/6-1/16) in ICU   - Recurrent left lung mucous plug however no fever spike or leukocytosis so far. Check RPT SCx and monitor OFF ABX for now.     HEME  # Normocytic anemia   - HH stable 11s and monitoring for now  - DVT PPX with lovenox     ENDOCRINE  - No hx of A1C   - Monitor BG     ETHICS/ GOC    - FULL CODE     DISPO - Back to NH

## 2024-01-20 NOTE — PROGRESS NOTE ADULT - TIME-BASED
55 Qbrexza Counseling:  I discussed with the patient the risks of Qbrexza including but not limited to headache, mydriasis, blurred vision, dry eyes, nasal dryness, dry mouth, dry throat, dry skin, urinary hesitation, and constipation.  Local skin reactions including erythema, burning, stinging, and itching can also occur.

## 2024-01-21 NOTE — PROGRESS NOTE ADULT - ASSESSMENT
67 YO M with PMHx of dementia, agitation, CVA, dysphagia with PEG, and BPH who presented [facility] for worsening agitation and progressive hypoxia. Patient found with left lung infiltrate and admitted to medicine for AHRF second to CAP with course complicated by mucous plug requiring ETT (1/6-1/12) and ultimately transferred to RCU (1/16).   NEUROLOGY  # Hx of TBI with neurocognitive disorder and agitation   - Agitation and aggression noted in house with multiple readmissions for agitation.   - Continued on home seroquel 225mg BID, depakote 250/125/250, klonopin 1 BID and zyprexa PO vs IM PRN doses however over sedation during the day with poor airway protection and night time insomnia noted   - Case discussed at length with  and last admission TFT, B12, folic acid, B1, and CTH WNL and this admission infection tx.   - Continue on Klonopin 1mg BID.   - Continue on Seroquel 100mg TID (decreased 1/19) however may need to decrease day time doses and increase evening dose.   - Continue on Depakote 125/125/250 (decreased 1/20) and check level on Monday.   - Continue on Zyprexa 2.5mg Q6H IM PRN.   - Plan to adjust regimen as needed/ able.      CARDIOVASCULAR  # Septic vs vasoplegic shock  - ECHO (1/8) with EF 62, normal LVSF and moderate to severe AR.   - IV pressors weaned off in ICU and continued on Midodrine 30 and Droxi 300   - Wean off pressors as able.     RESPIRATORY  # AHRF second to aspiration vs recurrent mucous plug   - Presented from NH for witnessed aspiration and started on ABX however course complicated by left lung mucous plug requiring ETT (1/6-1/12)   - Course complicated by recurrent left lung mucous plug and attempted aggressive airway clearance with no recruitment noted. Despite persistent left lung plug able to wean HFNC without recurrent respiratory distress.  - Continue on HFNC 50/50 and wean as tolerated   - Continue on Albuterol, Mucomyst, HTS and Vest   - Consider CT chest to evaluate airway for obstruction leading to collapse when on less supplement O2    HEENT   # Poor oral hygiene   - Biotene BID and RN mouth care     GI  # Dysphagia with PEG   - Continue on PEG with lactulose 20, miralax and senna     RENAL  # Hx of BPH   - Seymour removed in ICU and passed TOV   - Continue on flomax   - Monitor renal function and UOP     INFECTIOUS DISEASE  # MRSA PNA   - RVP/ COVID (1/5) negative   - SCx (1/6) with MRSA   - MRSA PCR (1/6) POSITIVE   - Completed bactroban and Vanco (1/6-1/16) in ICU   - Recurrent left lung mucous plug however no fever spike or leukocytosis so far. Check RPT SCx and monitor OFF ABX for now.     HEME  # Normocytic anemia   - HH stable 11s and monitoring for now  - DVT PPX with lovenox     ENDOCRINE  - No hx of A1C   - Monitor BG     ETHICS/ GOC    - FULL CODE     DISPO - Back to NH   67 YO M with PMHx of dementia, agitation, CVA, dysphagia with PEG, and BPH who presented [facility] for worsening agitation and progressive hypoxia. Patient found with left lung infiltrate and admitted to medicine for AHRF second to CAP with course complicated by mucous plug requiring ETT (1/6-1/12) and ultimately transferred to RCU (1/16).     NEUROLOGY  # Hx of TBI with neurocognitive disorder and agitation   - Agitation and aggression noted in house with multiple readmissions for agitation.   - Continued on home seroquel 225mg BID, depakote 250/125/250, klonopin 1 BID and zyprexa PO vs IM PRN doses however over sedation during the day with poor airway protection and night time insomnia noted   - Case discussed at length with  and last admission TFT, B12, folic acid, B1, and CTH WNL and this admission infection tx.   - Continue on Klonopin 1mg BID.   - Continue on Seroquel 100mg TID (decreased 1/19) however may need to decrease day time doses and increase evening dose.   - Continue on Depakote 125/125/250 (decreased 1/20) and check level on Monday.   - Continue on Zyprexa 2.5mg Q6H IM PRN.   - Plan to adjust regimen as needed/ able.      CARDIOVASCULAR  # Septic vs vasoplegic shock  - ECHO (1/8) with EF 62, normal LVSF and moderate to severe AR.   - IV pressors weaned off in ICU and continued on Midodrine 30 and Droxi 300   - Wean off pressors as able.     RESPIRATORY  # AHRF second to aspiration vs recurrent mucous plug   - Presented from NH for witnessed aspiration and started on ABX however course complicated by left lung mucous plug requiring ETT (1/6-1/12)   - Course complicated by recurrent left lung mucous plug and attempted aggressive airway clearance with no recruitment noted. Despite persistent left lung plug able to wean HFNC without recurrent respiratory distress.  - Continue on HFNC 90/50L and wean as tolerated   - Continue on Albuterol, Mucomyst, HTS and Vest   - Consider CT chest to evaluate airway for obstruction leading to collapse when on less supplement O2    HEENT   # Poor oral hygiene   - Biotene BID and RN mouth care     GI  # Dysphagia with PEG   - Continue on PEG with lactulose 20, miralax and senna     RENAL  # Hx of BPH   - Seymour removed in ICU and passed TOV   - Continue on flomax   - Monitor renal function and UOP     INFECTIOUS DISEASE  # MRSA PNA   - RVP/ COVID (1/5) negative   - SCx (1/6) with MRSA   - MRSA PCR (1/6) POSITIVE   - Completed bactroban and Vanco (1/6-1/16) in ICU   - Recurrent left lung mucous plug however no fever spike or leukocytosis so far. RPT SCx negative and monitoring OFF ABX for now.     HEME  # Normocytic anemia   - HH stable 11s and monitoring for now  - DVT PPX with lovenox     ENDOCRINE  - No hx of A1C   - Monitor BG     ETHICS/ GOC    - FULL CODE     DISPO - Back to NH

## 2024-01-21 NOTE — PROGRESS NOTE ADULT - SUBJECTIVE AND OBJECTIVE BOX
CHIEF COMPLAINT: Patient is a 68y old  Male who presents with a chief complaint of agitation (20 Jan 2024 08:05)      INTERVAL EVENTS:  - Desatted and agitated overnight. HFNC increased and NRB added with some stability. Suctioned and chest PT performed.     REVIEW OF SYSTEMS: Seen by bedside during AM rounds and unable to assess ROS given neurocognitive decline with known TBI      Arterial Blood Gas:  01-19 @ 08:00  7.40/63/89/39/98.1/11.8  ABG lactate: --      OBJECTIVE:  ICU Vital Signs Last 24 Hrs  T(C): 36.7 (21 Jan 2024 04:00), Max: 37.2 (21 Jan 2024 00:00)  T(F): 98 (21 Jan 2024 04:00), Max: 98.9 (21 Jan 2024 00:00)  HR: 97 (21 Jan 2024 06:00) (91 - 114)  BP: 105/68 (21 Jan 2024 06:55) (93/62 - 117/69)  BP(mean): 81 (21 Jan 2024 06:55) (71 - 83)  ABP: --  ABP(mean): --  RR: 14 (21 Jan 2024 04:00) (14 - 22)  SpO2: 97% (21 Jan 2024 06:00) (88% - 100%)    O2 Parameters below as of 21 Jan 2024 06:00  Patient On (Oxygen Delivery Method): high anahi    O2 Concentration (%): 90          01-20 @ 07:01  -  01-21 @ 07:00  --------------------------------------------------------  IN: 660 mL / OUT: 900 mL / NET: -240 mL      CAPILLARY BLOOD GLUCOSE          HOSPITAL MEDICATIONS:  MEDICATIONS  (STANDING):  acetylcysteine 20%  Inhalation 4 milliLiter(s) Inhalation every 6 hours  albuterol    0.083% 2.5 milliGRAM(s) Nebulizer every 6 hours  artificial  tears Solution 1 Drop(s) Both EYES every 6 hours  chlorhexidine 2% Cloths 1 Application(s) Topical daily  clonazePAM  Tablet 1 milliGRAM(s) Oral <User Schedule>  droxidopa 300 milliGRAM(s) Oral three times a day  enoxaparin Injectable 40 milliGRAM(s) SubCutaneous every 24 hours  lactulose Syrup 20 Gram(s) Oral three times a day  midodrine 30 milliGRAM(s) Oral every 8 hours  polyethylene glycol 3350 17 Gram(s) Oral two times a day  QUEtiapine 100 milliGRAM(s) Oral <User Schedule>  senna 1 Tablet(s) Oral every 12 hours  sodium chloride 3%  Inhalation 4 milliLiter(s) Inhalation every 6 hours  tamsulosin 0.4 milliGRAM(s) Oral at bedtime  valproic  acid Syrup 250 milliGRAM(s) Oral <User Schedule>  valproic  acid Syrup 125 milliGRAM(s) Oral <User Schedule>    MEDICATIONS  (PRN):  acetaminophen     Tablet .. 650 milliGRAM(s) Oral every 6 hours PRN Moderate Pain (4 - 6)  OLANZapine Injectable 2.5 milliGRAM(s) IntraMuscular every 6 hours PRN agitation      PHYSICAL EXAMINATION  General: NAD   HEENT: Oropharynx secretion   Cards: S1/S2, no murmurs   Pulm: Rhonchi bilaterally (R<L) with diminished breath sounds. No wheezes.   Abdomen: Soft, nondistended and nontender. BS (+) PEG present.   Extremities: No pedal edema. TEZ of BL upper and lower extremities.  Neurology: AOx0 with agitation. Does not follow commands but eyes open , tracks, and combative with staff/ sitter. No acute focal neurological deficits     LABS:                        10.9   12.04 )-----------( 180      ( 21 Jan 2024 05:29 )             34.3     01-21    144  |  103  |  14  ----------------------------<  116<H>  4.1   |  34<H>  |  0.75    Ca    8.8      21 Jan 2024 05:29  Phos  3.3     01-21  Mg     2.20     01-21    TPro  7.5  /  Alb  3.2<L>  /  TBili  <0.2  /  DBili  <0.2  /  AST  11  /  ALT  5   /  AlkPhos  46  01-20      Urinalysis Basic - ( 21 Jan 2024 05:29 )  Color: x / Appearance: x / SG: x / pH: x  Gluc: 116 mg/dL / Ketone: x  / Bili: x / Urobili: x   Blood: x / Protein: x / Nitrite: x   Leuk Esterase: x / RBC: x / WBC x   Sq Epi: x / Non Sq Epi: x / Bacteria: x      Arterial Blood Gas:  01-19 @ 08:00  7.40/63/89/39/98.1/11.8  ABG lactate: --    Venous Blood Gas:  01-21 @ 05:29  7.36/74/65/42/91.3  VBG Lactate: 1.3  Venous Blood Gas:  01-20 @ 07:02  7.34/73/47/39/70.4  VBG Lactate: 2.3      PAST MEDICAL & SURGICAL HISTORY:  CVA (cerebrovascular accident)      Dysphagia      BPH (benign prostatic hyperplasia)      Agitation      Schizophrenia      Congestive heart failure (CHF)      CVA (cerebral vascular accident)      Dysphagia      S/P percutaneous endoscopic gastrostomy (PEG) tube placement          FAMILY HISTORY:      Social History:  Been there at nursing home since 8/2022. (05 Jan 2024 17:55)      RADIOLOGY:  [ ] Reviewed and interpreted by me    PULMONARY FUNCTION TESTS:    EKG: CHIEF COMPLAINT: Patient is a 68y old  Male who presents with a chief complaint of agitation (20 Jan 2024 08:05)      INTERVAL EVENTS:  - Desatted and agitated overnight. HFNC increased and NRB added with some stability. Suctioned and chest PT performed and able to wean off NRB this morning. Attempted to further wean HFNC however noted with hypoxia.     REVIEW OF SYSTEMS: Seen by bedside during AM rounds and unable to assess ROS given neurocognitive decline with known TBI      Arterial Blood Gas:  01-19 @ 08:00  7.40/63/89/39/98.1/11.8  ABG lactate: --      OBJECTIVE:  ICU Vital Signs Last 24 Hrs  T(C): 36.7 (21 Jan 2024 04:00), Max: 37.2 (21 Jan 2024 00:00)  T(F): 98 (21 Jan 2024 04:00), Max: 98.9 (21 Jan 2024 00:00)  HR: 97 (21 Jan 2024 06:00) (91 - 114)  BP: 105/68 (21 Jan 2024 06:55) (93/62 - 117/69)  BP(mean): 81 (21 Jan 2024 06:55) (71 - 83)  ABP: --  ABP(mean): --  RR: 14 (21 Jan 2024 04:00) (14 - 22)  SpO2: 97% (21 Jan 2024 06:00) (88% - 100%)    O2 Parameters below as of 21 Jan 2024 06:00  Patient On (Oxygen Delivery Method): high anahi    O2 Concentration (%): 90          01-20 @ 07:01  -  01-21 @ 07:00  --------------------------------------------------------  IN: 660 mL / OUT: 900 mL / NET: -240 mL      CAPILLARY BLOOD GLUCOSE          HOSPITAL MEDICATIONS:  MEDICATIONS  (STANDING):  acetylcysteine 20%  Inhalation 4 milliLiter(s) Inhalation every 6 hours  albuterol    0.083% 2.5 milliGRAM(s) Nebulizer every 6 hours  artificial  tears Solution 1 Drop(s) Both EYES every 6 hours  chlorhexidine 2% Cloths 1 Application(s) Topical daily  clonazePAM  Tablet 1 milliGRAM(s) Oral <User Schedule>  droxidopa 300 milliGRAM(s) Oral three times a day  enoxaparin Injectable 40 milliGRAM(s) SubCutaneous every 24 hours  lactulose Syrup 20 Gram(s) Oral three times a day  midodrine 30 milliGRAM(s) Oral every 8 hours  polyethylene glycol 3350 17 Gram(s) Oral two times a day  QUEtiapine 100 milliGRAM(s) Oral <User Schedule>  senna 1 Tablet(s) Oral every 12 hours  sodium chloride 3%  Inhalation 4 milliLiter(s) Inhalation every 6 hours  tamsulosin 0.4 milliGRAM(s) Oral at bedtime  valproic  acid Syrup 250 milliGRAM(s) Oral <User Schedule>  valproic  acid Syrup 125 milliGRAM(s) Oral <User Schedule>    MEDICATIONS  (PRN):  acetaminophen     Tablet .. 650 milliGRAM(s) Oral every 6 hours PRN Moderate Pain (4 - 6)  OLANZapine Injectable 2.5 milliGRAM(s) IntraMuscular every 6 hours PRN agitation      PHYSICAL EXAMINATION  General: NAD   HEENT: Oropharynx secretion   Cards: S1/S2, no murmurs   Pulm: Rhonchi on right with diminished/ absent breath sounds on left. No wheezes.   Abdomen: Soft, nondistended and nontender. BS (+) PEG present.   Extremities: No pedal edema. TEZ of BL upper and lower extremities.  Neurology: AOx0 with agitation. Does not follow commands but eyes open , tracks, and combative with staff/ sitter. No acute focal neurological deficits     LABS:                        10.9   12.04 )-----------( 180      ( 21 Jan 2024 05:29 )             34.3     01-21    144  |  103  |  14  ----------------------------<  116<H>  4.1   |  34<H>  |  0.75    Ca    8.8      21 Jan 2024 05:29  Phos  3.3     01-21  Mg     2.20     01-21    TPro  7.5  /  Alb  3.2<L>  /  TBili  <0.2  /  DBili  <0.2  /  AST  11  /  ALT  5   /  AlkPhos  46  01-20      Urinalysis Basic - ( 21 Jan 2024 05:29 )  Color: x / Appearance: x / SG: x / pH: x  Gluc: 116 mg/dL / Ketone: x  / Bili: x / Urobili: x   Blood: x / Protein: x / Nitrite: x   Leuk Esterase: x / RBC: x / WBC x   Sq Epi: x / Non Sq Epi: x / Bacteria: x      Arterial Blood Gas:  01-19 @ 08:00  7.40/63/89/39/98.1/11.8  ABG lactate: --    Venous Blood Gas:  01-21 @ 05:29  7.36/74/65/42/91.3  VBG Lactate: 1.3  Venous Blood Gas:  01-20 @ 07:02  7.34/73/47/39/70.4  VBG Lactate: 2.3      PAST MEDICAL & SURGICAL HISTORY:  CVA (cerebrovascular accident)      Dysphagia      BPH (benign prostatic hyperplasia)      Agitation      Schizophrenia      Congestive heart failure (CHF)      CVA (cerebral vascular accident)      Dysphagia      S/P percutaneous endoscopic gastrostomy (PEG) tube placement          FAMILY HISTORY:      Social History:  Been there at nursing home since 8/2022. (05 Jan 2024 17:55)      RADIOLOGY:  [ ] Reviewed and interpreted by me    PULMONARY FUNCTION TESTS:    EKG:

## 2024-01-22 NOTE — BH CONSULTATION LIAISON PROGRESS NOTE - PRN MEDICATIONS SINCE LAST EVAL
Please call pt -cholesterol results are normal  Sugar is in the diabetic range. It is averaging 150 and should be less than 100. I think we should start you on a medicine called metformin. It decreases the absorption of carbs and sugars . It can also help you lose weight. SE can be gas/bloating/diarrhea but this usually goes away in 1-2 wks. Do you want to try this medication?  
no

## 2024-01-22 NOTE — PROVIDER CONTACT NOTE (CHANGE IN STATUS NOTIFICATION) - RECOMMENDATIONS
NP to assess patient at bedside. Patient fluid status to be assessed.
NP to assess patient at bedside.
NP to assess patient at bedside. Assess fluid status. Patient still febrile. Possible blood cultures, blood gas.
Provider assess patient at bedside. Suctioning/chest physical therapy to mobilize secretions.
Patient to be given tylenol for fever. NP to assess patient at bedside. Assess fluid status.

## 2024-01-22 NOTE — RAPID RESPONSE TEAM SUMMARY - NSADDTLFINDINGSRRT_GEN_ALL_CORE
RRT called for hypoxia to 85%. Pt has been on high flow max setting w a NRB mask overnight. Pt known to have MRSA PNA, on vanc. Pt AOx0-1 at baseline, on tube feeds. On rapid team arrival, BP 110s/70s, satting 85%, FSG 130s. Chest PT and suctioned. ABG and CXR obtained. Has  RRT called for hypoxia to 85%. Pt has been on high flow max setting w a NRB mask overnight. Pt known to have MRSA PNA, on vanc. Pt AOx0-1 at baseline, on tube feeds. On rapid team arrival, BP 110s/70s, satting 85%, FSG 130s. Chest PT and suctioned. Tube feeds stopped. ABG and CXR obtained. CXR with known L lung white out, pt positioned with R lung down. Oxygen sat improved to 95%.

## 2024-01-22 NOTE — PROVIDER CONTACT NOTE (CHANGE IN STATUS NOTIFICATION) - ASSESSMENT
Patient still with same mental status. Patient unable to make needs known. Respiratory status remains unchanged with persistent tachycardia.
Patient with no accessory muscle use. Patient unable to stay upright in bed. Patient not able to follow commands.
Patient with unchanged mental status, respiratory status remains unchanged.
Patient with no change in mental status. Patient still febrile. Patient unable to make needs known. Respiratory status remains unchanged with persistent tachycardia.
Patient with unchanged mental status. Patient unable to make needs known. Respiratory status unchanged with persistent tachycardia.

## 2024-01-22 NOTE — RAPID RESPONSE TEAM SUMMARY - NSADDTLFINDINGSRRT_GEN_ALL_CORE
RRT called for hypotension, progressive decline in mental status with concern for airway protection. ICU attending at bedside. Pt has been on high flow max setting w a NRB mask overnight. Decided to intubate. Pt known to have MRSA PNA, on vanc. Pt AOx0-1 at baseline, on tube feeds. On rapid team arrival, unable to get BP, satting 90%, HR: 120s, FSG 110s, AAOx0. Confirmed code status with primary team who confirmed with his HCP Unable to get BP, levo started. Prop 50 given with versed 2 mg. Successfully intubated and transferred to CCU under MICU care

## 2024-01-22 NOTE — PROCEDURE NOTE - NSPROCDETAILS_GEN_ALL_CORE
patient pre-oxygenated, tube inserted, placement confirmed
guidewire recovered/lumen(s) aspirated and flushed/sterile dressing applied/sterile technique, catheter placed/ultrasound guidance with use of sterile gel and probe cove

## 2024-01-22 NOTE — RAPID RESPONSE TEAM SUMMARY - NSMEDICATIONSRRT_GEN_ALL_CORE
Prop 50  Versed 2  Levo gtt  Prop gtt 
mando
Etomidate 12mg  Acetylcholine 100mg  Propofol 10mg IVP  Propofol gtt @ 50mcg/kg/min  Fentanyl gtt 1mcg/kg/min  Levophed gtt 0.5mcg/kg/min

## 2024-01-22 NOTE — CHART NOTE - NSCHARTNOTEFT_GEN_A_CORE
MICU Accept Note  ------------------------    CHIEF COMPLAINT:     HPI / INTERVAL HISTORY:  HPI:  67 yo M with PMhx of BPH, agitation, ?dementia, dysphagia after CVA now s/p PEG tube, and ?CHF presenting from [facility] for worsening agitation today. Per NH, pt was very agitated in the morning there. Pt coughs from time to time. At the NH, pt's O2 went down to 89% on RA, was placed on 4 L NC and transferred to ED. On exam, pt endorsed SOB but stated that he wants to go back home. A&Ox2 (name, in hospital).      In ED, pt received ceftriaxone x1, azithromycin x1. CXR showing questionable L infiltrate. Pt refusing NRB in ED. (05 Jan 2024 17:55)    Pt is a 68M with PMHx CVA with dysphagia s/p PEG, CAD, orthostatic hypotension on midodrine, chronic hypoxemic respiratory failure with O2 dependence (2-3L NC), and recurrent hospitalizations for agitation now presenting to [facility] on 1/5/24 with recurrent episodes of agitation now with acute hypoxemic respiratory distress requiring new O2 supplementation admitted to medicine service with hospital course c/b progressive hypoxemia s/p emergent ETT intubation/MV (1/6-1/10) and septic/vasoplegic shock extubated to [facility] and transferred to RCU 1/16 for further management.     Pt without known psychiatric or neurologic hx with recurrent hospital admissions from NH for agitation and uncooperative nature on anti-psychiatric medications with clonazepam 1mg qhs + Depakote DR 375mg AM/250mg qhs + quetiapine 225mg q12hr. Pt continues to have intermittent episodes of attempting to get out of bed and pull out lines/tubes requiring enhanced supervision and limited ability to re-orient pt. Zyprexa prns in place as well. Pt with intermittent episodes of increased lethargy likely a/w high doses of medications. Will attempt slow wean if possible to avoid increased risk of aspiration. Attempting to obtain prior diagnostic hx/collateral of worsening agitation over the past few months with family and NH.     Pt with chronic oropharyngeal dysphagia with PEG placement from outside hospital, tolerating feeds at goal rate. Nutrition recs appreciated. Bowel regimen prn. PPI for chronic esophagitis. No acute renal or endocrine issues. Hypernatremia resolved.     Pt initially admitted to acute hypoxemic respiratory failure with progression to ETT intubation/MV (1/6-1/10) now extubated to HFNC who had slowly decreasing O2 requirements likely 2/2 MRSA PNA c/b mucous plugging and aspiration events. Overnight, pt had episode of agitation s/p IM prn followed by AM medications with increased lethargy and aspiration with mucous plug and worsening O2 requirements. Airway clearance therapy broadened with aggressive chest PT and deep suctioning. Will continue to wean midodrine and droxidopa as hemodynamics allow. Pt now completed course of Vancomycin (1/6-1/16), remains afebrile without leukocytosis and no clinical s/s recurrent infectious process.     Pt continued on HFNC at times supplemented with 100% NRB over face to maintain 02 sat >90%..over the weekend and overnight-  Pt had RRT his am for desaturation able to increase pulse ox after deep suctioning and position on R side and was stable.  Pt with low grade temp and cultures were drawn and sent and IV zosyn/vanco was initiated.   #2 RRT called for decreasing blood pressure- despite Doxydropa and midodrine, pt with shallow labored respirations and unresponsive .  Pt intubated at bedside and levophed gtt initiated to maintain blood pressure and transferred to CCU for higher level of care - now ventilated and on pressors.   Pt's brother Frederick Yusuf - who states he is the HCP with paperwork done at  office- was informed of pts change in medical status, intubation and transfer to the ICU for unstable state.   Pt transferred to CCU via bed acoom by RN /KIA.        REVIEW OF SYSTEMS:  Constitutional: No fevers, chills, weight loss  Neurological: No headache, dizziness, weakness, numbness  HEENT: No vision problems, eye pain, nasal congestion, rhinorrhea, sore throat, dysphagia  Resp: No cough, dyspnea, wheezing, hemoptysis  CV: No chest pain, orthopnea, palpitations  GI: No nausea, vomiting, diarrhea, constipation, abdominal pain  : No dysuria, hematuria, urinary frequency or urgency  Musculoskeletal: No back pain, myalgias, arthralgias, or BLE edema  Skin: No new rashes, itching  [ ] Unable to assess ROS because ________      PMH/PSH:  PAST MEDICAL & SURGICAL HISTORY:  CVA (cerebrovascular accident)      Dysphagia      BPH (benign prostatic hyperplasia)      Agitation      Schizophrenia      Congestive heart failure (CHF)      CVA (cerebral vascular accident)      Dysphagia      S/P percutaneous endoscopic gastrostomy (PEG) tube placement          FAMILY HISTORY:  FAMILY HISTORY:      SOCIAL HISTORY:  Smoking: [  ] Never Smoked  [  ] Former Smoker (# packs x # years), quit   [  ] Current Smoker (# packs x # years)  Substance Use: [  ] None; [   ] Yes:  EtOH Use: [   ] None; [   ] Rare; [   ] Social; [   ] Frequent:   Marital Status: [  ] Single  [  ]   [  ]   [  ]   Dependents:  Occupation:  Barriers to treatment:   Advance Directives:     HOME MEDICATIONS:  Home Medications:  acetaminophen 325 mg oral tablet: 2 tab(s) orally every 6 hours as needed for Pain Through PEG, give prior to dressing change (15 Dec 2023 02:38)  clonazePAM 1 mg oral tablet: 1 tab(s) orally once a day (at bedtime) (26 Dec 2023 14:46)  clotrimazole-betamethasone dipropionate 1%-0.05% topical cream: Apply topically to affected area 2 times a day (05 Jan 2024 18:14)  DuoNeb 0.5 mg-2.5 mg/3 mL inhalation solution: 3 milliliter(s) by nebulizer every 6 hours as needed for  shortness of breath and/or wheezing (15 Dec 2023 02:40)  lactulose 10 g/15 mL oral syrup: 30 milliliter(s) orally 2 times a day Through PEG tube (15 Dec 2023 02:35)  LPS SF 15 gram-60 kcal/30 mL oral liquid: Give 30 mL by oral route 4 times a day. (05 Jan 2024 18:15)  melatonin 10 mg oral capsule: 1 cap(s) orally once a day (at bedtime) Through PEG tube (15 Dec 2023 02:36)  midodrine 10 mg oral tablet: 1 tab(s) orally every 8 hours Through PEG tube (6AM, 2PM, 10PM). Hold for SBP&gt;130 (15 Dec 2023 02:36)  QUEtiapine 200 mg oral tablet: 1 tab(s) orally 2 times a day At 9AM and 5PM (15 Dec 2023 02:41)  QUEtiapine 25 mg oral tablet: 1 tab(s) orally 2 times a day Give together with 200 mg tablet for 225mg dose. (15 Dec 2023 02:45)  Robitussin Long acting 1 mg-7.5 mg/5 mL oral liquid: Give 10 cc per hour by oral route every 4 hours and as neeeded (05 Jan 2024 18:13)  Senna 8.6 mg oral tablet: 1 tab(s) orally 2 times a day Through PEG tube (15 Dec 2023 02:37)  Silvadene 1% topical cream: Apply topically to affected area once a day (05 Jan 2024 18:16)  tamsulosin 0.4 mg oral capsule: 1 cap(s) orally once a day (at bedtime) Through PEG tube (15 Dec 2023 02:37)      ALLERGIES:  Allergies    No Known Allergies    Intolerances            OBJECTIVE:  ICU Vital Signs Last 24 Hrs  T(C): 37.3 (22 Jan 2024 12:11), Max: 37.9 (22 Jan 2024 04:20)  T(F): 99.2 (22 Jan 2024 12:11), Max: 100.2 (22 Jan 2024 04:20)  HR: 108 (22 Jan 2024 12:59) (86 - 138)  BP: 70/38 (22 Jan 2024 12:13) (68/51 - 115/91)  BP(mean): 87 (22 Jan 2024 04:26) (77 - 98)  ABP: --  ABP(mean): --  RR: 20 (22 Jan 2024 12:11) (20 - 23)  SpO2: 99% (22 Jan 2024 12:59) (82% - 99%)    O2 Parameters below as of 22 Jan 2024 12:11  Patient On (Oxygen Delivery Method): nasal cannula w/ humidification          Mode: AC/ CMV (Assist Control/ Continuous Mandatory Ventilation), RR (machine): 14, TV (machine): 400, FiO2: 100, PEEP: 5, ITime: 0.64, MAP: 9, PIP: 23    01-21 @ 07:01  -  01-22 @ 07:00  --------------------------------------------------------  IN: 880 mL / OUT: 0 mL / NET: 880 mL      CAPILLARY BLOOD GLUCOSE      POCT Blood Glucose.: 149 mg/dL (22 Jan 2024 12:14)      PHYSICAL EXAM  GENERAL: No acute distress  NEURO: A+O x 3, follows commands; spontaneously moving all 4 extremities; strength and sensation grossly intact  HEENT: Pupil equal and reactive to light; extra-ocular movements intact; clear conjunctiva; normal mucus membrane and oropharynx; neck supple  RESP: b/lk crackles   CVS: S1/S2 present, normal rate and rhythm; no murmurs, gallops or rubs appreciated  ABD: Soft, non-tender, non-distended, no masses appreciated; bowel sound normal at all 4 quadrants  EXT: Grossly normal ROM; 2+ pedal pulses bilaterally, no BLE edema  SKIN: Warm, dry, and appropirate color for skin tone; No new rashes    LINES:       HOSPITAL MEDICATIONS:  MEDICATIONS  (STANDING):  acetylcysteine 20%  Inhalation 4 milliLiter(s) Inhalation every 6 hours  albuterol    0.083% 2.5 milliGRAM(s) Nebulizer every 6 hours  artificial  tears Solution 1 Drop(s) Both EYES every 6 hours  chlorhexidine 0.12% Liquid 15 milliLiter(s) Oral Mucosa every 12 hours  chlorhexidine 2% Cloths 1 Application(s) Topical daily  clonazePAM  Tablet 1 milliGRAM(s) Oral <User Schedule>  droxidopa 300 milliGRAM(s) Oral three times a day  enoxaparin Injectable 40 milliGRAM(s) SubCutaneous every 24 hours  lactulose Syrup 20 Gram(s) Oral three times a day  midodrine 30 milliGRAM(s) Oral every 8 hours  piperacillin/tazobactam IVPB.- 3.375 Gram(s) IV Intermittent once  polyethylene glycol 3350 17 Gram(s) Oral two times a day  QUEtiapine 100 milliGRAM(s) Oral <User Schedule>  senna 1 Tablet(s) Oral every 12 hours  sodium chloride 3%  Inhalation 4 milliLiter(s) Inhalation every 6 hours  tamsulosin 0.4 milliGRAM(s) Oral at bedtime  valproic  acid Syrup 250 milliGRAM(s) Oral <User Schedule>  valproic  acid Syrup 125 milliGRAM(s) Oral <User Schedule>  vancomycin  IVPB      vancomycin  IVPB 1000 milliGRAM(s) IV Intermittent every 12 hours    MEDICATIONS  (PRN):  acetaminophen     Tablet .. 650 milliGRAM(s) Oral every 6 hours PRN Moderate Pain (4 - 6)  OLANZapine Injectable 2.5 milliGRAM(s) IntraMuscular every 6 hours PRN agitation        LABS:                        12.3   25.21 )-----------( 168      ( 22 Jan 2024 06:15 )             37.4     Hgb Trend: 12.3<--, 10.9<--, 11.8<--, 11.5<--, 11.5<--  01-22    143  |  102  |  16  ----------------------------<  125<H>  3.9   |  32<H>  |  0.80    Ca    8.8      22 Jan 2024 06:15  Phos  2.7     01-22  Mg     2.00     01-22    TPro  8.0  /  Alb  3.2<L>  /  TBili  0.4  /  DBili  <0.2  /  AST  13  /  ALT  5   /  AlkPhos  45  01-22    Creatinine Trend: 0.80<--, 0.75<--, 0.70<--, 0.62<--, 0.68<--, 0.73<--    Urinalysis Basic - ( 22 Jan 2024 06:15 )    Color: x / Appearance: x / SG: x / pH: x  Gluc: 125 mg/dL / Ketone: x  / Bili: x / Urobili: x   Blood: x / Protein: x / Nitrite: x   Leuk Esterase: x / RBC: x / WBC x   Sq Epi: x / Non Sq Epi: x / Bacteria: x      Arterial Blood Gas:  01-22 @ 07:28  7.48/47/53/35/88.7/10.2  ABG lactate: --    Venous Blood Gas:  01-22 @ 06:15  7.48/48/47/36/79.7  VBG Lactate: 1.9  Venous Blood Gas:  01-21 @ 05:29  7.36/74/65/42/91.3  VBG Lactate: 1.3      MICROBIOLOGY:     Culture - Sputum (collected 20 Jan 2024 04:30)  Source: .Sputum Sputum  Gram Stain (20 Jan 2024 14:29):    Few polymorphonuclear leukocytes per low power field    Numerous Squamous epithelial cells per low power field    Rare Gram Positive Cocci in Clusters seen per oil power field    Rare Gram Positive Rods seen per oil power field  Final Report (21 Jan 2024 19:51):    Normal Respiratory Clarita present        RADIOLOGY & ADDITIONAL TESTS: Reviewed.      67 YO M with PMHx of dementia, agitation, CVA, dysphagia with PEG, and BPH who presented [facility] for worsening agitation and progressive hypoxia. Patient found with left lung infiltrate and admitted to medicine for AHRF second to CAP with course complicated by mucous plug requiring ETT (1/6-1/12) and ultimately transferred to RCU (1/16). Now on am of 1/22 RRT x 2 for hypoxic respiratory failure and septic shock requiring vasopressor and intubation     NEUROLOGY  # Hx of TBI with neurocognitive disorder and agitation   - sedation with low dose propofol  - Agitation and aggression noted in house with multiple readmissions for agitation.   - Continued on home seroquel 225mg BID, depakote 250/125/250, klonopin 1 BID and zyprexa PO vs IM PRN doses however over sedation during the day with poor airway protection and night time insomnia noted   - Case discussed at length with  and last admission TFT, B12, folic acid, B1, and CTH WNL and this admission infection tx.   - Continue on Klonopin 1mg BID.   - Continue on Seroquel 100mg TID (decreased 1/19) however may need to decrease day time doses and increase evening dose.   - Continue on Depakote 125/125/250 (decreased 1/20) and check level on Monday.   - Continue on Zyprexa 2.5mg Q6H IM PRN.   - Plan to adjust regimen as needed/ able.      CARDIOVASCULAR  # Septic vs vasoplegic shock  - ECHO (1/8) with EF 62, normal LVSF and moderate to severe AR.   - IV pressors weaned off in ICU and continued on Midodrine 30 and Droxi 300   - Wean off pressors as able.   - Hypotensive - leovphed started     RESPIRATORY  # AHRF second to aspiration vs recurrent mucous plug   - Presented from NH for witnessed aspiration and started on ABX however course complicated by left lung mucous plug requiring ETT (1/6-1/12)   - Course complicated by recurrent left lung mucous plug and attempted aggressive airway clearance with no recruitment noted. Despite persistent left lung plug able to wean HFNC without recurrent respiratory distress.  - monitor vent settings - will obtain blood gas   - Continue on Albuterol, Mucomyst, HTS and Vest   - Consider CT chest to evaluate airway for obstruction leading to collapse when on less supplement O2  - Intubated for resp failure     HEENT   # Poor oral hygiene   - Biotene BID and RN mouth care     GI  # Dysphagia with PEG   - Continue on PEG with lactulose 20, miralax and senna     RENAL  # Hx of BPH   - Seymour removed in ICU and passed TOV   - Continue on flomax   - Monitor renal function and UOP     INFECTIOUS DISEASE  # MRSA PNA   - RVP/ COVID (1/5) negative   - SCx (1/6) with MRSA   - MRSA PCR (1/6) POSITIVE   - Completed bactroban and Vanco (1/6-1/16) in ICU   - fever, leukocytosis likely pna - will restart vancomycin and zosyn   - reculture     HEME  # Normocytic anemia   - HH stable 11s and monitoring for now  - DVT PPX with lovenox     ENDOCRINE  - No hx of A1C   - Monitor BG     ETHICS/ GOC    - FULL CODE MICU Accept Note  ------------------------    CHIEF COMPLAINT:     HPI / INTERVAL HISTORY:  HPI:  67 yo M with PMhx of BPH, agitation, ?dementia, dysphagia after CVA now s/p PEG tube, and ?CHF presenting from [facility] for worsening agitation today. Per NH, pt was very agitated in the morning there. Pt coughs from time to time. At the NH, pt's O2 went down to 89% on RA, was placed on 4 L NC and transferred to ED. On exam, pt endorsed SOB but stated that he wants to go back home. A&Ox2 (name, in hospital).      In ED, pt received ceftriaxone x1, azithromycin x1. CXR showing questionable L infiltrate. Pt refusing NRB in ED. (05 Jan 2024 17:55)    Pt is a 68M with PMHx CVA with dysphagia s/p PEG, CAD, orthostatic hypotension on midodrine, chronic hypoxemic respiratory failure with O2 dependence (2-3L NC), and recurrent hospitalizations for agitation now presenting to [facility] on 1/5/24 with recurrent episodes of agitation now with acute hypoxemic respiratory distress requiring new O2 supplementation admitted to medicine service with hospital course c/b progressive hypoxemia s/p emergent ETT intubation/MV (1/6-1/10) and septic/vasoplegic shock extubated to [facility] and transferred to RCU 1/16 for further management.     Pt without known psychiatric or neurologic hx with recurrent hospital admissions from NH for agitation and uncooperative nature on anti-psychiatric medications with clonazepam 1mg qhs + Depakote DR 375mg AM/250mg qhs + quetiapine 225mg q12hr. Pt continues to have intermittent episodes of attempting to get out of bed and pull out lines/tubes requiring enhanced supervision and limited ability to re-orient pt. Zyprexa prns in place as well. Pt with intermittent episodes of increased lethargy likely a/w high doses of medications. Will attempt slow wean if possible to avoid increased risk of aspiration. Attempting to obtain prior diagnostic hx/collateral of worsening agitation over the past few months with family and NH.     Pt with chronic oropharyngeal dysphagia with PEG placement from outside hospital, tolerating feeds at goal rate. Nutrition recs appreciated. Bowel regimen prn. PPI for chronic esophagitis. No acute renal or endocrine issues. Hypernatremia resolved.     Pt initially admitted to acute hypoxemic respiratory failure with progression to ETT intubation/MV (1/6-1/10) now extubated to HFNC who had slowly decreasing O2 requirements likely 2/2 MRSA PNA c/b mucous plugging and aspiration events. Overnight, pt had episode of agitation s/p IM prn followed by AM medications with increased lethargy and aspiration with mucous plug and worsening O2 requirements. Airway clearance therapy broadened with aggressive chest PT and deep suctioning. Will continue to wean midodrine and droxidopa as hemodynamics allow. Pt now completed course of Vancomycin (1/6-1/16), remains afebrile without leukocytosis and no clinical s/s recurrent infectious process.     Pt continued on HFNC at times supplemented with 100% NRB over face to maintain 02 sat >90%..over the weekend and overnight-  Pt had RRT his am for desaturation able to increase pulse ox after deep suctioning and position on R side and was stable.  Pt with low grade temp and cultures were drawn and sent and IV zosyn/vanco was initiated.   #2 RRT called for decreasing blood pressure- despite Doxydropa and midodrine, pt with shallow labored respirations and unresponsive .  Pt intubated at bedside and levophed gtt initiated to maintain blood pressure and transferred to CCU for higher level of care - now ventilated and on pressors.   Pt's brother Frederick Yusuf - who states he is the HCP with paperwork done at  office- was informed of pts change in medical status, intubation and transfer to the ICU for unstable state.   Pt transferred to CCU via bed acoom by RN /KIA.        REVIEW OF SYSTEMS:  Constitutional: No fevers, chills, weight loss  Neurological: No headache, dizziness, weakness, numbness  HEENT: No vision problems, eye pain, nasal congestion, rhinorrhea, sore throat, dysphagia  Resp: No cough, dyspnea, wheezing, hemoptysis  CV: No chest pain, orthopnea, palpitations  GI: No nausea, vomiting, diarrhea, constipation, abdominal pain  : No dysuria, hematuria, urinary frequency or urgency  Musculoskeletal: No back pain, myalgias, arthralgias, or BLE edema  Skin: No new rashes, itching  [ ] Unable to assess ROS because ________      PMH/PSH:  PAST MEDICAL & SURGICAL HISTORY:  CVA (cerebrovascular accident)      Dysphagia      BPH (benign prostatic hyperplasia)      Agitation      Schizophrenia      Congestive heart failure (CHF)      CVA (cerebral vascular accident)      Dysphagia      S/P percutaneous endoscopic gastrostomy (PEG) tube placement          FAMILY HISTORY:  FAMILY HISTORY:      SOCIAL HISTORY:  Smoking: [  ] Never Smoked  [  ] Former Smoker (# packs x # years), quit   [  ] Current Smoker (# packs x # years)  Substance Use: [  ] None; [   ] Yes:  EtOH Use: [   ] None; [   ] Rare; [   ] Social; [   ] Frequent:   Marital Status: [  ] Single  [  ]   [  ]   [  ]   Dependents:  Occupation:  Barriers to treatment:   Advance Directives:     HOME MEDICATIONS:  Home Medications:  acetaminophen 325 mg oral tablet: 2 tab(s) orally every 6 hours as needed for Pain Through PEG, give prior to dressing change (15 Dec 2023 02:38)  clonazePAM 1 mg oral tablet: 1 tab(s) orally once a day (at bedtime) (26 Dec 2023 14:46)  clotrimazole-betamethasone dipropionate 1%-0.05% topical cream: Apply topically to affected area 2 times a day (05 Jan 2024 18:14)  DuoNeb 0.5 mg-2.5 mg/3 mL inhalation solution: 3 milliliter(s) by nebulizer every 6 hours as needed for  shortness of breath and/or wheezing (15 Dec 2023 02:40)  lactulose 10 g/15 mL oral syrup: 30 milliliter(s) orally 2 times a day Through PEG tube (15 Dec 2023 02:35)  LPS SF 15 gram-60 kcal/30 mL oral liquid: Give 30 mL by oral route 4 times a day. (05 Jan 2024 18:15)  melatonin 10 mg oral capsule: 1 cap(s) orally once a day (at bedtime) Through PEG tube (15 Dec 2023 02:36)  midodrine 10 mg oral tablet: 1 tab(s) orally every 8 hours Through PEG tube (6AM, 2PM, 10PM). Hold for SBP&gt;130 (15 Dec 2023 02:36)  QUEtiapine 200 mg oral tablet: 1 tab(s) orally 2 times a day At 9AM and 5PM (15 Dec 2023 02:41)  QUEtiapine 25 mg oral tablet: 1 tab(s) orally 2 times a day Give together with 200 mg tablet for 225mg dose. (15 Dec 2023 02:45)  Robitussin Long acting 1 mg-7.5 mg/5 mL oral liquid: Give 10 cc per hour by oral route every 4 hours and as neeeded (05 Jan 2024 18:13)  Senna 8.6 mg oral tablet: 1 tab(s) orally 2 times a day Through PEG tube (15 Dec 2023 02:37)  Silvadene 1% topical cream: Apply topically to affected area once a day (05 Jan 2024 18:16)  tamsulosin 0.4 mg oral capsule: 1 cap(s) orally once a day (at bedtime) Through PEG tube (15 Dec 2023 02:37)      ALLERGIES:  Allergies    No Known Allergies    Intolerances            OBJECTIVE:  ICU Vital Signs Last 24 Hrs  T(C): 37.3 (22 Jan 2024 12:11), Max: 37.9 (22 Jan 2024 04:20)  T(F): 99.2 (22 Jan 2024 12:11), Max: 100.2 (22 Jan 2024 04:20)  HR: 108 (22 Jan 2024 12:59) (86 - 138)  BP: 70/38 (22 Jan 2024 12:13) (68/51 - 115/91)  BP(mean): 87 (22 Jan 2024 04:26) (77 - 98)  ABP: --  ABP(mean): --  RR: 20 (22 Jan 2024 12:11) (20 - 23)  SpO2: 99% (22 Jan 2024 12:59) (82% - 99%)    O2 Parameters below as of 22 Jan 2024 12:11  Patient On (Oxygen Delivery Method): nasal cannula w/ humidification          Mode: AC/ CMV (Assist Control/ Continuous Mandatory Ventilation), RR (machine): 14, TV (machine): 400, FiO2: 100, PEEP: 5, ITime: 0.64, MAP: 9, PIP: 23    01-21 @ 07:01  -  01-22 @ 07:00  --------------------------------------------------------  IN: 880 mL / OUT: 0 mL / NET: 880 mL      CAPILLARY BLOOD GLUCOSE      POCT Blood Glucose.: 149 mg/dL (22 Jan 2024 12:14)      PHYSICAL EXAM  GENERAL: No acute distress  NEURO: A+O x 3, follows commands; spontaneously moving all 4 extremities; strength and sensation grossly intact  HEENT: Pupil equal and reactive to light; extra-ocular movements intact; clear conjunctiva; normal mucus membrane and oropharynx; neck supple  RESP: b/lk crackles   CVS: S1/S2 present, normal rate and rhythm; no murmurs, gallops or rubs appreciated  ABD: Soft, non-tender, non-distended, no masses appreciated; bowel sound normal at all 4 quadrants  EXT: Grossly normal ROM; 2+ pedal pulses bilaterally, no BLE edema  SKIN: Warm, dry, and appropirate color for skin tone; No new rashes    LINES:       HOSPITAL MEDICATIONS:  MEDICATIONS  (STANDING):  acetylcysteine 20%  Inhalation 4 milliLiter(s) Inhalation every 6 hours  albuterol    0.083% 2.5 milliGRAM(s) Nebulizer every 6 hours  artificial  tears Solution 1 Drop(s) Both EYES every 6 hours  chlorhexidine 0.12% Liquid 15 milliLiter(s) Oral Mucosa every 12 hours  chlorhexidine 2% Cloths 1 Application(s) Topical daily  clonazePAM  Tablet 1 milliGRAM(s) Oral <User Schedule>  droxidopa 300 milliGRAM(s) Oral three times a day  enoxaparin Injectable 40 milliGRAM(s) SubCutaneous every 24 hours  lactulose Syrup 20 Gram(s) Oral three times a day  midodrine 30 milliGRAM(s) Oral every 8 hours  piperacillin/tazobactam IVPB.- 3.375 Gram(s) IV Intermittent once  polyethylene glycol 3350 17 Gram(s) Oral two times a day  QUEtiapine 100 milliGRAM(s) Oral <User Schedule>  senna 1 Tablet(s) Oral every 12 hours  sodium chloride 3%  Inhalation 4 milliLiter(s) Inhalation every 6 hours  tamsulosin 0.4 milliGRAM(s) Oral at bedtime  valproic  acid Syrup 250 milliGRAM(s) Oral <User Schedule>  valproic  acid Syrup 125 milliGRAM(s) Oral <User Schedule>  vancomycin  IVPB      vancomycin  IVPB 1000 milliGRAM(s) IV Intermittent every 12 hours    MEDICATIONS  (PRN):  acetaminophen     Tablet .. 650 milliGRAM(s) Oral every 6 hours PRN Moderate Pain (4 - 6)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

## 2024-01-22 NOTE — PROVIDER CONTACT NOTE (CHANGE IN STATUS NOTIFICATION) - BACKGROUND
Patient is a 68M presenting for this hospital stay for worsening agitation and progressive hypoxia.
Patient is a 68M from nursing home admitted for worsening agitation/progressive hypoxia.
Patient is a 68M from nursing home admitted for worsening agitation/progressive hypoxia. Patient on 100% non-breather, 100% high flow nasal cannula.

## 2024-01-22 NOTE — PROCEDURE NOTE - NSINDICATIONS_GEN_A_CORE
arterial puncture to obtain ABG's/critical patient/monitoring purposes
airway protection/critical patient/respiratory distress/respiratory failure
critical illness/emergency venous access/venous access

## 2024-01-22 NOTE — RAPID RESPONSE TEAM SUMMARY - NSOTHERINTERVENTIONSRRT_GEN_ALL_CORE
Nasotracheal suctioning  Endotracheal intervention  Chest X-ray
RRT for hypoxia, likely iso aspiration. Tube feeds stopped. Pt febrile earlier in the morning, zosyn started for empiric coverage, pt already on vanc for MRSA. Oxygen sat improved s/p chest PT and suction.

## 2024-01-22 NOTE — PROGRESS NOTE ADULT - SUBJECTIVE AND OBJECTIVE BOX
CHIEF COMPLAINT: Patient is a 68y old  Male who presents with a chief complaint of agitation (21 Jan 2024 07:40)      Interval Events:    REVIEW OF SYSTEMS:  [ ] All other systems negative  [ ] Unable to assess ROS because ________      Arterial Blood Gas:  01-22 @ 07:28  7.48/47/53/35/88.7/10.2  ABG lactate: --      OBJECTIVE:  ICU Vital Signs Last 24 Hrs  T(C): 37.9 (22 Jan 2024 04:20), Max: 37.9 (22 Jan 2024 04:20)  T(F): 100.2 (22 Jan 2024 04:20), Max: 100.2 (22 Jan 2024 04:20)  HR: 125 (22 Jan 2024 10:39) (86 - 138)  BP: 104/64 (22 Jan 2024 08:00) (89/54 - 115/91)  BP(mean): 87 (22 Jan 2024 04:26) (77 - 98)  ABP: --  ABP(mean): --  RR: 23 (22 Jan 2024 10:39) (20 - 23)  SpO2: 92% (22 Jan 2024 10:39) (82% - 98%)    O2 Parameters below as of 22 Jan 2024 10:39  Patient On (Oxygen Delivery Method): nasal cannula, high flow  O2 Flow (L/min): 60  O2 Concentration (%): 100          01-21 @ 07:01  -  01-22 @ 07:00  --------------------------------------------------------  IN: 880 mL / OUT: 0 mL / NET: 880 mL      CAPILLARY BLOOD GLUCOSE      POCT Blood Glucose.: 149 mg/dL (22 Jan 2024 12:14)      HOSPITAL MEDICATIONS:  MEDICATIONS  (STANDING):  acetylcysteine 20%  Inhalation 4 milliLiter(s) Inhalation every 6 hours  albuterol    0.083% 2.5 milliGRAM(s) Nebulizer every 6 hours  artificial  tears Solution 1 Drop(s) Both EYES every 6 hours  chlorhexidine 2% Cloths 1 Application(s) Topical daily  clonazePAM  Tablet 1 milliGRAM(s) Oral <User Schedule>  droxidopa 300 milliGRAM(s) Oral three times a day  enoxaparin Injectable 40 milliGRAM(s) SubCutaneous every 24 hours  lactulose Syrup 20 Gram(s) Oral three times a day  midazolam Injectable 2 milliGRAM(s) IV Push once  midodrine 30 milliGRAM(s) Oral every 8 hours  piperacillin/tazobactam IVPB.- 3.375 Gram(s) IV Intermittent once  polyethylene glycol 3350 17 Gram(s) Oral two times a day  QUEtiapine 100 milliGRAM(s) Oral <User Schedule>  senna 1 Tablet(s) Oral every 12 hours  sodium chloride 3%  Inhalation 4 milliLiter(s) Inhalation every 6 hours  tamsulosin 0.4 milliGRAM(s) Oral at bedtime  valproic  acid Syrup 250 milliGRAM(s) Oral <User Schedule>  valproic  acid Syrup 125 milliGRAM(s) Oral <User Schedule>  vancomycin  IVPB      vancomycin  IVPB 1000 milliGRAM(s) IV Intermittent every 12 hours    MEDICATIONS  (PRN):  acetaminophen     Tablet .. 650 milliGRAM(s) Oral every 6 hours PRN Moderate Pain (4 - 6)  OLANZapine Injectable 2.5 milliGRAM(s) IntraMuscular every 6 hours PRN agitation      LABS:                        12.3   25.21 )-----------( 168      ( 22 Jan 2024 06:15 )             37.4     01-22    143  |  102  |  16  ----------------------------<  125<H>  3.9   |  32<H>  |  0.80    Ca    8.8      22 Jan 2024 06:15  Phos  2.7     01-22  Mg     2.00     01-22    TPro  8.0  /  Alb  3.2<L>  /  TBili  0.4  /  DBili  <0.2  /  AST  13  /  ALT  5   /  AlkPhos  45  01-22      Urinalysis Basic - ( 22 Jan 2024 06:15 )    Color: x / Appearance: x / SG: x / pH: x  Gluc: 125 mg/dL / Ketone: x  / Bili: x / Urobili: x   Blood: x / Protein: x / Nitrite: x   Leuk Esterase: x / RBC: x / WBC x   Sq Epi: x / Non Sq Epi: x / Bacteria: x      Arterial Blood Gas:  01-22 @ 07:28  7.48/47/53/35/88.7/10.2  ABG lactate: --    Venous Blood Gas:  01-22 @ 06:15  7.48/48/47/36/79.7  VBG Lactate: 1.9  Venous Blood Gas:  01-21 @ 05:29  7.36/74/65/42/91.3  VBG Lactate: 1.3      PAST MEDICAL & SURGICAL HISTORY:  CVA (cerebrovascular accident)      Dysphagia      BPH (benign prostatic hyperplasia)      Agitation      Schizophrenia      Congestive heart failure (CHF)      CVA (cerebral vascular accident)      Dysphagia      S/P percutaneous endoscopic gastrostomy (PEG) tube placement          FAMILY HISTORY:      Social History:  Been there at nursing home since 8/2022. (05 Jan 2024 17:55)      RADIOLOGY:  [ ] Reviewed and interpreted by me    PULMONARY FUNCTION TESTS:    EKG: CHIEF COMPLAINT: Patient is a 68y old  Male who presents with a chief complaint of agitation (21 Jan 2024 07:40)      Interval Events: No acute overnight events - RRT x 2     REVIEW OF SYSTEMS:  [x ] Unable to assess ROS because AMS       Arterial Blood Gas:  01-22 @ 07:28  7.48/47/53/35/88.7/10.2  ABG lactate: --      OBJECTIVE:  ICU Vital Signs Last 24 Hrs  T(C): 37.9 (22 Jan 2024 04:20), Max: 37.9 (22 Jan 2024 04:20)  T(F): 100.2 (22 Jan 2024 04:20), Max: 100.2 (22 Jan 2024 04:20)  HR: 125 (22 Jan 2024 10:39) (86 - 138)  BP: 104/64 (22 Jan 2024 08:00) (89/54 - 115/91)  BP(mean): 87 (22 Jan 2024 04:26) (77 - 98)  ABP: --  ABP(mean): --  RR: 23 (22 Jan 2024 10:39) (20 - 23)  SpO2: 92% (22 Jan 2024 10:39) (82% - 98%)    O2 Parameters below as of 22 Jan 2024 10:39  Patient On (Oxygen Delivery Method): nasal cannula, high flow  O2 Flow (L/min): 60  O2 Concentration (%): 100          01-21 @ 07:01  -  01-22 @ 07:00  --------------------------------------------------------  IN: 880 mL / OUT: 0 mL / NET: 880 mL      CAPILLARY BLOOD GLUCOSE      POCT Blood Glucose.: 149 mg/dL (22 Jan 2024 12:14)      HOSPITAL MEDICATIONS:  MEDICATIONS  (STANDING):  acetylcysteine 20%  Inhalation 4 milliLiter(s) Inhalation every 6 hours  albuterol    0.083% 2.5 milliGRAM(s) Nebulizer every 6 hours  artificial  tears Solution 1 Drop(s) Both EYES every 6 hours  chlorhexidine 2% Cloths 1 Application(s) Topical daily  clonazePAM  Tablet 1 milliGRAM(s) Oral <User Schedule>  droxidopa 300 milliGRAM(s) Oral three times a day  enoxaparin Injectable 40 milliGRAM(s) SubCutaneous every 24 hours  lactulose Syrup 20 Gram(s) Oral three times a day  midazolam Injectable 2 milliGRAM(s) IV Push once  midodrine 30 milliGRAM(s) Oral every 8 hours  piperacillin/tazobactam IVPB.- 3.375 Gram(s) IV Intermittent once  polyethylene glycol 3350 17 Gram(s) Oral two times a day  QUEtiapine 100 milliGRAM(s) Oral <User Schedule>  senna 1 Tablet(s) Oral every 12 hours  sodium chloride 3%  Inhalation 4 milliLiter(s) Inhalation every 6 hours  tamsulosin 0.4 milliGRAM(s) Oral at bedtime  valproic  acid Syrup 250 milliGRAM(s) Oral <User Schedule>  valproic  acid Syrup 125 milliGRAM(s) Oral <User Schedule>  vancomycin  IVPB      vancomycin  IVPB 1000 milliGRAM(s) IV Intermittent every 12 hours    MEDICATIONS  (PRN):  acetaminophen     Tablet .. 650 milliGRAM(s) Oral every 6 hours PRN Moderate Pain (4 - 6)  OLANZapine Injectable 2.5 milliGRAM(s) IntraMuscular every 6 hours PRN agitation      LABS:                        12.3   25.21 )-----------( 168      ( 22 Jan 2024 06:15 )             37.4     01-22    143  |  102  |  16  ----------------------------<  125<H>  3.9   |  32<H>  |  0.80    Ca    8.8      22 Jan 2024 06:15  Phos  2.7     01-22  Mg     2.00     01-22    TPro  8.0  /  Alb  3.2<L>  /  TBili  0.4  /  DBili  <0.2  /  AST  13  /  ALT  5   /  AlkPhos  45  01-22      Urinalysis Basic - ( 22 Jan 2024 06:15 )    Color: x / Appearance: x / SG: x / pH: x  Gluc: 125 mg/dL / Ketone: x  / Bili: x / Urobili: x   Blood: x / Protein: x / Nitrite: x   Leuk Esterase: x / RBC: x / WBC x   Sq Epi: x / Non Sq Epi: x / Bacteria: x      Arterial Blood Gas:  01-22 @ 07:28  7.48/47/53/35/88.7/10.2  ABG lactate: --    Venous Blood Gas:  01-22 @ 06:15  7.48/48/47/36/79.7  VBG Lactate: 1.9  Venous Blood Gas:  01-21 @ 05:29  7.36/74/65/42/91.3  VBG Lactate: 1.3      PAST MEDICAL & SURGICAL HISTORY:  CVA (cerebrovascular accident)      Dysphagia      BPH (benign prostatic hyperplasia)      Agitation      Schizophrenia      Congestive heart failure (CHF)      CVA (cerebral vascular accident)      Dysphagia      S/P percutaneous endoscopic gastrostomy (PEG) tube placement          FAMILY HISTORY:      Social History:  Been there at nursing home since 8/2022. (05 Jan 2024 17:55)      RADIOLOGY:  [ ] Reviewed and interpreted by me    PULMONARY FUNCTION TESTS:    EKG:

## 2024-01-22 NOTE — PROGRESS NOTE ADULT - ASSESSMENT
67 YO M with PMHx of dementia, agitation, CVA, dysphagia with PEG, and BPH who presented [facility] for worsening agitation and progressive hypoxia. Patient found with left lung infiltrate and admitted to medicine for AHRF second to CAP with course complicated by mucous plug requiring ETT (1/6-1/12) and ultimately transferred to RCU (1/16).     NEUROLOGY  # Hx of TBI with neurocognitive disorder and agitation   - Agitation and aggression noted in house with multiple readmissions for agitation.   - Continued on home seroquel 225mg BID, depakote 250/125/250, klonopin 1 BID and zyprexa PO vs IM PRN doses however over sedation during the day with poor airway protection and night time insomnia noted   - Case discussed at length with  and last admission TFT, B12, folic acid, B1, and CTH WNL and this admission infection tx.   - Continue on Klonopin 1mg BID.   - Continue on Seroquel 100mg TID (decreased 1/19) however may need to decrease day time doses and increase evening dose.   - Continue on Depakote 125/125/250 (decreased 1/20) and check level on Monday.   - Continue on Zyprexa 2.5mg Q6H IM PRN.   - Plan to adjust regimen as needed/ able.      CARDIOVASCULAR  # Septic vs vasoplegic shock  - ECHO (1/8) with EF 62, normal LVSF and moderate to severe AR.   - IV pressors weaned off in ICU and continued on Midodrine 30 and Droxi 300   - Wean off pressors as able.     RESPIRATORY  # AHRF second to aspiration vs recurrent mucous plug   - Presented from NH for witnessed aspiration and started on ABX however course complicated by left lung mucous plug requiring ETT (1/6-1/12)   - Course complicated by recurrent left lung mucous plug and attempted aggressive airway clearance with no recruitment noted. Despite persistent left lung plug able to wean HFNC without recurrent respiratory distress.  - Continue on HFNC 90/50L and wean as tolerated   - Continue on Albuterol, Mucomyst, HTS and Vest   - Consider CT chest to evaluate airway for obstruction leading to collapse when on less supplement O2    HEENT   # Poor oral hygiene   - Biotene BID and RN mouth care     GI  # Dysphagia with PEG   - Continue on PEG with lactulose 20, miralax and senna     RENAL  # Hx of BPH   - Seymour removed in ICU and passed TOV   - Continue on flomax   - Monitor renal function and UOP     INFECTIOUS DISEASE  # MRSA PNA   - RVP/ COVID (1/5) negative   - SCx (1/6) with MRSA   - MRSA PCR (1/6) POSITIVE   - Completed bactroban and Vanco (1/6-1/16) in ICU   - Recurrent left lung mucous plug however no fever spike or leukocytosis so far. RPT SCx negative and monitoring OFF ABX for now.     HEME  # Normocytic anemia   - HH stable 11s and monitoring for now  - DVT PPX with lovenox     ENDOCRINE  - No hx of A1C   - Monitor BG     ETHICS/ GOC    - FULL CODE     DISPO - Back to NH   69 YO M with PMHx of dementia, agitation, CVA, dysphagia with PEG, and BPH who presented [facility] for worsening agitation and progressive hypoxia. Patient found with left lung infiltrate and admitted to medicine for AHRF second to CAP with course complicated by mucous plug requiring ETT (1/6-1/12) and ultimately transferred to RCU (1/16).     NEUROLOGY  # Hx of TBI with neurocognitive disorder and agitation   - Agitation and aggression noted in house with multiple readmissions for agitation.   - Continued on home seroquel 225mg BID, depakote 250/125/250, klonopin 1 BID and zyprexa PO vs IM PRN doses however over sedation during the day with poor airway protection and night time insomnia noted   - Case discussed at length with  and last admission TFT, B12, folic acid, B1, and CTH WNL and this admission infection tx.   - Continue on Klonopin 1mg BID.   - Continue on Seroquel 100mg TID (decreased 1/19) however may need to decrease day time doses and increase evening dose.   - Continue on Depakote 125/125/250 (decreased 1/20) and check level on Monday.   - Continue on Zyprexa 2.5mg Q6H IM PRN.   - Plan to adjust regimen as needed/ able.      CARDIOVASCULAR  # Septic vs vasoplegic shock  - ECHO (1/8) with EF 62, normal LVSF and moderate to severe AR.   - IV pressors weaned off in ICU and continued on Midodrine 30 and Droxi 300   - Wean off pressors as able.   - Hypotensive - leovphed started     RESPIRATORY  # AHRF second to aspiration vs recurrent mucous plug   - Presented from NH for witnessed aspiration and started on ABX however course complicated by left lung mucous plug requiring ETT (1/6-1/12)   - Course complicated by recurrent left lung mucous plug and attempted aggressive airway clearance with no recruitment noted. Despite persistent left lung plug able to wean HFNC without recurrent respiratory distress.  - Continue on HFNC 90/50L and wean as tolerated   - Continue on Albuterol, Mucomyst, HTS and Vest   - Consider CT chest to evaluate airway for obstruction leading to collapse when on less supplement O2  - Intubated for resp failure     HEENT   # Poor oral hygiene   - Biotene BID and RN mouth care     GI  # Dysphagia with PEG   - Continue on PEG with lactulose 20, miralax and senna     RENAL  # Hx of BPH   - Seymour removed in ICU and passed TOV   - Continue on flomax   - Monitor renal function and UOP     INFECTIOUS DISEASE  # MRSA PNA   - RVP/ COVID (1/5) negative   - SCx (1/6) with MRSA   - MRSA PCR (1/6) POSITIVE   - Completed bactroban and Vanco (1/6-1/16) in ICU   - Recurrent left lung mucous plug however no fever spike or leukocytosis so far. RPT SCx negative and monitoring OFF ABX for now.     HEME  # Normocytic anemia   - HH stable 11s and monitoring for now  - DVT PPX with lovenox     ENDOCRINE  - No hx of A1C   - Monitor BG     ETHICS/ GOC    - FULL CODE     DISPO - Back to NH

## 2024-01-22 NOTE — BH CONSULTATION LIAISON PROGRESS NOTE - NSBHFUPINTERVALHXFT_PSY_A_CORE
Chart reviewed. Discussed with primary team. No PRNs since 01/20. VAL53. MRSA. VS: tachycardia, low BP.  During the exam pt is laying in his bed with his eyes open, not moving, not responding to verbal or tactile stimuli.

## 2024-01-22 NOTE — PROGRESS NOTE ADULT - CARDIOVASCULAR
regular rate and rhythm/tachycardia
normal/regular rate and rhythm/S1 S2 present/no gallops/no rub/no murmur

## 2024-01-22 NOTE — BH CONSULTATION LIAISON PROGRESS NOTE - NSBHCHARTREVIEWVS_PSY_A_CORE FT
Vital Signs Last 24 Hrs  T(C): 37.3 (22 Jan 2024 12:11), Max: 37.9 (22 Jan 2024 04:20)  T(F): 99.2 (22 Jan 2024 12:11), Max: 100.2 (22 Jan 2024 04:20)  HR: 115 (22 Jan 2024 15:00) (86 - 138)  BP: 104/67 (22 Jan 2024 15:00) (68/51 - 115/91)  BP(mean): 79 (22 Jan 2024 15:00) (60 - 98)  RR: 22 (22 Jan 2024 13:40) (20 - 23)  SpO2: 96% (22 Jan 2024 15:00) (82% - 99%)    Parameters below as of 22 Jan 2024 13:40  Patient On (Oxygen Delivery Method): ventilator    O2 Concentration (%): 70

## 2024-01-22 NOTE — CHART NOTE - NSCHARTNOTEFT_GEN_A_CORE
Pt is a 68M with PMHx CVA with dysphagia s/p PEG, CAD, orthostatic hypotension on midodrine, chronic hypoxemic respiratory failure with O2 dependence (2-3L NC), and recurrent hospitalizations for agitation now presenting to [facility] on 1/5/24 with recurrent episodes of agitation now with acute hypoxemic respiratory distress requiring new O2 supplementation admitted to medicine service with hospital course c/b progressive hypoxemia s/p emergent ETT intubation/MV (1/6-1/10) and septic/vasoplegic shock extubated to HFNC and transferred to RCU 1/16 for further management.     Pt without known psychiatric or neurologic hx with recurrent hospital admissions from NH for agitation and uncooperative nature on anti-psychiatric medications with clonazepam 1mg qhs + Depakote DR 375mg AM/250mg qhs + quetiapine 225mg q12hr. Pt continues to have intermittent episodes of attempting to get out of bed and pull out lines/tubes requiring enhanced supervision and limited ability to re-orient pt. Zyprexa prns in place as well. Pt with intermittent episodes of increased lethargy likely a/w high doses of medications. Will attempt slow wean if possible to avoid increased risk of aspiration. Attempting to obtain prior diagnostic hx/collateral of worsening agitation over the past few months with family and NH.     Pt with chronic oropharyngeal dysphagia with PEG placement from outside hospital, tolerating feeds at goal rate. Nutrition recs appreciated. Bowel regimen prn. PPI for chronic esophagitis. No acute renal or endocrine issues. Hypernatremia resolved.     Pt initially admitted to acute hypoxemic respiratory failure with progression to ETT intubation/MV (1/6-1/10) now extubated to HFNC who had slowly decreasing O2 requirements likely 2/2 MRSA PNA c/b mucous plugging and aspiration events. Overnight, pt had episode of agitation s/p IM prn followed by AM medications with increased lethargy and aspiration with mucous plug and worsening O2 requirements. Airway clearance therapy broadened with aggressive chest PT and deep suctioning. Will continue to wean midodrine and droxidopa as hemodynamics allow. Pt now completed course of Vancomycin (1/6-1/16), remains afebrile without leukocytosis and no clinical s/s recurrent infectious process.     Pt continued on HFNC at times supplemented with 100% NRB over face to maintain 02 sat >90%..over the weekend and overnight-  Pt had RRT his am for desaturation able to increase pulse ox after deep suctioning and position on R side and was stable.  Pt with low grade temp and cultures were drawn and sent and IV zosyn/vanco was initiated.   #2 RRT called for decreasing blood pressure- despite Doxydropa and midodrine, pt with shallow labored respirations and unresponsive .  Pt intubated at bedside and levophed gtt initiated to maintain blood pressure and transferred to CCU for higher level of care - now ventilated and on pressors.   Pt's brother Frederick Yusuf - who states he is the HCP with paperwork done at  office- was informed of pts change in medical status, intubation and transfer to the ICU for unstable state.   Pt transferred to CCU via bed acoom by RN /KIA

## 2024-01-22 NOTE — PROVIDER CONTACT NOTE (CHANGE IN STATUS NOTIFICATION) - SITUATION
Patient tachycardic to high 130s, BP stable at 115/91 (98).
Patient tachycardic to high 120s. BP stable at 101/73 (82). Patient on 100% high flow nasal cannula/100% non-rebreather.
Patient tachycardic to high 130s. BP stable at 113/79 (87).
Patient tachycardic to 120s. /68  (76). Patient on 100% non-rebreather, 100% high flow nasal cannula.
Patient with pulse oximeter to low 80s-85% on 100% high-flow nasal cannula. 100% non-rebreather.

## 2024-01-22 NOTE — BH CONSULTATION LIAISON PROGRESS NOTE - NSBHASSESSMENTFT_PSY_ALL_CORE
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 Patient received  Benadryl 50mg IV, Haldol 2.5 IVP x2, Ativan 2mg IVP x2, Versed 2mg IVP x1, 5mg IVP x1, and Zyprexa 2.5mg IM for agitation on admission. Psychiatry consulted for agitation/medication management.    Impression: patient without past psych hx with recently started agitation and aggressive behavior, possible executive and memory dysfunction, confusion, poor attention span raises concern for delirium vs r/o new case of early dementia/ consequences of TBI.    12/16 Pt today irritable and uncooperative on exam, limiting evaluation. Although pt refuses to participate in orientation questions, he appears confused given his persistent requests for PO breakfast despite being reminded of PEG. Req'd 2x PRN yesterday, no significant agitation overnight.  12/18: Patient receiving less PRNs: once on 12/16 and once 12/17. Currently calm, cooperative with the interview.  12/19: received 3 PRNs since yesterday. Currently calm, no safety issues.  12/20: No PRNs since yesterday. Calm on assessment, disoriented in time and place.  12/22: Zyprexa PRN overnight. Tolerating Depakote, with low level. Disoriented to time/place but calm.   12/26: calm, no  recent PRNs. Depakote decreased slightly due to low platelets, and the latter are rebounding. Exam appropriate.  01/22: not responding to verbal or tactile stimuli. No PRNs within the last 48 hours.    Recommendations:  - Enhanced observation for impulsivity/falls.  - DECREASE Quetiapine from 100 mg TID to 100 mg qhs  - Decrease Clonazepam 1 mg BID to 1 mg qhs  - C/W Depakote 125 mg  at 8 am, 125 mg at 2 pm and 250 mg at 8 pm for management of agitation/impulsivity  -PRN for agitation: Zyprexa 2.5 mg Q 6 hours PO/IM. Can give additional Zyprexa 2.5 mg for refractory agitation, combativeness (ensure qtc <500)  - Supportive treatment of delirium: 1) Minimize use of benzodiazepines, opioids, anticholinergics, and other deliriogenic agents whenever possible.  2) Maintain sleep wake cycle.  3) Provide frequent reorientation and redirection.  4) Family member at bedside if possible. 5) Provide corrective lenses/hearing aids if required  Dispo: NH/rehab; no role for inpt psych  CL psych will sign off given lack of behavioral acuity. 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 Patient received  Benadryl 50mg IV, Haldol 2.5 IVP x2, Ativan 2mg IVP x2, Versed 2mg IVP x1, 5mg IVP x1, and Zyprexa 2.5mg IM for agitation on admission. Psychiatry consulted for agitation/medication management.    Impression: patient without past psych hx with recently started agitation and aggressive behavior, possible executive and memory dysfunction, confusion, poor attention span raises concern for delirium vs r/o new case of early dementia/ consequences of TBI.    12/16 Pt today irritable and uncooperative on exam, limiting evaluation. Although pt refuses to participate in orientation questions, he appears confused given his persistent requests for PO breakfast despite being reminded of PEG. Req'd 2x PRN yesterday, no significant agitation overnight.  12/18: Patient receiving less PRNs: once on 12/16 and once 12/17. Currently calm, cooperative with the interview.  12/19: received 3 PRNs since yesterday. Currently calm, no safety issues.  12/20: No PRNs since yesterday. Calm on assessment, disoriented in time and place.  12/22: Zyprexa PRN overnight. Tolerating Depakote, with low level. Disoriented to time/place but calm.   12/26: calm, no  recent PRNs. Depakote decreased slightly due to low platelets, and the latter are rebounding. Exam appropriate.  01/22: not responding to verbal or tactile stimuli. No PRNs within the last 48 hours, no catalepsy, waxiness etc, would decrease meds so as to reduce oversedation    Recommendations:  - Enhanced observation for impulsivity/falls.  - DECREASE Quetiapine from 100 mg TID to 100 mg qhs  - Decrease Clonazepam 1 mg BID to 1 mg qhs  - C/W Depakote 125 mg  at 8 am, 125 mg at 2 pm and 250 mg at 8 pm for management of agitation/impulsivity  -PRN for agitation: Zyprexa 2.5 mg Q 6 hours PO/IM. Can give additional Zyprexa 2.5 mg for refractory agitation, combativeness (ensure qtc <500)  - Supportive treatment of delirium: 1) Minimize use of benzodiazepines, opioids, anticholinergics, and other deliriogenic agents whenever possible.  2) Maintain sleep wake cycle.  3) Provide frequent reorientation and redirection.  4) Family member at bedside if possible. 5) Provide corrective lenses/hearing aids if required  Dispo: NH/rehab; no role for inpt psych  CL psych will sign off given lack of acute agitation. Please call us as/if needed

## 2024-01-22 NOTE — PROVIDER CONTACT NOTE (CHANGE IN STATUS NOTIFICATION) - ACTION/TREATMENT ORDERED:
NP at bedside ordered EKG to be conducted. EKG done and given to NP for interpretation. Fluids to be ordered, 500 cc NS bolus to be given.
No orders given at this time. NP to review patient and give orders soon.
No orders given at this time. NP to review patient orders and give orders soon.
Provider at bedside assessed patient. Patient suctioned via nasal cavity/oral. Chest physical therapy done with response. Thick secretions obtained. Patient O2 saturation at 88% and above. Patient still remains on 100% high-flow and 100% non-rebreather.
Tylenol ordered for fever. No further orders given at this time.

## 2024-01-22 NOTE — BH CONSULTATION LIAISON PROGRESS NOTE - CURRENT MEDICATION
MEDICATIONS  (STANDING):  acetylcysteine 20%  Inhalation 4 milliLiter(s) Inhalation every 6 hours  albuterol    0.083% 2.5 milliGRAM(s) Nebulizer every 6 hours  artificial  tears Solution 1 Drop(s) Both EYES every 6 hours  chlorhexidine 0.12% Liquid 15 milliLiter(s) Oral Mucosa every 12 hours  chlorhexidine 2% Cloths 1 Application(s) Topical daily  clonazePAM  Tablet 1 milliGRAM(s) Oral <User Schedule>  droxidopa 300 milliGRAM(s) Oral three times a day  enoxaparin Injectable 40 milliGRAM(s) SubCutaneous every 24 hours  lactulose Syrup 20 Gram(s) Oral three times a day  midodrine 30 milliGRAM(s) Oral every 8 hours  norepinephrine Infusion 0.04 MICROgram(s)/kG/Min (5.03 mL/Hr) IV Continuous <Continuous>  piperacillin/tazobactam IVPB.- 3.375 Gram(s) IV Intermittent once  polyethylene glycol 3350 17 Gram(s) Oral two times a day  propofol Infusion 10 MICROgram(s)/kG/Min (4.02 mL/Hr) IV Continuous <Continuous>  QUEtiapine 100 milliGRAM(s) Oral <User Schedule>  senna 1 Tablet(s) Oral every 12 hours  sodium chloride 3%  Inhalation 4 milliLiter(s) Inhalation every 6 hours  tamsulosin 0.4 milliGRAM(s) Oral at bedtime  valproic  acid Syrup 250 milliGRAM(s) Oral <User Schedule>  valproic  acid Syrup 125 milliGRAM(s) Oral <User Schedule>  vancomycin  IVPB      vancomycin  IVPB 1000 milliGRAM(s) IV Intermittent every 12 hours    MEDICATIONS  (PRN):  acetaminophen     Tablet .. 650 milliGRAM(s) Oral every 6 hours PRN Moderate Pain (4 - 6)  OLANZapine Injectable 2.5 milliGRAM(s) IntraMuscular every 6 hours PRN agitation

## 2024-01-22 NOTE — RAPID RESPONSE TEAM SUMMARY - NSSITUATIONBACKGROUNDRRT_GEN_ALL_CORE
Briefly, this is a 68M with PMHx of dementia, agitation, CVA, dysphagia with PEG, and BPH who presented from [facility] for worsening agitation and progressive hypoxia. Patient found with left lung infiltrate and admitted to medicine for AHRF second to CAP with course complicated by mucous plug requiring ETT (1/6-1/12) and ultimately transferred to RCU (1/16).

## 2024-01-22 NOTE — BH CONSULTATION LIAISON PROGRESS NOTE - NSBHCHARTREVIEWLAB_PSY_A_CORE FT
12.3   25.21 )-----------( 168      ( 22 Jan 2024 06:15 )             37.4     01-22    143  |  102  |  16  ----------------------------<  125<H>  3.9   |  32<H>  |  0.80    Ca    8.8      22 Jan 2024 06:15  Phos  2.7     01-22  Mg     2.00     01-22    TPro  8.0  /  Alb  3.2<L>  /  TBili  0.4  /  DBili  <0.2  /  AST  13  /  ALT  5   /  AlkPhos  45  01-22
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
## 2024-01-22 NOTE — CHART NOTE - NSCHARTNOTEFT_GEN_A_CORE
Informed by nervous that IV pressor line extravasated  - Line removed  - Terbutaline and nitropaste applied at site as per protocol    will continue to  monitor Informed by nurse that IV pressor line extravasated  - Line removed  - Terbutaline and nitropaste applied at site as per protocol    will continue to  monitor

## 2024-01-22 NOTE — BH CONSULTATION LIAISON PROGRESS NOTE - NSBHATTESTCOMMENTATTENDFT_PSY_A_CORE
agree with above, patient cognition/arousal continues to worsen, confused, having multiple RRTs, would reduce psych meds as above slowly so as to reduce sedating agents, appears more hypoactively delirious in which meds are less indicated, cannot abruptly stop either as patient may undergo cholingeric rebound or bzd withdrawal. Low utility in many other med adjustments,  minimal agitation. Psych psych c/i to discharge to rehab or other safe dispo. If patient still very sedated please inform [facility] psych so as to review further taper recs.

## 2024-01-22 NOTE — RAPID RESPONSE TEAM SUMMARY - NSSITUATIONBACKGROUNDRRT_GEN_ALL_CORE
68M with PMHx of dementia, agitation, CVA, dysphagia with PEG, and BPH who presented from [facility] for worsening agitation and progressive hypoxia. Patient found with left lung infiltrate and admitted to medicine for AHRF second to CAP with course complicated by mucous plug requiring ETT (1/6-1/12) and ultimately transferred to RCU (1/16).

## 2024-01-22 NOTE — PROGRESS NOTE ADULT - RESPIRATORY
bilat coarse bs, thick yellow secretions
normal/clear to auscultation bilaterally/no wheezes/no rales/no rhonchi
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
## 2024-01-23 NOTE — PROGRESS NOTE ADULT - ASSESSMENT
69 YO M with PMHx of dementia, agitation, CVA, dysphagia with PEG, and BPH who presented [facility] for worsening agitation and progressive hypoxia. Patient found with left lung infiltrate and admitted to medicine for AHRF second to CAP with course complicated by mucous plug requiring ETT (1/6-1/12) and ultimately transferred to RCU (1/16). Now on am of 1/22 RRT x 2 for hypoxic respiratory failure and septic shock requiring vasopressor and intubation     NEUROLOGY  # Hx of TBI with neurocognitive disorder and agitation   - sedation with low dose propofol  - Agitation and aggression noted in house with multiple readmissions for agitation.   - Continued on home seroquel 225mg BID, depakote 250/125/250, klonopin 1 BID and zyprexa PO vs IM PRN doses however over sedation during the day with poor airway protection and night time insomnia noted   - Case discussed at length with  and last admission TFT, B12, folic acid, B1, and CTH WNL and this admission infection tx.   - Continue on Klonopin 1mg BID.   - Continue on Seroquel 100mg TID (decreased 1/19) however may need to decrease day time doses and increase evening dose.   - Continue on Depakote 125/125/250 (decreased 1/20) and check level on Monday.   - Continue on Zyprexa 2.5mg Q6H IM PRN.   - Plan to adjust regimen as needed/ able.      RESPIRATORY  # Acute hypoxemic respiratory failure 2/2 to aspiration vs recurrent mucous plug   - Presented from NH for witnessed aspiration and started on ABX however course complicated by left lung mucous plug requiring ETT (1/6-1/12)   - Course complicated by recurrent left lung mucous plug and attempted aggressive airway clearance with no recruitment noted. Despite persistent left lung plug able to wean HFNC without recurrent respiratory distress.  - monitor vent settings   - Continue on Albuterol, Mucomyst, HTS and Vest   - Consider CT chest to evaluate airway for obstruction leading to collapse when on less supplement O2    CARDIOVASCULAR  # Septic vs vasoplegic shock  - ECHO (1/8) with EF 62, normal LVSF and moderate to severe AR.   - IV pressors weaned off in ICU and was on Midodrine 30 and Droxi 300 currently on midodrine only   -c/w norepinephrine to maintain MAP>65       HEENT   # Poor oral hygiene   - Biotene BID and RN mouth care     GI  # Dysphagia with PEG   #constipation   - Continue tube feeds as tolerated  -c/w bowel regimen with lactulose 20, miralax and senna  -last BM 1/21     RENAL  # Hx of BPH   - Seymour removed in ICU and passed TOV   - Continue on flomax   - Monitor renal function and UOP     INFECTIOUS DISEASE  # MRSA PNA   - RVP/ COVID (1/5) negative   - sputum cx (1/6) with MRSA s/p course of vancomycin (1/6-1/16)  - MRSA PCR (1/6) POSITIVE   -restarted on empiric vancomycin and zosyn will f/u cultures sent 1/22   - fever, leukocytosis likely pna     HEME  # Normocytic anemia   -Hgb stable   - DVT PPX with lovenox     ENDOCRINE  - No hx of A1C   - Monitor BG     ETHICS/ GOC    - FULL CODE  -palliative consulted regarding GOC with family, pending further discussions    67 YO M with PMHx of dementia, agitation, CVA, dysphagia with PEG, and BPH who presented [facility] for worsening agitation and progressive hypoxia. Patient found with left lung infiltrate and admitted to medicine for AHRF second to CAP with course complicated by mucous plug requiring ETT (1/6-1/12) and ultimately transferred to RCU (1/16). Now on am of 1/22 RRT x 2 for hypoxic respiratory failure and septic shock requiring vasopressor and intubation     NEUROLOGY  # Hx of TBI with neurocognitive disorder and agitation   - sedation with low dose propofol may consider switching to precedex   - Agitation and aggression noted in house with multiple readmissions for agitation.   -holding home seroquel 225mg BID, depakote 250/125/250, klonopin 1 BID and zyprexa PO vs IM PRN   - Case discussed at length with  and last admission TFT, B12, folic acid, B1, and CTH WNL and this admission infection tx.   - Continue on Klonopin 1mg BID.   - Continue on Seroquel 100mg TID (decreased 1/19) however may need to decrease day time doses and increase evening dose.   - Continue on Depakote 125/125/250 (decreased 1/20) and check level on Monday.   - Continue on Zyprexa 2.5mg Q6H IM PRN.   - Plan to adjust regimen as needed/ able.      RESPIRATORY  # Acute hypoxemic respiratory failure 2/2 to aspiration vs recurrent mucous plug   - Presented from NH for witnessed aspiration and started on ABX however course complicated by left lung mucous plug requiring ETT (1/6-1/12)   - Course complicated by recurrent left lung mucous plug and attempted aggressive airway clearance with no recruitment noted. Despite persistent left lung plug able to wean HFNC without recurrent respiratory distress.  - monitor vent settings   - Continue on Albuterol, Mucomyst, HTS and Vest   - Consider CT chest to evaluate airway for obstruction leading to collapse when on less supplement O2    CARDIOVASCULAR  # Septic vs vasoplegic shock  - ECHO (1/8) with EF 62, normal LVSF and moderate to severe AR.   - IV pressors weaned off in ICU and was on Midodrine 30 and Droxi 300 currently on midodrine only   -c/w norepinephrine to maintain MAP>65       HEENT   # Poor oral hygiene   - Biotene BID and RN mouth care     GI  # Dysphagia with PEG   #constipation   - Continue tube feeds as tolerated  -c/w bowel regimen with lactulose 20, miralax and senna  -last BM 1/21     RENAL  # Hx of BPH   - Seymour removed in ICU and passed TOV   - Continue on flomax   - Monitor renal function and UOP     INFECTIOUS DISEASE  # MRSA PNA   - RVP/ COVID (1/5) negative   - sputum cx (1/6) with MRSA s/p course of vancomycin (1/6-1/16)  - MRSA PCR (1/6) POSITIVE   -restarted on empiric vancomycin and zosyn will f/u cultures sent 1/22   - fever, leukocytosis likely pna     HEME  # Normocytic anemia   -Hgb stable   - DVT PPX with lovenox     ENDOCRINE  - No hx of A1C   - Monitor BG     ETHICS/ GOC    - FULL CODE  -palliative consulted regarding GOC with family, pending further discussions    67 YO M with PMHx of dementia, agitation, CVA, dysphagia with PEG, and BPH who presented [facility] [place] for worsening agitation and progressive hypoxia. Patient found with left lung infiltrate and admitted to medicine for AHRF second to CAP with course complicated by mucous plug requiring ETT (1/6-1/12) and ultimately transferred to RCU (1/16). Now on am of 1/22 RRT x 2 for hypoxic respiratory failure and septic shock requiring vasopressor and intubation     NEUROLOGY  # Hx of TBI with neurocognitive disorder and agitation   - sedation with low dose propofol may consider switching to precedex   - Agitation and aggression noted in house with multiple readmissions for agitation.   -holding home seroquel 225mg BID, depakote 250/125/250, klonopin 1 BID and zyprexa PO vs IM PRN   - Case discussed at length with  and last admission TFT, B12, folic acid, B1, and CTH WNL and this admission infection tx.   - in RCU was on Klonopin 1mg BID. Seroquel 100mg TID and Depakote currently on hold       RESPIRATORY  # Acute hypoxemic respiratory failure 2/2 to aspiration vs recurrent mucous plug   - Presented from NH for witnessed aspiration and started on ABX however course complicated by MRSA pneumonia and left lung mucous plug requiring intubation from 1/6-1/12   -tolerating current vent settings   - Continue airway clearance with IPV, Albuterol, Mucomyst, HTS and Vest   -may consider bronchoscopy if continues to have increased secretions     CARDIOVASCULAR  # Septic vs vasoplegic shock  - ECHO (1/8) with EF 62, normal LVSF and moderate to severe AR.   - IV pressors weaned off in ICU and was on Midodrine 30 and Droxi 300 currently on midodrine only   -c/w norepinephrine to maintain MAP>65       HEENT   # Poor oral hygiene   - Biotene BID and RN mouth care     GI  # Dysphagia with PEG   #constipation   - Continue tube feeds as tolerated  -c/w bowel regimen with miralax and senna  -last BM 1/223    RENAL  # Hx of BPH   - passed initially TOV garcia reinserted 1/22   - was on flomax but unclear if receiving if unable to crush via PEG will consider low dose cardura if pressor requirements remain unchanged   -making good UO     INFECTIOUS DISEASE  # MRSA PNA   - RVP/ COVID (1/5) negative   - sputum cx (1/6) with MRSA s/p course of vancomycin (1/6-1/16)  - MRSA PCR (1/6) POSITIVE   -restarted on empiric vancomycin (1/22-   ) and zosyn (1/22-  ) will f/u cultures sent 1/22   - fever, leukocytosis likely pna     HEME  # Normocytic anemia   -Hgb stable   - DVT PPX with lovenox     ENDOCRINE  - No hx of A1C   - Monitor BG     ETHICS/ GOC    - FULL CODE  -palliative consulted regarding GOC with family, pending further discussions  69 YO M with PMHx of dementia, agitation, CVA, dysphagia with PEG, and BPH who presented [facility] [place] for worsening agitation and progressive hypoxia. Patient found with left lung infiltrate and admitted to medicine for AHRF second to CAP with course complicated by mucous plug requiring ETT (1/6-1/12) and ultimately transferred to RCU (1/16). Now on am of 1/22 RRT x 2 for hypoxic respiratory failure and septic shock requiring vasopressor and intubation     NEUROLOGY  # Hx of TBI with neurocognitive disorder and agitation   - sedation with low dose propofol may consider switching to precedex   - Agitation and aggression noted in house with multiple readmissions for agitation.   -holding home seroquel 225mg BID, depakote 250/125/250, klonopin 1 BID and zyprexa PO vs IM PRN   - Case discussed at length with  and last admission TFT, B12, folic acid, B1, and CTH WNL and this admission infection tx.   - in RCU was on Klonopin 1mg BID. Seroquel 100mg TID and Depakote currently on hold       RESPIRATORY  # Acute hypoxemic respiratory failure 2/2 to aspiration vs recurrent mucous plug   - Presented from NH for witnessed aspiration and started on ABX however course complicated by MRSA pneumonia and left lung mucous plug requiring intubation from 1/6-1/12   -tolerating current vent settings   - Continue airway clearance with IPV, Albuterol, Mucomyst, HTS and Vest   -may consider bronchoscopy if continues to have increased secretions     CARDIOVASCULAR  # Septic vs vasoplegic shock  - ECHO (1/8) with EF 62, normal LVSF and moderate to severe AR.   - IV pressors weaned off in ICU and was on Midodrine 30 and Droxi 300 currently on midodrine only   -c/w norepinephrine to maintain MAP>65       HEENT   # Poor oral hygiene   - Biotene BID and RN mouth care     GI  # Dysphagia with PEG   #constipation   - Continue tube feeds as tolerated  -c/w bowel regimen with miralax and senna  -last BM 1/223    RENAL  # Hx of BPH   - passed initially TOV garcia reinserted 1/22   - was on flomax but unclear if receiving if unable to crush via PEG will consider low dose cardura if pressor requirements remain unchanged   -making good UO     INFECTIOUS DISEASE  # MRSA PNA   - RVP/ COVID (1/5) negative   - sputum cx (1/6) with MRSA s/p course of vancomycin (1/6-1/16)  - MRSA PCR (1/6) POSITIVE   -restarted on empiric vancomycin (1/22-   ) and zosyn (1/22-  ) will f/u cultures sent 1/22   - fever, leukocytosis likely pna     HEME  # Normocytic anemia   -Hgb stable   - DVT PPX with lovenox     ENDOCRINE  - No hx of A1C   - Monitor BG     ETHICS/ GOC    - DNR  -spoke with brother Frederick 1/23, MARISOL filled, DNR at this time and will continue having GOC daily

## 2024-01-23 NOTE — PROGRESS NOTE ADULT - SUBJECTIVE AND OBJECTIVE BOX
Interval Events:  -copious secretions reported by RN from ETT  -periods of agitation requiring increase in propofol gtt     HPI:  69 yo M with PMhx of BPH, agitation, ?dementia, dysphagia after CVA now s/p PEG tube, and ?CHF presenting from [facility] for worsening agitation today. Per NH, pt was very agitated in the morning there. Pt coughs from time to time. At the NH, pt's O2 went down to 89% on RA, was placed on 4 L NC and transferred to ED. On exam, pt endorsed SOB but stated that he wants to go back home. A&Ox2 (name, in hospital).      In ED, pt received ceftriaxone x1, azithromycin x1. CXR showing questionable L infiltrate. Pt refusing NRB in ED. (05 Jan 2024 17:55)      REVIEW OF SYSTEMS:    [x ] Unable to assess ROS because patient is intubated and sedated    OBJECTIVE:  ICU Vital Signs Last 24 Hrs  T(C): 36.7 (23 Jan 2024 04:00), Max: 38.2 (22 Jan 2024 14:00)  T(F): 98.1 (23 Jan 2024 04:00), Max: 100.7 (22 Jan 2024 14:00)  HR: 96 (23 Jan 2024 07:59) (87 - 131)  BP: 104/67 (22 Jan 2024 15:00) (68/51 - 115/73)  BP(mean): 79 (22 Jan 2024 15:00) (60 - 87)  ABP: 96/62 (23 Jan 2024 07:30) (94/54 - 152/75)  ABP(mean): 78 (23 Jan 2024 07:30) (70 - 114)  RR: 14 (23 Jan 2024 07:30) (10 - 24)  SpO2: 97% (23 Jan 2024 07:59) (90% - 100%)    O2 Parameters below as of 23 Jan 2024 07:30  Patient On (Oxygen Delivery Method): ventilator    O2 Concentration (%): 40      Mode: AC/ CMV (Assist Control/ Continuous Mandatory Ventilation), RR (machine): 14, TV (machine): 400, FiO2: 40, PEEP: 5, ITime: 0.64, MAP: 8, PIP: 23    01-22 @ 07:01  -  01-23 @ 07:00  --------------------------------------------------------  IN: 2947.3 mL / OUT: 440 mL / NET: 2507.3 mL      CAPILLARY BLOOD GLUCOSE      POCT Blood Glucose.: 149 mg/dL (22 Jan 2024 12:14)      Physical Exam:   Constitutional: ill appearing, no acute distress   HEENT: + PERRLA, EOMI, no drainage or redness  Neck: supple,  No JVD  Respiratory: Ventilator assisted breath Sounds equal & clear bilaterally to auscultation, no accessory muscle use noted  Cardiovascular: Regular rate, regular rhythm, normal S1, S2; no murmurs or rub  Gastrointestinal: Soft, non-tender, non distended, no hepatosplenomegaly, normal bowel sounds  Extremities: + 2 peripheral edema, no cyanosis, no clubbing   Vascular: Equal and normal pulses: 2+ peripheral pulses throughout  Neurological: sedated/intubated  Psychiatric: calm, normal mood, normal affect  Musculoskeletal: No joint swelling or deformity; no limitation of movement  Skin: warm, dry, well perfused, no rashes    HOSPITAL MEDICATIONS:  Standing Meds:  acetylcysteine 20%  Inhalation 4 milliLiter(s) Inhalation every 6 hours  albuterol    0.083% 2.5 milliGRAM(s) Nebulizer every 6 hours  artificial  tears Solution 1 Drop(s) Both EYES every 6 hours  chlorhexidine 0.12% Liquid 15 milliLiter(s) Oral Mucosa every 12 hours  chlorhexidine 2% Cloths 1 Application(s) Topical daily  enoxaparin Injectable 40 milliGRAM(s) SubCutaneous every 24 hours  lactulose Syrup 20 Gram(s) Oral three times a day  midodrine 30 milliGRAM(s) Oral every 8 hours  norepinephrine Infusion 0.3 MICROgram(s)/kG/Min IV Continuous <Continuous>  pantoprazole  Injectable 40 milliGRAM(s) IV Push daily  piperacillin/tazobactam IVPB.. 3.375 Gram(s) IV Intermittent every 8 hours  polyethylene glycol 3350 17 Gram(s) Oral two times a day  propofol Infusion 15 MICROgram(s)/kG/Min IV Continuous <Continuous>  senna 1 Tablet(s) Oral every 12 hours  sodium chloride 3%  Inhalation 4 milliLiter(s) Inhalation every 6 hours  tamsulosin 0.4 milliGRAM(s) Oral at bedtime  vancomycin  IVPB      vancomycin  IVPB 1000 milliGRAM(s) IV Intermittent every 12 hours      PRN Meds:  acetaminophen     Tablet .. 650 milliGRAM(s) Oral every 6 hours PRN  acetaminophen     Tablet .. 650 milliGRAM(s) Oral every 6 hours PRN      LABS:                        10.2   31.62 )-----------( 146      ( 23 Jan 2024 00:46 )             30.3     Hgb Trend: 10.2<--, 11.4<--, 12.3<--, 10.9<--, 11.8<--  01-23    141  |  101  |  24<H>  ----------------------------<  143<H>  4.2   |  32<H>  |  0.85    Ca    8.5      23 Jan 2024 00:46  Phos  4.0     01-23  Mg     2.10     01-23    TPro  7.6  /  Alb  3.0<L>  /  TBili  0.5  /  DBili  x   /  AST  13  /  ALT  6   /  AlkPhos  41  01-23    Creatinine Trend: 0.85<--, 0.95<--, 0.80<--, 0.75<--, 0.70<--, 0.62<--  PT/INR - ( 23 Jan 2024 00:46 )   PT: 18.9 sec;   INR: 1.71 ratio         PTT - ( 23 Jan 2024 00:46 )  PTT:36.4 sec  Urinalysis Basic - ( 23 Jan 2024 00:46 )    Color: x / Appearance: x / SG: x / pH: x  Gluc: 143 mg/dL / Ketone: x  / Bili: x / Urobili: x   Blood: x / Protein: x / Nitrite: x   Leuk Esterase: x / RBC: x / WBC x   Sq Epi: x / Non Sq Epi: x / Bacteria: x      Arterial Blood Gas:  01-23 @ 00:46  7.37/60/102/35/98.8/7.8  ABG lactate: --  Arterial Blood Gas:  01-22 @ 14:00  7.37/58/167/34/99.4/6.7  ABG lactate: --  Arterial Blood Gas:  01-22 @ 07:28  7.48/47/53/35/88.7/10.2  ABG lactate: --    Venous Blood Gas:  01-22 @ 06:15  7.48/48/47/36/79.7  VBG Lactate: 1.9      MICROBIOLOGY:     Culture - Sputum (collected 22 Jan 2024 16:00)  Source: .Sputum Sputum  Gram Stain (23 Jan 2024 07:45):    Numerous polymorphonuclear leukocytes per low power field    Few Squamous epithelial cells per low power field    Rare Gram Negative Rods seen per oil power field      RADIOLOGY:  [ ] Reviewed and interpreted by me
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
## 2024-01-24 NOTE — PROGRESS NOTE ADULT - SUBJECTIVE AND OBJECTIVE BOX
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 Per NH, pt was very agitated in the morning there. Pt coughs from time to time. At the NH, pt's O2 went down to 89% on RA, was placed on 4 L NC and transferred to ED. On exam, pt endorsed SOB but stated that he wants to go back home. A&Ox2 (name, in hospital).      In ED, pt received ceftriaxone x1, azithromycin x1. CXR showing questionable L infiltrate. Pt refusing NRB in ED. (05 Jan 2024 17:55)      REVIEW OF SYSTEMS:  Constitutional: [ ] negative [ ] fevers [ ] chills [ ] weight loss [ ] weight gain  HEENT: [ ] negative [ ] dry eyes [ ] eye irritation [ ] postnasal drip [ ] nasal congestion  CV: [ ] negative  [ ] chest pain [ ] orthopnea [ ] palpitations [ ] murmur  Resp: [ ] negative [ ] cough [ ] shortness of breath [ ] dyspnea [ ] wheezing [ ] sputum [ ] hemoptysis  GI: [ ] negative [ ] nausea [ ] vomiting [ ] diarrhea [ ] constipation [ ] abd pain [ ] dysphagia   : [ ] negative [ ] dysuria [ ] nocturia [ ] hematuria [ ] increased urinary frequency  Musculoskeletal: [ ] negative [ ] back pain [ ] myalgias [ ] arthralgias [ ] fracture  Skin: [ ] negative [ ] rash [ ] itch  Neurological: [ ] negative [ ] headache [ ] dizziness [ ] syncope [ ] weakness [ ] numbness  Psychiatric: [ ] negative [ ] anxiety [ ] depression  Endocrine: [ ] negative [ ] diabetes [ ] thyroid problem  Hematologic/Lymphatic: [ ] negative [ ] anemia [ ] bleeding problem  Allergic/Immunologic: [ ] negative [ ] itchy eyes [ ] nasal discharge [ ] hives [ ] angioedema  [ ] All other systems negative  [ ] Unable to assess ROS because ________    OBJECTIVE:  ICU Vital Signs Last 24 Hrs  T(C): 37.3 (24 Jan 2024 04:00), Max: 37.5 (23 Jan 2024 20:00)  T(F): 99.2 (24 Jan 2024 04:00), Max: 99.5 (23 Jan 2024 20:00)  HR: 74 (24 Jan 2024 06:30) (65 - 110)  BP: --  BP(mean): --  ABP: 113/58 (24 Jan 2024 06:30) (92/46 - 118/58)  ABP(mean): 82 (24 Jan 2024 06:30) (65 - 89)  RR: 21 (24 Jan 2024 06:30) (14 - 28)  SpO2: 100% (24 Jan 2024 06:30) (93% - 100%)    O2 Parameters below as of 24 Jan 2024 04:28  Patient On (Oxygen Delivery Method): ventilator          Mode: AC/ CMV (Assist Control/ Continuous Mandatory Ventilation), RR (machine): 14, TV (machine): 400, FiO2: 40, PEEP: 5, ITime: 0.88, MAP: 9, PIP: 21    01-22 @ 07:01 - 01-23 @ 07:00  --------------------------------------------------------  IN: 2947.3 mL / OUT: 440 mL / NET: 2507.3 mL    01-23 @ 07:01 - 01-24 @ 06:58  --------------------------------------------------------  IN: 3061.8 mL / OUT: 1365 mL / NET: 1696.8 mL      CAPILLARY BLOOD GLUCOSE      POCT Blood Glucose.: 149 mg/dL (22 Jan 2024 12:14)      Physical Exam:   Constitutional: ill appearing, no acute distress   HEENT: + PERRLA, EOMI, no drainage or redness  Neck: supple,  No JVD  Respiratory: Ventilator assisted breath Sounds equal & clear bilaterally to auscultation, no accessory muscle use noted  Cardiovascular: Regular rate, regular rhythm, normal S1, S2; no murmurs or rub  Gastrointestinal: Soft, non-tender, non distended, no hepatosplenomegaly, normal bowel sounds  Extremities: + 2 peripheral edema, no cyanosis, no clubbing   Vascular: Equal and normal pulses: 2+ peripheral pulses throughout  Neurological: sedated/intubated  Psychiatric: calm, normal mood, normal affect  Musculoskeletal: No joint swelling or deformity; no limitation of movement  Skin: warm, dry, well perfused, no rashes    HOSPITAL MEDICATIONS:  Standing Meds:  acetylcysteine 20%  Inhalation 4 milliLiter(s) Inhalation every 6 hours  albuterol    0.083% 2.5 milliGRAM(s) Nebulizer every 6 hours  artificial  tears Solution 1 Drop(s) Both EYES every 6 hours  chlorhexidine 0.12% Liquid 15 milliLiter(s) Oral Mucosa every 12 hours  chlorhexidine 2% Cloths 1 Application(s) Topical daily  enoxaparin Injectable 40 milliGRAM(s) SubCutaneous every 24 hours  midodrine 30 milliGRAM(s) Oral every 8 hours  mupirocin 2% Ointment 1 Application(s) Topical two times a day  norepinephrine Infusion 0.3 MICROgram(s)/kG/Min IV Continuous <Continuous>  pantoprazole  Injectable 40 milliGRAM(s) IV Push daily  piperacillin/tazobactam IVPB.. 3.375 Gram(s) IV Intermittent every 8 hours  polyethylene glycol 3350 17 Gram(s) Oral two times a day  propofol Infusion 15 MICROgram(s)/kG/Min IV Continuous <Continuous>  senna 1 Tablet(s) Oral every 12 hours  sodium chloride 3%  Inhalation 4 milliLiter(s) Inhalation every 6 hours  vancomycin  IVPB 1250 milliGRAM(s) IV Intermittent every 12 hours      PRN Meds:  acetaminophen     Tablet .. 650 milliGRAM(s) Oral every 6 hours PRN  acetaminophen     Tablet .. 650 milliGRAM(s) Oral every 6 hours PRN      LABS:                        8.6    13.21 )-----------( 108      ( 24 Jan 2024 01:00 )             26.2     Hgb Trend: 8.6<--, 10.2<--, 11.4<--, 12.3<--, 10.9<--  01-24    141  |  101  |  18  ----------------------------<  116<H>  4.1   |  33<H>  |  0.67    Ca    8.0<L>      24 Jan 2024 01:00  Phos  2.4     01-24  Mg     1.90     01-24    TPro  6.7  /  Alb  2.6<L>  /  TBili  0.3  /  DBili  x   /  AST  11  /  ALT  <5  /  AlkPhos  36<L>  01-24    Creatinine Trend: 0.67<--, 0.85<--, 0.95<--, 0.80<--, 0.75<--, 0.70<--  PT/INR - ( 23 Jan 2024 00:46 )   PT: 18.9 sec;   INR: 1.71 ratio         PTT - ( 23 Jan 2024 00:46 )  PTT:36.4 sec  Urinalysis Basic - ( 24 Jan 2024 01:00 )    Color: x / Appearance: x / SG: x / pH: x  Gluc: 116 mg/dL / Ketone: x  / Bili: x / Urobili: x   Blood: x / Protein: x / Nitrite: x   Leuk Esterase: x / RBC: x / WBC x   Sq Epi: x / Non Sq Epi: x / Bacteria: x      Arterial Blood Gas:  01-24 @ 01:00  7.43/57/119/38/98.8/11.9  ABG lactate: --  Arterial Blood Gas:  01-23 @ 00:46  7.37/60/102/35/98.8/7.8  ABG lactate: --  Arterial Blood Gas:  01-22 @ 14:00  7.37/58/167/34/99.4/6.7  ABG lactate: --  Arterial Blood Gas:  01-22 @ 07:28  7.48/47/53/35/88.7/10.2  ABG lactate: --        MICROBIOLOGY:     Culture - Sputum (collected 22 Jan 2024 16:00)  Source: .Sputum Sputum  Gram Stain (23 Jan 2024 07:45):    Numerous polymorphonuclear leukocytes per low power field    Few Squamous epithelial cells per low power field    Rare Gram Negative Rods seen per oil power field  Preliminary Report (23 Jan 2024 18:25):    Normal Respiratory Clarita present    Culture - Blood (collected 22 Jan 2024 14:00)  Source: .Blood Blood-Peripheral  Preliminary Report (23 Jan 2024 19:02):    No growth at 24 hours    Culture - Blood (collected 22 Jan 2024 13:20)  Source: .Blood Blood  Preliminary Report (23 Jan 2024 19:02):    No growth at 24 hours    Culture - Blood (collected 22 Jan 2024 09:45)  Source: .Blood Blood-Peripheral  Preliminary Report (23 Jan 2024 13:03):    No growth at 24 hours    Culture - Blood (collected 22 Jan 2024 09:40)  Source: .Blood Blood-Venous  Preliminary Report (23 Jan 2024 13:03):    No growth at 24 hours      RADIOLOGY:  [ ] Reviewed and interpreted by me         Interval Events: No acute events overnight. Remains mechanically vented, on levo and propofol gtt.    HPI:  67 yo M with PMhx of BPH, agitation, ?dementia, dysphagia after CVA now s/p PEG tube, and ?CHF presenting from [facility] for worsening agitation today. Per NH, pt was very agitated in the morning there. Pt coughs from time to time. At the NH, pt's O2 went down to 89% on RA, was placed on 4 L NC and transferred to ED. On exam, pt endorsed SOB but stated that he wants to go back home. A&Ox2 (name, in hospital).      In ED, pt received ceftriaxone x1, azithromycin x1. CXR showing questionable L infiltrate. Pt refusing NRB in ED. (05 Jan 2024 17:55)      REVIEW OF SYSTEMS:  [ ] All other systems negative  [x] Unable to assess ROS because patient is intubated and sedated    OBJECTIVE:  ICU Vital Signs Last 24 Hrs  T(C): 37.3 (24 Jan 2024 04:00), Max: 37.5 (23 Jan 2024 20:00)  T(F): 99.2 (24 Jan 2024 04:00), Max: 99.5 (23 Jan 2024 20:00)  HR: 74 (24 Jan 2024 06:30) (65 - 110)  BP: --  BP(mean): --  ABP: 113/58 (24 Jan 2024 06:30) (92/46 - 118/58)  ABP(mean): 82 (24 Jan 2024 06:30) (65 - 89)  RR: 21 (24 Jan 2024 06:30) (14 - 28)  SpO2: 100% (24 Jan 2024 06:30) (93% - 100%)    O2 Parameters below as of 24 Jan 2024 04:28  Patient On (Oxygen Delivery Method): ventilator          Mode: AC/ CMV (Assist Control/ Continuous Mandatory Ventilation), RR (machine): 14, TV (machine): 400, FiO2: 40, PEEP: 5, ITime: 0.88, MAP: 9, PIP: 21    01-22 @ 07:01  -  01-23 @ 07:00  --------------------------------------------------------  IN: 2947.3 mL / OUT: 440 mL / NET: 2507.3 mL    01-23 @ 07:01 - 01-24 @ 06:58  --------------------------------------------------------  IN: 3061.8 mL / OUT: 1365 mL / NET: 1696.8 mL      CAPILLARY BLOOD GLUCOSE      POCT Blood Glucose.: 149 mg/dL (22 Jan 2024 12:14)      Physical Exam:   Constitutional: ill appearing, no acute distress   HEENT: + PERRLA, EOMI, no drainage or redness  Neck: supple,  No JVD  Respiratory: Ventilator assisted breath Sounds equal & diminished bilaterally to auscultation, no accessory muscle use noted  Cardiovascular: Regular rate, regular rhythm, normal S1, S2; no murmurs or rub  Gastrointestinal: Soft, non-tender, non distended, no hepatosplenomegaly, normal bowel sounds  Extremities: + 2 peripheral edema, no cyanosis, no clubbing   Vascular: Equal and normal pulses: 2+ peripheral pulses throughout  Neurological: sedated/intubated, followed simple commands  Psychiatric: calm, normal mood, normal affect  Musculoskeletal: No joint swelling or deformity; moving extremities spontaneously  Skin: warm, dry, well perfused, no rashes          HOSPITAL MEDICATIONS:  Standing Meds:  acetylcysteine 20%  Inhalation 4 milliLiter(s) Inhalation every 6 hours  albuterol    0.083% 2.5 milliGRAM(s) Nebulizer every 6 hours  artificial  tears Solution 1 Drop(s) Both EYES every 6 hours  chlorhexidine 0.12% Liquid 15 milliLiter(s) Oral Mucosa every 12 hours  chlorhexidine 2% Cloths 1 Application(s) Topical daily  enoxaparin Injectable 40 milliGRAM(s) SubCutaneous every 24 hours  midodrine 30 milliGRAM(s) Oral every 8 hours  mupirocin 2% Ointment 1 Application(s) Topical two times a day  norepinephrine Infusion 0.3 MICROgram(s)/kG/Min IV Continuous <Continuous>  pantoprazole  Injectable 40 milliGRAM(s) IV Push daily  piperacillin/tazobactam IVPB.. 3.375 Gram(s) IV Intermittent every 8 hours  polyethylene glycol 3350 17 Gram(s) Oral two times a day  propofol Infusion 15 MICROgram(s)/kG/Min IV Continuous <Continuous>  senna 1 Tablet(s) Oral every 12 hours  sodium chloride 3%  Inhalation 4 milliLiter(s) Inhalation every 6 hours  vancomycin  IVPB 1250 milliGRAM(s) IV Intermittent every 12 hours      PRN Meds:  acetaminophen     Tablet .. 650 milliGRAM(s) Oral every 6 hours PRN  acetaminophen     Tablet .. 650 milliGRAM(s) Oral every 6 hours PRN      LABS:                        8.6    13.21 )-----------( 108      ( 24 Jan 2024 01:00 )             26.2     Hgb Trend: 8.6<--, 10.2<--, 11.4<--, 12.3<--, 10.9<--  01-24    141  |  101  |  18  ----------------------------<  116<H>  4.1   |  33<H>  |  0.67    Ca    8.0<L>      24 Jan 2024 01:00  Phos  2.4     01-24  Mg     1.90     01-24    TPro  6.7  /  Alb  2.6<L>  /  TBili  0.3  /  DBili  x   /  AST  11  /  ALT  <5  /  AlkPhos  36<L>  01-24    Creatinine Trend: 0.67<--, 0.85<--, 0.95<--, 0.80<--, 0.75<--, 0.70<--  PT/INR - ( 23 Jan 2024 00:46 )   PT: 18.9 sec;   INR: 1.71 ratio         PTT - ( 23 Jan 2024 00:46 )  PTT:36.4 sec  Urinalysis Basic - ( 24 Jan 2024 01:00 )    Color: x / Appearance: x / SG: x / pH: x  Gluc: 116 mg/dL / Ketone: x  / Bili: x / Urobili: x   Blood: x / Protein: x / Nitrite: x   Leuk Esterase: x / RBC: x / WBC x   Sq Epi: x / Non Sq Epi: x / Bacteria: x      Arterial Blood Gas:  01-24 @ 01:00  7.43/57/119/38/98.8/11.9  ABG lactate: --  Arterial Blood Gas:  01-23 @ 00:46  7.37/60/102/35/98.8/7.8  ABG lactate: --  Arterial Blood Gas:  01-22 @ 14:00  7.37/58/167/34/99.4/6.7  ABG lactate: --  Arterial Blood Gas:  01-22 @ 07:28  7.48/47/53/35/88.7/10.2  ABG lactate: --        MICROBIOLOGY:     Culture - Sputum (collected 22 Jan 2024 16:00)  Source: .Sputum Sputum  Gram Stain (23 Jan 2024 07:45):    Numerous polymorphonuclear leukocytes per low power field    Few Squamous epithelial cells per low power field    Rare Gram Negative Rods seen per oil power field  Preliminary Report (23 Jan 2024 18:25):    Normal Respiratory Clarita present    Culture - Blood (collected 22 Jan 2024 14:00)  Source: .Blood Blood-Peripheral  Preliminary Report (23 Jan 2024 19:02):    No growth at 24 hours    Culture - Blood (collected 22 Jan 2024 13:20)  Source: .Blood Blood  Preliminary Report (23 Jan 2024 19:02):    No growth at 24 hours    Culture - Blood (collected 22 Jan 2024 09:45)  Source: .Blood Blood-Peripheral  Preliminary Report (23 Jan 2024 13:03):    No growth at 24 hours    Culture - Blood (collected 22 Jan 2024 09:40)  Source: .Blood Blood-Venous  Preliminary Report (23 Jan 2024 13:03):    No growth at 24 hours      RADIOLOGY:  [ ] Reviewed and interpreted by me         Interval Events: No acute events overnight. Remains mechanically vented, on levo and propofol gtt.    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 Per NH, pt was very agitated in the morning there. Pt coughs from time to time. At the NH, pt's O2 went down to 89% on RA, was placed on 4 L NC and transferred to ED. On exam, pt endorsed SOB but stated that he wants to go back home. A&Ox2 (name, in hospital).      In ED, pt received ceftriaxone x1, azithromycin x1. CXR showing questionable L infiltrate. Pt refusing NRB in ED. (05 Jan 2024 17:55)      REVIEW OF SYSTEMS:  [ ] All other systems negative  [x] Unable to assess ROS because patient is intubated and sedated    OBJECTIVE:  ICU Vital Signs Last 24 Hrs  T(C): 37.3 (24 Jan 2024 04:00), Max: 37.5 (23 Jan 2024 20:00)  T(F): 99.2 (24 Jan 2024 04:00), Max: 99.5 (23 Jan 2024 20:00)  HR: 74 (24 Jan 2024 06:30) (65 - 110)  BP: --  BP(mean): --  ABP: 113/58 (24 Jan 2024 06:30) (92/46 - 118/58)  ABP(mean): 82 (24 Jan 2024 06:30) (65 - 89)  RR: 21 (24 Jan 2024 06:30) (14 - 28)  SpO2: 100% (24 Jan 2024 06:30) (93% - 100%)    O2 Parameters below as of 24 Jan 2024 04:28  Patient On (Oxygen Delivery Method): ventilator          Mode: AC/ CMV (Assist Control/ Continuous Mandatory Ventilation), RR (machine): 14, TV (machine): 400, FiO2: 40, PEEP: 5, ITime: 0.88, MAP: 9, PIP: 21    01-22 @ 07:01  -  01-23 @ 07:00  --------------------------------------------------------  IN: 2947.3 mL / OUT: 440 mL / NET: 2507.3 mL    01-23 @ 07:01 - 01-24 @ 06:58  --------------------------------------------------------  IN: 3061.8 mL / OUT: 1365 mL / NET: 1696.8 mL      CAPILLARY BLOOD GLUCOSE      POCT Blood Glucose.: 149 mg/dL (22 Jan 2024 12:14)      Physical Exam:   Constitutional: ill appearing, no acute distress   HEENT: + PERRLA, EOMI, no drainage or redness  Neck: supple,  No JVD  Respiratory: Ventilator assisted breath Sounds equal & diminished bilaterally to auscultation, no accessory muscle use noted  Cardiovascular: Regular rate, regular rhythm, normal S1, S2; no murmurs or rub  Gastrointestinal: Soft, non-tender, non distended, no hepatosplenomegaly, normal bowel sounds  Extremities: + 2 peripheral edema, no cyanosis, no clubbing   Vascular: Equal and normal pulses: 2+ peripheral pulses throughout  Neurological: sedated/intubated, followed simple commands  Psychiatric: calm, normal mood, normal affect  Musculoskeletal: No joint swelling or deformity; moving extremities spontaneously  Skin: warm, dry, well perfused, no rashes          HOSPITAL MEDICATIONS:  Standing Meds:  acetylcysteine 20%  Inhalation 4 milliLiter(s) Inhalation every 6 hours  albuterol    0.083% 2.5 milliGRAM(s) Nebulizer every 6 hours  artificial  tears Solution 1 Drop(s) Both EYES every 6 hours  chlorhexidine 0.12% Liquid 15 milliLiter(s) Oral Mucosa every 12 hours  chlorhexidine 2% Cloths 1 Application(s) Topical daily  enoxaparin Injectable 40 milliGRAM(s) SubCutaneous every 24 hours  midodrine 30 milliGRAM(s) Oral every 8 hours  mupirocin 2% Ointment 1 Application(s) Topical two times a day  norepinephrine Infusion 0.3 MICROgram(s)/kG/Min IV Continuous <Continuous>  pantoprazole  Injectable 40 milliGRAM(s) IV Push daily  piperacillin/tazobactam IVPB.. 3.375 Gram(s) IV Intermittent every 8 hours  polyethylene glycol 3350 17 Gram(s) Oral two times a day  propofol Infusion 15 MICROgram(s)/kG/Min IV Continuous <Continuous>  senna 1 Tablet(s) Oral every 12 hours  sodium chloride 3%  Inhalation 4 milliLiter(s) Inhalation every 6 hours  vancomycin  IVPB 1250 milliGRAM(s) IV Intermittent every 12 hours      PRN Meds:  acetaminophen     Tablet .. 650 milliGRAM(s) Oral every 6 hours PRN  acetaminophen     Tablet .. 650 milliGRAM(s) Oral every 6 hours PRN      LABS:                        8.6    13.21 )-----------( 108      ( 24 Jan 2024 01:00 )             26.2     Hgb Trend: 8.6<--, 10.2<--, 11.4<--, 12.3<--, 10.9<--  01-24    141  |  101  |  18  ----------------------------<  116<H>  4.1   |  33<H>  |  0.67    Ca    8.0<L>      24 Jan 2024 01:00  Phos  2.4     01-24  Mg     1.90     01-24    TPro  6.7  /  Alb  2.6<L>  /  TBili  0.3  /  DBili  x   /  AST  11  /  ALT  <5  /  AlkPhos  36<L>  01-24    Creatinine Trend: 0.67<--, 0.85<--, 0.95<--, 0.80<--, 0.75<--, 0.70<--  PT/INR - ( 23 Jan 2024 00:46 )   PT: 18.9 sec;   INR: 1.71 ratio         PTT - ( 23 Jan 2024 00:46 )  PTT:36.4 sec  Urinalysis Basic - ( 24 Jan 2024 01:00 )    Color: x / Appearance: x / SG: x / pH: x  Gluc: 116 mg/dL / Ketone: x  / Bili: x / Urobili: x   Blood: x / Protein: x / Nitrite: x   Leuk Esterase: x / RBC: x / WBC x   Sq Epi: x / Non Sq Epi: x / Bacteria: x      Arterial Blood Gas:  01-24 @ 01:00  7.43/57/119/38/98.8/11.9  ABG lactate: --  Arterial Blood Gas:  01-23 @ 00:46  7.37/60/102/35/98.8/7.8  ABG lactate: --  Arterial Blood Gas:  01-22 @ 14:00  7.37/58/167/34/99.4/6.7  ABG lactate: --  Arterial Blood Gas:  01-22 @ 07:28  7.48/47/53/35/88.7/10.2  ABG lactate: --        MICROBIOLOGY:     Culture - Sputum (collected 22 Jan 2024 16:00)  Source: .Sputum Sputum  Gram Stain (23 Jan 2024 07:45):    Numerous polymorphonuclear leukocytes per low power field    Few Squamous epithelial cells per low power field    Rare Gram Negative Rods seen per oil power field  Preliminary Report (23 Jan 2024 18:25):    Normal Respiratory Clarita present    Culture - Blood (collected 22 Jan 2024 14:00)  Source: .Blood Blood-Peripheral  Preliminary Report (23 Jan 2024 19:02):    No growth at 24 hours    Culture - Blood (collected 22 Jan 2024 13:20)  Source: .Blood Blood  Preliminary Report (23 Jan 2024 19:02):    No growth at 24 hours    Culture - Blood (collected 22 Jan 2024 09:45)  Source: .Blood Blood-Peripheral  Preliminary Report (23 Jan 2024 13:03):    No growth at 24 hours    Culture - Blood (collected 22 Jan 2024 09:40)  Source: .Blood Blood-Venous  Preliminary Report (23 Jan 2024 13:03):    No growth at 24 hours      RADIOLOGY:  [ ] Reviewed and interpreted by me

## 2024-01-24 NOTE — PROGRESS NOTE ADULT - ASSESSMENT
67 YO M with PMHx of dementia, agitation, CVA, dysphagia with PEG, and BPH who presented [facility] for worsening agitation and progressive hypoxia. Patient found with left lung infiltrate and admitted to medicine for AHRF second to CAP with course complicated by mucous plug requiring ETT (1/6-1/12) and ultimately transferred to RCU (1/16). Now on am of 1/22 RRT x 2 for hypoxic respiratory failure and septic shock requiring vasopressor and intubation     NEUROLOGY  # Hx of TBI with neurocognitive disorder and agitation   - sedation with low dose propofol may consider switching to precedex   - Agitation and aggression noted in house with multiple readmissions for agitation.   -holding home seroquel 225mg BID, depakote 250/125/250, klonopin 1 BID and zyprexa PO vs IM PRN   - Case discussed at length with  and last admission TFT, B12, folic acid, B1, and CTH WNL and this admission infection tx.   - in RCU was on Klonopin 1mg BID. Seroquel 100mg TID and Depakote currently on hold       RESPIRATORY  # Acute hypoxemic respiratory failure 2/2 to aspiration vs recurrent mucous plug   - Presented from NH for witnessed aspiration and started on ABX however course complicated by MRSA pneumonia and left lung mucous plug requiring intubation from 1/6-1/12   -tolerating current vent settings   - Continue airway clearance with IPV, Albuterol, Mucomyst, HTS and Vest   -may consider bronchoscopy if continues to have increased secretions     CARDIOVASCULAR  # Septic vs vasoplegic shock  - ECHO (1/8) with EF 62, normal LVSF and moderate to severe AR.   - IV pressors weaned off in ICU and was on Midodrine 30 and Droxi 300 currently on midodrine only   -c/w norepinephrine to maintain MAP>65       HEENT   # Poor oral hygiene   - Biotene BID and RN mouth care     GI  # Dysphagia with PEG   #constipation   - Continue tube feeds as tolerated  -c/w bowel regimen with miralax and senna  -last BM 1/223    RENAL  # Hx of BPH   - passed initially TOV garcia reinserted 1/22   - was on flomax but unclear if receiving if unable to crush via PEG will consider low dose cardura if pressor requirements remain unchanged   -making good UO     INFECTIOUS DISEASE  # MRSA PNA   - RVP/ COVID (1/5) negative   - sputum cx (1/6) with MRSA s/p course of vancomycin (1/6-1/16)  - MRSA PCR (1/6) POSITIVE   -restarted on empiric vancomycin (1/22-   ) and zosyn (1/22-  ) will f/u cultures sent 1/22   - fever, leukocytosis likely pna     HEME  # Normocytic anemia   -Hgb stable   - DVT PPX with lovenox     ENDOCRINE  - No hx of A1C   - Monitor BG     ETHICS/ GOC    - DNR  -spoke with brother Frederick 1/23, MARISOL filled, DNR at this time and will continue having GOC daily  67 YO M with PMHx of dementia, agitation, CVA, dysphagia with PEG, and BPH who presented [facility] [place] for worsening agitation and progressive hypoxia. Patient found with left lung infiltrate and admitted to medicine for AHRF second to CAP with course complicated by mucous plug requiring ETT (1/6-1/12) and ultimately transferred to RCU (1/16). Now on am of 1/22 RRT x 2 for hypoxic respiratory failure and septic shock requiring vasopressor and intubation     NEUROLOGY  # Hx of TBI with neurocognitive disorder and agitation   - sedation with low dose propofol, will swtich to precedex   - Agitation and aggression noted in house with multiple readmissions for agitation.   - holding home seroquel 225mg BID, depakote 250/125/250, klonopin 1 BID and zyprexa PO vs IM PRN   - Case discussed at length with  and last admission TFT, B12, folic acid, B1, and CTH WNL and this admission infection tx.   - in RCU was on Klonopin 1mg BID. Seroquel 100mg TID and Depakote, however can restart Klonopin 1mg BID for now      RESPIRATORY  # Acute hypoxemic respiratory failure 2/2 to aspiration vs recurrent mucous plug   - Presented from NH for witnessed aspiration and started on ABX however course complicated by MRSA pneumonia and left lung mucous plug requiring intubation from 1/6-1/12   - tolerating current vent settings AC : 14/400/5/40, SBT  - Continue airway clearance with IPV, Albuterol, Mucomyst, HTS and Vest   - may consider bronchoscopy if continues to have increased secretions   - CXR shows improvement    CARDIOVASCULAR  # Septic vs vasoplegic shock  - ECHO (1/8) with EF 62, normal LVSF and moderate to severe AR.   - IV pressors weaned off in ICU and was on Midodrine 30 and Droxi 300 currently on midodrine only   - c/w norepinephrine to maintain MAP>65       HEENT   # Poor oral hygiene   - Biotene BID and RN mouth care     GI  # Dysphagia with PEG   #constipation   - Continue tube feeds as tolerated  - c/w bowel regimen with miralax and senna  - last BM 1/223    RENAL  # Hx of BPH   - passed initially TOV garcia reinserted 1/22   - was on flomax but unclear if receiving if unable to crush via PEG will consider low dose cardura if pressor requirements remain unchanged   - making good UO     INFECTIOUS DISEASE  # MRSA PNA   - RVP/ COVID (1/5) negative   - sputum cx (1/6) with MRSA s/p course of vancomycin (1/6-1/16)  - MRSA PCR (1/6) POSITIVE   -restarted on empiric vancomycin (1/22-   ) and zosyn (1/22-  ) will f/u cultures sent 1/22   - fever, leukocytosis likely pna     HEME  # Normocytic anemia   -Hgb stable   - DVT PPX with lovenox     ENDOCRINE  - No hx of A1C   - Monitor BG     ETHICS/ GOC    - DNR  -spoke with brother Frederick 1/23, MOL filled, DNR at this time and will continue having GOC daily  69 YO M with PMHx of dementia, agitation, CVA, dysphagia with PEG, and BPH who presented [facility] for worsening agitation and progressive hypoxia. Patient found with left lung infiltrate and admitted to medicine for AHRF second to CAP with course complicated by mucous plug requiring ETT (1/6-1/12) and ultimately transferred to RCU (1/16). Now on am of 1/22 RRT x 2 for hypoxic respiratory failure and septic shock requiring vasopressor and intubation     NEUROLOGY  # Hx of TBI with neurocognitive disorder and agitation   - sedation with low dose propofol, will swtich to precedex   - Agitation and aggression noted in house with multiple readmissions for agitation.   - holding home seroquel 225mg BID, depakote 250/125/250, klonopin 1 BID and zyprexa PO vs IM PRN   - Case discussed at length with  and last admission TFT, B12, folic acid, B1, and CTH WNL and this admission infection tx.   - in RCU was on Klonopin 1mg BID. Seroquel 100mg TID and Depakote, however can restart Klonopin 1mg BID for now      RESPIRATORY  # Acute hypoxemic respiratory failure 2/2 to aspiration vs recurrent mucous plug   - Presented from NH for witnessed aspiration and started on ABX however course complicated by MRSA pneumonia and left lung mucous plug requiring intubation from 1/6-1/12   - tolerating current vent settings AC : 14/400/5/40, if tolerates SBT will plan for extubation in AM  - less secretions noted this AM, continue airway clearance with IPV, Albuterol, Mucomyst, HTS and Vest  - CXR shows improved lung aeration  - consider bronchoscopy if secretions increase      CARDIOVASCULAR  # Septic vs vasoplegic shock  - c/w norepinephrine to maintain MAP>65   - during previous stay in ICU , required IV pressors and was weaned off with Midodrine 30 and Droxidopa, currently on midodrine only  - ECHO (1/8) with EF 62, normal LVSF and moderate to severe AR.           HEENT   # Poor oral hygiene   - Biotene BID and RN mouth care     GI  # Dysphagia with PEG   #constipation   - Continue tube feeds as tolerated, can hold in AM for possible extubation  - c/w bowel regimen with miralax and senna  - last BM 1/23    RENAL  # Hx of BPH   - passed initially TOV garcia reinserted 1/22   - was on flomax but unclear if receiving if unable to crush via PEG will consider low dose cardura if pressor requirements remain unchanged   - making good UO, 40-100ml/hr, net positive 1.7L for 24 hrs     INFECTIOUS DISEASE  # MRSA PNA   - RVP/ COVID (1/5) negative   - sputum cx (1/6) with MRSA s/p course of vancomycin (1/6-1/16)  - MRSA/MSSA PCR (1/6) and (1/24)  POSITIVE   - restarted on empiric vancomycin (1/22-   ) and zosyn (1/22-  )    - sputum w/ GNR -> NRF,  f/u blood cultures   - fever, leukocytosis likely pna     HEME  # Normocytic anemia   -Hgb stable   - DVT PPX with lovenox     ENDOCRINE  - No hx of A1C   - Monitor BG     ETHICS/ GOC    - DNR  -spoke with brother Frederick 1/23, MOLST filled, DNR at this time and will continue having GOC daily  67 YO M with PMHx of dementia, agitation, CVA, dysphagia with PEG, and BPH who presented [facility] [place] for worsening agitation and progressive hypoxia. Patient found with left lung infiltrate and admitted to medicine for AHRF second to CAP with course complicated by mucous plug requiring ETT (1/6-1/12) and ultimately transferred to RCU (1/16). Now on am of 1/22 RRT x 2 for hypoxic respiratory failure and septic shock requiring vasopressor and intubation    NEUROLOGY  # Hx of TBI with neurocognitive disorder and agitation   - sedation with low dose propofol, will swtich to precedex   - Agitation and aggression noted in house with multiple readmissions for agitation.   - holding home seroquel 225mg BID, depakote 250/125/250, klonopin 1 BID and zyprexa PO vs IM PRN   - Case discussed at length with  and last admission TFT, B12, folic acid, B1, and CTH WNL and this admission infection tx.   - in RCU was on Klonopin 1mg BID. Seroquel 100mg TID and Depakote, however can restart Klonopin 1mg BID for now      RESPIRATORY  # Acute hypoxemic respiratory failure 2/2 to aspiration vs recurrent mucous plug   - Presented from NH for witnessed aspiration and started on ABX however course complicated by MRSA pneumonia and left lung mucous plug requiring intubation from 1/6-1/12   - tolerating current vent settings AC : 14/400/5/40, if tolerates SBT will plan for extubation in AM  - less secretions noted this AM, continue airway clearance with IPV, Albuterol, Mucomyst, HTS and Vest  - CXR shows improved lung aeration  - consider bronchoscopy if secretions increase      CARDIOVASCULAR  # Septic vs vasoplegic shock  - c/w norepinephrine to maintain MAP>65   - during previous stay in ICU , required IV pressors and was weaned off with Midodrine 30 and Droxidopa, currently on midodrine only  - ECHO (1/8) with EF 62, normal LVSF and moderate to severe AR.           HEENT   # Poor oral hygiene   - Biotene BID and RN mouth care     GI  # Dysphagia with PEG   #constipation   - Continue tube feeds as tolerated, can hold in AM for possible extubation  - c/w bowel regimen with miralax and senna  - last BM 1/23    RENAL  # Hx of BPH   - passed initially TOV garcia reinserted 1/22   - was on flomax but unclear if receiving if unable to crush via PEG will consider low dose cardura if pressor requirements remain unchanged   - making good UO, 40-100ml/hr, net positive 1.7L for 24 hrs     INFECTIOUS DISEASE  # MRSA PNA   - RVP/ COVID (1/5) negative   - sputum cx (1/6) with MRSA s/p course of vancomycin (1/6-1/16)  - MRSA/MSSA PCR (1/6) and (1/24)  POSITIVE   - restarted on empiric vancomycin (1/22-   ) and zosyn (1/22-  )    - sputum w/ GNR -> NRF,  f/u blood cultures   - fever, leukocytosis likely pna     HEME  # Normocytic anemia   -Hgb stable   - DVT PPX with lovenox     ENDOCRINE  - No hx of A1C   - Monitor BG     ETHICS/ GOC    - DNR  -spoke with brother Frederick 1/23, MOLST filled, DNR at this time and will continue having GOC daily  67 YO M with PMHx of dementia, agitation, CVA, dysphagia with PEG, and BPH who presented [facility] [place] for worsening agitation and progressive hypoxia. Patient found with left lung infiltrate and admitted to medicine for AHRF second to CAP with course complicated by mucous plug requiring ETT (1/6-1/12) and ultimately transferred to RCU (1/16). Now on am of 1/22 RRT x 2 for hypoxic respiratory failure and septic shock requiring vasopressor and intubation    NEUROLOGY  # Hx of TBI with neurocognitive disorder and agitation   - sedation with low dose propofol, will swtich to precedex   - Agitation and aggression noted in house with multiple readmissions for agitation.   - holding home seroquel 225mg BID, depakote 250/125/250, klonopin 1 BID and zyprexa PO vs IM PRN   - Case discussed at length with  and last admission TFT, B12, folic acid, B1, and CTH WNL and this admission infection tx.   - in RCU was on Klonopin 1mg BID. Seroquel 100mg TID and Depakote, however can restart Klonopin 1mg BID for now      RESPIRATORY  # Acute hypoxemic respiratory failure 2/2 to aspiration vs recurrent mucous plug   - Presented from NH for witnessed aspiration and started on ABX however course complicated by MRSA pneumonia and left lung mucous plug requiring intubation from 1/6-1/12   - tolerating current vent settings AC : 14/400/5/40, if tolerates SBT will plan for extubation in AM  - less secretions noted this AM, continue airway clearance with IPV, Albuterol, Mucomyst, HTS and Vest  - CXR shows improved lung aeration  - consider bronchoscopy if secretions increase      CARDIOVASCULAR  # Septic vs vasoplegic shock  - c/w norepinephrine to maintain MAP>65   - during previous stay in ICU , required IV pressors and was weaned off with Midodrine 30 and Droxidopa, currently on midodrine only  - ECHO (1/8) with EF 62, normal LVSF and moderate to severe AR.           HEENT   # Poor oral hygiene   - Biotene BID and RN mouth care     GI  # Dysphagia with PEG   #constipation   - Continue tube feeds as tolerated, can hold in AM for possible extubation  - c/w bowel regimen with miralax and senna  - last BM 1/23    RENAL  # Hx of BPH   - passed initially TOV garcia reinserted 1/22   - was on flomax but unclear if receiving if unable to crush via PEG will consider low dose cardura if pressor requirements remain unchanged   - making good UO, 40-100ml/hr, net positive 1.7L for 24 hrs     INFECTIOUS DISEASE  # MRSA PNA   - RVP/ COVID (1/5) negative   - sputum cx (1/6) with MRSA s/p course of vancomycin (1/6-1/16)  - MRSA/MSSA PCR (1/6) and (1/24)  POSITIVE   - restarted on empiric vancomycin (1/22-   ) and zosyn (1/22-  )    - sputum w/ GNR -> NRF,  f/u blood cultures   - fever, leukocytosis likely pna     HEME  # Normocytic anemia   -Hgb stable   - DVT PPX with lovenox     ENDOCRINE  - No hx of A1C   - Monitor BG     ETHICS/ GOC    - DNR  -spoke with brother Frederick 1/23, MOLST filled, DNR at this time and will continue having GOC daily   -had discussion with brothmonserrat Mack regarding code status at length. Explained the difference between Do Not Resuscitate/ Do Not Intubate versus CPR and Intubation. patients current condition and plan for extubation in the AM. He stated he has had multiple discussions with different providers in the past two weeks  regarding GOC, and he would like to do what is best for his brother. He feels that his brother would not want to be dependent on ventilator, and if he fails extubation, he would not want his brother reintubated. Instead he would like us to      67 YO M with PMHx of dementia, agitation, CVA, dysphagia with PEG, and BPH who presented [facility] [place] for worsening agitation and progressive hypoxia. Patient found with left lung infiltrate and admitted to medicine for AHRF second to CAP with course complicated by mucous plug requiring ETT (1/6-1/12) and ultimately transferred to RCU (1/16). Now on am of 1/22 RRT x 2 for hypoxic respiratory failure and septic shock requiring vasopressor and intubation    NEUROLOGY  # Hx of TBI with neurocognitive disorder and agitation   - sedation with low dose propofol, will swtich to precedex   - Agitation and aggression noted in house with multiple readmissions for agitation.   - holding home seroquel 225mg BID, depakote 250/125/250, klonopin 1 BID and zyprexa PO vs IM PRN   - Case discussed at length with  and last admission TFT, B12, folic acid, B1, and CTH WNL and this admission infection tx.   - in RCU was on Klonopin 1mg BID. Seroquel 100mg TID and Depakote, however can restart Klonopin 1mg BID for now      RESPIRATORY  # Acute hypoxemic respiratory failure 2/2 to aspiration vs recurrent mucous plug   - Presented from NH for witnessed aspiration and started on ABX however course complicated by MRSA pneumonia and left lung mucous plug requiring intubation from 1/6-1/12   - tolerating current vent settings AC : 14/400/5/40, if tolerates SBT will plan for extubation in AM  - less secretions noted this AM, continue airway clearance with IPV, Albuterol, Mucomyst, HTS and Vest  - CXR shows improved lung aeration  - consider bronchoscopy if secretions increase      CARDIOVASCULAR  # Septic vs vasoplegic shock  - c/w norepinephrine to maintain MAP>65   - during previous stay in ICU , required IV pressors and was weaned off with Midodrine 30 and Droxidopa, currently on midodrine only  - ECHO (1/8) with EF 62, normal LVSF and moderate to severe AR.           HEENT   # Poor oral hygiene   - Biotene BID and RN mouth care     GI  # Dysphagia with PEG   #constipation   - Continue tube feeds as tolerated, can hold in AM for possible extubation  - c/w bowel regimen with miralax and senna  - last BM 1/23    RENAL  # Hx of BPH   - passed initially TOV garcia reinserted 1/22   - was on flomax but unclear if receiving if unable to crush via PEG will consider low dose cardura if pressor requirements remain unchanged   - making good UO, 40-100ml/hr, net positive 1.7L for 24 hrs     INFECTIOUS DISEASE  # MRSA PNA   - RVP/ COVID (1/5) negative   - sputum cx (1/6) with MRSA s/p course of vancomycin (1/6-1/16)  - MRSA/MSSA PCR (1/6) and (1/24)  POSITIVE   - restarted on empiric vancomycin (1/22-   ) and zosyn (1/22-  )    - sputum w/ GNR -> NRF,  f/u blood cultures   - fever, leukocytosis likely pna     HEME  # Normocytic anemia   -Hgb stable   - DVT PPX with lovenox     ENDOCRINE  - No hx of A1C   - Monitor BG     ETHICS/ GOC    - DNR  -spoke with brother Frederick 1/23, MOLST filled, DNR at this time and will continue having GOC daily   -1/24/24 - had discussion with brother Frederick Paramjit ([phone]) regarding patient current condition and explained the difference between Do Not Resuscitate/ Do Not Intubate versus CPR and Intubation. Explained plan for possible extubation tentatively in the AM. He stated he has had multiple discussions with different providers in the past two weeks regarding GOC, and he would like to do what is best for his brother. He feels that his brother would not want to be dependent on ventilator, and if he fails extubation, he would not want us to reintubate him, instead he would like us to try non-invasive ventilatory support if applicable.      69 YO M with PMHx of dementia, agitation, CVA, dysphagia with PEG, and BPH who presented [facility] [place] for worsening agitation and progressive hypoxia. Patient found with left lung infiltrate and admitted to medicine for AHRF second to CAP with course complicated by mucous plug requiring ETT (1/6-1/12) and ultimately transferred to RCU (1/16). Now on am of 1/22 RRT x 2 for hypoxic respiratory failure and septic shock requiring vasopressor and intubation    NEUROLOGY  # Hx of TBI with neurocognitive disorder and agitation   - sedation with low dose propofol, will swtich to precedex   - Agitation and aggression noted in house with multiple readmissions for agitation.   - holding home seroquel 225mg BID, depakote 250/125/250, klonopin 1 BID and zyprexa PO vs IM PRN   - Case discussed at length with  and last admission TFT, B12, folic acid, B1, and CTH WNL and this admission infection tx.   - in RCU was on Klonopin 1mg BID. Seroquel 100mg TID and Depakote, however can restart Klonopin 1mg BID for now      RESPIRATORY  # Acute hypoxemic respiratory failure 2/2 to aspiration vs recurrent mucous plug   - Presented from NH for witnessed aspiration and started on ABX however course complicated by MRSA pneumonia and left lung mucous plug requiring intubation from 1/6-1/12   - tolerating current vent settings AC : 14/400/5/40, if tolerates SBT will plan for extubation in AM  - less secretions noted this AM, continue airway clearance with IPV, Albuterol, Mucomyst, HTS and Vest  - CXR 1/24 imaging w/ slightly improved aeration   - consider bronchoscopy if secretions increase      CARDIOVASCULAR  # Septic vs vasoplegic shock  - requiring pressors likely 2/2 sepsis vs vasoplegic shock vs sedation, c/w norepinephrine to maintain MAP>65   - during previous stay in ICU , required IV pressors and was weaned off with Midodrine 30 and Droxidopa, currently on midodrine 30 q 8hrs only  - ECHO (1/8) with EF 62, normal LVSF and moderate to severe AR.         HEENT   # Poor oral hygiene   - Biotene BID and RN mouth care     GI  # Dysphagia with PEG   #constipation   - Continue tube feeds as tolerated, can hold in AM for possible extubation  - c/w bowel regimen with miralax and senna  - last BM 1/23    RENAL  # Hx of BPH   - passed initially TOV garcia reinserted 1/22   - was on flomax but unclear if receiving if unable to crush via PEG will consider low dose cardura if pressor requirements remain unchanged   - making good UO, 40-100ml/hr, net positive 1.7L for 24 hrs     INFECTIOUS DISEASE  # MRSA PNA   - fever, leukocytosis likely iso pna, WBC now down trending   - RVP/ COVID (1/5) and (1/22) negative   - sputum cx (1/6) with MRSA s/p course of vancomycin (1/6-1/16)  - MRSA/MSSA PCR (1/6) and (1/24)  POSITIVE   - restarted on empiric vancomycin (1/22-   ) and zosyn (1/22-  )    - next vanco trough 1/25 5am  - sputum 1/22 grew GNR -> NRF,  UA neg, f/u blood cultures       HEME  # Normocytic anemia   -Hgb stable   - DVT PPX with lovenox     ENDOCRINE  - No hx of A1C   - Monitor BG     ETHICS/ GOC    - DNR  -spoke with brothmonserrat Mack 1/23, MARISOL filled, DNR at this time and will continue having GOC daily   -1/24/24 - had discussion with brother Frederick Paramjit ([phone]) regarding patient current condition and explained the difference between Do Not Resuscitate/ Do Not Intubate versus CPR and Intubation. Explained plan for possible extubation tentatively in the AM. He stated he has had multiple discussions with different providers in the past two weeks regarding GOC, and he would like to do what is best for his brother. He feels that his brother would not want to be dependent on ventilator, and if he fails extubation, he would not want us to reintubate him, instead he would like us to try non-invasive ventilatory support if applicable.      69 YO M with PMHx of dementia, agitation, CVA, dysphagia with PEG, and BPH who presented [facility] [place] for worsening agitation and progressive hypoxia. Patient found with left lung infiltrate and admitted to medicine for AHRF second to CAP with course complicated by mucous plug requiring ETT (1/6-1/12) and ultimately transferred to RCU (1/16). Now on am of 1/22 RRT x 2 for hypoxic respiratory failure and septic shock requiring vasopressor and intubation    NEUROLOGY  # Hx of TBI with neurocognitive disorder and agitation   - sedation with low dose propofol, will swtich to precedex   - Agitation and aggression noted in house with multiple readmissions for agitation.  - BH following - w/o past psych hx, recently started agitation and aggressive behavior likely executive/memory dysfunction. Confusion vs poor attention span raises concern for delirium vs r/o new case of early dementia/ consequences of TBI.  - holding home seroquel 225mg BID, depakote 250/125/250, klonopin 1 BID and zyprexa PO vs IM PRN   - Case discussed at length with  and last admission TFT, B12, folic acid, B1, and CTH WNL and this admission infection tx.   - in RCU was on Klonopin 1mg BID. Seroquel 100mg TID and Depakote, however can restart Klonopin 1mg BID for now      RESPIRATORY  # Acute hypoxemic respiratory failure 2/2 to aspiration vs recurrent mucous plug   - Presented from NH for witnessed aspiration and started on ABX however course complicated by MRSA pneumonia and left lung mucous plug requiring intubation from 1/6-1/12   - tolerating current vent settings AC : 14/400/5/40, if tolerates SBT will plan for extubation in AM  - less secretions noted this AM, continue airway clearance with IPV, Albuterol, Mucomyst, HTS and Vest  - CXR 1/24 imaging w/ slightly improved aeration   - consider bronchoscopy if secretions increase      CARDIOVASCULAR  # Septic vs vasoplegic shock  - requiring pressors likely 2/2 sepsis vs vasoplegic shock vs sedation, c/w norepinephrine to maintain MAP>65   - during previous stay in ICU , required IV pressors and was weaned off with Midodrine 30 and Droxidopa, currently on midodrine 30 q 8hrs only  - ECHO (1/8) with EF 62, normal LVSF and moderate to severe AR.         HEENT   # Poor oral hygiene   - Biotene BID and RN mouth care     GI  # Dysphagia with PEG   #constipation   - Continue tube feeds as tolerated, can hold in AM for possible extubation  - c/w bowel regimen with miralax and senna  - last BM 1/23    RENAL  # Hx of BPH   - passed initially TOV garcia reinserted 1/22   - was on flomax but unclear if receiving if unable to crush via PEG will consider low dose cardura if pressor requirements remain unchanged   - making good UO, 40-100ml/hr, net positive 1.7L for 24 hrs     INFECTIOUS DISEASE  # MRSA PNA   - fever, leukocytosis likely iso pna, WBC now down trending   - RVP/ COVID (1/5) and (1/22) negative   - sputum cx (1/6) with MRSA s/p course of vancomycin (1/6-1/16)  - MRSA/MSSA PCR (1/6) and (1/24)  POSITIVE   - restarted on empiric vancomycin (1/22-   ) and zosyn (1/22-  )    - next vanco trough 1/25 5am  - sputum 1/22 grew GNR -> NRF,  UA neg, f/u blood cultures       HEME  # Normocytic anemia   -Hgb stable   - DVT PPX with lovenox     ENDOCRINE  - No hx of A1C   - Monitor BG     ETHICS/ GOC    - DNR  -spoke with brother Frederick 1/23, MARISOL filled, DNR at this time and will continue having GOC daily   -1/24/24 - had discussion with brother Frederick Yusuf ([phone]) regarding patient current condition and explained the difference between Do Not Resuscitate/ Do Not Intubate versus CPR and Intubation. Explained plan for possible extubation tentatively in the AM. He stated he has had multiple discussions with different providers in the past two weeks regarding GOC, and he would like to do what is best for his brother. He feels that his brother would not want to be dependent on ventilator, and if he fails extubation, he would not want us to reintubate him, instead he would like us to try non-invasive ventilatory support if applicable.

## 2024-01-25 NOTE — PROGRESS NOTE ADULT - ASSESSMENT
69 YO M with PMHx of dementia, agitation, CVA, dysphagia with PEG, and BPH who presented [facility] for worsening agitation and progressive hypoxia. Patient found with left lung infiltrate and admitted to medicine for AHRF second to CAP with course complicated by mucous plug requiring ETT (1/6-1/12) and ultimately transferred to RCU (1/16). Now on am of 1/22 RRT x 2 for hypoxic respiratory failure and septic shock requiring vasopressor and intubation    NEUROLOGY  # Hx of TBI with neurocognitive disorder and agitation   - sedation with low dose propofol, will swtich to precedex   - Agitation and aggression noted in house with multiple readmissions for agitation.  - BH following - w/o past psych hx, recently started agitation and aggressive behavior likely executive/memory dysfunction. Confusion vs poor attention span raises concern for delirium vs r/o new case of early dementia/ consequences of TBI.  - holding home seroquel 225mg BID, depakote 250/125/250, klonopin 1 BID and zyprexa PO vs IM PRN   - Case discussed at length with  and last admission TFT, B12, folic acid, B1, and CTH WNL and this admission infection tx.   - in RCU was on Klonopin 1mg BID. Seroquel 100mg TID and Depakote, however can restart Klonopin 1mg BID for now      RESPIRATORY  # Acute hypoxemic respiratory failure 2/2 to aspiration vs recurrent mucous plug   - Presented from NH for witnessed aspiration and started on ABX however course complicated by MRSA pneumonia and left lung mucous plug requiring intubation from 1/6-1/12   - tolerating current vent settings AC : 14/400/5/40, if tolerates SBT will plan for extubation in AM  - less secretions noted this AM, continue airway clearance with IPV, Albuterol, Mucomyst, HTS and Vest  - CXR 1/24 imaging w/ slightly improved aeration   - consider bronchoscopy if secretions increase      CARDIOVASCULAR  # Septic vs vasoplegic shock  - requiring pressors likely 2/2 sepsis vs vasoplegic shock vs sedation, c/w norepinephrine to maintain MAP>65   - during previous stay in ICU , required IV pressors and was weaned off with Midodrine 30 and Droxidopa, currently on midodrine 30 q 8hrs only  - ECHO (1/8) with EF 62, normal LVSF and moderate to severe AR.         HEENT   # Poor oral hygiene   - Biotene BID and RN mouth care     GI  # Dysphagia with PEG   #constipation   - Continue tube feeds as tolerated, can hold in AM for possible extubation  - c/w bowel regimen with miralax and senna  - last BM 1/23    RENAL  # Hx of BPH   - passed initially TOV garcia reinserted 1/22   - was on flomax but unclear if receiving if unable to crush via PEG will consider low dose cardura if pressor requirements remain unchanged   - making good UO, 40-100ml/hr, net positive 1.7L for 24 hrs     INFECTIOUS DISEASE  # MRSA PNA   - fever, leukocytosis likely iso pna, WBC now down trending   - RVP/ COVID (1/5) and (1/22) negative   - sputum cx (1/6) with MRSA s/p course of vancomycin (1/6-1/16)  - MRSA/MSSA PCR (1/6) and (1/24)  POSITIVE   - restarted on empiric vancomycin (1/22-   ) and zosyn (1/22-  )    - next vanco trough 1/25 5am  - sputum 1/22 grew GNR -> NRF,  UA neg, f/u blood cultures       HEME  # Normocytic anemia   -Hgb stable   - DVT PPX with lovenox     ENDOCRINE  - No hx of A1C   - Monitor BG     ETHICS/ GOC    - DNR  -spoke with brother Frederick 1/23, MARISOL filled, DNR at this time and will continue having GOC daily   -1/24/24 - had discussion with brother Frederick Yusuf ([phone]) regarding patient current condition and explained the difference between Do Not Resuscitate/ Do Not Intubate versus CPR and Intubation. Explained plan for possible extubation tentatively in the AM. He stated he has had multiple discussions with different providers in the past two weeks regarding GOC, and he would like to do what is best for his brother. He feels that his brother would not want to be dependent on ventilator, and if he fails extubation, he would not want us to reintubate him, instead he would like us to try non-invasive ventilatory support if applicable.      67 YO M with PMHx of dementia, agitation, CVA, dysphagia with PEG, and BPH who presented [facility] for worsening agitation and progressive hypoxia. Patient found with left lung infiltrate and admitted to medicine for AHRF second to CAP with course complicated by mucous plug requiring ETT (1/6-1/12) and ultimately transferred to RCU (1/16). Now on am of 1/22 RRT x 2 for hypoxic respiratory failure and septic shock requiring vasopressor and intubation    NEUROLOGY  # Hx of TBI with neurocognitive disorder and agitation   -off propfol on precedex- titrate as tolerated   - Agitation and aggression noted in house with multiple readmissions for agitation.  -  following - w/o past psych hx, recently started agitation and aggressive behavior likely executive/memory dysfunction. Confusion vs poor attention span raises concern for delirium vs r/o new case of early dementia/ consequences of TBI.  - holding home seroquel 225mg BID, depakote 250/125/250  - Case discussed at length with  and last admission TFT, B12, folic acid, B1, and CTH WNL and this admission infection tx.   -restarted on Klonopin 1mg BID    RESPIRATORY  # Acute hypoxemic respiratory failure 2/2 to aspiration vs recurrent mucous plug   - Presented from NH for witnessed aspiration and started on ABX however course complicated by MRSA pneumonia and left lung mucous plug requiring intubation from 1/6-1/12   - reintubated 1/22 in setting of mucous plugging and white out of L lung  - tolerating current vent settings AC : 14/400/5/40 tolerating SBT 10/5 40%  - brother Frederick does not want to trach patient- plan to optimize for extubation and patient made DNR/DNI  - less secretions noted this AM, continue airway clearance with IPV, Albuterol, Mucomyst, HTS and Vest  - CXR 1/24 imaging w/ slightly improved aeration   - consider bronchoscopy if secretions increase      CARDIOVASCULAR  # Septic vs vasoplegic shock  - requiring pressors likely 2/2 sepsis vs vasoplegic shock vs sedation, c/w norepinephrine to maintain MAP>65   - during previous stay in ICU , required IV pressors and was weaned off with Midodrine 30 and Droxidopa, currently on midodrine 30 q 8hrs only  - ECHO (1/8) with EF 62, normal LVSF and moderate to severe AR.         HEENT   # Poor oral hygiene   - Biotene BID and RN mouth care     GI  # Dysphagia with PEG   #constipation   - Continue tube feeds as tolerated, can hold in AM for possible extubation  - c/w bowel regimen with miralax and senna  - last BM 1/23    RENAL  # Hx of BPH   - passed initially TOV garcia reinserted 1/22   - was on flomax but unclear if receiving if unable to crush via PEG will consider low dose cardura if pressor requirements remain unchanged   - making good UO, 40-100ml/hr, net positive 1.7L for 24 hrs     INFECTIOUS DISEASE  # MRSA PNA   - fever, leukocytosis likely iso pna, WBC now down trending   - RVP/ COVID (1/5) and (1/22) negative   - sputum cx (1/6) with MRSA s/p course of vancomycin (1/6-1/16)  - MRSA/MSSA PCR (1/6) and (1/24)  POSITIVE   - restarted on empiric vancomycin (1/22-   ) and zosyn (1/22-  )    - next vanco trough 1/25 5am  - sputum 1/22 grew GNR -> NRF,  UA neg, f/u blood cultures       HEME  # Normocytic anemia   -Hgb stable   - DVT PPX with lovenox     ENDOCRINE  - No hx of A1C   - Monitor BG     ETHICS/ GOC    - DNR  -spoke with brother Frederick 1/23, MARISOL filled, DNR at this time and will continue having GOC daily   -1/24/24 - had discussion with brother Frederick Yusuf ([phone]) regarding patient current condition and explained the difference between Do Not Resuscitate/ Do Not Intubate versus CPR and Intubation. Explained plan for possible extubation tentatively in the AM. He stated he has had multiple discussions with different providers in the past two weeks regarding GOC, and he would like to do what is best for his brother. He feels that his brother would not want to be dependent on ventilator, and if he fails extubation, he would not want us to reintubate him, instead he would like us to try non-invasive ventilatory support if applicable.      69 YO M with PMHx of dementia, agitation, CVA, dysphagia with PEG, and BPH who presented [facility] for worsening agitation and progressive hypoxia. Patient found with left lung infiltrate and admitted to medicine for AHRF second to CAP with course complicated by mucous plug requiring ETT (1/6-1/12) and ultimately transferred to RCU (1/16). Now on am of 1/22 RRT x 2 for hypoxic respiratory failure and septic shock requiring vasopressor and intubation    NEUROLOGY  # Hx of TBI with neurocognitive disorder and agitation   -off propfol on precedex- titrate as tolerated   - Agitation and aggression noted in house with multiple readmissions for agitation.  -  following - w/o past psych hx, recently started agitation and aggressive behavior likely executive/memory dysfunction. Confusion vs poor attention span raises concern for delirium vs r/o new case of early dementia/ consequences of TBI.  - holding home seroquel 225mg BID, depakote 250/125/250  - Case discussed at length with  and last admission TFT, B12, folic acid, B1, and CTH WNL and this admission infection tx.   -restarted on Klonopin 1mg BID    RESPIRATORY  # Acute hypoxemic respiratory failure 2/2 to aspiration vs recurrent mucous plug   - Presented from NH for witnessed aspiration and started on ABX however course complicated by MRSA pneumonia and left lung mucous plug requiring intubation from 1/6-1/12   - reintubated 1/22 in setting of mucous plugging and white out of L lung  - tolerating current vent settings AC : 14/400/5/40 tolerating SBT 10/5 40%  - brother Frederick does not want to trach patient- plan to optimize for extubation and patient made DNR/DNI  - less secretions noted this AM, continue airway clearance with IPV, Albuterol, Mucomyst, HTS and Vest  - CXR 1/24 imaging w/ slightly improved aeration   - consider bronchoscopy if secretions increase      CARDIOVASCULAR  # Septic vs vasoplegic shock  - requiring pressors likely 2/2 sepsis vs vasoplegic shock vs sedation, c/w norepinephrine at 0.04  - during previous stay in ICU , required IV pressors and was weaned off with Midodrine 30 and Droxidopa, currently on midodrine 30 q 8hrs only  - ECHO (1/8) with EF 62, normal LVSF and moderate to severe AR.         HEENT   # Poor oral hygiene   - Biotene BID and RN mouth care     GI  # Dysphagia with PEG   #constipation   - Continue tube feeds as tolerated, can hold in AM for possible extubation  - c/w bowel regimen with miralax and senna  - last BM 1/23    RENAL  # Hx of BPH   - passed initially TOV garcia reinserted 1/22   - was on flomax but unclear if receiving if unable to crush via PEG will consider low dose cardura if pressor requirements remain unchanged   -Strict Is and Os: +611 overnight/ +12L for LOS making 40-100ml/hr    INFECTIOUS DISEASE  # MRSA PNA   - fever, leukocytosis likely iso pna, WBC now down trending   - RVP/ COVID (1/5) and (1/22) negative   - sputum cx (1/6) with MRSA s/p course of vancomycin (1/6-1/16) (1/22- 1/15)  - MRSA/MSSA PCR (1/6) and (1/24)  POSITIVE   - continue zosyn (1/22-  )    - sputum 1/22 NRF,  UA neg Bcx 1/22 neg       HEME  # Normocytic anemia   -Hgb stable   - DVT PPX with lovenox     ENDOCRINE  - No hx of A1C   - Monitor BG     ETHICS/ GOC    - DNR  -spoke with brother Frederick 1/23, MARISOL filled, DNR at this time and will continue having GOC daily   -1/24/24 - had discussion with brother Frederick Yusuf ([phone]) regarding patient current condition and explained the difference between Do Not Resuscitate/ Do Not Intubate versus CPR and Intubation. Explained plan for possible extubation tentatively in the AM. He stated he has had multiple discussions with different providers in the past two weeks regarding GOC, and he would like to do what is best for his brother. He feels that his brother would not want to be dependent on ventilator, and if he fails extubation, he would not want us to reintubate him, instead he would like us to try non-invasive ventilatory support if applicable.
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
## 2024-01-25 NOTE — CHART NOTE - NSCHARTNOTEFT_GEN_A_CORE
Contacted by RN for Levophed running through AccuCath running in RUE with signs of extravasation.  Adhered to pressor infiltration protocol- discontinued use of IV, phentolamine reconstituted with 10cc normal saline and injected through IV site and in clockwise fashion around indurated area. Peripheral IV then removed. Nitropaste applied in 2 inch ribbon and dressing applied.   Extremity closely examined at infiltration site with good blanchability. + Pulse and cap refill present distally.     New PIV access obtained under US guidance.

## 2024-01-25 NOTE — PROGRESS NOTE ADULT - SUBJECTIVE AND OBJECTIVE BOX
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 Per NH, pt was very agitated in the morning there. Pt coughs from time to time. At the NH, pt's O2 went down to 89% on RA, was placed on 4 L NC and transferred to ED. On exam, pt endorsed SOB but stated that he wants to go back home. A&Ox2 (name, in hospital).      In ED, pt received ceftriaxone x1, azithromycin x1. CXR showing questionable L infiltrate. Pt refusing NRB in ED. (05 Jan 2024 17:55)      REVIEW OF SYSTEMS:  [ ] All other systems negative  [x] Unable to assess ROS because patient is intubated    OBJECTIVE:  ICU Vital Signs Last 24 Hrs  T(C): 37.1 (25 Jan 2024 04:00), Max: 37.9 (24 Jan 2024 15:00)  T(F): 98.7 (25 Jan 2024 04:00), Max: 100.2 (24 Jan 2024 15:00)  HR: 78 (25 Jan 2024 06:00) (50 - 100)  BP: --  BP(mean): --  ABP: 105/56 (25 Jan 2024 06:00) (76/42 - 143/75)  ABP(mean): 76 (25 Jan 2024 06:00) (55 - 102)  RR: 24 (25 Jan 2024 06:00) (12 - 28)  SpO2: 98% (25 Jan 2024 06:00) (91% - 100%)    O2 Parameters below as of 25 Jan 2024 06:00  Patient On (Oxygen Delivery Method): BiPAP/CPAP    O2 Concentration (%): 40      Mode: AC/ CMV (Assist Control/ Continuous Mandatory Ventilation), RR (machine): 14, TV (machine): 400, FiO2: 40, PEEP: 5, ITime: 0.76, MAP: 9, PIP: 22    01-23 @ 07:01  -  01-24 @ 07:00  --------------------------------------------------------  IN: 3089.6 mL / OUT: 1365 mL / NET: 1724.6 mL    01-24 @ 07:01 - 01-25 @ 06:23  --------------------------------------------------------  IN: 1550.8 mL / OUT: 1389 mL / NET: 161.8 mL      CAPILLARY BLOOD GLUCOSE          Physical Exam:   Constitutional: ill appearing, no acute distress   HEENT: + PERRLA, EOMI, no drainage or redness  Neck: supple,  No JVD  Respiratory: Ventilator assisted breath Sounds equal & clear bilaterally to auscultation, no accessory muscle use noted  Cardiovascular: Regular rate, regular rhythm, normal S1, S2; no murmurs or rub  Gastrointestinal: Soft, non-tender, non distended, no hepatosplenomegaly, normal bowel sounds  Extremities: + 2 peripheral edema, no cyanosis, no clubbing   Vascular: Equal and normal pulses: 2+ peripheral pulses throughout  Neurological: sedated/intubated  Psychiatric: calm, normal mood, normal affect  Musculoskeletal: No joint swelling or deformity; no limitation of movement  Skin: warm, dry, well perfused, no rashes    HOSPITAL MEDICATIONS:  Standing Meds:  acetylcysteine 20%  Inhalation 4 milliLiter(s) Inhalation every 6 hours  albuterol    0.083% 2.5 milliGRAM(s) Nebulizer every 6 hours  artificial  tears Solution 1 Drop(s) Both EYES every 6 hours  chlorhexidine 0.12% Liquid 15 milliLiter(s) Oral Mucosa every 12 hours  chlorhexidine 2% Cloths 1 Application(s) Topical daily  clonazePAM  Tablet 1 milliGRAM(s) Oral every 12 hours  dexMEDEtomidine Infusion 0.3 MICROgram(s)/kG/Hr IV Continuous <Continuous>  enoxaparin Injectable 40 milliGRAM(s) SubCutaneous every 24 hours  midodrine 30 milliGRAM(s) Oral every 8 hours  mupirocin 2% Ointment 1 Application(s) Topical two times a day  norepinephrine Infusion 0.3 MICROgram(s)/kG/Min IV Continuous <Continuous>  pantoprazole  Injectable 40 milliGRAM(s) IV Push daily  piperacillin/tazobactam IVPB.. 3.375 Gram(s) IV Intermittent every 8 hours  polyethylene glycol 3350 17 Gram(s) Oral two times a day  propofol Infusion 25 MICROgram(s)/kG/Min IV Continuous <Continuous>  senna 1 Tablet(s) Oral every 12 hours  sodium chloride 3%  Inhalation 4 milliLiter(s) Inhalation every 6 hours  vancomycin  IVPB 1500 milliGRAM(s) IV Intermittent every 12 hours      PRN Meds:  acetaminophen     Tablet .. 650 milliGRAM(s) Oral every 6 hours PRN  acetaminophen     Tablet .. 650 milliGRAM(s) Oral every 6 hours PRN      LABS:                        9.0    7.28  )-----------( 100      ( 25 Jan 2024 04:43 )             28.2     Hgb Trend: 9.0<--, 8.6<--, 10.2<--, 11.4<--, 12.3<--  01-25    137  |  100  |  16  ----------------------------<  109<H>  4.2   |  32<H>  |  0.65    Ca    8.2<L>      25 Jan 2024 04:43  Phos  2.9     01-25  Mg     1.60     01-25    TPro  7.1  /  Alb  2.8<L>  /  TBili  0.3  /  DBili  x   /  AST  10  /  ALT  <5  /  AlkPhos  33<L>  01-25    Creatinine Trend: 0.65<--, 0.67<--, 0.85<--, 0.95<--, 0.80<--, 0.75<--    Urinalysis Basic - ( 25 Jan 2024 04:43 )    Color: x / Appearance: x / SG: x / pH: x  Gluc: 109 mg/dL / Ketone: x  / Bili: x / Urobili: x   Blood: x / Protein: x / Nitrite: x   Leuk Esterase: x / RBC: x / WBC x   Sq Epi: x / Non Sq Epi: x / Bacteria: x      Arterial Blood Gas:  01-25 @ 04:43  7.41/54/134/34/99.1/8.3  ABG lactate: --  Arterial Blood Gas:  01-24 @ 01:00  7.43/57/119/38/98.8/11.9  ABG lactate: --        MICROBIOLOGY:     Culture - Sputum (collected 22 Jan 2024 16:00)  Source: .Sputum Sputum  Gram Stain (23 Jan 2024 07:45):    Numerous polymorphonuclear leukocytes per low power field    Few Squamous epithelial cells per low power field    Rare Gram Negative Rods seen per oil power field  Final Report (24 Jan 2024 15:32):    Normal Respiratory Clarita present    Culture - Blood (collected 22 Jan 2024 14:00)  Source: .Blood Blood-Peripheral  Preliminary Report (24 Jan 2024 19:01):    No growth at 48 Hours    Culture - Blood (collected 22 Jan 2024 13:20)  Source: .Blood Blood  Preliminary Report (24 Jan 2024 19:01):    No growth at 48 Hours    Culture - Blood (collected 22 Jan 2024 09:45)  Source: .Blood Blood-Peripheral  Preliminary Report (24 Jan 2024 13:02):    No growth at 48 Hours    Culture - Blood (collected 22 Jan 2024 09:40)  Source: .Blood Blood-Venous  Preliminary Report (24 Jan 2024 13:02):    No growth at 48 Hours      RADIOLOGY:  [ ] Reviewed and interpreted by me       Interval Events: No acute overnight events, propofol held at 4am.      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 for worsening agitation today. Per NH, pt was very agitated in the morning there. Pt coughs from time to time. At the NH, pt's O2 went down to 89% on RA, was placed on 4 L NC and transferred to ED. On exam, pt endorsed SOB but stated that he wants to go back home. A&Ox2 (name, in hospital).      In ED, pt received ceftriaxone x1, azithromycin x1. CXR showing questionable L infiltrate. Pt refusing NRB in ED. (05 Jan 2024 17:55)      REVIEW OF SYSTEMS:  [ ] All other systems negative  [x] Unable to assess ROS because patient is intubated    OBJECTIVE:  ICU Vital Signs Last 24 Hrs  T(C): 37.1 (25 Jan 2024 04:00), Max: 37.9 (24 Jan 2024 15:00)  T(F): 98.7 (25 Jan 2024 04:00), Max: 100.2 (24 Jan 2024 15:00)  HR: 78 (25 Jan 2024 06:00) (50 - 100)  BP: --  BP(mean): --  ABP: 105/56 (25 Jan 2024 06:00) (76/42 - 143/75)  ABP(mean): 76 (25 Jan 2024 06:00) (55 - 102)  RR: 24 (25 Jan 2024 06:00) (12 - 28)  SpO2: 98% (25 Jan 2024 06:00) (91% - 100%)    O2 Parameters below as of 25 Jan 2024 06:00  Patient On (Oxygen Delivery Method): BiPAP/CPAP    O2 Concentration (%): 40      Mode: AC/ CMV (Assist Control/ Continuous Mandatory Ventilation), RR (machine): 14, TV (machine): 400, FiO2: 40, PEEP: 5, ITime: 0.76, MAP: 9, PIP: 22    01-23 @ 07:01  -  01-24 @ 07:00  --------------------------------------------------------  IN: 3089.6 mL / OUT: 1365 mL / NET: 1724.6 mL    01-24 @ 07:01 - 01-25 @ 06:23  --------------------------------------------------------  IN: 1550.8 mL / OUT: 1389 mL / NET: 161.8 mL      CAPILLARY BLOOD GLUCOSE          Physical Exam:   Constitutional: ill appearing, no acute distress   HEENT: + PERRLA, EOMI, no drainage or redness  Neck: supple,  No JVD  Respiratory: Ventilator assisted breath Sounds equal & clear bilaterally to auscultation, no accessory muscle use noted  Cardiovascular: Regular rate, regular rhythm, normal S1, S2; no murmurs or rub  Gastrointestinal: Soft, non-tender, non distended, no hepatosplenomegaly, normal bowel sounds  Extremities: + 2 peripheral edema, no cyanosis, no clubbing   Vascular: Equal and normal pulses: 2+ peripheral pulses throughout  Neurological: sedated/intubated  Psychiatric: calm, normal mood, normal affect  Musculoskeletal: No joint swelling or deformity; no limitation of movement  Skin: warm, dry, well perfused, no rashes    HOSPITAL MEDICATIONS:  Standing Meds:  acetylcysteine 20%  Inhalation 4 milliLiter(s) Inhalation every 6 hours  albuterol    0.083% 2.5 milliGRAM(s) Nebulizer every 6 hours  artificial  tears Solution 1 Drop(s) Both EYES every 6 hours  chlorhexidine 0.12% Liquid 15 milliLiter(s) Oral Mucosa every 12 hours  chlorhexidine 2% Cloths 1 Application(s) Topical daily  clonazePAM  Tablet 1 milliGRAM(s) Oral every 12 hours  dexMEDEtomidine Infusion 0.3 MICROgram(s)/kG/Hr IV Continuous <Continuous>  enoxaparin Injectable 40 milliGRAM(s) SubCutaneous every 24 hours  midodrine 30 milliGRAM(s) Oral every 8 hours  mupirocin 2% Ointment 1 Application(s) Topical two times a day  norepinephrine Infusion 0.3 MICROgram(s)/kG/Min IV Continuous <Continuous>  pantoprazole  Injectable 40 milliGRAM(s) IV Push daily  piperacillin/tazobactam IVPB.. 3.375 Gram(s) IV Intermittent every 8 hours  polyethylene glycol 3350 17 Gram(s) Oral two times a day  propofol Infusion 25 MICROgram(s)/kG/Min IV Continuous <Continuous>  senna 1 Tablet(s) Oral every 12 hours  sodium chloride 3%  Inhalation 4 milliLiter(s) Inhalation every 6 hours  vancomycin  IVPB 1500 milliGRAM(s) IV Intermittent every 12 hours      PRN Meds:  acetaminophen     Tablet .. 650 milliGRAM(s) Oral every 6 hours PRN  acetaminophen     Tablet .. 650 milliGRAM(s) Oral every 6 hours PRN      LABS:                        9.0    7.28  )-----------( 100      ( 25 Jan 2024 04:43 )             28.2     Hgb Trend: 9.0<--, 8.6<--, 10.2<--, 11.4<--, 12.3<--  01-25    137  |  100  |  16  ----------------------------<  109<H>  4.2   |  32<H>  |  0.65    Ca    8.2<L>      25 Jan 2024 04:43  Phos  2.9     01-25  Mg     1.60     01-25    TPro  7.1  /  Alb  2.8<L>  /  TBili  0.3  /  DBili  x   /  AST  10  /  ALT  <5  /  AlkPhos  33<L>  01-25    Creatinine Trend: 0.65<--, 0.67<--, 0.85<--, 0.95<--, 0.80<--, 0.75<--    Urinalysis Basic - ( 25 Jan 2024 04:43 )    Color: x / Appearance: x / SG: x / pH: x  Gluc: 109 mg/dL / Ketone: x  / Bili: x / Urobili: x   Blood: x / Protein: x / Nitrite: x   Leuk Esterase: x / RBC: x / WBC x   Sq Epi: x / Non Sq Epi: x / Bacteria: x      Arterial Blood Gas:  01-25 @ 04:43  7.41/54/134/34/99.1/8.3  ABG lactate: --  Arterial Blood Gas:  01-24 @ 01:00  7.43/57/119/38/98.8/11.9  ABG lactate: --        MICROBIOLOGY:     Culture - Sputum (collected 22 Jan 2024 16:00)  Source: .Sputum Sputum  Gram Stain (23 Jan 2024 07:45):    Numerous polymorphonuclear leukocytes per low power field    Few Squamous epithelial cells per low power field    Rare Gram Negative Rods seen per oil power field  Final Report (24 Jan 2024 15:32):    Normal Respiratory Clarita present    Culture - Blood (collected 22 Jan 2024 14:00)  Source: .Blood Blood-Peripheral  Preliminary Report (24 Jan 2024 19:01):    No growth at 48 Hours    Culture - Blood (collected 22 Jan 2024 13:20)  Source: .Blood Blood  Preliminary Report (24 Jan 2024 19:01):    No growth at 48 Hours    Culture - Blood (collected 22 Jan 2024 09:45)  Source: .Blood Blood-Peripheral  Preliminary Report (24 Jan 2024 13:02):    No growth at 48 Hours    Culture - Blood (collected 22 Jan 2024 09:40)  Source: .Blood Blood-Venous  Preliminary Report (24 Jan 2024 13:02):    No growth at 48 Hours      RADIOLOGY:  [ ] Reviewed and interpreted by me

## 2024-01-26 NOTE — PROGRESS NOTE ADULT - SUBJECTIVE AND OBJECTIVE BOX
Interval Events: had episode of O2 desaturation to 82%, improved w/ suctioning, fio2 on vent was increased to 40%. repeat CXR stable. held TF at 5am and was placed on PST 10/5/40%. SHERRON armstrong also infiltrated, now s/p extravasation protocol and new IV placed. remains on low dose levophed.    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 Per NH, pt was very agitated in the morning there. Pt coughs from time to time. At the NH, pt's O2 went down to 89% on RA, was placed on 4 L NC and transferred to ED. On exam, pt endorsed SOB but stated that he wants to go back home. A&Ox2 (name, in hospital).      In ED, pt received ceftriaxone x1, azithromycin x1. CXR showing questionable L infiltrate. Pt refusing NRB in ED. (05 Jan 2024 17:55)      REVIEW OF SYSTEMS:  [ ] All other systems negative  [x] Unable to assess ROS because patient is intubated    OBJECTIVE:  ICU Vital Signs Last 24 Hrs  T(C): 37.1 (25 Jan 2024 04:00), Max: 37.9 (24 Jan 2024 15:00)  T(F): 98.7 (25 Jan 2024 04:00), Max: 100.2 (24 Jan 2024 15:00)  HR: 78 (25 Jan 2024 06:00) (50 - 100)  BP: --  BP(mean): --  ABP: 105/56 (25 Jan 2024 06:00) (76/42 - 143/75)  ABP(mean): 76 (25 Jan 2024 06:00) (55 - 102)  RR: 24 (25 Jan 2024 06:00) (12 - 28)  SpO2: 98% (25 Jan 2024 06:00) (91% - 100%)    O2 Parameters below as of 25 Jan 2024 06:00  Patient On (Oxygen Delivery Method): BiPAP/CPAP    O2 Concentration (%): 40      Mode: AC/ CMV (Assist Control/ Continuous Mandatory Ventilation), RR (machine): 14, TV (machine): 400, FiO2: 40, PEEP: 5, ITime: 0.76, MAP: 9, PIP: 22    01-23 @ 07:01 - 01-24 @ 07:00  --------------------------------------------------------  IN: 3089.6 mL / OUT: 1365 mL / NET: 1724.6 mL    01-24 @ 07:01 - 01-25 @ 06:23  --------------------------------------------------------  IN: 1550.8 mL / OUT: 1389 mL / NET: 161.8 mL      CAPILLARY BLOOD GLUCOSE          Physical Exam:   Constitutional: ill appearing, no acute distress   HEENT: + PERRLA, EOMI, no drainage or redness  Neck: supple,  No JVD  Respiratory: Ventilator assisted breath Sounds equal & clear bilaterally to auscultation, no accessory muscle use noted  Cardiovascular: Regular rate, regular rhythm, normal S1, S2; no murmurs or rub  Gastrointestinal: Soft, non-tender, non distended, no hepatosplenomegaly, normal bowel sounds  Extremities: + 2 peripheral edema, no cyanosis, no clubbing   Vascular: Equal and normal pulses: 2+ peripheral pulses throughout  Neurological: sedated/intubated  Psychiatric: calm, normal mood, normal affect  Musculoskeletal: No joint swelling or deformity; no limitation of movement  Skin: warm, dry, well perfused, no rashes    HOSPITAL MEDICATIONS:  Standing Meds:  acetylcysteine 20%  Inhalation 4 milliLiter(s) Inhalation every 6 hours  albuterol    0.083% 2.5 milliGRAM(s) Nebulizer every 6 hours  artificial  tears Solution 1 Drop(s) Both EYES every 6 hours  chlorhexidine 0.12% Liquid 15 milliLiter(s) Oral Mucosa every 12 hours  chlorhexidine 2% Cloths 1 Application(s) Topical daily  clonazePAM  Tablet 1 milliGRAM(s) Oral every 12 hours  dexMEDEtomidine Infusion 0.3 MICROgram(s)/kG/Hr IV Continuous <Continuous>  enoxaparin Injectable 40 milliGRAM(s) SubCutaneous every 24 hours  midodrine 30 milliGRAM(s) Oral every 8 hours  mupirocin 2% Ointment 1 Application(s) Topical two times a day  norepinephrine Infusion 0.3 MICROgram(s)/kG/Min IV Continuous <Continuous>  pantoprazole  Injectable 40 milliGRAM(s) IV Push daily  piperacillin/tazobactam IVPB.. 3.375 Gram(s) IV Intermittent every 8 hours  polyethylene glycol 3350 17 Gram(s) Oral two times a day  propofol Infusion 25 MICROgram(s)/kG/Min IV Continuous <Continuous>  senna 1 Tablet(s) Oral every 12 hours  sodium chloride 3%  Inhalation 4 milliLiter(s) Inhalation every 6 hours  vancomycin  IVPB 1500 milliGRAM(s) IV Intermittent every 12 hours      PRN Meds:  acetaminophen     Tablet .. 650 milliGRAM(s) Oral every 6 hours PRN  acetaminophen     Tablet .. 650 milliGRAM(s) Oral every 6 hours PRN      LABS:                        9.0    7.28  )-----------( 100      ( 25 Jan 2024 04:43 )             28.2     Hgb Trend: 9.0<--, 8.6<--, 10.2<--, 11.4<--, 12.3<--  01-25    137  |  100  |  16  ----------------------------<  109<H>  4.2   |  32<H>  |  0.65    Ca    8.2<L>      25 Jan 2024 04:43  Phos  2.9     01-25  Mg     1.60     01-25    TPro  7.1  /  Alb  2.8<L>  /  TBili  0.3  /  DBili  x   /  AST  10  /  ALT  <5  /  AlkPhos  33<L>  01-25    Creatinine Trend: 0.65<--, 0.67<--, 0.85<--, 0.95<--, 0.80<--, 0.75<--    Urinalysis Basic - ( 25 Jan 2024 04:43 )    Color: x / Appearance: x / SG: x / pH: x  Gluc: 109 mg/dL / Ketone: x  / Bili: x / Urobili: x   Blood: x / Protein: x / Nitrite: x   Leuk Esterase: x / RBC: x / WBC x   Sq Epi: x / Non Sq Epi: x / Bacteria: x      Arterial Blood Gas:  01-25 @ 04:43  7.41/54/134/34/99.1/8.3  ABG lactate: --  Arterial Blood Gas:  01-24 @ 01:00  7.43/57/119/38/98.8/11.9  ABG lactate: --        MICROBIOLOGY:     Culture - Sputum (collected 22 Jan 2024 16:00)  Source: .Sputum Sputum  Gram Stain (23 Jan 2024 07:45):    Numerous polymorphonuclear leukocytes per low power field    Few Squamous epithelial cells per low power field    Rare Gram Negative Rods seen per oil power field  Final Report (24 Jan 2024 15:32):    Normal Respiratory Clarita present    Culture - Blood (collected 22 Jan 2024 14:00)  Source: .Blood Blood-Peripheral  Preliminary Report (24 Jan 2024 19:01):    No growth at 48 Hours    Culture - Blood (collected 22 Jan 2024 13:20)  Source: .Blood Blood  Preliminary Report (24 Jan 2024 19:01):    No growth at 48 Hours    Culture - Blood (collected 22 Jan 2024 09:45)  Source: .Blood Blood-Peripheral  Preliminary Report (24 Jan 2024 13:02):    No growth at 48 Hours    Culture - Blood (collected 22 Jan 2024 09:40)  Source: .Blood Blood-Venous  Preliminary Report (24 Jan 2024 13:02):    No growth at 48 Hours      RADIOLOGY:  [ ] Reviewed and interpreted by me

## 2024-01-26 NOTE — PROGRESS NOTE ADULT - ASSESSMENT
67 YO M with PMHx of dementia, agitation, CVA, dysphagia with PEG, and BPH who presented [facility] for worsening agitation and progressive hypoxia. Patient found with left lung infiltrate and admitted to medicine for AHRF second to CAP with course complicated by mucous plug requiring ETT (1/6-1/12) and ultimately transferred to RCU (1/16). Now on am of 1/22 RRT x 2 for hypoxic respiratory failure and septic shock requiring vasopressor and intubation    NEUROLOGY  # Hx of TBI with neurocognitive disorder and agitation   -off propfol on precedex- titrate as tolerated   - Agitation and aggression noted in house with multiple readmissions for agitation.  -  following - w/o past psych hx, recently started agitation and aggressive behavior likely executive/memory dysfunction. Confusion vs poor attention span raises concern for delirium vs r/o new case of early dementia/ consequences of TBI.  - holding home seroquel 225mg BID, depakote 250/125/250  - Case discussed at length with  and last admission TFT, B12, folic acid, B1, and CTH WNL and this admission infection tx.   -restarted on Klonopin 1mg BID    RESPIRATORY  # Acute hypoxemic respiratory failure 2/2 to aspiration vs recurrent mucous plug   - Presented from NH for witnessed aspiration and started on ABX however course complicated by MRSA pneumonia and left lung mucous plug requiring intubation from 1/6-1/12   - reintubated 1/22 in setting of mucous plugging and white out of L lung  - tolerating current vent settings AC : 14/400/5/40 tolerating SBT 10/5 40%  - brother Frederick does not want to trach patient- plan to optimize for extubation and patient made DNR/DNI  - less secretions noted this AM, continue airway clearance with IPV, Albuterol, Mucomyst, HTS and Vest  - CXR 1/24 imaging w/ slightly improved aeration   - consider bronchoscopy if secretions increase      CARDIOVASCULAR  # Septic vs vasoplegic shock  - requiring pressors likely 2/2 sepsis vs vasoplegic shock vs sedation, c/w norepinephrine at 0.04  - during previous stay in ICU , required IV pressors and was weaned off with Midodrine 30 and Droxidopa, currently on midodrine 30 q 8hrs only  - ECHO (1/8) with EF 62, normal LVSF and moderate to severe AR.         HEENT   # Poor oral hygiene   - Biotene BID and RN mouth care     GI  # Dysphagia with PEG   #constipation   - Continue tube feeds as tolerated, can hold in AM for possible extubation  - c/w bowel regimen with miralax and senna  - last BM 1/23    RENAL  # Hx of BPH   - passed initially TOV garcia reinserted 1/22   - was on flomax but unclear if receiving if unable to crush via PEG will consider low dose cardura if pressor requirements remain unchanged   -Strict Is and Os: +611 overnight/ +12L for LOS making 40-100ml/hr    INFECTIOUS DISEASE  # MRSA PNA   - fever, leukocytosis likely iso pna, WBC now down trending   - RVP/ COVID (1/5) and (1/22) negative   - sputum cx (1/6) with MRSA s/p course of vancomycin (1/6-1/16) (1/22- 1/15)  - MRSA/MSSA PCR (1/6) and (1/24)  POSITIVE   - continue zosyn (1/22-  )    - sputum 1/22 NRF,  UA neg Bcx 1/22 neg       HEME  # Normocytic anemia   -Hgb stable   - DVT PPX with lovenox     ENDOCRINE  - No hx of A1C   - Monitor BG     ETHICS/ GOC    - DNR  -spoke with brother Frederick 1/23, MARISOL filled, DNR at this time and will continue having GOC daily   -1/24/24 - had discussion with brother Frederick Yusuf ([phone]) regarding patient current condition and explained the difference between Do Not Resuscitate/ Do Not Intubate versus CPR and Intubation. Explained plan for possible extubation tentatively in the AM. He stated he has had multiple discussions with different providers in the past two weeks regarding GOC, and he would like to do what is best for his brother. He feels that his brother would not want to be dependent on ventilator, and if he fails extubation, he would not want us to reintubate him, instead he would like us to try non-invasive ventilatory support if applicable.      67 YO M with PMHx of dementia, agitation, CVA, dysphagia with PEG, and BPH who presented [facility] for worsening agitation and progressive hypoxia. Patient found with left lung infiltrate and admitted to medicine for AHRF second to CAP with course complicated by mucous plug requiring ETT (1/6-1/12) and ultimately transferred to RCU (1/16). Now on am of 1/22 RRT x 2 for hypoxic respiratory failure and septic shock requiring vasopressor and intubation    NEUROLOGY  # Hx of TBI with neurocognitive disorder and agitation   -off propfol   - Agitation and aggression noted in house with multiple readmissions for agitation.  -  following - w/o past psych hx, recently started agitation and aggressive behavior likely executive/memory dysfunction. Confusion vs poor attention span raises concern for delirium vs r/o new case of early dementia/ consequences of TBI.  - holding home seroquel 225mg BID, depakote 250/125/250  - Case discussed at length with  and last admission TFT, B12, folic acid, B1, and CTH WNL and this admission infection tx.   - restarted on Klonopin 1mg BID, Dc'd 1/26 w/ extubation, continue low dose precedex for now    RESPIRATORY  # Acute hypoxemic respiratory failure 2/2 to aspiration vs recurrent mucous plug   - Presented from NH for witnessed aspiration and started on ABX however course complicated by MRSA pneumonia and left lung mucous plug requiring intubation from 1/6-1/12. reintubated 1/22 in setting of mucous plugging and white out of L lung  - brother Frederick does not want to trach patient- plan to optimize for extubation and patient made DNR/DNI. extubated to HFNC on 1/26.  - spo2 stable on HFNC 40LPM/40%fio2  - continue airway clearance with Albuterol, Mucomyst, HTS and Vest  - repeat CXR 1/26 with some improvement      CARDIOVASCULAR  # Septic vs vasoplegic shock  - off levophed this am  - during previous stay in ICU , required IV pressors and was weaned off with Midodrine 30 and Droxidopa, currently on midodrine 30 q 8hrs only  - ECHO (1/8) with EF 62, normal LVSF and moderate to severe AR.     HEENT   # Poor oral hygiene   - Biotene BID and RN mouth care     GI  # Dysphagia with PEG   #constipation   - Continue tube feeds as tolerated, restart TFs post extubation  - c/w bowel regimen with miralax and senna    RENAL  # Hx of BPH   - passed initially TOV garcia reinserted 1/22   - was on flomax but unclear if receiving if unable to crush via PEG will consider low dose cardura if pressor requirements remain unchanged   - Strict Is and Os  - consider TOV    INFECTIOUS DISEASE  # MRSA PNA   - afebrile, leukocytosis resolved  - RVP/ COVID (1/5) and (1/22) negative   - sputum cx (1/6) with MRSA s/p course of vancomycin (1/6-1/16) (1/22- 1/15)  - MRSA/MSSA PCR + (1/6) and (1/24)    - continue zosyn (1/22-  )    - sputum 1/22 NRF,  UA neg Bcx 1/22 neg       HEME  # Normocytic anemia   -Hgb stable   - DVT PPX with lovenox     ENDOCRINE  - No hx of A1C   - Monitor BG     ETHICS/ GOC    - DNR/DNI  -1/24/24 - had discussion with brother Frederick Rutherfordquinnash ([phone]) regarding patient current condition and explained the difference between Do Not Resuscitate/ Do Not Intubate versus CPR and Intubation. Explained plan for possible extubation tentatively in the AM. He stated he has had multiple discussions with different providers in the past two weeks regarding GOC, and he would like to do what is best for his brother. He feels that his brother would not want to be dependent on ventilator, and if he fails extubation, he would not want us to reintubate him, instead he would like us to try non-invasive ventilatory support if applicable.     1/26- brother updated regarding extubation today, all questions answered.
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
## 2024-01-27 NOTE — PROGRESS NOTE ADULT - SUBJECTIVE AND OBJECTIVE BOX
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 Per NH, pt was very agitated in the morning there. Pt coughs from time to time. At the NH, pt's O2 went down to 89% on RA, was placed on 4 L NC and transferred to ED. On exam, pt endorsed SOB but stated that he wants to go back home. A&Ox2 (name, in hospital).      In ED, pt received ceftriaxone x1, azithromycin x1. CXR showing questionable L infiltrate. Pt refusing NRB in ED. (05 Jan 2024 17:55)      REVIEW OF SYSTEMS:    [ ] Unable to assess ROS because ________    OBJECTIVE:  ICU Vital Signs Last 24 Hrs  T(C): 36.6 (27 Jan 2024 04:00), Max: 37.1 (27 Jan 2024 00:00)  T(F): 97.8 (27 Jan 2024 04:00), Max: 98.7 (27 Jan 2024 00:00)  HR: 47 (27 Jan 2024 06:00) (44 - 97)  BP: --  BP(mean): --  ABP: 107/51 (27 Jan 2024 06:00) (93/46 - 157/74)  ABP(mean): 72 (27 Jan 2024 06:00) (64 - 107)  RR: 22 (27 Jan 2024 06:00) (14 - 36)  SpO2: 96% (27 Jan 2024 06:00) (76% - 100%)    O2 Parameters below as of 27 Jan 2024 06:00  Patient On (Oxygen Delivery Method): nasal cannula, high flow  O2 Flow (L/min): 40  O2 Concentration (%): 40      Mode: CPAP with PS, FiO2: 40, PEEP: 5, MAP: 8, PIP: 16    01-25 @ 07:01  -  01-26 @ 07:00  --------------------------------------------------------  IN: 1397 mL / OUT: 1325 mL / NET: 72 mL    01-26 @ 07:01 - 01-27 @ 06:39  --------------------------------------------------------  IN: 1424.5 mL / OUT: 1213 mL / NET: 211.5 mL      CAPILLARY BLOOD GLUCOSE          Physical Exam:   Constitutional: ill appearing, no acute distress   HEENT: + PERRLA, EOMI, no drainage or redness  Neck: supple,  No JVD  Respiratory: Ventilator assisted breath Sounds equal & clear bilaterally to auscultation, no accessory muscle use noted  Cardiovascular: Regular rate, regular rhythm, normal S1, S2; no murmurs or rub  Gastrointestinal: Soft, non-tender, non distended, no hepatosplenomegaly, normal bowel sounds  Extremities: + 2 peripheral edema, no cyanosis, no clubbing   Vascular: Equal and normal pulses: 2+ peripheral pulses throughout  Neurological: sedated/intubated  Psychiatric: calm, normal mood, normal affect  Musculoskeletal: No joint swelling or deformity; no limitation of movement  Skin: warm, dry, well perfused, no rashes    HOSPITAL MEDICATIONS:  Standing Meds:  acetylcysteine 20%  Inhalation 4 milliLiter(s) Inhalation every 6 hours  albuterol    0.083% 2.5 milliGRAM(s) Nebulizer every 6 hours  artificial  tears Solution 1 Drop(s) Both EYES every 6 hours  chlorhexidine 2% Cloths 1 Application(s) Topical daily  clonazePAM  Tablet 1 milliGRAM(s) Oral every 12 hours  dexMEDEtomidine Infusion 0.3 MICROgram(s)/kG/Hr IV Continuous <Continuous>  enoxaparin Injectable 40 milliGRAM(s) SubCutaneous every 24 hours  midodrine 30 milliGRAM(s) Oral every 8 hours  mupirocin 2% Ointment 1 Application(s) Topical two times a day  norepinephrine Infusion 0.3 MICROgram(s)/kG/Min IV Continuous <Continuous>  pantoprazole  Injectable 40 milliGRAM(s) IV Push daily  piperacillin/tazobactam IVPB.. 3.375 Gram(s) IV Intermittent every 8 hours  polyethylene glycol 3350 17 Gram(s) Oral two times a day  senna 1 Tablet(s) Oral every 12 hours  sodium chloride 3%  Inhalation 4 milliLiter(s) Inhalation every 6 hours      PRN Meds:  acetaminophen     Tablet .. 650 milliGRAM(s) Oral every 6 hours PRN  acetaminophen     Tablet .. 650 milliGRAM(s) Oral every 6 hours PRN      LABS:                        9.4    7.11  )-----------( 125      ( 27 Jan 2024 00:12 )             28.0     Hgb Trend: 9.4<--, 8.9<--, 9.0<--, 8.6<--, 10.2<--  01-27    137  |  102  |  14  ----------------------------<  120<H>  4.0   |  27  |  0.75    Ca    8.1<L>      27 Jan 2024 00:12  Phos  2.8     01-27  Mg     1.90     01-27    TPro  6.8  /  Alb  2.5<L>  /  TBili  0.3  /  DBili  x   /  AST  14  /  ALT  <5  /  AlkPhos  28<L>  01-27    Creatinine Trend: 0.75<--, 0.71<--, 0.65<--, 0.67<--, 0.85<--, 0.95<--    Urinalysis Basic - ( 27 Jan 2024 00:12 )    Color: x / Appearance: x / SG: x / pH: x  Gluc: 120 mg/dL / Ketone: x  / Bili: x / Urobili: x   Blood: x / Protein: x / Nitrite: x   Leuk Esterase: x / RBC: x / WBC x   Sq Epi: x / Non Sq Epi: x / Bacteria: x      Arterial Blood Gas:  01-27 @ 00:12  7.41/54/73/34/96.0/8.3  ABG lactate: --  Arterial Blood Gas:  01-26 @ 00:16  7.43/50/70/33/96.0/7.9  ABG lactate: --        MICROBIOLOGY:     RADIOLOGY:  [ ] Reviewed and interpreted by me       Interval Events:   -episode of o2 desat to 80% x2, improved w/ suctioning and chest PT -Remains on HFNC 40LPM/40%   -remains off norepinephrine since 2300    HPI:  67 yo M with PMhx of BPH, agitation, ?dementia, dysphagia 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 Per NH, pt was very agitated in the morning there. Pt coughs from time to time. At the NH, pt's O2 went down to 89% on RA, was placed on 4 L NC and transferred to ED. On exam, pt endorsed SOB but stated that he wants to go back home. A&Ox2 (name, in hospital).      In ED, pt received ceftriaxone x1, azithromycin x1. CXR showing questionable L infiltrate. Pt refusing NRB in ED. (05 Jan 2024 17:55)      REVIEW OF SYSTEMS:    [ x] Unable to assess ROS because patient non verbal at this time     OBJECTIVE:  ICU Vital Signs Last 24 Hrs  T(C): 36.6 (27 Jan 2024 04:00), Max: 37.1 (27 Jan 2024 00:00)  T(F): 97.8 (27 Jan 2024 04:00), Max: 98.7 (27 Jan 2024 00:00)  HR: 47 (27 Jan 2024 06:00) (44 - 97)  BP: --  BP(mean): --  ABP: 107/51 (27 Jan 2024 06:00) (93/46 - 157/74)  ABP(mean): 72 (27 Jan 2024 06:00) (64 - 107)  RR: 22 (27 Jan 2024 06:00) (14 - 36)  SpO2: 96% (27 Jan 2024 06:00) (76% - 100%)    O2 Parameters below as of 27 Jan 2024 06:00  Patient On (Oxygen Delivery Method): nasal cannula, high flow  O2 Flow (L/min): 40  O2 Concentration (%): 40      Mode: CPAP with PS, FiO2: 40, PEEP: 5, MAP: 8, PIP: 16    01-25 @ 07:01  -  01-26 @ 07:00  --------------------------------------------------------  IN: 1397 mL / OUT: 1325 mL / NET: 72 mL    01-26 @ 07:01 - 01-27 @ 06:39  --------------------------------------------------------  IN: 1424.5 mL / OUT: 1213 mL / NET: 211.5 mL      CAPILLARY BLOOD GLUCOSE          Physical Exam:   Constitutional: ill appearing, no acute distress   HEENT: + PERRLA, EOMI, no drainage or redness  Neck: supple,  No JVD  Respiratory: dimished breath Sounds equal & clear bilaterally to auscultation, no accessory muscle use noted  Cardiovascular: Regular rate, regular rhythm, normal S1, S2; no murmurs or rub  Gastrointestinal: PEG tube in place, flat, Soft, non-tender  Extremities: no peripheral edema, no cyanosis, no clubbing   Vascular: Equal and normal pulses: 2+ peripheral pulses throughout  Neurological: opens eyes but nonverbal not following any commands   Psychiatric: calm   Musculoskeletal: contracted extremities   Skin: warm, dry, well perfused, no rashes    HOSPITAL MEDICATIONS:  Standing Meds:  acetylcysteine 20%  Inhalation 4 milliLiter(s) Inhalation every 6 hours  albuterol    0.083% 2.5 milliGRAM(s) Nebulizer every 6 hours  artificial  tears Solution 1 Drop(s) Both EYES every 6 hours  chlorhexidine 2% Cloths 1 Application(s) Topical daily  clonazePAM  Tablet 1 milliGRAM(s) Oral every 12 hours  dexMEDEtomidine Infusion 0.3 MICROgram(s)/kG/Hr IV Continuous <Continuous>  enoxaparin Injectable 40 milliGRAM(s) SubCutaneous every 24 hours  midodrine 30 milliGRAM(s) Oral every 8 hours  mupirocin 2% Ointment 1 Application(s) Topical two times a day  norepinephrine Infusion 0.3 MICROgram(s)/kG/Min IV Continuous <Continuous>  pantoprazole  Injectable 40 milliGRAM(s) IV Push daily  piperacillin/tazobactam IVPB.. 3.375 Gram(s) IV Intermittent every 8 hours  polyethylene glycol 3350 17 Gram(s) Oral two times a day  senna 1 Tablet(s) Oral every 12 hours  sodium chloride 3%  Inhalation 4 milliLiter(s) Inhalation every 6 hours      PRN Meds:  acetaminophen     Tablet .. 650 milliGRAM(s) Oral every 6 hours PRN  acetaminophen     Tablet .. 650 milliGRAM(s) Oral every 6 hours PRN      LABS:                        9.4    7.11  )-----------( 125      ( 27 Jan 2024 00:12 )             28.0     Hgb Trend: 9.4<--, 8.9<--, 9.0<--, 8.6<--, 10.2<--  01-27    137  |  102  |  14  ----------------------------<  120<H>  4.0   |  27  |  0.75    Ca    8.1<L>      27 Jan 2024 00:12  Phos  2.8     01-27  Mg     1.90     01-27    TPro  6.8  /  Alb  2.5<L>  /  TBili  0.3  /  DBili  x   /  AST  14  /  ALT  <5  /  AlkPhos  28<L>  01-27    Creatinine Trend: 0.75<--, 0.71<--, 0.65<--, 0.67<--, 0.85<--, 0.95<--    Urinalysis Basic - ( 27 Jan 2024 00:12 )    Color: x / Appearance: x / SG: x / pH: x  Gluc: 120 mg/dL / Ketone: x  / Bili: x / Urobili: x   Blood: x / Protein: x / Nitrite: x   Leuk Esterase: x / RBC: x / WBC x   Sq Epi: x / Non Sq Epi: x / Bacteria: x      Arterial Blood Gas:  01-27 @ 00:12  7.41/54/73/34/96.0/8.3  ABG lactate: --  Arterial Blood Gas:  01-26 @ 00:16  7.43/50/70/33/96.0/7.9  ABG lactate: --        MICROBIOLOGY:     RADIOLOGY:  [ ] Reviewed and interpreted by me       Interval Events:   -episode of o2 desat to 80% x2, improved w/ suctioning and chest PT -Remains on HFNC 40LPM/40%   -remains off norepinephrine since 2300    HPI:  67 yo M with PMhx of BPH, agitation, ?dementia, dysphagia after CVA now s/p PEG tube, and ?CHF presenting from [facility] for worsening agitation today. Per NH, pt was very agitated in the morning there. Pt coughs from time to time. At the NH, pt's O2 went down to 89% on RA, was placed on 4 L NC and transferred to ED. On exam, pt endorsed SOB but stated that he wants to go back home. A&Ox2 (name, in hospital).      In ED, pt received ceftriaxone x1, azithromycin x1. CXR showing questionable L infiltrate. Pt refusing NRB in ED. (05 Jan 2024 17:55)      REVIEW OF SYSTEMS:    [ x] Unable to assess ROS because patient non verbal at this time     OBJECTIVE:  ICU Vital Signs Last 24 Hrs  T(C): 36.6 (27 Jan 2024 04:00), Max: 37.1 (27 Jan 2024 00:00)  T(F): 97.8 (27 Jan 2024 04:00), Max: 98.7 (27 Jan 2024 00:00)  HR: 47 (27 Jan 2024 06:00) (44 - 97)  BP: --  BP(mean): --  ABP: 107/51 (27 Jan 2024 06:00) (93/46 - 157/74)  ABP(mean): 72 (27 Jan 2024 06:00) (64 - 107)  RR: 22 (27 Jan 2024 06:00) (14 - 36)  SpO2: 96% (27 Jan 2024 06:00) (76% - 100%)    O2 Parameters below as of 27 Jan 2024 06:00  Patient On (Oxygen Delivery Method): nasal cannula, high flow  O2 Flow (L/min): 40  O2 Concentration (%): 40      Mode: CPAP with PS, FiO2: 40, PEEP: 5, MAP: 8, PIP: 16    01-25 @ 07:01  -  01-26 @ 07:00  --------------------------------------------------------  IN: 1397 mL / OUT: 1325 mL / NET: 72 mL    01-26 @ 07:01 - 01-27 @ 06:39  --------------------------------------------------------  IN: 1424.5 mL / OUT: 1213 mL / NET: 211.5 mL      CAPILLARY BLOOD GLUCOSE          Physical Exam:   Constitutional: ill appearing, no acute distress   HEENT: + PERRLA, EOMI, no drainage or redness  Neck: supple,  No JVD  Respiratory: dimished breath Sounds equal & clear bilaterally to auscultation, no accessory muscle use noted  Cardiovascular: Regular rate, regular rhythm, normal S1, S2; no murmurs or rub  Gastrointestinal: PEG tube in place, flat, Soft, non-tender  Extremities: no peripheral edema, no cyanosis, no clubbing   Vascular: Equal and normal pulses: 2+ peripheral pulses throughout  Neurological: opens eyes but nonverbal not following any commands   Psychiatric: calm   Musculoskeletal: contracted extremities   Skin: warm, dry, well perfused, no rashes    HOSPITAL MEDICATIONS:  Standing Meds:  acetylcysteine 20%  Inhalation 4 milliLiter(s) Inhalation every 6 hours  albuterol    0.083% 2.5 milliGRAM(s) Nebulizer every 6 hours  artificial  tears Solution 1 Drop(s) Both EYES every 6 hours  chlorhexidine 2% Cloths 1 Application(s) Topical daily  clonazePAM  Tablet 1 milliGRAM(s) Oral every 12 hours  dexMEDEtomidine Infusion 0.3 MICROgram(s)/kG/Hr IV Continuous <Continuous>  enoxaparin Injectable 40 milliGRAM(s) SubCutaneous every 24 hours  midodrine 30 milliGRAM(s) Oral every 8 hours  mupirocin 2% Ointment 1 Application(s) Topical two times a day  norepinephrine Infusion 0.3 MICROgram(s)/kG/Min IV Continuous <Continuous>  pantoprazole  Injectable 40 milliGRAM(s) IV Push daily  piperacillin/tazobactam IVPB.. 3.375 Gram(s) IV Intermittent every 8 hours  polyethylene glycol 3350 17 Gram(s) Oral two times a day  senna 1 Tablet(s) Oral every 12 hours  sodium chloride 3%  Inhalation 4 milliLiter(s) Inhalation every 6 hours      PRN Meds:  acetaminophen     Tablet .. 650 milliGRAM(s) Oral every 6 hours PRN  acetaminophen     Tablet .. 650 milliGRAM(s) Oral every 6 hours PRN      LABS:                        9.4    7.11  )-----------( 125      ( 27 Jan 2024 00:12 )             28.0     Hgb Trend: 9.4<--, 8.9<--, 9.0<--, 8.6<--, 10.2<--  01-27    137  |  102  |  14  ----------------------------<  120<H>  4.0   |  27  |  0.75    Ca    8.1<L>      27 Jan 2024 00:12  Phos  2.8     01-27  Mg     1.90     01-27    TPro  6.8  /  Alb  2.5<L>  /  TBili  0.3  /  DBili  x   /  AST  14  /  ALT  <5  /  AlkPhos  28<L>  01-27    Creatinine Trend: 0.75<--, 0.71<--, 0.65<--, 0.67<--, 0.85<--, 0.95<--    Urinalysis Basic - ( 27 Jan 2024 00:12 )    Color: x / Appearance: x / SG: x / pH: x  Gluc: 120 mg/dL / Ketone: x  / Bili: x / Urobili: x   Blood: x / Protein: x / Nitrite: x   Leuk Esterase: x / RBC: x / WBC x   Sq Epi: x / Non Sq Epi: x / Bacteria: x      Arterial Blood Gas:  01-27 @ 00:12  7.41/54/73/34/96.0/8.3  ABG lactate: --  Arterial Blood Gas:  01-26 @ 00:16  7.43/50/70/33/96.0/7.9  ABG lactate: --        MICROBIOLOGY:     RADIOLOGY:  [ ] Reviewed and interpreted by me
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
## 2024-01-27 NOTE — PROGRESS NOTE ADULT - ASSESSMENT
69 YO M with PMHx of dementia, agitation, CVA, dysphagia with PEG, and BPH who presented [facility] [place] for worsening agitation and progressive hypoxia. Patient found with left lung infiltrate and admitted to medicine for AHRF second to CAP with course complicated by mucous plug requiring ETT (1/6-1/12) and ultimately transferred to RCU (1/16). Now on am of 1/22 RRT x 2 for hypoxic respiratory failure and septic shock requiring vasopressor and intubation    NEUROLOGY  # Hx of TBI with neurocognitive disorder and agitation   -off propfol   - Agitation and aggression noted in house with multiple readmissions for agitation.  -  following - w/o past psych hx, recently started agitation and aggressive behavior likely executive/memory dysfunction. Confusion vs poor attention span raises concern for delirium vs r/o new case of early dementia/ consequences of TBI.  - holding home seroquel 225mg BID, depakote 250/125/250  - Case discussed at length with  and last admission TFT, B12, folic acid, B1, and CTH WNL and this admission infection tx.   - restarted on Klonopin 1mg BID, Dc'd 1/26 w/ extubation, continue low dose precedex for now    RESPIRATORY  # Acute hypoxemic respiratory failure 2/2 to aspiration vs recurrent mucous plug   - Presented from NH for witnessed aspiration and started on ABX however course complicated by MRSA pneumonia and left lung mucous plug requiring intubation from 1/6-1/12. reintubated 1/22 in setting of mucous plugging and white out of L lung  - brother Frederick does not want to trach patient- plan to optimize for extubation and patient made DNR/DNI. extubated to HFNC on 1/26.  - spo2 stable on HFNC 40LPM/40%fio2  - continue airway clearance with Albuterol, Mucomyst, HTS and Vest  - repeat CXR 1/26 with some improvement      CARDIOVASCULAR  # Septic vs vasoplegic shock  - off levophed this am  - during previous stay in ICU , required IV pressors and was weaned off with Midodrine 30 and Droxidopa, currently on midodrine 30 q 8hrs only  - ECHO (1/8) with EF 62, normal LVSF and moderate to severe AR.     HEENT   # Poor oral hygiene   - Biotene BID and RN mouth care     GI  # Dysphagia with PEG   #constipation   - Continue tube feeds as tolerated, restart TFs post extubation  - c/w bowel regimen with miralax and senna    RENAL  # Hx of BPH   - passed initially TOV garcia reinserted 1/22   - was on flomax but unclear if receiving if unable to crush via PEG will consider low dose cardura if pressor requirements remain unchanged   - Strict Is and Os  - consider TOV    INFECTIOUS DISEASE  # MRSA PNA   - afebrile, leukocytosis resolved  - RVP/ COVID (1/5) and (1/22) negative   - sputum cx (1/6) with MRSA s/p course of vancomycin (1/6-1/16) (1/22- 1/15)  - MRSA/MSSA PCR + (1/6) and (1/24)    - continue zosyn (1/22-  )    - sputum 1/22 NRF,  UA neg Bcx 1/22 neg       HEME  # Normocytic anemia   -Hgb stable   - DVT PPX with lovenox     ENDOCRINE  - No hx of A1C   - Monitor BG     ETHICS/ GOC    - DNR/DNI  -1/24/24 - had discussion with brother Frederick Rutherfordquinnash ([phone]) regarding patient current condition and explained the difference between Do Not Resuscitate/ Do Not Intubate versus CPR and Intubation. Explained plan for possible extubation tentatively in the AM. He stated he has had multiple discussions with different providers in the past two weeks regarding GOC, and he would like to do what is best for his brother. He feels that his brother would not want to be dependent on ventilator, and if he fails extubation, he would not want us to reintubate him, instead he would like us to try non-invasive ventilatory support if applicable.     1/26- brother updated regarding extubation today, all questions answered.   69 YO M with PMHx of dementia, agitation, CVA, dysphagia with PEG, and BPH who presented [facility] for worsening agitation and progressive hypoxia. Patient found with left lung infiltrate and admitted to medicine for AHRF second to CAP with course complicated by mucous plug requiring ETT (1/6-1/12) and ultimately transferred to RCU (1/16). Now on am of 1/22 RRT x 2 for hypoxic respiratory failure and septic shock requiring vasopressor and intubation    NEUROLOGY  # Hx of TBI with neurocognitive disorder and agitation   - Agitation and aggression noted in house with multiple readmissions for agitation.  - BH following - w/o past psych hx, recently started agitation and aggressive behavior likely executive/memory dysfunction. Confusion vs poor attention span raises concern for delirium vs r/o new case of early dementia/ consequences of TBI.  - holding home seroquel 225mg BID, depakote 250/125/250  - Case discussed at length with  and last admission TFT, B12, folic acid, B1, and CTH WNL and this admission infection tx.   - restarted on Klonopin 1mg BID  -stopped precedex this AM, patient remains alert but non verbal not following commands at this time     RESPIRATORY  # Acute hypoxemic respiratory failure 2/2 to aspiration vs recurrent mucous plug   - Presented from NH for witnessed aspiration and started on ABX however course complicated by MRSA pneumonia and left lung mucous plug requiring intubation from 1/6-1/12. reintubated 1/22 in setting of mucous plugging and white out of L lung, no plan for trach as per GOC with brother, now extubated to HFNC on 1/26 and DNR/DNI  - spo2 stable on HFNC 40LPM/40%fio2 but has periods of desaturation when turning   - continue airway clearance with Albuterol, Mucomyst, HTS and Vest  - repeat CXR 1/26 with some improvement      CARDIOVASCULAR  # Septic vs vasoplegic shock  - off levophed since 11pm   - during previous stay in ICU , required IV pressors and was weaned off with Midodrine 30 and Droxidopa, currently on midodrine 30 q 8hrs only  - ECHO (1/8) with EF 62, normal LVSF and moderate to severe AR.     HEENT   # Poor oral hygiene   - Biotene BID and RN mouth care     GI  # Dysphagia with PEG   #constipation   - Continue tube feeds as tolerated  - c/w bowel regimen with miralax and senna    RENAL  # Hx of BPH   - passed initially TOV garcia reinserted 1/22   - was on flomax but unclear if receiving if unable to crush via PEG will consider low dose cardura if BP maintains off pressors   - Strict Is and Os  - consider TOV    INFECTIOUS DISEASE  # MRSA PNA   - afebrile, leukocytosis resolved  - RVP/ COVID (1/5) and (1/22) negative   - sputum cx (1/6) with MRSA s/p course of vancomycin (1/6-1/16) (1/22- 1/15)  - MRSA/MSSA PCR + (1/6) and (1/24)    - continue empiric zosyn (1/22-  )    - sputum 1/22 NRF,  UA neg Bcx 1/22 neg       HEME  # Normocytic anemia   -Hgb stable   - DVT PPX with lovenox     ENDOCRINE  - No hx of A1C   - Monitor BG     ETHICS/ GOC    - DNR/DNI  -1/24/24 - had discussion with brother Frederick Solissilvestre ([phone]) regarding patient current condition and explained the difference between Do Not Resuscitate/ Do Not Intubate versus CPR and Intubation. Explained plan for possible extubation tentatively in the AM. He stated he has had multiple discussions with different providers in the past two weeks regarding GOC, and he would like to do what is best for his brother. He feels that his brother would not want to be dependent on ventilator, and if he fails extubation, he would not want us to reintubate him, instead he would like us to try non-invasive ventilatory support if applicable.     1/26- brother updated regarding extubation today, all questions answered.   67 YO M with PMHx of dementia, agitation, CVA, dysphagia with PEG, and BPH who presented [facility] [place] for worsening agitation and progressive hypoxia. Patient found with left lung infiltrate and admitted to medicine for AHRF second to CAP with course complicated by mucous plug requiring ETT (1/6-1/12) and ultimately transferred to RCU (1/16). Now on am of 1/22 RRT x 2 for hypoxic respiratory failure and septic shock requiring vasopressor and intubation    NEUROLOGY  # Hx of TBI with neurocognitive disorder and agitation   - Agitation and aggression noted in house with multiple readmissions for agitation.  - BH following - w/o past psych hx, recently started agitation and aggressive behavior likely executive/memory dysfunction. Confusion vs poor attention span raises concern for delirium vs r/o new case of early dementia/ consequences of TBI.  - holding home seroquel 225mg BID, depakote 250/125/250  - Case discussed at length with  and last admission TFT, B12, folic acid, B1, and CTH WNL and this admission infection tx   - restarted on Klonopin 1mg BID  -stopped precedex this AM, patient remains alert but non verbal not following commands at this time     RESPIRATORY  # Acute hypoxemic respiratory failure 2/2 to aspiration vs recurrent mucous plug   - Presented from NH for witnessed aspiration and started on ABX however course complicated by MRSA pneumonia and left lung mucous plug requiring intubation from 1/6-1/12. reintubated 1/22 in setting of mucous plugging and white out of L lung, no plan for trach as per GOC with brother, now extubated to HFNC on 1/26 and DNR/DNI  - spo2 stable on HFNC 40LPM/40%fio2 but has periods of desaturation when turning   - continue airway clearance with Albuterol, Mucomyst, HTS and Vest  - repeat CXR 1/26 with some improvement      CARDIOVASCULAR  # Septic vs vasoplegic shock  - off levophed since 11pm 1/26  - during previous stay in ICU , required IV pressors and was weaned off with Midodrine 30 and Droxidopa, currently on midodrine 30 q 8hrs only  - ECHO (1/8) with EF 62, normal LVSF and moderate to severe AR.     HEENT   # Poor oral hygiene   - Biotene BID and RN mouth care     GI  # Dysphagia with PEG   #constipation   - Continue tube feeds as tolerated  - c/w bowel regimen with miralax and senna    RENAL  # Hx of BPH   - passed initially TOV garcia reinserted 1/22   - was on flomax but unclear if receiving if unable to crush via PEG will consider low dose cardura if BP maintains off pressors   - Strict Is and Os  - consider TOV    INFECTIOUS DISEASE  # MRSA PNA   - afebrile, leukocytosis resolved  - RVP/ COVID (1/5) and (1/22) negative   - sputum cx (1/6) with MRSA s/p course of vancomycin (1/6-1/16) (1/22- 1/15)  - MRSA/MSSA PCR + (1/6) and (1/24)    - continue empiric zosyn (1/22-  )    - sputum 1/22 NRF,  UA neg Bcx 1/22 neg       HEME  # Normocytic anemia   -Hgb stable   - DVT PPX with lovenox     ENDOCRINE  - No hx of A1C   - Monitor BG     ETHICS/ GOC    - DNR/DNI  -1/24/24 - had discussion with brother Frederick Yusuf ([phone]) regarding patient current condition and explained the difference between Do Not Resuscitate/ Do Not Intubate versus CPR and Intubation. Explained plan for possible extubation tentatively in the AM. He stated he has had multiple discussions with different providers in the past two weeks regarding GOC, and he would like to do what is best for his brother. He feels that his brother would not want to be dependent on ventilator, and if he fails extubation, he would not want us to reintubate him, instead he would like us to try non-invasive ventilatory support if applicable.   1/26- brother updated regarding extubation, all questions answered.

## 2024-01-28 NOTE — PROGRESS NOTE ADULT - ASSESSMENT
67 YO M with PMHx of dementia, agitation, CVA, dysphagia with PEG, and BPH who presented [facility] [place] for worsening agitation and progressive hypoxia. Patient found with left lung infiltrate and admitted to medicine for AHRF second to CAP with course complicated by mucous plug requiring ETT (1/6-1/12) and ultimately transferred to RCU (1/16). Now on am of 1/22 RRT x 2 for hypoxic respiratory failure and septic shock requiring vasopressor and intubation    NEUROLOGY  # Hx of TBI with neurocognitive disorder and agitation   - Agitation and aggression noted in house with multiple readmissions for agitation.  - BH following - w/o past psych hx, recently started agitation and aggressive behavior likely executive/memory dysfunction. Confusion vs poor attention span raises concern for delirium vs r/o new case of early dementia/ consequences of TBI.  - holding home seroquel 225mg BID, depakote 250/125/250  - Case discussed at length with  and last admission TFT, B12, folic acid, B1, and CTH WNL and this admission infection tx   - restarted on Klonopin 1mg BID  -stopped precedex this AM, patient remains alert but non verbal not following commands at this time     RESPIRATORY  # Acute hypoxemic respiratory failure 2/2 to aspiration vs recurrent mucous plug   - Presented from NH for witnessed aspiration and started on ABX however course complicated by MRSA pneumonia and left lung mucous plug requiring intubation from 1/6-1/12. reintubated 1/22 in setting of mucous plugging and white out of L lung, no plan for trach as per GOC with brother, now extubated to HFNC on 1/26 and DNR/DNI  - spo2 stable on HFNC 40LPM/40%fio2 but has periods of desaturation when turning   - continue airway clearance with Albuterol, Mucomyst, HTS and Vest  - repeat CXR 1/26 with some improvement      CARDIOVASCULAR  # Septic vs vasoplegic shock  - off levophed since 11pm 1/26  - during previous stay in ICU , required IV pressors and was weaned off with Midodrine 30 and Droxidopa, currently on midodrine 30 q 8hrs only  - ECHO (1/8) with EF 62, normal LVSF and moderate to severe AR.     HEENT   # Poor oral hygiene   - Biotene BID and RN mouth care     GI  # Dysphagia with PEG   #constipation   - Continue tube feeds as tolerated  - c/w bowel regimen with miralax and senna    RENAL  # Hx of BPH   - passed initially TOV garcia reinserted 1/22   - was on flomax but unclear if receiving if unable to crush via PEG will consider low dose cardura if BP maintains off pressors   - Strict Is and Os  - consider TOV    INFECTIOUS DISEASE  # MRSA PNA   - afebrile, leukocytosis resolved  - RVP/ COVID (1/5) and (1/22) negative   - sputum cx (1/6) with MRSA s/p course of vancomycin (1/6-1/16) (1/22- 1/15)  - MRSA/MSSA PCR + (1/6) and (1/24)    - continue empiric zosyn (1/22-  )    - sputum 1/22 NRF,  UA neg Bcx 1/22 neg       HEME  # Normocytic anemia   -Hgb stable   - DVT PPX with lovenox     ENDOCRINE  - No hx of A1C   - Monitor BG     ETHICS/ GOC    - DNR/DNI  -1/24/24 - had discussion with brother Frederick Yusuf ([phone]) regarding patient current condition and explained the difference between Do Not Resuscitate/ Do Not Intubate versus CPR and Intubation. Explained plan for possible extubation tentatively in the AM. He stated he has had multiple discussions with different providers in the past two weeks regarding GOC, and he would like to do what is best for his brother. He feels that his brother would not want to be dependent on ventilator, and if he fails extubation, he would not want us to reintubate him, instead he would like us to try non-invasive ventilatory support if applicable.   1/26- brother updated regarding extubation, all questions answered.   67 YO M with PMHx of dementia, agitation, CVA, dysphagia with PEG, and BPH who presented [facility] [place] for worsening agitation and progressive hypoxia. Patient found with left lung infiltrate and admitted to medicine for AHRF second to CAP with course complicated by mucous plug requiring ETT (1/6-1/12) and ultimately transferred to RCU (1/16). Now on am of 1/22 RRT x 2 for hypoxic respiratory failure and septic shock requiring vasopressor and intubation    NEUROLOGY  # Hx of TBI with neurocognitive disorder and agitation   - Agitation and aggression noted in house with multiple readmissions for agitation.  -  following - w/o past psych hx, recently started agitation and aggressive behavior likely executive/memory dysfunction. Confusion vs poor attention span raises concern for delirium vs r/o new case of early dementia/ consequences of TBI.  - holding home seroquel 225mg BID, depakote 250/125/250  - Case discussed at length with  and last admission TFT, B12, folic acid, B1, and CTH WNL and this admission infection tx   - cont Klonopin 1mg BID    RESPIRATORY  # Acute hypoxemic respiratory failure 2/2 to aspiration vs recurrent mucous plug   - Presented from NH for witnessed aspiration and started on ABX however course complicated by MRSA pneumonia and left lung mucous plug requiring intubation from 1/6-1/12. reintubated 1/22 in setting of mucous plugging and white out of L lung, no plan for trach as per GOC with brother, now extubated to HFNC on 1/26 and DNR/DNI  - spo2 stable on HFNC 50L/ 60%  - continue airway clearance with Albuterol, Mucomyst, HTS and bed percussion   - repeat CXR 1/26 with some improvement      CARDIOVASCULAR  # Septic vs vasoplegic shock  - off levophed since 11pm 1/26  - during previous stay in ICU , required IV pressors and was weaned off with Midodrine 30 and Droxidopa, currently on midodrine 30 q 8hrs only  - ECHO (1/8) with EF 62, normal LVSF and moderate to severe AR.     HEENT   # Poor oral hygiene   - Biotene BID and RN mouth care     GI  # Dysphagia with PEG   #constipation   - Continue tube feeds as tolerated  - c/w bowel regimen with miralax and senna    RENAL  # Hx of BPH   - passed initially TOV garcia reinserted 1/22   - was on flomax but unclear if receiving if unable to crush via PEG will consider low dose cardura if BP maintains off pressors   - Strict Is and Os:  neg 40 overnight/ +13L for LOS making 40-50/ hr   - consider TOV    INFECTIOUS DISEASE  # MRSA PNA   - afebrile, leukocytosis resolved  - RVP/ COVID (1/5) and (1/22) negative   - sputum cx (1/6) with MRSA s/p course of vancomycin (1/6-1/16) (1/22- 1/15)  - MRSA/MSSA PCR + (1/6) and (1/24)    - continue empiric zosyn (1/22-  )    - sputum 1/22 NRF,  UA neg Bcx 1/22 neg       HEME  # Normocytic anemia   -Hgb stable   - DVT PPX with lovenox     ENDOCRINE  - No hx of A1C   - Monitor BG     ETHICS/ GOC    - DNR/DNI  -1/24/24 - had discussion with brother Frederick Yusuf ([phone]) regarding patient current condition and explained the difference between Do Not Resuscitate/ Do Not Intubate versus CPR and Intubation. Explained plan for possible extubation tentatively in the AM. He stated he has had multiple discussions with different providers in the past two weeks regarding GOC, and he would like to do what is best for his brother. He feels that his brother would not want to be dependent on ventilator, and if he fails extubation, he would not want us to reintubate him, instead he would like us to try non-invasive ventilatory support if applicable.   1/26- brother updated regarding extubation, all questions answered.   67 YO M with PMHx of dementia, agitation, CVA, dysphagia with PEG, and BPH who presented [facility] [place] for worsening agitation and progressive hypoxia. Patient found with left lung infiltrate and admitted to medicine for AHRF second to CAP with course complicated by mucous plug requiring ETT (1/6-1/12) and ultimately transferred to RCU (1/16). Now on am of 1/22 RRT x 2 for hypoxic respiratory failure and septic shock requiring vasopressor and intubation    NEUROLOGY  # Hx of TBI with neurocognitive disorder and agitation   - Agitation and aggression noted in house with multiple readmissions for agitation.  -  following - w/o past psych hx, recently started agitation and aggressive behavior likely executive/memory dysfunction. Confusion vs poor attention span raises concern for delirium vs r/o new case of early dementia/ consequences of TBI.  - holding home seroquel 225mg BID, depakote 250/125/250  - Case discussed at length with  and last admission TFT, B12, folic acid, B1, and CTH WNL and this admission infection tx   - cont Klonopin 1mg BID    RESPIRATORY  # Acute hypoxemic respiratory failure 2/2 to aspiration vs recurrent mucous plug   - Presented from NH for witnessed aspiration and started on ABX however course complicated by MRSA pneumonia and left lung mucous plug requiring intubation from 1/6-1/12. reintubated 1/22 in setting of mucous plugging and white out of L lung, no plan for trach as per GOC with brother, now extubated to HFNC on 1/26 and DNR/DNI  - spo2 stable on HFNC 50L/ 60%  - continue airway clearance with Albuterol, Mucomyst, HTS and bed percussion   - repeat CXR 1/26 with some improvement      CARDIOVASCULAR  # Septic vs vasoplegic shock  - off levophed since 11pm 1/26  - during previous stay in ICU , required IV pressors and was weaned off with Midodrine 30 and Droxidopa, currently on midodrine 30 q 8hrs only  - ECHO (1/8) with EF 62, normal LVSF and moderate to severe AR.     HEENT   # Poor oral hygiene   - Biotene BID and RN mouth care     GI  # Dysphagia with PEG   #constipation   - Continue tube feeds as tolerated  - c/w bowel regimen with miralax and senna    RENAL  # Hx of BPH   - passed initially TOV garcia reinserted 1/22   - was on flomax but unclear if receiving if unable to crush via PEG will consider low dose cardura if BP maintains off pressors   - Strict Is and Os:  neg 40 overnight/ +13L for LOS making 40-50/ hr   - consider TOV    INFECTIOUS DISEASE  # MRSA PNA   - afebrile, leukocytosis resolved  - RVP/ COVID (1/5) and (1/22) negative   - sputum cx (1/6) with MRSA s/p course of vancomycin (1/6-1/16) (1/22- 1/25)  - MRSA/MSSA PCR + (1/6) and (1/24)    - continue empiric zosyn (1/22-  )    - sputum 1/22 NRF,  UA neg Bcx 1/22 neg       HEME  # Normocytic anemia   -Hgb stable   - DVT PPX with lovenox     ENDOCRINE  - No hx of A1C   - Monitor BG     ETHICS/ GOC    - DNR/DNI  -1/24/24 - had discussion with brother Frederick Yusuf ([phone]) regarding patient current condition and explained the difference between Do Not Resuscitate/ Do Not Intubate versus CPR and Intubation. Explained plan for possible extubation tentatively in the AM. He stated he has had multiple discussions with different providers in the past two weeks regarding GOC, and he would like to do what is best for his brother. He feels that his brother would not want to be dependent on ventilator, and if he fails extubation, he would not want us to reintubate him, instead he would like us to try non-invasive ventilatory support if applicable.   1/26- brother updated regarding extubation, all questions answered.   69 YO M with PMHx of dementia, agitation, CVA, dysphagia with PEG, and BPH who presented [facility] [place] for worsening agitation and progressive hypoxia. Patient found with left lung infiltrate and admitted to medicine for AHRF second to CAP with course complicated by mucous plug requiring ETT (1/6-1/12) and ultimately transferred to RCU (1/16). Now on am of 1/22 RRT x 2 for hypoxic respiratory failure and septic shock requiring vasopressor and intubation    NEUROLOGY  # Hx of TBI with neurocognitive disorder and agitation   - Agitation and aggression noted in house with multiple readmissions for agitation.  -  following - w/o past psych hx, recently started agitation and aggressive behavior likely executive/memory dysfunction. Confusion vs poor attention span raises concern for delirium vs r/o new case of early dementia/ consequences of TBI.  - holding home seroquel 225mg BID, depakote 250/125/250  - Case discussed at length with  and last admission TFT, B12, folic acid, B1, and CTH WNL and this admission infection tx   - cont Klonopin 1mg BID    RESPIRATORY  # Acute hypoxemic respiratory failure 2/2 to aspiration vs recurrent mucous plug   - Presented from NH for witnessed aspiration and started on ABX however course complicated by MRSA pneumonia and left lung mucous plug requiring intubation from 1/6-1/12. reintubated 1/22 in setting of mucous plugging and white out of L lung, no plan for trach as per GOC with brother, now extubated to HFNC on 1/26 and DNR/DNI  - spo2 droppping on HFNC 50L/ 60%-> increased to 50L and 70%  - continue airway clearance with Albuterol, Mucomyst, HTS and chest vest  - repeat CXR 1/26 with some improvement  -frequent suctioning for secretions       CARDIOVASCULAR  # Septic vs vasoplegic shock  - off levophed since 11pm 1/26  - during previous stay in ICU , required IV pressors and was weaned off with Midodrine 30 and Droxidopa, currently on midodrine 30 q 8hrs only  - ECHO (1/8) with EF 62, normal LVSF and moderate to severe AR.     HEENT   # Poor oral hygiene   - Biotene BID and RN mouth care     GI  # Dysphagia with PEG   #constipation   - Continue tube feeds as tolerated  - c/w bowel regimen with miralax and senna    RENAL  # Hx of BPH   - passed initially TOV garcia reinserted 1/22   - was on flomax but unclear if receiving if unable to crush via PEG   - Strict Is and Os:  neg 40 overnight/ +13L for LOS making 40-50/ hr   - consider TOV  - will start cardura 1/28 for potential TOV     INFECTIOUS DISEASE  # MRSA PNA   - afebrile, leukocytosis resolved  - RVP/ COVID (1/5) and (1/22) negative   - sputum cx (1/6) with MRSA s/p course of vancomycin (1/6-1/16) (1/22- 1/25)  - MRSA/MSSA PCR + (1/6) and (1/24)    - continue empiric zosyn (1/22-1/30 )    - sputum 1/22 NRF,  UA neg Bcx 1/22 neg       HEME  # Normocytic anemia   -Hgb stable   - DVT PPX with lovenox     ENDOCRINE  - No hx of A1C   - Monitor BG     ETHICS/ GOC    - DNR/DNI  -1/24/24 - had discussion with brother Frederick Yusuf ([phone]) regarding patient current condition and explained the difference between Do Not Resuscitate/ Do Not Intubate versus CPR and Intubation. Explained plan for possible extubation tentatively in the AM. He stated he has had multiple discussions with different providers in the past two weeks regarding GOC, and he would like to do what is best for his brother. He feels that his brother would not want to be dependent on ventilator, and if he fails extubation, he would not want us to reintubate him, instead he would like us to try non-invasive ventilatory support if applicable.   1/26- brother updated regarding extubation, all questions answered.
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
## 2024-01-29 NOTE — PROGRESS NOTE ADULT - ASSESSMENT
69 YO M with PMHx of dementia, agitation, CVA, dysphagia with PEG, and BPH who presented [facility] for worsening agitation and progressive hypoxia. Patient found with left lung infiltrate and admitted to medicine for AHRF second to CAP with course complicated by mucous plug requiring ETT (1/6-1/12) and ultimately transferred to RCU (1/16). Now on am of 1/22 RRT x 2 for hypoxic respiratory failure and septic shock requiring vasopressor and intubation    NEUROLOGY  # Hx of TBI with neurocognitive disorder and agitation   - Agitation and aggression noted in house with multiple readmissions for agitation.  -  following - w/o past psych hx, recently started agitation and aggressive behavior likely executive/memory dysfunction. Confusion vs poor attention span raises concern for delirium vs r/o new case of early dementia/ consequences of TBI.  - holding home seroquel 225mg BID, depakote 250/125/250  - Case discussed at length with  and last admission TFT, B12, folic acid, B1, and CTH WNL and this admission infection tx   - cont Klonopin 1mg BID    RESPIRATORY  # Acute hypoxemic respiratory failure 2/2 to aspiration vs recurrent mucous plug   - Presented from NH for witnessed aspiration and started on ABX however course complicated by MRSA pneumonia and left lung mucous plug requiring intubation from 1/6-1/12. reintubated 1/22 in setting of mucous plugging and white out of L lung, no plan for trach as per GOC with brother, now extubated to HFNC on 1/26 and DNR/DNI  - spo2 droppping on HFNC 50L/ 60%-> increased to 50L and 70%  - continue airway clearance with Albuterol, Mucomyst, HTS and chest vest  - repeat CXR 1/26 with some improvement  -frequent suctioning for secretions       CARDIOVASCULAR  # Septic vs vasoplegic shock  - off levophed since 11pm 1/26  - during previous stay in ICU , required IV pressors and was weaned off with Midodrine 30 and Droxidopa, currently on midodrine 30 q 8hrs only  - ECHO (1/8) with EF 62, normal LVSF and moderate to severe AR.     HEENT   # Poor oral hygiene   - Biotene BID and RN mouth care     GI  # Dysphagia with PEG   #constipation   - Continue tube feeds as tolerated  - c/w bowel regimen with miralax and senna    RENAL  # Hx of BPH   - passed initially TOV garcia reinserted 1/22   - was on flomax but unclear if receiving if unable to crush via PEG   - Strict Is and Os:  neg 40 overnight/ +13L for LOS making 40-50/ hr   - consider TOV  - will start cardura 1/28 for potential TOV     INFECTIOUS DISEASE  # MRSA PNA   - afebrile, leukocytosis resolved  - RVP/ COVID (1/5) and (1/22) negative   - sputum cx (1/6) with MRSA s/p course of vancomycin (1/6-1/16) (1/22- 1/25)  - MRSA/MSSA PCR + (1/6) and (1/24)    - continue empiric zosyn (1/22-1/30 )    - sputum 1/22 NRF,  UA neg Bcx 1/22 neg       HEME  # Normocytic anemia   -Hgb stable   - DVT PPX with lovenox     ENDOCRINE  - No hx of A1C   - Monitor BG     ETHICS/ GOC    - DNR/DNI  -1/24/24 - had discussion with brother Frederick Yusuf ([phone]) regarding patient current condition and explained the difference between Do Not Resuscitate/ Do Not Intubate versus CPR and Intubation. Explained plan for possible extubation tentatively in the AM. He stated he has had multiple discussions with different providers in the past two weeks regarding GOC, and he would like to do what is best for his brother. He feels that his brother would not want to be dependent on ventilator, and if he fails extubation, he would not want us to reintubate him, instead he would like us to try non-invasive ventilatory support if applicable.   1/26- brother updated regarding extubation, all questions answered.   69 YO M with PMHx of dementia, agitation, CVA, dysphagia with PEG, and BPH who presented [facility] for worsening agitation and progressive hypoxia. Patient found with left lung infiltrate and admitted to medicine for AHRF second to CAP with course complicated by mucous plug requiring ETT (1/6-1/12) and ultimately transferred to RCU (1/16). Now on am of 1/22 RRT x 2 for hypoxic respiratory failure and septic shock requiring vasopressor and intubation    NEUROLOGY  # Hx of TBI with neurocognitive disorder and agitation   - Agitation and aggression noted in house with multiple readmissions for agitation.  -  following - w/o past psych hx, recently started agitation and aggressive behavior likely executive/memory dysfunction. Confusion vs poor attention span raises concern for delirium vs r/o new case of early dementia/ consequences of TBI.  - holding home seroquel 225mg BID, depakote 250/125/250  - Case discussed at length with  and last admission TFT, B12, folic acid, B1, and CTH WNL and this admission infection tx   - cont Klonopin 1mg BID    RESPIRATORY  # Acute hypoxemic respiratory failure 2/2 to aspiration vs recurrent mucous plug   - Presented from NH for witnessed aspiration and started on ABX however course complicated by MRSA pneumonia and left lung mucous plug requiring intubation from 1/6-1/12. reintubated 1/22 in setting of mucous plugging and white out of L lung, no plan for trach as per GOC with brother, now extubated to HFNC on 1/26 and DNR/DNI  - spo2 droppping on HFNC 50L/ 60%-> increased to 50L and 70%  - continue airway clearance with Albuterol, Mucomyst, HTS and chest vest  - repeat CXR 1/26 with some improvement  -frequent suctioning for secretions       CARDIOVASCULAR  # Septic vs vasoplegic shock  - off levophed since 11pm 1/26  - during previous stay in ICU , required IV pressors and was weaned off with Midodrine 30 and Droxidopa, currently on midodrine 30 q 8hrs only  - ECHO (1/8) with EF 62, normal LVSF and moderate to severe AR.     HEENT   # Poor oral hygiene   - Biotene BID and RN mouth care     GI  # Dysphagia with PEG   #constipation   - Continue tube feeds as tolerated  - c/w bowel regimen with miralax and senna    RENAL  # Hx of BPH   - passed initially TOV garcia reinserted 1/22   - was on flomax but unclear if receiving if unable to crush via PEG   - Strict Is and Os:  neg 40 overnight/ +13L for LOS making 40-50/ hr   - consider TOV  - will start cardura 1/28 for potential TOV     INFECTIOUS DISEASE  # MRSA PNA   - afebrile, leukocytosis resolved  - RVP/ COVID (1/5) and (1/22) negative   - sputum cx (1/6) with MRSA s/p course of vancomycin (1/6-1/16) (1/22- 1/25)  - MRSA/MSSA PCR + (1/6) and (1/24)    - continue empiric zosyn (1/22-1/30 )    - sputum 1/22 NRF,  UA neg Bcx 1/22 neg       HEME  # Normocytic anemia   -Hgb stable   - DVT PPX with lovenox     ENDOCRINE  - No hx of A1C   - Monitor BG     ETHICS/ GOC    - DNR/DNI  -1/24/24 - had discussion with brother Frederick Yusuf ([phone]) regarding patient current condition and explained the difference between Do Not Resuscitate/ Do Not Intubate versus CPR and Intubation. Explained plan for possible extubation tentatively in the AM. He stated he has had multiple discussions with different providers in the past two weeks regarding GOC, and he would like to do what is best for his brother. He feels that his brother would not want to be dependent on ventilator, and if he fails extubation, he would not want us to reintubate him, instead he would like us to try non-invasive ventilatory support if applicable.   1/26- brother updated regarding extubation, all questions answered.  1/29-spoke with brother Frederick today about patient not needing ICU care, also informed him about patients oxygen requirements and what the next steps are. He is agreeable to comfort care if his brother is in any distress such as morphine gtt. Will continue to follow up
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
## 2024-01-30 NOTE — CHART NOTE - NSCHARTNOTEFT_GEN_A_CORE
Nutrition Follow-Up/Chart Note     Source: Patient A&Ox0-1        Chart [x ]    Hospital Course:    Per chart review, Patient is a 67 YO M with PMHx of dementia, agitation, CVA, dysphagia with PEG, and BPH who presented [facility] for worsening agitation and progressive hypoxia. Patient found with left lung infiltrate and admitted to medicine for AHRF second to CAP with course complicated by mucous plug requiring ETT (1/6-1/12) and ultimately transferred to RCU (1/16). Now on am of 1/22 RRT x 2 for hypoxic respiratory failure and septic shock requiring vasopressor and intubation    Nutrition Course:  Patient is alert, non-responsive when writter called his name.   Noted TF is completed and required TF to be resumed when RD visited today 1/30/24.      Diet, NPO with Tube Feed:   Tube Feeding Modality: Gastrostomy  Jevity 1.5 Reid (JEVITY1.5RTH)  Total Volume for 24 Hours (mL): 1200  Continuous  Starting Tube Feed Rate {mL per Hour}: 30  Increase Tube Feed Rate by (mL): 10     Every 4 hours  Until Goal Tube Feed Rate (mL per Hour): 50  Tube Feed Duration (in Hours): 24  Tube Feed Start Time: 08:00 (01-20-24 @ 08:05)          Anthropometrics:   Height (cm): 167.6 (01-05), 167.6 (12-28), 167.6 (12-15)  Weight (kg): 64.8 (01-30), 70.3 (12-15)  BMI (kg/m2): 23.1 (01-30), 25 (01-05), 25 (12-28), 25 (12-15)  IBW:    Weight Assessment:    Enteral /Parenteral Nutrition:    PO intake:  [ ] < 50% [ ] 50-75% [ ]  % [ ]  other :    GI: WDL. Last BM reported (  ) per RN flowsheets     Edema:    Pressure Injuries:      __________________ Pertinent Medications__________________   MEDICATIONS  (STANDING):  acetylcysteine 20%  Inhalation 4 milliLiter(s) Inhalation every 6 hours  albuterol    0.083% 2.5 milliGRAM(s) Nebulizer every 6 hours  artificial  tears Solution 1 Drop(s) Both EYES every 6 hours  chlorhexidine 2% Cloths 1 Application(s) Topical daily  clonazePAM  Tablet 1 milliGRAM(s) Oral every 12 hours  doxazosin 1 milliGRAM(s) Oral at bedtime  enoxaparin Injectable 40 milliGRAM(s) SubCutaneous every 24 hours  midodrine 20 milliGRAM(s) Oral every 8 hours  pantoprazole  Injectable 40 milliGRAM(s) IV Push daily  polyethylene glycol 3350 17 Gram(s) Oral two times a day  senna 1 Tablet(s) Oral every 12 hours  sodium chloride 3%  Inhalation 4 milliLiter(s) Inhalation every 6 hours    MEDICATIONS  (PRN):  acetaminophen     Tablet .. 650 milliGRAM(s) Oral every 6 hours PRN Moderate Pain (4 - 6)      __________________ Pertinent Labs__________________   01-30 Na141 mmol/L Glu 129 mg/dL<H> K+ 4.3 mmol/L Cr  0.75 mg/dL BUN 15 mg/dL 01-30 Phos 3.4 mg/dL 01-29 Alb 2.9 g/dL<L>        POCT Blood Glucose.: 134 mg/dL (01-30-24 @ 12:32)        Estimated Needs Assessment: (Weight Used:  )  Energy:  Kcal/kg/day (  kcal/kg)  Protein:  gm/kg/day  (  gm/kg)  Fluid:  Fluid per MD and team discretion.  [  ] No change in need assessment    Previous Nutrition Diagnosis: Severe/Moderate malnutrition    Nutrition Diagnosis is [ ] ongoing  [ ] resolved [ ] not applicable     New Nutrition Diagnosis:    Education:  [  ] Given today        Type of education provided:   [  ] Given on previous assessment by RD   [  ] Not applicable 2/2 cognitive deficit  [  ] Pt refusal of education offered   [  ] Not applicable 2/2 current prognosis  [  ] Not warranted at present    Recommendations:  1. Continue present diet order as it remains appropriate at this time  2. Honor food and fluid preferences as able.    Monitoring and Evaluation:   1. Monitor PO intake, skin integrity, bowel regimen, weight trends, and nutrition pertinent labs.  2. RD to remain available for further nutritional intervention. Nutrition Follow-Up/Chart Note     Source: Patient A&Ox0-1        Chart [x ]    Hospital Course:    Per chart review, Patient is a 67 YO M with PMHx of dementia, agitation, CVA, dysphagia with PEG, and BPH who presented [facility] for worsening agitation and progressive hypoxia. Patient found with left lung infiltrate and admitted to medicine for AHRF second to CAP with course complicated by mucous plug requiring ETT (1/6-1/12) and ultimately transferred to RCU (1/16). Now on am of 1/22 RRT x 2 for hypoxic respiratory failure and septic shock requiring vasopressor and intubation    Nutrition Course:  Patient is alert and non-verbal at baseline, non-responsive when writer called his name.  Information collateral from comprehensive chart review.  Noted TF was completed and required TF to be resumed when RD visited today 1/30/24.  No reported nausea, vomit, noted episodes of loose BMs, last BM reported 1/29/24 per RN flowsheet.  Noted pt is on bowel regimen (senna/miralax), pt may not need bowel regimen due to frequent BMs.  Will make MD and team aware.  Patient has no edema however noted stage II pressure injury around nasal area s/p supplemental oxygen use, per RN flowsheet.  Present TF is providing 1800 kcal, 77gm protein and 912ml free water; meeting pt's nutrition needs at this time.  RD to remain available.    Diet, NPO with Tube Feed:   Tube Feeding Modality: Gastrostomy  Jevity 1.5 Reid (JEVITY1.5RTH)  Total Volume for 24 Hours (mL): 1200  Continuous  Starting Tube Feed Rate {mL per Hour}: 30  Increase Tube Feed Rate by (mL): 10     Every 4 hours  Until Goal Tube Feed Rate (mL per Hour): 50  Tube Feed Duration (in Hours): 24  Tube Feed Start Time: 08:00 (01-20-24 @ 08:05)    Anthropometrics:   Height (cm): 167.6 (01-05), 167.6 (12-28), 167.6 (12-15)  Weight (kg): 64.8 (01-30), 70.3 (12-15)  BMI (kg/m2): 23.1 (01-30), 25 (01-05), 25 (12-28), 25 (12-15)  IBW: 142 lbs +/-10%, 64.5kg +/-10%    Weight Assessment:  69.1kg (1-7)  72.9kg (1-9)  69kg (1-12)  Noted a 4.6kg (6.6%) weight loss in 3 weeks s/p hospitalization, likely related to subsiding 1+ generalized edema upon admission.      __________________ Pertinent Medications__________________   MEDICATIONS  (STANDING):  acetylcysteine 20%  Inhalation 4 milliLiter(s) Inhalation every 6 hours  albuterol    0.083% 2.5 milliGRAM(s) Nebulizer every 6 hours  artificial  tears Solution 1 Drop(s) Both EYES every 6 hours  chlorhexidine 2% Cloths 1 Application(s) Topical daily  clonazePAM  Tablet 1 milliGRAM(s) Oral every 12 hours  doxazosin 1 milliGRAM(s) Oral at bedtime  enoxaparin Injectable 40 milliGRAM(s) SubCutaneous every 24 hours  midodrine 20 milliGRAM(s) Oral every 8 hours  pantoprazole  Injectable 40 milliGRAM(s) IV Push daily  polyethylene glycol 3350 17 Gram(s) Oral two times a day  senna 1 Tablet(s) Oral every 12 hours  sodium chloride 3%  Inhalation 4 milliLiter(s) Inhalation every 6 hours    MEDICATIONS  (PRN):  acetaminophen     Tablet .. 650 milliGRAM(s) Oral every 6 hours PRN Moderate Pain (4 - 6)      __________________ Pertinent Labs__________________   01-30 Na141 mmol/L Glu 129 mg/dL<H> K+ 4.3 mmol/L Cr  0.75 mg/dL BUN 15 mg/dL 01-30 Phos 3.4 mg/dL 01-29 Alb 2.9 g/dL<L>      POCT Blood Glucose.: 134 mg/dL (01-30-24 @ 12:32)    Estimated Needs Assessment: (Weight Used: IBW 64.5kg)  Energy: 1136-0876 Kcal/kg/day ( 25-30 kcal/kg)  Protein: 76.8-90.3 gm/kg/day (1.2-1.4 gm/kg)  Fluid:  Fluid per MD and team discretion.  [ x ] No change in need assessment    Previous Nutrition Diagnosis: Severe malnutrition    Nutrition Diagnosis is [x] ongoing      Education: [ x ] Not applicable 2/2 cognitive deficit    Recommendations:  1. Please consider to discontinue bowel regimen due to frequent BMs  2. If Patient continues to have frequent BMs without bowel regimen, pt may benefit from benatrol 1x/day for bowel regularity        Monitoring and Evaluation:   1. Monitor TF tolerance, skin integrity, bowel regimen, weight trends, and nutrition pertinent labs.  2. RD to remain available for further nutritional intervention.

## 2024-01-30 NOTE — PROGRESS NOTE ADULT - SUBJECTIVE AND OBJECTIVE BOX
[facility] Division of Hospital Medicine  Manas Samuel MD  In House Pager [phone]    Patient is a 68y old  Male who presents with a chief complaint of agitation (29 Jan 2024 08:34)    SUBJECTIVE / OVERNIGHT EVENTS: no acute events. non-verbal.     ROS: unable to obtain due to mental status.     MEDICATIONS  (STANDING):  acetylcysteine 20%  Inhalation 4 milliLiter(s) Inhalation every 6 hours  albuterol    0.083% 2.5 milliGRAM(s) Nebulizer every 6 hours  artificial  tears Solution 1 Drop(s) Both EYES every 6 hours  chlorhexidine 2% Cloths 1 Application(s) Topical daily  clonazePAM  Tablet 1 milliGRAM(s) Oral every 12 hours  doxazosin 1 milliGRAM(s) Oral at bedtime  enoxaparin Injectable 40 milliGRAM(s) SubCutaneous every 24 hours  midodrine 20 milliGRAM(s) Oral every 8 hours  pantoprazole  Injectable 40 milliGRAM(s) IV Push daily  polyethylene glycol 3350 17 Gram(s) Oral two times a day  senna 1 Tablet(s) Oral every 12 hours  sodium chloride 3%  Inhalation 4 milliLiter(s) Inhalation every 6 hours    MEDICATIONS  (PRN):  acetaminophen     Tablet .. 650 milliGRAM(s) Oral every 6 hours PRN Moderate Pain (4 - 6)    CAPILLARY BLOOD GLUCOSE      POCT Blood Glucose.: 134 mg/dL (30 Jan 2024 12:32)    I&O's Summary    29 Jan 2024 07:01  -  30 Jan 2024 07:00  --------------------------------------------------------  IN: 500 mL / OUT: 1595 mL / NET: -1095 mL    30 Jan 2024 07:01  -  30 Jan 2024 16:07  --------------------------------------------------------  IN: 300 mL / OUT: 1300 mL / NET: -1000 mL        Vital Signs Last 24 Hrs  T(C): 37.6 (30 Jan 2024 13:01), Max: 37.6 (30 Jan 2024 13:01)  T(F): 99.6 (30 Jan 2024 13:01), Max: 99.6 (30 Jan 2024 13:01)  HR: 99 (30 Jan 2024 13:01) (67 - 99)  BP: 125/65 (30 Jan 2024 13:01) (113/51 - 125/65)  BP(mean): --  RR: 17 (30 Jan 2024 13:01) (17 - 27)  SpO2: 99% (30 Jan 2024 13:01) (95% - 99%)    Parameters below as of 30 Jan 2024 13:01  Patient On (Oxygen Delivery Method): nasal cannula, high flow    Physical Exam:  GENERAL: no apparent distress; on HFNC; calm  CHEST/LUNG: Clear to auscultation bilaterally; No wheezing; No crackles  HEART: no obvious audible murmurs; ext warm to touch; No edema  ABDOMEN: Soft, Nontender, Nondistended; Bowel sounds present  MSK: no joint or back on palpation; no joint erythema or swelling. contracted all 4 extremities.   NEUROLOGY: Awake and alert; not following commands.     LABS:                        9.5    11.60 )-----------( 168      ( 30 Jan 2024 06:55 )             29.8     01-30    141  |  102  |  15  ----------------------------<  129<H>  4.3   |  32<H>  |  0.75    Ca    8.6      30 Jan 2024 06:55  Phos  3.4     01-30  Mg     2.10     01-30    TPro  7.2  /  Alb  2.9<L>  /  TBili  0.3  /  DBili  x   /  AST  10  /  ALT  <5  /  AlkPhos  34<L>  01-29          Urinalysis Basic - ( 30 Jan 2024 06:55 )    Color: x / Appearance: x / SG: x / pH: x  Gluc: 129 mg/dL / Ketone: x  / Bili: x / Urobili: x   Blood: x / Protein: x / Nitrite: x   Leuk Esterase: x / RBC: x / WBC x   Sq Epi: x / Non Sq Epi: x / Bacteria: x        RADIOLOGY & ADDITIONAL TESTS:  Results Reviewed: Y  Imaging Personally Reviewed: Y  Electrocardiogram Personally Reviewed: Y    COORDINATION OF CARE:  Care Discussed with Consultants/Other Providers [Y/N]: Y  Prior or Outpatient Records Reviewed [Y/N]: Y

## 2024-01-30 NOTE — PROGRESS NOTE ADULT - PROBLEM SELECTOR PLAN 1
s/p 2 intubation this hospitalization.   attributing to sepsis and pneumonia.   now DNI/DNR  c/w HFNC, wean as toelrated  pulmonary toilet.

## 2024-01-30 NOTE — PROGRESS NOTE ADULT - ASSESSMENT
69 YO M with PMHx of dementia, agitation, CVA, dysphagia with PEG, and BPH who presented [facility] for worsening agitation and progressive hypoxia. Patient found with left lung infiltrate and admitted to medicine for AHRF second to CAP with course complicated by mucous plug requiring ETT (1/6-1/12) and ultimately transferred to RCU (1/16). Now on am of 1/22 RRT x 2 for hypoxic respiratory failure and septic shock requiring vasopressor and intubation. Patient is now wean to Midodrine and extubated to HFNC after discussion with HCP. Patient was made DNI/DNR.     NEUROLOGY  # Hx of TBI with neurocognitive disorder and agitation   - Agitation and aggression noted in house with multiple readmissions for agitation.  -  following - w/o past psych hx, recently started agitation and aggressive behavior likely executive/memory dysfunction. Confusion vs poor attention span raises concern for delirium vs r/o new case of early dementia/ consequences of TBI.  - holding home seroquel 225mg BID, depakote 250/125/250  - Case discussed at length with  and last admission TFT, B12, folic acid, B1, and CTH WNL and this admission infection tx   - cont Klonopin 1mg BID    RESPIRATORY  # Acute hypoxemic respiratory failure 2/2 to aspiration vs recurrent mucous plug   - Presented from NH for witnessed aspiration and started on ABX however course complicated by MRSA pneumonia and left lung mucous plug requiring intubation from 1/6-1/12. reintubated 1/22 in setting of mucous plugging and white out of L lung, no plan for trach as per GOC with brother, now extubated to HFNC on 1/26 and DNR/DNI  - spo2 droppping on HFNC 50L/ 60%-> increased to 50L and 70%  - continue airway clearance with Albuterol, Mucomyst, HTS and chest vest  - repeat CXR 1/26 with some improvement  -frequent suctioning for secretions       CARDIOVASCULAR  # Septic vs vasoplegic shock  - off levophed since 11pm 1/26  - during previous stay in ICU , required IV pressors and was weaned off with Midodrine 30 and Droxidopa, currently on midodrine 30 q 8hrs only  - ECHO (1/8) with EF 62, normal LVSF and moderate to severe AR.     HEENT   # Poor oral hygiene   - Biotene BID and RN mouth care     GI  # Dysphagia with PEG   #constipation   - Continue tube feeds as tolerated  - c/w bowel regimen with miralax and senna    RENAL  # Hx of BPH   - passed initially TOV garcia reinserted 1/22   - was on flomax but unclear if receiving if unable to crush via PEG   - Strict Is and Os:  neg 40 overnight/ +13L for LOS making 40-50/ hr   - consider TOV  - will start cardura 1/28 for potential TOV     INFECTIOUS DISEASE  # MRSA PNA   - afebrile, leukocytosis resolved  - RVP/ COVID (1/5) and (1/22) negative   - sputum cx (1/6) with MRSA s/p course of vancomycin (1/6-1/16) (1/22- 1/25)  - MRSA/MSSA PCR + (1/6) and (1/24)    - continue empiric zosyn (1/22-1/30 )    - sputum 1/22 NRF,  UA neg Bcx 1/22 neg       HEME  # Normocytic anemia   -Hgb stable   - DVT PPX with lovenox     ENDOCRINE  - No hx of A1C   - Monitor BG     ETHICS/ GOC    - DNR/DNI  -1/24/24 - had discussion with brother Frederick Paramjit ([phone]) regarding patient current condition and explained the difference between Do Not Resuscitate/ Do Not Intubate versus CPR and Intubation. Explained plan for possible extubation tentatively in the AM. He stated he has had multiple discussions with different providers in the past two weeks regarding GOC, and he would like to do what is best for his brother. He feels that his brother would not want to be dependent on ventilator, and if he fails extubation, he would not want us to reintubate him, instead he would like us to try non-invasive ventilatory support if applicable.   1/26- brother updated regarding extubation, all questions answered.  1/29-spoke with brother Frederick today about patient not needing ICU care, also informed him about patients oxygen requirements and what the next steps are. He is agreeable to comfort care if his brother is in any distress such as morphine gtt. Will continue to follow up

## 2024-01-31 NOTE — PROGRESS NOTE ADULT - PROBLEM SELECTOR PLAN 8
- DNR/DNI  -1/24/24 - had discussion with brother Frederick Paramjit ([phone]) regarding patient current condition and explained the difference between Do Not Resuscitate/ Do Not Intubate versus CPR and Intubation. Explained plan for possible extubation tentatively in the AM. He stated he has had multiple discussions with different providers in the past two weeks regarding GOC, and he would like to do what is best for his brother. He feels that his brother would not want to be dependent on ventilator, and if he fails extubation, he would not want us to reintubate him, instead he would like us to try non-invasive ventilatory support if applicable.   1/26- brother updated regarding extubation, all questions answered.  1/29-spoke with brother Frederick today about patient not needing ICU care, also informed him about patients oxygen requirements and what the next steps are. He is agreeable to comfort care if his brother is in any distress such as morphine gtt. Will continue to follow up   1/31 - spoke to alexismonserrat Mack today as follow up to prior discussion. informed him of increase oxygen needs and suggested goals of care to be with comfort instead of treating other medication conditions, as this will not change his ultimate outcome. He is in agreement with moving toward comfort and symptomatic management. Palliative to follow up with discussion of care.

## 2024-01-31 NOTE — PROGRESS NOTE ADULT - ASSESSMENT
69 YO M with PMHx of dementia, agitation, CVA, dysphagia with PEG, and BPH who presented [facility] for worsening agitation and progressive hypoxia. Patient found with left lung infiltrate and admitted to medicine for AHRF second to CAP with course complicated by mucous plug requiring ETT (1/6-1/12) and ultimately transferred to RCU (1/16). Now on am of 1/22 RRT x 2 for hypoxic respiratory failure and septic shock requiring vasopressor and intubation. Patient is now wean to Midodrine and extubated to HFNC after discussion with HCP. Patient was made DNI/DNR.

## 2024-01-31 NOTE — PROGRESS NOTE ADULT - PROBLEM SELECTOR PLAN 1
- CXRAY 1/31 - Imaging personally reviewed by me -Right lung clear without pleural effusion. Dense retrocardiac opacities  - s/p 1/22 RRT x 2 for hypoxic respiratory failure and septic shock requiring vasopressor and intubation; now extubated  - currently on HFNC  - dyspnea management as below

## 2024-01-31 NOTE — CHART NOTE - NSCHARTNOTEFT_GEN_A_CORE
[facility] NIGHT MEDICINE COVERAGE    Notified by RN that pt desatted to 65% while on HFNC, occurred while repositioning pt.  Pt assessed at bedside.  Pt non-verbal at baseline, unable to participate in review of systems questions.  Pt satting 85% upon writer's arrival, respiratory increased FiO2 to 100%, NRB mask also placed.  Pt repositioned in bed, chest PT performed by RN, and oropharyngeal suctioning performed by writer - pt has occasional cough, wet sounding, secretions noted.  SpO2 improved to 94%.  Pt also has rectal temp of 100.3.    Vital Signs Last 24 Hrs  T(C): 37.3 (2024 00:50), Max: 37.6 (2024 13:01)  T(F): 99.1 (2024 00:50), Max: 99.6 (2024 13:01)  HR: 116 (2024 00:50) (94 - 116)  BP: 150/73 (2024 00:50) (101/42 - 150/73)  RR: 24 (2024 01:00) (17 - 25)  SpO2: 98% (2024 01:00) (66% - 99%)  O2 Parameters below as of 2024 01:00  Patient On (Oxygen Delivery Method): nasal cannula, high flow    Physical Exam:  GS: Frail appearing male, in NAD, awake  Lungs: Rales in L lung base, coarse breath sounds throughout  Heart: Tachycardic, regular, +S1S2, w/o murmur, rub, or gallop  Abdomen: Soft, NT/ND, +BS, no rebound or guarding noted  Extremities: No LE edema noted b/l    Plan:  -Sepsis w/u ordered - CXR, UA, BCx x2, Procalcitonin, CBC+Diff, and BMP.  -ABG ordered for hypoxia.  -Ofirmev 1g IVPB x1 for fever.  -Prior CXR from 2024 reviewed, laying effusion noted on L side, pt appears more comfortable laying while leaning towards L side.  -Will restart Zosyn given concern for aspiration w/ fever.  -Hold tube feeds for now, monitor FS q6h, will start maintenance IV fluid - D5+NS at 50 mL/hr x 10 hours.  -Pt already ordered for airway clearance nebulizers, c/w supportive measures.  -C/w Midodrine 20mg q8h, hold off sepsis bolus as pt is normotensive.  -Case d/w overnight [facility], Dr. Luong, who agrees w/ above assessment and plan.    Above labs were reviewed:  ABG - ( 2024 01:30 )  pH, Arterial: 7.45  pH, Blood: x     /  pCO2: 48    /  pO2: 97    / HCO3: 33    / Base Excess: 8.4   /  SaO2: TNP                     9.7    8.36  )-----------( 196      ( 2024 01:30 )             29.6         140  |  101  |  20  ----------------------------<  109<H>  4.5   |  30  |  0.68    Ca    8.7      2024 01:30  Phos  2.8       Mg     2.00         Urinanalysis Basic (24 @ 03:01):  Color: Yellow / Appearance: Cloudy / S.020 / pH: x  Gluc: x / Ketone: Negative / Bili: Negative / Urobili: 1.0  Blood: x / Protein: 100 / Nitrite: Negative  Leuk Esterase: Negative / RBC: 3 / WBC: 0  Sq Epi: x / Non Sq Epi: x / Bacteria: Negative  Color: x / Appearance: x / SG: x / pH: x  Gluc: 109 / Ketone: x / Bili: x / Urobili: x  Blood: x / Protein: x / Nitrite: x  Leuk Esterase: x / RBC: x / WBC: x  Sq Epi: x / Non Sq Epi: x / Bacteria: x  Color: x / Appearance: x / SG: x / pH: x  Gluc: 129 / Ketone: x / Bili: x / Urobili: x  Blood: x / Protein: x / Nitrite: x  Leuk Esterase: x / RBC: x / WBC: x  Sq Epi: x / Non Sq Epi: x / Bacteria: x  <End of referenced text>    Labs unremarkable, no leukocytosis on CBC, Lactate 0.9, ABG w/o derangement.  Pt now satting 98% on HFNC w/ NRB on reassessment.  -F/u CXR.  -Wean FiO2 as able.  -Will d/w day provider in AM.    D/w RN at bedside.    Manas Lucero PA-C  Department of Hospital Medicine - Night ACP  In-House Pager: #[phone]
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
## 2024-01-31 NOTE — PROGRESS NOTE ADULT - SUBJECTIVE AND OBJECTIVE BOX
[facility]  Manas Samuel MD  In House Pager [phone]    Patient is a 68y old  Male who presents with a chief complaint of agitation (2024 16:07)    SUBJECTIVE / OVERNIGHT EVENTS: more hypoxic overnight, increased O2 req on HFNC. patient seen today, obtunded.     ROS: unable to obtain due to mental status.     MEDICATIONS  (STANDING):  acetylcysteine 20%  Inhalation 4 milliLiter(s) Inhalation every 6 hours  albuterol    0.083% 2.5 milliGRAM(s) Nebulizer every 6 hours  artificial  tears Solution 1 Drop(s) Both EYES every 6 hours  chlorhexidine 2% Cloths 1 Application(s) Topical daily  clonazePAM  Tablet 1 milliGRAM(s) Oral every 12 hours  dextrose 5% + sodium chloride 0.9%. 1000 milliLiter(s) (50 mL/Hr) IV Continuous <Continuous>  doxazosin 1 milliGRAM(s) Oral at bedtime  enoxaparin Injectable 40 milliGRAM(s) SubCutaneous every 24 hours  HYDROmorphone  Injectable 0.2 milliGRAM(s) IV Push every 8 hours  midodrine 20 milliGRAM(s) Oral every 8 hours  pantoprazole   Suspension 40 milliGRAM(s) Oral before breakfast  polyethylene glycol 3350 17 Gram(s) Oral two times a day  senna 1 Tablet(s) Oral every 12 hours  sodium chloride 3%  Inhalation 4 milliLiter(s) Inhalation every 6 hours    MEDICATIONS  (PRN):  acetaminophen     Tablet .. 650 milliGRAM(s) Oral every 6 hours PRN Moderate Pain (4 - 6)  glycopyrrolate Injectable 0.4 milliGRAM(s) IV Push every 6 hours PRN Secretions  HYDROmorphone  Injectable 0.2 milliGRAM(s) IV Push every 2 hours PRN pain/dyspnea    CAPILLARY BLOOD GLUCOSE      POCT Blood Glucose.: 110 mg/dL (2024 11:56)  POCT Blood Glucose.: 127 mg/dL (2024 06:45)  POCT Blood Glucose.: 111 mg/dL (2024 22:51)  POCT Blood Glucose.: 146 mg/dL (2024 17:58)    I&O's Summary    2024 07:01  -  2024 07:00  --------------------------------------------------------  IN: 900 mL / OUT: 2350 mL / NET: -1450 mL    2024 07:01  -  2024 16:40  --------------------------------------------------------  IN: 0 mL / OUT: 700 mL / NET: -700 mL        Vital Signs Last 24 Hrs  T(C): 37.1 (2024 13:00), Max: 37.4 (2024 20:51)  T(F): 98.7 (2024 13:00), Max: 99.3 (2024 20:51)  HR: 100 (:00) (78 - 116)  BP: 108/51 (2024 13:00) (99/45 - 150/73)  BP(mean): --  RR: 19 (2024 13:00) (18 - 26)  SpO2: 100% (2024 13:00) (66% - 100%)    Parameters below as of 2024 13:00  Patient On (Oxygen Delivery Method): nasal cannula, high flow  O2 Flow (L/min): 50  O2 Concentration (%): 100    Physical Exam:  GENERAL: lethargic; on HFNC;   CHEST/LUNG: rapid shallow breathing; difficult to auscultate.   HEART: no obvious audible murmurs; ext warm to touch;   ABDOMEN: Soft, Nontender, Nondistended; +PEG  MSK: no joint or back on palpation; no joint erythema or swelling.   NEUROLOGY: lethargic; contracted; turned on his left side.     LABS:                        9.7    8.36  )-----------( 196      ( 2024 01:30 )             29.6     01-31    140  |  101  |  20  ----------------------------<  109<H>  4.5   |  30  |  0.68    Ca    8.7      2024 01:30  Phos  2.8     -  Mg     2.00     -            Urinalysis Basic - ( 2024 02:02 )    Color: Yellow / Appearance: Cloudy / S.020 / pH: x  Gluc: x / Ketone: Negative mg/dL  / Bili: Negative / Urobili: 1.0 mg/dL   Blood: x / Protein: 100 mg/dL / Nitrite: Negative   Leuk Esterase: Negative / RBC: 3 /HPF / WBC 0 /HPF   Sq Epi: x / Non Sq Epi: 0 /HPF / Bacteria: Negative /HPF        RADIOLOGY & ADDITIONAL TESTS:  Results Reviewed: Y  Imaging Personally Reviewed: Y  Electrocardiogram Personally Reviewed: Y    COORDINATION OF CARE:  Care Discussed with Consultants/Other Providers [Y/N]: Y  Prior or Outpatient Records Reviewed [Y/N]: Y

## 2024-01-31 NOTE — PROGRESS NOTE ADULT - SUBJECTIVE AND OBJECTIVE BOX
Date of Service  : 1/31/2024    INTERVAL HPI/OVERNIGHT EVENTS:  Hospital course complicated by  1/22 RRT x 2 for hypoxic respiratory failure and septic shock requiring vasopressor and intubation; now extubated and DNR/DNI and transferred to general medical floors and now [facility]    SUBJECTIVE AND OBJECTIVE:  Patient seen and examined at bedside. Patient unresponsive; on HFNC with settings 50/100.       Allergies    No Known Allergies    Intolerances    MEDICATIONS  (STANDING):  acetylcysteine 20%  Inhalation 4 milliLiter(s) Inhalation every 6 hours  albuterol    0.083% 2.5 milliGRAM(s) Nebulizer every 6 hours  artificial  tears Solution 1 Drop(s) Both EYES every 6 hours  chlorhexidine 2% Cloths 1 Application(s) Topical daily  clonazePAM  Tablet 1 milliGRAM(s) Oral every 12 hours  dextrose 5% + sodium chloride 0.9%. 1000 milliLiter(s) (50 mL/Hr) IV Continuous <Continuous>  doxazosin 1 milliGRAM(s) Oral at bedtime  enoxaparin Injectable 40 milliGRAM(s) SubCutaneous every 24 hours  HYDROmorphone  Injectable 0.2 milliGRAM(s) IV Push every 8 hours  midodrine 20 milliGRAM(s) Oral every 8 hours  pantoprazole   Suspension 40 milliGRAM(s) Oral before breakfast  polyethylene glycol 3350 17 Gram(s) Oral two times a day  senna 1 Tablet(s) Oral every 12 hours  sodium chloride 3%  Inhalation 4 milliLiter(s) Inhalation every 6 hours    MEDICATIONS  (PRN):  acetaminophen     Tablet .. 650 milliGRAM(s) Oral every 6 hours PRN Moderate Pain (4 - 6)  glycopyrrolate Injectable 0.4 milliGRAM(s) IV Push every 6 hours PRN Secretions  HYDROmorphone  Injectable 0.2 milliGRAM(s) IV Push every 2 hours PRN pain/dyspnea      ITEMS UNCHECKED ARE NOT PRESENT    PRESENT SYMPTOMS: [x ]Unable to self-report due to altered mental status- see [ ] CPOT [ x] PAINADS [ x] RDOS  Source if other than patient:  [ ]Family   [ ]Team     Pain:  [ ]yes [ ]no  QOL impact -   Location -                    Aggravating factors -  Quality -  Radiation -  Timing-  Severity (0-10 scale):  Minimal acceptable level / Pain Goal (0-10 scale):     Dyspnea:                           [ ]Mild [ ]Moderate [ ]Severe  Anxiety:                             [ ]Mild [ ]Moderate [ ]Severe  Agitation:                          [ ]Mild [ ]Moderate [ ]Severe  Fatigue:                             [ ]Mild [ ]Moderate [ ]Severe  Nausea:                             [ ]Mild [ ]Moderate [ ]Severe  Loss of appetite:              [ ]Mild [ ]Moderate [ ]Severe  Constipation:                   [ ]Mild [ ]Moderate [ ]Severe  Diarrhea:                          [ ]Mild [ ]Moderate [ ]Severe    CPOT:    https://www.Psychiatric.org/getattachment/[email or web address]/Critical-Care-Pain-Observation-Tool-(CPOT)    PCSSQ[Palliative Care Spiritual Screening Question]   Severity (0-10):  Score of 4 or > indicate consideration of Chaplaincy referral.  Chaplaincy Referral: [ ] yes [ ] refused [ ] following [x ] deferred    Caregiver Hart[place]? : [ ] yes [ ] no [ ] Declined [x ] Deferred              Social work referral [ ] Patient & Family Centered Care Referral [ ]     Anticipatory Grief present?:  [ ] yes [ ] no  [ x] Deferred                  Social work referral [ ] Chaplaincy Referral[ ]    Other Symptoms:  [ ]All other review of systems negative   [x ] Unable to obtain due to poor mentation     PHYSICAL EXAM:  Vital Signs Last 24 Hrs  Vital Signs Last 24 Hrs  T(C): 37.1 (31 Jan 2024 13:00), Max: 37.4 (30 Jan 2024 20:51)  T(F): 98.7 (31 Jan 2024 13:00), Max: 99.3 (30 Jan 2024 20:51)  HR: 100 (31 Jan 2024 13:00) (78 - 116)  BP: 108/51 (31 Jan 2024 13:00) (99/45 - 150/73)  BP(mean): --  RR: 19 (31 Jan 2024 13:00) (18 - 26)  SpO2: 100% (31 Jan 2024 13:00) (66% - 100%)    Parameters below as of 31 Jan 2024 13:00  Patient On (Oxygen Delivery Method): nasal cannula, high flow  O2 Flow (L/min): 50  O2 Concentration (%): 100     GENERAL:  [ ]Awake  [ ]Oriented    [ ]Lethargic  [ ]Cachexia  [ x]Unarousable   [x ]Non-Verbal   Behavioral:   [ ] Anxiety  [ ] Delirium [ ] Agitation [ ] Calm  [x ] Other-encephalopathy   HEENT:  [ ]Normal  [ ] Temporal Wasting  [x ]Dry mouth   [ ]ET Tube/Trach  [ ]Oral lesions  [ ] Mucositis  PULMONARY:   [ ]Clear [ ]Tachypnea  [ ]Audible excessive secretions   [ ]Rhonchi        [ ]Right [ ]Left [ ]Bilateral  [ ]Crackles        [ ]Right [ ]Left [ ]Bilateral  [ ]Wheezing     [ ]Right [ ]Left [ ]Bilateral  [ x]Diminished breath sounds [ ]right [ ]left [ x]bilateral  CARDIOVASCULAR:    [ x]Regular [ ]Irregular [ ]Tachy  [ ]Joey [ ]Murmur [ ]Other  GASTROINTESTINAL:  [ x]Soft  [ ]Distended   [ ]+BS  [ x]Non tender [ ]Tender  [x ]PEG [ ]OGT/ NGT  Last BM: 1/29  GENITOURINARY:  [ ]Normal [ ] Incontinent   [ ]Oliguria/Anuria   [ x][place]  MUSCULOSKELETAL:   [ ]Normal   [ ]Weakness  [ x]Bed/Wheelchair bound [ ]Edema  [  ] amputation  [  ] contraction  NEUROLOGIC:   [ ]No focal deficits  [ ]Cognitive impairment  [ x]Dysphagia [ ]Dysarthria [ ]Paresis [ x]Other -encephalopathy  SKIN: See Nursing Skin Assessment for further details  [ ]Normal    [ ]Rash  [ ]Pressure ulcer(s)       Present on admission [ ]y [ ]n   [  ]  Wound    [  ] hyperpigmentation    CRITICAL CARE:  [ ]Shock Present  [ ]Septic [ ]Cardiogenic [ ]Neurologic [ ]Hypovolemic  [ ]Vasopressors [ ]Inotropes  [ x]Respiratory failure present [ ]Mechanical Ventilation [ ]Non-invasive ventilatory support [x ]High-Flow   [ ]Acute  [ ]Chronic [x ]Hypoxic  [ ]Hypercarbic [ ]Other  [ ]Other organ failure     LABS:  reviewed                                         9.7    8.36  )-----------( 196      ( 31 Jan 2024 01:30 )             29.6       01-31    140  |  101  |  20  ----------------------------<  109<H>  4.5   |  30  |  0.68    Ca    8.7      31 Jan 2024 01:30  Phos  2.8     01-31  Mg     2.00     01-31    RADIOLOGY & ADDITIONAL STUDIES: reviewed     Protein Calorie Malnutrition Present: [ ]mild [ ]moderate [ x]severe [ ]underweight [ ]morbid obesity  https://www.andeal.org/vault/2440/web/files/ONC/Table_Clinical%20Characteristics%20to%20Document%20Malnutrition-White%20JV%20et%20al%202012.pdf    Height (cm): 167.6 (01-05-24 @ 11:59), 167.6 (12-28-23 @ 19:19), 167.6 (12-15-23 @ 04:29)  Weight (kg): 67 (01-06-24 @ 05:01), 70.3 (12-15-23 @ 04:29)  BMI (kg/m2): 23.9 (01-06-24 @ 05:01), 25 (01-05-24 @ 11:59), 25 (12-28-23 @ 19:19)    [ x]PPSV2 < or = 30%  [ ]significant weight loss [ ]poor nutritional intake [ ]anasarca   [ x]Artificial Nutrition    REFERRALS:   [ ]Chaplaincy  [ ]Hospice  [ ]Child Life  [ ]Social Work  [ x]Case management [ ]Holistic Therapy

## 2024-01-31 NOTE — GOALS OF CARE CONVERSATION - ADVANCED CARE PLANNING - CONVERSATION/DISCUSSION
Diagnosis/MOLST Discussed/Treatment Options
MOLST Discussed
Diagnosis/Prognosis/Treatment Options
MOLST Discussed

## 2024-02-01 NOTE — PROGRESS NOTE ADULT - SUBJECTIVE AND OBJECTIVE BOX
[facility] Division of Hospital Medicine  Manas Samuel MD  In House Pager [phone]    Patient is a 68y old  Male who presents with a chief complaint of agitation (2024 17:27)    SUBJECTIVE / OVERNIGHT EVENTS: no acute events. unresponsive.     ROS: unable to obtain due to mental status.     MEDICATIONS  (STANDING):  acetylcysteine 20%  Inhalation 4 milliLiter(s) Inhalation every 6 hours  albuterol    0.083% 2.5 milliGRAM(s) Nebulizer every 6 hours  artificial  tears Solution 1 Drop(s) Both EYES every 6 hours  chlorhexidine 2% Cloths 1 Application(s) Topical daily  clonazePAM  Tablet 1 milliGRAM(s) Oral every 12 hours  dextrose 5% + sodium chloride 0.9%. 1000 milliLiter(s) (50 mL/Hr) IV Continuous <Continuous>  doxazosin 1 milliGRAM(s) Oral at bedtime  enoxaparin Injectable 40 milliGRAM(s) SubCutaneous every 24 hours  HYDROmorphone  Injectable 0.2 milliGRAM(s) IV Push every 8 hours  midodrine 20 milliGRAM(s) Oral every 8 hours  pantoprazole   Suspension 40 milliGRAM(s) Oral before breakfast  polyethylene glycol 3350 17 Gram(s) Oral two times a day  senna 1 Tablet(s) Oral every 12 hours  sodium chloride 3%  Inhalation 4 milliLiter(s) Inhalation every 6 hours    MEDICATIONS  (PRN):  acetaminophen     Tablet .. 650 milliGRAM(s) Oral every 6 hours PRN Moderate Pain (4 - 6)  glycopyrrolate Injectable 0.4 milliGRAM(s) IV Push every 6 hours PRN Secretions  HYDROmorphone  Injectable 0.2 milliGRAM(s) IV Push every 2 hours PRN pain/dyspnea    CAPILLARY BLOOD GLUCOSE      POCT Blood Glucose.: 89 mg/dL (2024 12:15)  POCT Blood Glucose.: 95 mg/dL (2024 22:12)  POCT Blood Glucose.: 104 mg/dL (2024 17:31)    I&O's Summary    2024 07:01  -  2024 07:00  --------------------------------------------------------  IN: 1000 mL / OUT: 1100 mL / NET: -100 mL    2024 07:01  -  2024 16:26  --------------------------------------------------------  IN: 360 mL / OUT: 340 mL / NET: 20 mL        Vital Signs Last 24 Hrs  T(C): 37.2 (2024 06:17), Max: 37.2 (2024 06:17)  T(F): 98.9 (2024 06:17), Max: 98.9 (2024 06:17)  HR: 105 (2024 15:32) (83 - 109)  BP: 104/44 (2024 13:59) (104/44 - 115/60)  BP(mean): --  RR: 16 (2024 15:32) (16 - 22)  SpO2: 100% (2024 15:32) (97% - 100%)    Parameters below as of 2024 15:32  Patient On (Oxygen Delivery Method): nasal cannula, high flow  O2 Flow (L/min): 50  O2 Concentration (%): 100    Physical Exam:  GENERAL: cachetic male; diffuse muscle wasting; contracted.   CHEST/LUNG: Clear to auscultation bilaterally; No wheezing; No crackles  HEART: no obvious audible murmurs; ext warm to touch; No edema  ABDOMEN: Soft, Nontender, Nondistended; Bowel sounds present  MSK: no joint or back on palpation; no joint erythema or swelling.   NEUROLOGY: obtunded;     LABS:                        9.7    8.36  )-----------( 196      ( 2024 01:30 )             29.6     -31    140  |  101  |  20  ----------------------------<  109<H>  4.5   |  30  |  0.68    Ca    8.7      2024 01:30  Phos  2.8     01-31  Mg     2.00     01-31            Urinalysis Basic - ( 2024 02:02 )    Color: Yellow / Appearance: Cloudy / S.020 / pH: x  Gluc: x / Ketone: Negative mg/dL  / Bili: Negative / Urobili: 1.0 mg/dL   Blood: x / Protein: 100 mg/dL / Nitrite: Negative   Leuk Esterase: Negative / RBC: 3 /HPF / WBC 0 /HPF   Sq Epi: x / Non Sq Epi: 0 /HPF / Bacteria: Negative /HPF        Culture - Blood (collected 2024 02:20)  Source: .Blood Blood-Peripheral  Preliminary Report (2024 10:01):    No growth at 24 hours    Culture - Blood (collected 2024 01:30)  Source: .Blood Blood-Peripheral  Preliminary Report (2024 10:01):    No growth at 24 hours      RADIOLOGY & ADDITIONAL TESTS:  Results Reviewed: Y  Imaging Personally Reviewed: Y  Electrocardiogram Personally Reviewed: Y    COORDINATION OF CARE:  Care Discussed with Consultants/Other Providers [Y/N]: Y  Prior or Outpatient Records Reviewed [Y/N]: Y

## 2024-02-01 NOTE — PROGRESS NOTE ADULT - PROBLEM SELECTOR PLAN 8
- DNR/DNI  -1/24/24 - had discussion with brother Frederick Solissilvestre ([phone]) regarding patient current condition and explained the difference between Do Not Resuscitate/ Do Not Intubate versus CPR and Intubation. Explained plan for possible extubation tentatively in the AM. He stated he has had multiple discussions with different providers in the past two weeks regarding GOC, and he would like to do what is best for his brother. He feels that his brother would not want to be dependent on ventilator, and if he fails extubation, he would not want us to reintubate him, instead he would like us to try non-invasive ventilatory support if applicable.   1/26- brother updated regarding extubation, all questions answered.  1/29-spoke with brother Frederick today about patient not needing ICU care, also informed him about patients oxygen requirements and what the next steps are. He is agreeable to comfort care if his brother is in any distress such as morphine gtt. Will continue to follow up   1/31 - spoke to alexismonserrat Mack today as follow up to prior discussion. informed him of increase oxygen needs and suggested goals of care to be with comfort instead of treating other medication conditions, as this will not change his ultimate outcome. He is in agreement with moving toward comfort and symptomatic management. Palliative to follow up with discussion of care, now full comfort care. no blood draws or testing.

## 2024-02-01 NOTE — PROGRESS NOTE ADULT - ASSESSMENT
69 YO M with PMHx of dementia, agitation, CVA, dysphagia with PEG, and BPH who presented [facility] for worsening agitation and progressive hypoxia. Patient found with left lung infiltrate and admitted to medicine for AHRF second to CAP with course complicated by mucous plug requiring ETT (1/6-1/12) and ultimately transferred to RCU (1/16). Now on am of 1/22 RRT x 2 for hypoxic respiratory failure and septic shock requiring vasopressor and intubation. Patient is now wean to Midodrine and extubated to HFNC after discussion with HCP. Patient was made DNI/DNR/comfort care.

## 2024-02-01 NOTE — PROGRESS NOTE ADULT - PROBLEM SELECTOR PLAN 1
s/p 2 intubation this hospitalization.   attributing to sepsis and pneumonia.   now DNI/DNR/comfort care.   c/w HFNC, wean as toelrated  pulmonary toilet.  no further abx.

## 2024-02-02 NOTE — ADVANCED PRACTICE NURSE CONSULT - ASSESSMENT
General: A&Ox0, on high flow nasal cannula with FIO2 70% - patient noted to have nasal secretions dripping from nostrils, creating maceration and denudation exposing red moist dermis to septum; area not noted to be in contact with high flow cannula. Able to be turned with 2x assistance, incontinent of stool. Indwelling garcia catheter in place. Skin warm, dry with increased moisture in intertriginous folds, scattered areas of hyperpigmentation and hypopigmentation, scattered areas of ecchymosis without hematoma on bilateral upper extremities. Blanchable erythema noted to right elbow. Patient at risk for incontinence associated dermatitis to b/l buttocks; no open ulcerations noted, hyperpigmentation noted to buttocks. LUQ with PEG tube, peritubular skin intact.     Left forearm noted with atypical, irregularly bordered blanchable erythema measuring [id number] with mild induration; patient noted to have similar atypical wound presentations to generalized areas (back, hip, arms). Periwound intact. No increased warmth, no drainage, no erythema, no fluctuance, no edema, no overt signs of infection noted at this time.     Vascular of b/l lower extremities: Bilateral lower extremities with scattered areas of hyper and hypopigmentation. Dry, flaky skin. Thickened-yellow hyperpigmented overgrown toenails. Multiple wounds present. Increased coolness from midfoot to distal toes. +1 Edema. Capillary refill less than 3 seconds. +2 DP/PT pulses. Blanchable erythema noted to b/l heels.     Left trochanter: Wound of unknown etiology. Frictional vs atypical vs SCALES. Presenting as 3 clusters measured as [id number]. Presenting as three very irregularly bordered skin changes with 60% blanchable erythema and 40% purple maroon discoloration. Not noted to be over direct domingo prominence. Noted to be on subcutaneous tissue. Mild induration noted to each of the 3 areas. Periwound intact. No increased warmth, no drainage, no erythema, no fluctuance, no edema, no overt signs of infection noted at this time. Goals of care: comfort measures and continue measures to decrease friction/shear/pressure.     Left upper back: Wound of unknown etiology. Frictional vs atypical vs SCALES. Presenting as 2 clusters measured as [id number]. Presenting as two very irregularly bordered skin changes with 50% blanchable erythema and 50% purple maroon discoloration. Not noted to be over direct domingo prominence. Noted to be on subcutaneous tissue. Mild induration noted to each of the 2 areas. Periwound intact. No increased warmth, no drainage, no erythema, no fluctuance, no edema, no overt signs of infection noted at this time. Goals of care: comfort measures and continue measures to decrease friction/shear/pressure.

## 2024-02-02 NOTE — ADVANCED PRACTICE NURSE CONSULT - REASON FOR CONSULT
Patient seen on skin care rounds after wound care referral received for assessment of skin impairment and recommendations of topical management. As per H&P, patient is a 68M with PMHx CVA with dysphagia s/p PEG, CAD, orthostatic hypotension on midodrine, chronic hypoxemic respiratory failure with O2 dependence (2-3L NC), and recurrent hospitalizations for agitation now presenting to [facility] on 1/5/24 with recurrent episodes of agitation now with acute hypoxemic respiratory distress requiring new O2 supplementation admitted to medicine service with hospital course c/b progressive hypoxemia s/p emergent ETT intubation/MV (1/6-1/10) and septic/vasoplegic shock extubated to HFNC and transferred to RCU 1/16 for further management. ). Now on am of 1/22 RRT x 2 for hypoxic respiratory failure and septic shock requiring vasopressor and intubation. Patient is now wean to Midodrine and extubated to HFNC after discussion with HCP. Patient was made DNI/DNR and comfort measures. Chart reviewed: WBC 8.36, H/H 9.7/29.6, Platelets 196, INR 1.71, Serum albumin 2.9, A1C 4.7, BMI 23.1, Trent 10.

## 2024-02-02 NOTE — PROGRESS NOTE ADULT - SUBJECTIVE AND OBJECTIVE BOX
[facility]  Manas Samuel MD  In House Pager [phone]    Patient is a 68y old  Male who presents with a chief complaint of agitation (01 Feb 2024 16:25)    SUBJECTIVE / OVERNIGHT EVENTS: no acute events. remain obtunded.     ROS: unable to obtain due to mental status.     MEDICATIONS  (STANDING):  acetylcysteine 20%  Inhalation 4 milliLiter(s) Inhalation every 6 hours  albuterol    0.083% 2.5 milliGRAM(s) Nebulizer every 6 hours  artificial  tears Solution 1 Drop(s) Both EYES every 6 hours  chlorhexidine 2% Cloths 1 Application(s) Topical daily  clonazePAM  Tablet 1 milliGRAM(s) Oral every 12 hours  dextrose 5% + sodium chloride 0.9%. 1000 milliLiter(s) (50 mL/Hr) IV Continuous <Continuous>  HYDROmorphone  Injectable 0.2 milliGRAM(s) IV Push every 8 hours  polyethylene glycol 3350 17 Gram(s) Oral two times a day  sodium chloride 3%  Inhalation 4 milliLiter(s) Inhalation every 6 hours    MEDICATIONS  (PRN):  glycopyrrolate Injectable 0.4 milliGRAM(s) IV Push every 6 hours PRN Secretions  HYDROmorphone  Injectable 0.2 milliGRAM(s) IV Push every 2 hours PRN pain/dyspnea    CAPILLARY BLOOD GLUCOSE        I&O's Summary    01 Feb 2024 07:01  -  02 Feb 2024 07:00  --------------------------------------------------------  IN: 840 mL / OUT: 440 mL / NET: 400 mL    02 Feb 2024 07:01  -  02 Feb 2024 15:17  --------------------------------------------------------  IN: 600 mL / OUT: 0 mL / NET: 600 mL        Vital Signs Last 24 Hrs  T(C): 37.2 (01 Feb 2024 20:03), Max: 37.2 (01 Feb 2024 20:03)  T(F): 99 (01 Feb 2024 20:03), Max: 99 (01 Feb 2024 20:03)  HR: 111 (02 Feb 2024 07:54) (97 - 111)  BP: 131/62 (01 Feb 2024 20:03) (119/75 - 131/62)  BP(mean): --  RR: 20 (02 Feb 2024 11:38) (16 - 20)  SpO2: 95% (02 Feb 2024 11:38) (95% - 100%)    Parameters below as of 02 Feb 2024 11:38  Patient On (Oxygen Delivery Method): nasal cannula, high flow  O2 Flow (L/min): 50  O2 Concentration (%): 90    Physical Exam:  GENERAL: cachetic male.   CHEST/LUNG: on HFNC; Clear to auscultation bilaterally; No wheezing; No crackles  HEART: no obvious audible murmurs; ext warm to touch; No edema  ABDOMEN: Soft, Nontender, Nondistended; Bowel sounds present  MSK: no joint or back on palpation; no joint erythema or swelling.   NEUROLOGY: obtunded; contracted extremities.     LABS:                    Culture - Blood (collected 31 Jan 2024 02:20)  Source: .Blood Blood-Peripheral  Preliminary Report (02 Feb 2024 10:01):    No growth at 48 Hours    Culture - Blood (collected 31 Jan 2024 01:30)  Source: .Blood Blood-Peripheral  Preliminary Report (02 Feb 2024 10:01):    No growth at 48 Hours      RADIOLOGY & ADDITIONAL TESTS:  Results Reviewed: Y  Imaging Personally Reviewed: Y  Electrocardiogram Personally Reviewed: Y    COORDINATION OF CARE:  Care Discussed with Consultants/Other Providers [Y/N]: Y  Prior or Outpatient Records Reviewed [Y/N]: Y

## 2024-02-02 NOTE — ADVANCED PRACTICE NURSE CONSULT - RECOMMEDATIONS
Appreciate continued care from Palliative team.     Topical recommendations:     Sacrum to b/l buttocks: Cleanse with skin cleanser, pat dry. Apply [place] moisture barrier cream twice a day and PRN with incontinent episodes.     Left trochanter/upper back: Cleanse with NS, pat dry. Paint with Liquid barrier film and allow to dry. Cover with silicone foam with border. Change daily and PRN if soiled.     Peritubular site (PEG tube): Cleanse q shift with NS, apply liquid barrier film beneath trisha disc.  If redness noted under trisha disc bumper apply thin foam  dressing without border (Mepilex Lite)- cut to mid dressing with a 'Y' shape.   Secondary securement to abdomen taping in 'H' fashion with Steri-strips.     To area of increased moisture under septum, gently cleanse with NS, pat dry. Apply small thin foam dressing without border (cut mepilex lite foam dressing to size) to area of denudation/maceration - avoid obscuring nostrils. Change daily and PRN if soiled.     Apply Sween 24 Moisturizer to b/l UE and LE daily, avoiding in between the toes.     Continue low air loss bed therapy, continue heel elevation, continue to turn & reposition as per protocol, soft pillow between bony prominences, continue moisture management with barrier creams & single breathable pad, continue measures to decrease friction/shear/pressure. Continue with nutritional support as per dietary/orders.     Plan of care discussed with Primary RN Mel and     Please contact Wound/Ostomy Care Service Line if we can be of further assistance (ext [phone]).  Appreciate continued care from Palliative team.     Topical recommendations:     Sacrum to b/l buttocks: Cleanse with skin cleanser, pat dry. Apply [place] moisture barrier cream twice a day and PRN with incontinent episodes.     Left trochanter/upper back: Cleanse with NS, pat dry. Paint with Liquid barrier film and allow to dry. Apply daily.    Peritubular site (PEG tube): Cleanse q shift with NS, apply liquid barrier film beneath trisha disc. If redness noted under trisha disc bumper apply thin foam  dressing without border (Mepilex Lite)- cut to mid dressing with a 'Y' shape.   Secondary securement to abdomen taping in 'H' fashion with Steri-strips.     To area of increased moisture under septum, gently cleanse with NS, pat dry. Apply small thin foam dressing without border (cut mepilex lite foam dressing to size) to area of denudation/maceration - avoid obscuring nostrils. Change daily and PRN if soiled.     Apply Sween 24 Moisturizer to b/l UE and LE daily, avoiding in between the toes.     Continue low air loss bed therapy, continue heel elevation, continue to turn & reposition as per protocol, soft pillow between bony prominences, continue moisture management with barrier creams & single breathable pad, continue measures to decrease friction/shear/pressure. Continue with nutritional support as per dietary/orders.     Plan of care discussed with Primary RN Mel and     Please contact Wound/Ostomy Care Service Line if we can be of further assistance (ext [phone]).  Appreciate continued care from Palliative team.     Topical recommendations:     Sacrum to b/l buttocks: Cleanse with skin cleanser, pat dry. Apply [place] moisture barrier cream twice a day and PRN with incontinent episodes.     Left trochanter/upper back: Cleanse with NS, pat dry. Paint with Liquid barrier film and allow to dry. Apply daily.    Peritubular site (PEG tube): Cleanse q shift with NS, apply liquid barrier film beneath trisha disc. If redness noted under trisha disc bumper apply thin foam  dressing without border (Mepilex Lite)- cut to mid dressing with a 'Y' shape.   Secondary securement to abdomen taping in 'H' fashion with Steri-strips.     To area of increased moisture under septum, gently cleanse with NS, pat dry. Apply small thin foam dressing without border (cut mepilex lite foam dressing to size) to area of denudation/maceration - avoid obscuring nostrils. Change daily and PRN if soiled.     Apply Sween 24 Moisturizer to b/l UE and LE daily, avoiding in between the toes.     Continue low air loss bed therapy, continue heel elevation, continue to turn & reposition as per protocol, soft pillow between bony prominences, continue moisture management with barrier creams & single breathable pad, continue measures to decrease friction/shear/pressure. Continue with nutritional support as per dietary/orders.     Plan of care discussed with Primary RN VEGA Leal and Chaim Mcbride (PA)    Please contact Wound/Ostomy Care Service Line if we can be of further assistance (ext [phone]).

## 2024-02-02 NOTE — CHART NOTE - NSCHARTNOTEFT_GEN_A_CORE
Patient on HFNC with settings of 50/90.   Patient appeared comfortable; no dyspnea noted.     - Continue IV Dilaudid 0.2mg q8 ATC  - Continue IV Dilaudid 0.2mg q2 PRN pain/dyspnea  - Continue IV Robinul 0.4mg q6 PRN secretions.    In the event of newly developing, evolving, or worsening symptoms, please contact the Palliative Medicine team via pager (if the patient is at [facility] #[phone] or if the patient is at [facility] #[phone]) The Geriatric and Palliative Medicine service has coverage 24 hours a day/ 7 days a week to provide medical recommendations regarding symptom management needs via telephone.

## 2024-02-02 NOTE — PROGRESS NOTE ADULT - PROBLEM SELECTOR PLAN 8
- DNR/DNI  -1/24/24 - had discussion with brother Frederick Solissilvestre ([phone]) regarding patient current condition and explained the difference between Do Not Resuscitate/ Do Not Intubate versus CPR and Intubation. Explained plan for possible extubation tentatively in the AM. He stated he has had multiple discussions with different providers in the past two weeks regarding GOC, and he would like to do what is best for his brother. He feels that his brother would not want to be dependent on ventilator, and if he fails extubation, he would not want us to reintubate him, instead he would like us to try non-invasive ventilatory support if applicable.   1/26- brother updated regarding extubation, all questions answered.  1/29-spoke with brother Frederick today about patient not needing ICU care, also informed him about patients oxygen requirements and what the next steps are. He is agreeable to comfort care if his brother is in any distress such as morphine gtt. Will continue to follow up   1/31 - spoke to alexismonserrat Mack today as follow up to prior discussion. informed him of increase oxygen needs and suggested goals of care to be with comfort instead of treating other medication conditions, as this will not change his ultimate outcome. He is in agreement with moving toward comfort and symptomatic management. Palliative to follow up with discussion of care, now full comfort care. no blood draws or testing.

## 2024-02-02 NOTE — PROGRESS NOTE ADULT - ASSESSMENT
69 YO M with PMHx of dementia, agitation, CVA, dysphagia with PEG, and BPH who presented [facility] for worsening agitation and progressive hypoxia. Patient found with left lung infiltrate and admitted to medicine for AHRF second to CAP with course complicated by mucous plug requiring ETT (1/6-1/12) and ultimately transferred to RCU (1/16). Now on am of 1/22 RRT x 2 for hypoxic respiratory failure and septic shock requiring vasopressor and intubation. Patient is now wean to Midodrine and extubated to HFNC after discussion with HCP. Patient was made DNI/DNR/comfort care.

## 2024-02-03 NOTE — PROGRESS NOTE ADULT - PROBLEM SELECTOR PLAN 1
s/p 2 intubation this hospitalization.   attributing to sepsis and pneumonia.   now DNI/DNR/comfort care.   c/w HFNC, now maximized, no further escalation of O2.   pulmonary toilet.  no further abx.

## 2024-02-03 NOTE — PROGRESS NOTE ADULT - SUBJECTIVE AND OBJECTIVE BOX
[facility] Division of Hospital Medicine  Manas Samuel MD  In House Pager [phone]    Patient is a 68y old  Male who presents with a chief complaint of agitation (02 Feb 2024 15:17)    SUBJECTIVE / OVERNIGHT EVENTS: no acute events. obtunded.     ROS: unable to obtain due to mental status.     MEDICATIONS  (STANDING):  acetylcysteine 20%  Inhalation 4 milliLiter(s) Inhalation every 6 hours  albuterol    0.083% 2.5 milliGRAM(s) Nebulizer every 6 hours  artificial  tears Solution 1 Drop(s) Both EYES every 6 hours  chlorhexidine 2% Cloths 1 Application(s) Topical daily  clonazePAM  Tablet 1 milliGRAM(s) Oral every 12 hours  dextrose 5% + sodium chloride 0.9%. 1000 milliLiter(s) (50 mL/Hr) IV Continuous <Continuous>  HYDROmorphone  Injectable 0.2 milliGRAM(s) IV Push every 8 hours  polyethylene glycol 3350 17 Gram(s) Oral two times a day  sodium chloride 3%  Inhalation 4 milliLiter(s) Inhalation every 6 hours    MEDICATIONS  (PRN):  glycopyrrolate Injectable 0.4 milliGRAM(s) IV Push every 6 hours PRN Secretions  HYDROmorphone  Injectable 0.2 milliGRAM(s) IV Push every 2 hours PRN pain/dyspnea    CAPILLARY BLOOD GLUCOSE        I&O's Summary    02 Feb 2024 07:01  -  03 Feb 2024 07:00  --------------------------------------------------------  IN: 2400 mL / OUT: 300 mL / NET: 2100 mL    03 Feb 2024 07:01  -  03 Feb 2024 16:12  --------------------------------------------------------  IN: 500 mL / OUT: 0 mL / NET: 500 mL        Vital Signs Last 24 Hrs  T(C): 36.5 (03 Feb 2024 13:36), Max: 37.2 (02 Feb 2024 19:36)  T(F): 97.7 (03 Feb 2024 13:36), Max: 99 (02 Feb 2024 19:36)  HR: 119 (03 Feb 2024 13:36) (98 - 119)  BP: 101/59 (03 Feb 2024 13:36) (98/68 - 101/59)  BP(mean): --  RR: 20 (03 Feb 2024 13:36) (16 - 20)  SpO2: 97% (03 Feb 2024 13:36) (94% - 98%)    Parameters below as of 03 Feb 2024 13:36  Patient On (Oxygen Delivery Method): nasal cannula, high flow  O2 Flow (L/min): 50  O2 Concentration (%): 100    Physical Exam:  GENERAL: cachetic male; on HFNC, non-distress   CHEST/LUNG: Clear to auscultation bilaterally; No wheezing; No crackles  HEART: no obvious audible murmurs; ext warm to touch; No edema  ABDOMEN: Soft, Nontender, Nondistended; Bowel sounds present  NEUROLOGY: non-responsive to stimuli.     LABS:                    RADIOLOGY & ADDITIONAL TESTS:  Results Reviewed: Y  Imaging Personally Reviewed: Y  Electrocardiogram Personally Reviewed: Y    COORDINATION OF CARE:  Care Discussed with Consultants/Other Providers [Y/N]: Y  Prior or Outpatient Records Reviewed [Y/N]: Y

## 2024-02-03 NOTE — PROGRESS NOTE ADULT - ASSESSMENT
67 YO M with PMHx of dementia, agitation, CVA, dysphagia with PEG, and BPH who presented [facility] for worsening agitation and progressive hypoxia. Patient found with left lung infiltrate and admitted to medicine for AHRF second to CAP with course complicated by mucous plug requiring ETT (1/6-1/12) and ultimately transferred to RCU (1/16). Now on am of 1/22 RRT x 2 for hypoxic respiratory failure and septic shock requiring vasopressor and intubation. Patient is now wean to Midodrine and extubated to HFNC after discussion with HCP. Patient was made DNI/DNR/comfort care.

## 2024-02-04 NOTE — PROGRESS NOTE ADULT - SUBJECTIVE AND OBJECTIVE BOX
[facility] Division of Hospital Medicine  Manas Samuel MD  In House Pager [phone]    Patient is a 68y old  Male who presents with a chief complaint of agitation (03 Feb 2024 16:11)    SUBJECTIVE / OVERNIGHT EVENTS: no acute events.     ROS: Denied Fever, Chill, CP, SOB, Abd pain, N/V/D, LE swelling or pain.     MEDICATIONS  (STANDING):  acetylcysteine 20%  Inhalation 4 milliLiter(s) Inhalation every 6 hours  albuterol    0.083% 2.5 milliGRAM(s) Nebulizer every 6 hours  artificial  tears Solution 1 Drop(s) Both EYES every 6 hours  chlorhexidine 2% Cloths 1 Application(s) Topical daily  clonazePAM  Tablet 1 milliGRAM(s) Oral every 12 hours  HYDROmorphone  Injectable 0.2 milliGRAM(s) IV Push every 8 hours  polyethylene glycol 3350 17 Gram(s) Oral two times a day  sodium chloride 3%  Inhalation 4 milliLiter(s) Inhalation every 6 hours    MEDICATIONS  (PRN):  glycopyrrolate Injectable 0.4 milliGRAM(s) IV Push every 6 hours PRN Secretions  HYDROmorphone  Injectable 0.2 milliGRAM(s) IV Push every 2 hours PRN pain/dyspnea    CAPILLARY BLOOD GLUCOSE        I&O's Summary    03 Feb 2024 07:01  -  04 Feb 2024 07:00  --------------------------------------------------------  IN: 1350 mL / OUT: 500 mL / NET: 850 mL        Vital Signs Last 24 Hrs  T(C): 36.6 (04 Feb 2024 05:00), Max: 36.6 (04 Feb 2024 05:00)  T(F): 97.9 (04 Feb 2024 05:00), Max: 97.9 (04 Feb 2024 05:00)  HR: 125 (04 Feb 2024 05:00) (119 - 125)  BP: 92/60 (04 Feb 2024 05:00) (92/60 - 102/57)  BP(mean): --  RR: 20 (04 Feb 2024 06:47) (18 - 20)  SpO2: 90% (04 Feb 2024 06:47) (75% - 97%)    Parameters below as of 04 Feb 2024 06:47  Patient On (Oxygen Delivery Method): nasal cannula, high flow  O2 Flow (L/min): 50  O2 Concentration (%): 100    Physical Exam:  GENERAL: cachetic male; on HFNC, non-distress   CHEST/LUNG: Clear to auscultation bilaterally; No wheezing; No crackles  HEART: no obvious audible murmurs; ext warm to touch; No edema  ABDOMEN: Soft, Nontender, Nondistended; Bowel sounds present  NEUROLOGY: non-responsive to stimuli.     LABS:                    RADIOLOGY & ADDITIONAL TESTS:  Results Reviewed: Y  Imaging Personally Reviewed: Y  Electrocardiogram Personally Reviewed: Y    COORDINATION OF CARE:  Care Discussed with Consultants/Other Providers [Y/N]: Y  Prior or Outpatient Records Reviewed [Y/N]: Y

## 2024-02-04 NOTE — DISCHARGE NOTE PROVIDER - HOSPITAL COURSE
69 YO M with PMHx of dementia, agitation, CVA, dysphagia with PEG, and BPH who presented [facility] for worsening agitation and progressive hypoxia. Patient found with left lung infiltrate and admitted to medicine for AHRF second to CAP with course complicated by mucous plug requiring ETT (1/6-1/12) and ultimately transferred to RCU (1/16). Now on am of 1/22 RRT x 2 for hypoxic respiratory failure and septic shock requiring vasopressor and intubation. Patient is now extubated to [facility] and made DNI/DNR/comfort care. no further escalation of care.

## 2024-02-04 NOTE — PROGRESS NOTE ADULT - PROBLEM SELECTOR PLAN 8
- DNR/DNI  -1/24/24 - had discussion with brother Frederick Solissilvestre ([phone]) regarding patient current condition and explained the difference between Do Not Resuscitate/ Do Not Intubate versus CPR and Intubation. Explained plan for possible extubation tentatively in the AM. He stated he has had multiple discussions with different providers in the past two weeks regarding GOC, and he would like to do what is best for his brother. He feels that his brother would not want to be dependent on ventilator, and if he fails extubation, he would not want us to reintubate him, instead he would like us to try non-invasive ventilatory support if applicable.   1/26- brother updated regarding extubation, all questions answered.  1/29-spoke with brother Frederick today about patient not needing ICU care, also informed him about patients oxygen requirements and what the next steps are. He is agreeable to comfort care if his brother is in any distress such as morphine gtt. Will continue to follow up   1/31 - spoke to alexismonserrat Mack today as follow up to prior discussion. informed him of increase oxygen needs and suggested goals of care to be with comfort instead of treating other medication conditions, as this will not change his ultimate outcome. He is in agreement with moving toward comfort and symptomatic management. Palliative to follow up with discussion of care, now full comfort care. no blood draws or testing. - DNR/DNI  -1/24/24 - had discussion with brother Frederick Rutherfordpj ([phone]) regarding patient current condition and explained the difference between Do Not Resuscitate/ Do Not Intubate versus CPR and Intubation. Explained plan for possible extubation tentatively in the AM. He stated he has had multiple discussions with different providers in the past two weeks regarding GOC, and he would like to do what is best for his brother. He feels that his brother would not want to be dependent on ventilator, and if he fails extubation, he would not want us to reintubate him, instead he would like us to try non-invasive ventilatory support if applicable.   1/26- brother updated regarding extubation, all questions answered.  1/29-spoke with alexismonserrat Mack today about patient not needing ICU care, also informed him about patients oxygen requirements and what the next steps are. He is agreeable to comfort care if his brother is in any distress such as morphine gtt. Will continue to follow up   1/31 - spoke to brother Frederick today as follow up to prior discussion. informed him of increase oxygen needs and suggested goals of care to be with comfort instead of treating other medication conditions, as this will not change his ultimate outcome. He is in agreement with moving toward comfort and symptomatic management. Palliative to follow up with discussion of care, now full comfort care. no blood draws or testing.  2/4 - updated Frederick on patient's progress, patient is now hypotensive. would continue care goal of comfort measure only. Frederick is made aware that patient will likely pass very soon.

## 2024-02-04 NOTE — PROGRESS NOTE ADULT - PROBLEM SELECTOR PLAN 1
s/p 2 intubation this hospitalization.   attributing to sepsis and pneumonia.   now DNI/DNR/comfort care.   c/w HFNC, now maximized, no further escalation of O2.   pulmonary toilet.  no further abx. s/p 2 intubation this hospitalization.   attributing to sepsis and pneumonia.   now DNI/DNR/comfort care.   c/w HFNC, now maximized, no further escalation of O2.   no further abx.

## 2024-02-04 NOTE — DISCHARGE NOTE PROVIDER - NSDCMRMEDTOKEN_GEN_ALL_CORE_FT
acetaminophen 325 mg oral tablet: 2 tab(s) orally every 6 hours as needed for Pain Through PEG, give prior to dressing change  clonazePAM 1 mg oral tablet: 1 tab(s) orally once a day (at bedtime)  clotrimazole-betamethasone dipropionate 1%-0.05% topical cream: Apply topically to affected area 2 times a day  Depakote Sprinkles 125 mg oral delayed release capsule: 1 cap(s) orally once a day AT 1400HRS  DuoNeb 0.5 mg-2.5 mg/3 mL inhalation solution: 3 milliliter(s) by nebulizer every 6 hours as needed for  shortness of breath and/or wheezing  lactulose 10 g/15 mL oral syrup: 30 milliliter(s) orally 2 times a day Through PEG tube  LPS SF 15 gram-60 kcal/30 mL oral liquid: Give 30 mL by oral route 4 times a day.  melatonin 10 mg oral capsule: 1 cap(s) orally once a day (at bedtime) Through PEG tube  midodrine 10 mg oral tablet: 1 tab(s) orally every 8 hours Through PEG tube (6AM, 2PM, 10PM). Hold for SBP&gt;130  QUEtiapine 200 mg oral tablet: 1 tab(s) orally 2 times a day At 9AM and 5PM  QUEtiapine 25 mg oral tablet: 1 tab(s) orally 2 times a day Give together with 200 mg tablet for 225mg dose.  Robitussin Long acting 1 mg-7.5 mg/5 mL oral liquid: Give 10 cc per hour by oral route every 4 hours and as neeeded  Senna 8.6 mg oral tablet: 1 tab(s) orally 2 times a day Through PEG tube  Silvadene 1% topical cream: Apply topically to affected area once a day  tamsulosin 0.4 mg oral capsule: 1 cap(s) orally once a day (at bedtime) Through PEG tube

## 2024-02-04 NOTE — PROGRESS NOTE ADULT - ASSESSMENT
67 YO M with PMHx of dementia, agitation, CVA, dysphagia with PEG, and BPH who presented [facility] for worsening agitation and progressive hypoxia. Patient found with left lung infiltrate and admitted to medicine for AHRF second to CAP with course complicated by mucous plug requiring ETT (1/6-1/12) and ultimately transferred to RCU (1/16). Now on am of 1/22 RRT x 2 for hypoxic respiratory failure and septic shock requiring vasopressor and intubation. Patient is now wean to Midodrine and extubated to HFNC after discussion with HCP. Patient was made DNI/DNR/comfort care.

## 2024-02-04 NOTE — PROVIDER CONTACT NOTE (OTHER) - RECOMMENDATIONS
made provider aware
provider notified, provider notifying family to see what measures they want taken, patient is on comfort care DNR, DNI
increase fio2
notify provider
Recommending give ordered dose of droxidopa
Provider assessment

## 2024-02-05 NOTE — PROGRESS NOTE ADULT - SUBJECTIVE AND OBJECTIVE BOX
Date of Service  : 2/5/2024    INTERVAL HPI/OVERNIGHT EVENTS:  Patient on HFNC 50/100 this AM     SUBJECTIVE AND OBJECTIVE:  Patient seen and examined at bedside. Patient unresponsive with dyspnea. Asked bedside RN to provide prn dose of dilaudid       Allergies    No Known Allergies    Intolerances    MEDICATIONS  (STANDING):  acetylcysteine 20%  Inhalation 4 milliLiter(s) Inhalation every 6 hours  artificial  tears Solution 1 Drop(s) Both EYES every 6 hours  chlorhexidine 2% Cloths 1 Application(s) Topical daily  clonazePAM  Tablet 1 milliGRAM(s) Oral every 12 hours  polyethylene glycol 3350 17 Gram(s) Oral two times a day  senna 2 Tablet(s) Oral at bedtime  sodium chloride 3%  Inhalation 4 milliLiter(s) Inhalation every 6 hours    MEDICATIONS  (PRN):  acetaminophen   Oral Liquid .. 650 milliGRAM(s) Enteral Tube every 6 hours PRN Temp greater or equal to 38C (100.4F)  glycopyrrolate Injectable 0.4 milliGRAM(s) IV Push every 6 hours PRN Secretions      ITEMS UNCHECKED ARE NOT PRESENT    PRESENT SYMPTOMS: [x ]Unable to self-report due to altered mental status- see [ ] CPOT [ x] PAINADS [ x] RDOS  Source if other than patient:  [ ]Family   [ ]Team     Pain:  [ ]yes [ ]no  QOL impact -   Location -                    Aggravating factors -  Quality -  Radiation -  Timing-  Severity (0-10 scale):  Minimal acceptable level / Pain Goal (0-10 scale):     Dyspnea:                           [ ]Mild [ ]Moderate [ ]Severe  Anxiety:                             [ ]Mild [ ]Moderate [ ]Severe  Agitation:                          [ ]Mild [ ]Moderate [ ]Severe  Fatigue:                             [ ]Mild [ ]Moderate [ ]Severe  Nausea:                             [ ]Mild [ ]Moderate [ ]Severe  Loss of appetite:              [ ]Mild [ ]Moderate [ ]Severe  Constipation:                   [ ]Mild [ ]Moderate [ ]Severe  Diarrhea:                          [ ]Mild [ ]Moderate [ ]Severe    CPOT:    https://www.[facility].org/getattachment/[email or web address]/Critical-Care-Pain-Observation-Tool-(CPOT)    PCSSQ[Palliative Care Spiritual Screening Question]   Severity (0-10):  Score of 4 or > indicate consideration of Chaplaincy referral.  Chaplaincy Referral: [ ] yes [ ] refused [ ] following [x ] deferred    Caregiver [place]? : [ ] yes [ ] no [ ] Declined [x ] Deferred              Social work referral [ ] Patient & Family Centered Care Referral [ ]     Anticipatory Grief present?:  [ ] yes [ ] no  [ x] Deferred                  Social work referral [ ] Chaplaincy Referral[ ]    Other Symptoms:  [ ]All other review of systems negative   [x ] Unable to obtain due to poor mentation     PHYSICAL EXAM:  Vital Signs Last 24 Hrs  T(C): 39.1 (05 Feb 2024 11:37), Max: 39.3 (04 Feb 2024 21:00)  T(F): 102.4 (05 Feb 2024 11:37), Max: 102.8 (04 Feb 2024 21:00)  HR: 127 (05 Feb 2024 15:18) (126 - 144)  BP: 92/46 (05 Feb 2024 11:37) (90/55 - 101/56)  BP(mean): --  RR: 18 (05 Feb 2024 15:13) (16 - 21)  SpO2: 99% (05 Feb 2024 15:18) (90% - 100%)    Parameters below as of 05 Feb 2024 15:18  Patient On (Oxygen Delivery Method): nasal cannula, high flow      GENERAL:  [ ]Awake  [ ]Oriented    [ ]Lethargic  [ ]Cachexia  [ x]Unarousable   [x ]Non-Verbal   Behavioral:   [ ] Anxiety  [ ] Delirium [ ] Agitation [ ] Calm  [x ] Other-encephalopathy   HEENT:  [ ]Normal  [ ] Temporal Wasting  [x ]Dry mouth   [ ]ET Tube/Trach  [ ]Oral lesions  [ ] Mucositis  PULMONARY:   [ ]Clear [x ]Tachypnea  [ ]Audible excessive secretions   [ ]Rhonchi        [ ]Right [ ]Left [ ]Bilateral  [ ]Crackles        [ ]Right [ ]Left [ ]Bilateral  [ ]Wheezing     [ ]Right [ ]Left [ ]Bilateral  [ x]Diminished breath sounds [ ]right [ ]left [ x]bilateral  CARDIOVASCULAR:    [ ]Regular [ ]Irregular [ x]Tachy  [ ]Joey [ ]Murmur [ ]Other  GASTROINTESTINAL:  [ x]Soft  [ ]Distended   [ ]+BS  [ x]Non tender [ ]Tender  [x ]PEG [ ]OGT/ NGT  Last BM: 2/4  GENITOURINARY:  [ ]Normal [ ] Incontinent   [ ]Oliguria/Anuria   [ x][place]our  MUSCULOSKELETAL:   [ ]Normal   [ ]Weakness  [ x]Bed/Wheelchair bound [ ]Edema  [  ] amputation  [  ] contraction  NEUROLOGIC:   [ ]No focal deficits  [ ]Cognitive impairment  [ x]Dysphagia [ ]Dysarthria [ ]Paresis [ x]Other -encephalopathy  SKIN: See Nursing Skin Assessment for further details  [ ]Normal    [ ]Rash  [ ]Pressure ulcer(s)       Present on admission [ ]y [ ]n   [  ]  Wound    [  ] hyperpigmentation    CRITICAL CARE:  [ ]Shock Present  [ ]Septic [ ]Cardiogenic [ ]Neurologic [ ]Hypovolemic  [ ]Vasopressors [ ]Inotropes  [ x]Respiratory failure present [ ]Mechanical Ventilation [ ]Non-invasive ventilatory support [x ]High-Flow   [ ]Acute  [ ]Chronic [x ]Hypoxic  [ ]Hypercarbic [ ]Other  [ ]Other organ failure     LABS:  reviewed                      RADIOLOGY & ADDITIONAL STUDIES: reviewed     Protein Calorie Malnutrition Present: [ ]mild [ ]moderate [ x]severe [ ]underweight [ ]morbid obesity  https://www.andeal.org/vault/2440/web/files/ONC/Table_Clinical%20Characteristics%20to%20Document%20Malnutrition-White%20JV%20et%20al%751594.pdf    Height (cm): 167.6 (01-05-24 @ 11:59), 167.6 (12-28-23 @ 19:19), 167.6 (12-15-23 @ 04:29)  Weight (kg): 67 (01-06-24 @ 05:01), 70.3 (12-15-23 @ 04:29)  BMI (kg/m2): 23.9 (01-06-24 @ 05:01), 25 (01-05-24 @ 11:59), 25 (12-28-23 @ 19:19)    [ x]PPSV2 < or = 30%  [ ]significant weight loss [ ]poor nutritional intake [ ]anasarca   [ x]Artificial Nutrition    REFERRALS:   [ ]Chaplaincy  [ ]Hospice  [ ]Child Life  [ ]Social Work  [ x]Case management [ ]Holistic Therapy

## 2024-02-05 NOTE — PROVIDER CONTACT NOTE (OTHER) - ACTION/TREATMENT ORDERED:
Provider states she will come to bedside to assess.
made provider aware
will try to escalate further to get new IV placed
no orders at this time from provider. all patient will be receiving is highflow due to comfort care measures
provider aware and we are not doing any further interventions on patient since he is comfort care
provider notified and made aware
Provider aware and ordered to administer droxidopa and recheck BP
provider to come bedside

## 2024-02-05 NOTE — PROGRESS NOTE ADULT - PROBLEM SELECTOR PLAN 8
- DNR/DNI  -Multiple discussions held with family  2/4 -  Frederick was updated on patient's progress, patient is now hypotensive. would continue care goal of comfort measure only. Frederick is made aware that patient will likely pass very soon.

## 2024-02-05 NOTE — PROGRESS NOTE ADULT - PROBLEM SELECTOR PLAN 1
s/p 2 intubation this hospitalization.   attributing to sepsis and pneumonia.   now DNI/DNR/comfort care.   c/w HFNC, now maximized, no further escalation of O2.   no further abx.

## 2024-02-05 NOTE — PROGRESS NOTE ADULT - SUBJECTIVE AND OBJECTIVE BOX
[facility] Division of Hospital Medicine  Betsy Garcia MD  Available via MS Teams  Pager: [phone]    SUBJECTIVE / OVERNIGHT EVENTS:  pt unresponsive    MEDICATIONS  (STANDING):  acetylcysteine 20%  Inhalation 4 milliLiter(s) Inhalation every 6 hours  artificial  tears Solution 1 Drop(s) Both EYES every 6 hours  chlorhexidine 2% Cloths 1 Application(s) Topical daily  clonazePAM  Tablet 1 milliGRAM(s) Oral every 12 hours  polyethylene glycol 3350 17 Gram(s) Oral two times a day  senna 2 Tablet(s) Oral at bedtime  sodium chloride 3%  Inhalation 4 milliLiter(s) Inhalation every 6 hours    MEDICATIONS  (PRN):  acetaminophen   Oral Liquid .. 650 milliGRAM(s) Enteral Tube every 6 hours PRN Temp greater or equal to 38C (100.4F)  glycopyrrolate Injectable 0.4 milliGRAM(s) IV Push every 6 hours PRN Secretions      I&O's Summary    04 Feb 2024 07:01  -  05 Feb 2024 07:00  --------------------------------------------------------  IN: 1600 mL / OUT: 300 mL / NET: 1300 mL        PHYSICAL EXAM:  Vital Signs Last 24 Hrs  T(C): 39.1 (05 Feb 2024 11:37), Max: 39.3 (04 Feb 2024 21:00)  T(F): 102.4 (05 Feb 2024 11:37), Max: 102.8 (04 Feb 2024 21:00)  HR: 127 (05 Feb 2024 15:18) (75 - 144)  BP: 92/46 (05 Feb 2024 11:37) (90/55 - 101/56)  BP(mean): --  RR: 18 (05 Feb 2024 15:13) (16 - 21)  SpO2: 99% (05 Feb 2024 15:18) (90% - 100%)    Parameters below as of 05 Feb 2024 15:18  Patient On (Oxygen Delivery Method): nasal cannula, high flow      CONSTITUTIONAL: NAD, appears ill  EYES: PERRLA; conjunctiva and sclera clear  RESPIRATORY: mild respiratory distress; lungs are clear to auscultation bilaterally  CARDIOVASCULAR: tachycardia, normal S1 and S2, no murmur/rub/gallop; Peripheral pulses are 2+ bilaterally  ABDOMEN: Nontender to palpation, normoactive bowel sounds, no rebound/guarding  NEUROLOGY: unresponsive   SKIN: No rashes; no palpable lesions    LABS:                    COVID-19 PCR: NotDetec (05 Feb 2024 08:46)  SARS-CoV-2: NotDetec (30 Jan 2024 10:45)  SARS-CoV-2: NotDetec (22 Jan 2024 13:25)  SARS-CoV-2: NotDetec (05 Jan 2024 13:03)  SARS-CoV-2: NotDetec (15 Dec 2023 00:09)

## 2024-02-05 NOTE — PROGRESS NOTE ADULT - TIME BILLING
- Ordering, reviewing, and interpreting labs, testing, and imaging.  - Independently obtaining a review of systems and performing a physical exam  - Reviewing prior hospitalization and where necessary, outpatient records.  - Reviewing consultant recommendations/communicating with consultants  - Counselling and educating patient and family regarding interpretation of aforementioned items and plan of care.
- Ordering, reviewing, and interpreting labs, testing, and imaging.  - Independently obtaining a review of systems and performing a physical exam  - Reviewing prior hospitalization and where necessary, outpatient records.  - Reviewing consultant recommendations/communicating with consultants  - Counselling and educating patient and family regarding interpretation of aforementioned items and plan of care.
Review of patient records, including history, laboratory data, and imaging. Patient evaluation and assessment. Coordination of care. Time excludes teaching or procedures.
- Ordering, reviewing, and interpreting labs, testing, and imaging.  - Independently obtaining a review of systems and performing a physical exam  - Reviewing prior hospitalization and where necessary, outpatient records.  - Reviewing consultant recommendations/communicating with consultants  - Counselling and educating patient and family regarding interpretation of aforementioned items and plan of care.
- Ordering, reviewing, and interpreting labs, testing, and imaging.  - Independently obtaining a review of systems and performing a physical exam  - Reviewing prior hospitalization and where necessary, outpatient records.  - Reviewing consultant recommendations/communicating with consultants  - Counselling and educating patient and family regarding interpretation of aforementioned items and plan of care.
Time spent for extensive review of the physical chart, electronic medical record, and documentation to obtain collateral information including but not limited to:  [x] Inpatient records (ED, H&P, primary team, and consultants if applicable, care coordination)  [x] Inpatient values/results (biomarkers, immunoassays, imaging, and microbiology results)  [x] Current or proposed treatment plans  [x] Pharmacotherapy review  [x] Discussion and coordinating care with primary team and interdisciplinary staff and floor staff
- Ordering, reviewing, and interpreting labs, testing, and imaging.  - Independently obtaining a review of systems and performing a physical exam  - Reviewing prior hospitalization and where necessary, outpatient records.  - Reviewing consultant recommendations/communicating with consultants  - Counselling and educating patient and family regarding interpretation of aforementioned items and plan of care.
Review of patient records, including history, laboratory data, and imaging. Patient evaluation and assessment. Coordination of care. Time excludes teaching or procedures.
Time spent for extensive review of the physical chart, electronic medical record, and documentation to obtain collateral information including but not limited to:  [x] Inpatient records (ED, H&P, primary team, and consultants if applicable, care coordination)  [x] Inpatient values/results (biomarkers, immunoassays, imaging, and microbiology results)  [x] Current or proposed treatment plans  [x] Pharmacotherapy review  [x] Discussion and coordinating care with primary team and interdisciplinary staff and floor staff  [x] Discussion including counseling/ education with the surrogate decision maker, or family  [x] Spent 16 minutes separately discussing goals of care/ advanced care planning with family
- Ordering, reviewing, and interpreting labs, testing, and imaging.  - Independently obtaining a review of systems and performing a physical exam  - Reviewing prior hospitalization and where necessary, outpatient records.  - Reviewing consultant recommendations/communicating with consultants  - Counselling and educating patient and family regarding interpretation of aforementioned items and plan of care.

## 2024-02-05 NOTE — PROVIDER CONTACT NOTE (OTHER) - ASSESSMENT
patient agitated with multiple attempt of IV
pt A&ox1, agitated on unsecured mittens, desattng to 80s on highflow
pt continues to desat while on 90% high anahi, and 10L non rebreather, NT and oral suction done multiples times, chest PT done, Rt notified as well
see flowsheet attached
patient in no s/s of distress
patient nonverbal. no signs of distress
/57, , T 103.0 axillary, O2 89 on high flow NC 50L 100%
Pt is resting in bed, no s/s of distress noted.
Pt O2 Saturation maintaining in high 80's, breathing noted with accessory muscle use.

## 2024-02-05 NOTE — PROVIDER CONTACT NOTE (OTHER) - NAME OF MD/NP/PA/DO NOTIFIED:
Chaim Mcbride
Sinai Cornejo NP
chrissy [phone]
eulalia scott
Sinai Harris NP
glory vivar
eulalia scott
provider chrissy
Yenny More

## 2024-02-05 NOTE — PROVIDER CONTACT NOTE (OTHER) - SITUATION
patient has fever, elevated HR and decrease O2
patient on comfort care only on high flow on highest settings
patient pulled IV out. I attempted another IV twice and am unable to get one in
patient is DNR/DNI, comfort measures only
pt desatting to 80s on highflo 50%, fio2 increased to 80%
Pt BP 89/52
Pt Hypoxic to 85%-87%. Chest PT completed, Oxygen tubing repositioned, HOB raised and respiratory called to bedside to evaluate.
pt continues to desat while on 90% high anahi, and 10L non rebreather
was desatting in the 60's resperatory put patient on the max setting for both nonrebreather and highflow now sitting in 98's

## 2024-02-05 NOTE — PROVIDER CONTACT NOTE (OTHER) - DATE AND TIME:
03-Feb-2024 05:22
20-Jan-2024 23:00
04-Feb-2024 11:57
21-Jan-2024 00:15
22-Jan-2024 07:06
03-Feb-2024 20:30
04-Feb-2024 22:08
05-Feb-2024 00:16
22-Jan-2024 11:38

## 2024-02-05 NOTE — PROGRESS NOTE ADULT - ASSESSMENT
67 YO M with PMHx of dementia, agitation, CVA, dysphagia with PEG, and BPH who presented [facility] for worsening agitation and progressive hypoxia. Patient found with left lung infiltrate and admitted to medicine for AHRF second to CAP with course complicated by mucous plug requiring ETT (1/6-1/12) and ultimately transferred to RCU (1/16). Now on am of 1/22 RRT x 2 for hypoxic respiratory failure and septic shock requiring vasopressor and intubation. Patient is now extubated to [facility] after discussion with HCP. Patient was made DNI/DNR/comfort care  Palliative care following for symptom management for end of life care.

## 2024-02-05 NOTE — PROGRESS NOTE ADULT - ASSESSMENT
69 YO M with PMHx of dementia, agitation, CVA, dysphagia with PEG, and BPH who presented [facility] for worsening agitation and progressive hypoxia. Patient found with left lung infiltrate and admitted to medicine for AHRF second to CAP with course complicated by mucous plug requiring ETT (1/6-1/12) and ultimately transferred to RCU (1/16). Now on am of 1/22 RRT x 2 for hypoxic respiratory failure and septic shock requiring vasopressor and intubation. Patient is now extubated to NC after discussion with HCP. Patient was made DNI/DNR/comfort care.

## 2024-02-05 NOTE — PROVIDER CONTACT NOTE (OTHER) - BACKGROUND
PNA and agitation
patient comfort care on high flow- not receiving any further interventions
Pt admitted for PNA, pt on droxidopa for weaning from pressors in ICU
Pt admitted for PNA
admitted fo PNA and agitation
admitted for pneumonia due to infectious organism
patient A&Ox1 and confused
patient on comfort care stating in 80s currently and has been 50-60s

## 2024-02-05 NOTE — PROVIDER CONTACT NOTE (OTHER) - REASON
Hypotension
patient o2 dropping
Hypoxia
fever and tachycardia
patient has temp of 102.8 and HR of 144
patient pulled IV out
pt continues to desat while on 90% high anahi, and 10L non rebreather
was desatting in the 60's respiratory put patient on the max setting for both nonrebreather and highflow
pt desatting to 80s on highflo 50%, fio2 increased to 80%

## 2024-02-06 NOTE — PROGRESS NOTE ADULT - PROBLEM SELECTOR PLAN 3
completed abx.   monitor respiratory status.   care plan as above.
- Patient febrile to 102 last night and this AM  - possibly terminal fevers possibly from aspiration  - Start Tylenol 650mg q6 PRN fever
completed abx.   monitor respiratory status.   care plan as above.
completed abx.   monitor respiratory status.   care plan as above.
Patient requires total support for all ADL's.   Please assist with ADLs  Skin care as per protocol.
completed abx.   monitor respiratory status.   care plan as above.
- please coordinate care with family.  -Frequent reassurance and verbal orientation   -Family members or other familiar persons by his bedside.   - Monitor for constipation, urinary retention, pain, hunger, thirst, etc.    - Promote sleep wake cycle and reorientation as indicated.  - Minimize use of benzodiazepines, opioids, anticholinergics, and other deliriogenic agents whenever possible.

## 2024-02-06 NOTE — PROGRESS NOTE ADULT - PROBLEM SELECTOR PROBLEM 2
Septic shock
Septic shock
Pneumonia
Septic shock
Septic shock
Dyspnea
Septic shock
Septic shock
Dyspnea
Septic shock
Septic shock

## 2024-02-06 NOTE — PROGRESS NOTE ADULT - PROBLEM SELECTOR PLAN 2
iso MRSA pneumonia, s/p pressors in MICU  completed course of abx. no further abx.   d/c midodrine, medically futile at this point in his care.
- Patient dyspneic on exam today; asked RN to provide PRN dose of dilaudid.   - IV Dilaudid 0.2mg q8 ATC  - IV Dilaudid 0.2mg q2 PRN pain/dyspnea  - IV Robinul 0.4mg q6 PRN secretions
iso MRSA pneumonia, s/p pressors in MICU  completed course of abx. no further abx.   d/c midodrine, medically futile at this point in his care.
- Start Trial of IV Dilaudid 0.2mg q8 ATC  - Start IV Dilaudid 0.2mg q2 PRN pain/dyspnea  - Start IV Robinul 0.4mg q6 PRN secretions
iso MRSA pneumonia, s/p pressors in MICU  completed course of abx.   on Midodrine, titrating down with hold parameters.   monitor BP.
iso MRSA pneumonia, s/p pressors in MICU  completed course of abx. no further abx.   d/c midodrine, medically futile at this point in his care.
iso MRSA pneumonia, s/p pressors in MICU  completed course of abx.   on Midodrine, titrating down with hold parameters.   monitor BP.
iso MRSA pneumonia, s/p pressors in MICU  completed course of abx. no further abx.   d/c midodrine, medically futile at this point in his care.
- on antibiotics   - management as per primary team.

## 2024-02-06 NOTE — PROGRESS NOTE ADULT - PROBLEM SELECTOR PLAN 5
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   c/w tube feeds, nutrition consult.   pressure ulcer precaution.  GI ppx  bowel regimen.
Case reviewed with medicine team.   Thank you for allowing us to participate in your patient's care. Please page [phone] for any questions/concerns.
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   c/w tube feeds, nutrition consult.   pressure ulcer precaution.  GI ppx  bowel regimen.
Further discussions regarding hospice disposition planning with family if/when able to wean HFNC settings to 40/40.     Case reviewed with medicine team.   Thank you for allowing us to participate in your patient's care. Please page [phone] for any questions/concerns.
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   c/w tube feeds, nutrition consult.   pressure ulcer precaution.  GI ppx  bowel regimen.
Advanced care planning regarding code status addressed in setting of respiratory failure requiring HFNC as patient at high risk for worsening respiratory failure requiring reintubation.   Addressed advanced directives in the event of cardiac arrest or respiratory failure. Options discussed in the setting of advanced illness. Including option for allowing a natural passing (DNR/DNI) and adding medications for comfort if any symptoms might arise.  Frederick shares that at this time he is not ready to make decisions regarding code status preferences.  Recommended to Frederick for him to inform primary medical team if/when he is ready to finalize any decisions regarding advanced directives.   Please see separate GOC note.  16 minutes spent on advanced care planning with family.

## 2024-02-06 NOTE — PROGRESS NOTE ADULT - PROBLEM SELECTOR PROBLEM 3
Fever
Pneumonia
Functional quadriplegia
Pneumonia
Other encephalopathy
Pneumonia

## 2024-02-06 NOTE — PROGRESS NOTE ADULT - ASSESSMENT
69 YO M with PMHx of dementia, agitation, CVA, dysphagia with PEG, and BPH who presented [facility] for worsening agitation and progressive hypoxia. Patient found with left lung infiltrate and admitted to medicine for AHRF second to CAP with course complicated by mucous plug requiring ETT (1/6-1/12) and ultimately transferred to RCU (1/16). Now on am of 1/22 RRT x 2 for hypoxic respiratory failure and septic shock requiring vasopressor and intubation. Patient is now extubated to [facility] after discussion with HCP. Patient was made DNI/DNR/comfort care.

## 2024-02-06 NOTE — PROGRESS NOTE ADULT - NUTRITIONAL ASSESSMENT
This patient has been assessed with a concern for Malnutrition and has been determined to have a diagnosis/diagnoses of Severe protein-calorie malnutrition.    This patient is being managed with:   Diet NPO with Tube Feed-  Tube Feeding Modality: Gastrostomy  Jevity 1.5 Reid (JEVITY1.5RTH)  Total Volume for 24 Hours (mL): 1200  Continuous  Starting Tube Feed Rate {mL per Hour}: 10  Increase Tube Feed Rate by (mL): 10     Every 4 hours  Until Goal Tube Feed Rate (mL per Hour): 50  Tube Feed Duration (in Hours): 24  Tube Feed Start Time: 07:00  Entered: Jan 9 2024  7:14AM  
This patient has been assessed with a concern for Malnutrition and has been determined to have a diagnosis/diagnoses of Severe protein-calorie malnutrition.    This patient is being managed with:   Diet NPO with Tube Feed-  Tube Feeding Modality: Gastrostomy  Jevity 1.5 Reid (JEVITY1.5RTH)  Total Volume for 24 Hours (mL): 1200  Continuous  Starting Tube Feed Rate {mL per Hour}: 10  Increase Tube Feed Rate by (mL): 10     Every 4 hours  Until Goal Tube Feed Rate (mL per Hour): 50  Tube Feed Duration (in Hours): 24  Tube Feed Start Time: 07:00  Entered: Jan 9 2024  7:14AM  
This patient has been assessed with a concern for Malnutrition and has been determined to have a diagnosis/diagnoses of Severe protein-calorie malnutrition.    This patient is being managed with:   Diet NPO with Tube Feed-  Tube Feeding Modality: Gastrostomy  Jevity 1.5 Reid (JEVITY1.5RTH)  Total Volume for 24 Hours (mL): 1200  Continuous  Starting Tube Feed Rate {mL per Hour}: 30  Increase Tube Feed Rate by (mL): 10     Every 4 hours  Until Goal Tube Feed Rate (mL per Hour): 50  Tube Feed Duration (in Hours): 24  Tube Feed Start Time: 08:00  Entered: Jan 20 2024  8:03AM  
This patient has been assessed with a concern for Malnutrition and has been determined to have a diagnosis/diagnoses of Severe protein-calorie malnutrition.    This patient is being managed with:   Diet NPO with Tube Feed-  Tube Feeding Modality: Gastrostomy  Jevity 1.5 Reid (JEVITY1.5RTH)  Total Volume for 24 Hours (mL): 1200  Continuous  Starting Tube Feed Rate {mL per Hour}: 10  Increase Tube Feed Rate by (mL): 10     Every 4 hours  Until Goal Tube Feed Rate (mL per Hour): 50  Tube Feed Duration (in Hours): 24  Tube Feed Start Time: 07:00  Entered: Jan 9 2024  7:14AM  
This patient has been assessed with a concern for Malnutrition and has been determined to have a diagnosis/diagnoses of Severe protein-calorie malnutrition.    This patient is being managed with:   Diet NPO with Tube Feed-  Tube Feeding Modality: Gastrostomy  Jevity 1.5 Reid (JEVITY1.5RTH)  Total Volume for 24 Hours (mL): 1200  Continuous  Starting Tube Feed Rate {mL per Hour}: 10  Increase Tube Feed Rate by (mL): 10     Every 4 hours  Until Goal Tube Feed Rate (mL per Hour): 50  Tube Feed Duration (in Hours): 24  Tube Feed Start Time: 07:00  Entered: Jan 9 2024  7:14AM  
This patient has been assessed with a concern for Malnutrition and has been determined to have a diagnosis/diagnoses of Severe protein-calorie malnutrition.    This patient is being managed with:   Diet NPO with Tube Feed-  Tube Feeding Modality: Gastrostomy  Jevity 1.5 Reid (JEVITY1.5RTH)  Total Volume for 24 Hours (mL): 1200  Continuous  Starting Tube Feed Rate {mL per Hour}: 30  Increase Tube Feed Rate by (mL): 10     Every 4 hours  Until Goal Tube Feed Rate (mL per Hour): 50  Tube Feed Duration (in Hours): 24  Tube Feed Start Time: 08:00  Entered: Jan 20 2024  8:03AM  
This patient has been assessed with a concern for Malnutrition and has been determined to have a diagnosis/diagnoses of Severe protein-calorie malnutrition.    This patient is being managed with:   Diet NPO with Tube Feed-  Tube Feeding Modality: Gastrostomy  Jevity 1.5 Reid (JEVITY1.5RTH)  Total Volume for 24 Hours (mL): 1200  Continuous  Starting Tube Feed Rate {mL per Hour}: 10  Increase Tube Feed Rate by (mL): 10     Every 4 hours  Until Goal Tube Feed Rate (mL per Hour): 50  Tube Feed Duration (in Hours): 24  Tube Feed Start Time: 07:00  Entered: Jan 9 2024  7:14AM  
This patient has been assessed with a concern for Malnutrition and has been determined to have a diagnosis/diagnoses of Severe protein-calorie malnutrition.    This patient is being managed with:   Diet NPO with Tube Feed-  Tube Feeding Modality: Gastrostomy  Jevity 1.5 Reid (JEVITY1.5RTH)  Total Volume for 24 Hours (mL): 1200  Continuous  Starting Tube Feed Rate {mL per Hour}: 30  Increase Tube Feed Rate by (mL): 10     Every 4 hours  Until Goal Tube Feed Rate (mL per Hour): 50  Tube Feed Duration (in Hours): 24  Tube Feed Start Time: 08:00  Entered: Jan 20 2024  8:03AM  
This patient has been assessed with a concern for Malnutrition and has been determined to have a diagnosis/diagnoses of Severe protein-calorie malnutrition.    This patient is being managed with:   Diet NPO-  Except Medications  Entered: Jan 19 2024  7:49AM  
This patient has been assessed with a concern for Malnutrition and has been determined to have a diagnosis/diagnoses of Severe protein-calorie malnutrition.    This patient is being managed with:   Diet NPO with Tube Feed-  Tube Feeding Modality: Gastrostomy  Jevity 1.5 Reid (JEVITY1.5RTH)  Total Volume for 24 Hours (mL): 1200  Continuous  Starting Tube Feed Rate {mL per Hour}: 10  Increase Tube Feed Rate by (mL): 10     Every 4 hours  Until Goal Tube Feed Rate (mL per Hour): 50  Tube Feed Duration (in Hours): 24  Tube Feed Start Time: 07:00  Entered: Jan 9 2024  7:14AM  
This patient has been assessed with a concern for Malnutrition and has been determined to have a diagnosis/diagnoses of Severe protein-calorie malnutrition.    This patient is being managed with:   Diet NPO with Tube Feed-  Tube Feeding Modality: Gastrostomy  Jevity 1.5 Reid (JEVITY1.5RTH)  Total Volume for 24 Hours (mL): 1200  Continuous  Starting Tube Feed Rate {mL per Hour}: 10  Increase Tube Feed Rate by (mL): 10     Every 4 hours  Until Goal Tube Feed Rate (mL per Hour): 50  Tube Feed Duration (in Hours): 24  Tube Feed Start Time: 07:00  Entered: Jan 9 2024  7:14AM  
This patient has been assessed with a concern for Malnutrition and has been determined to have a diagnosis/diagnoses of Severe protein-calorie malnutrition.    This patient is being managed with:   Diet NPO with Tube Feed-  Tube Feeding Modality: Gastrostomy  Jevity 1.5 Reid (JEVITY1.5RTH)  Total Volume for 24 Hours (mL): 1200  Continuous  Starting Tube Feed Rate {mL per Hour}: 30  Increase Tube Feed Rate by (mL): 10     Every 4 hours  Until Goal Tube Feed Rate (mL per Hour): 50  Tube Feed Duration (in Hours): 24  Tube Feed Start Time: 08:00  Entered: Jan 20 2024  8:03AM  
This patient has been assessed with a concern for Malnutrition and has been determined to have a diagnosis/diagnoses of Severe protein-calorie malnutrition.    This patient is being managed with:   Diet NPO with Tube Feed-  Tube Feeding Modality: Gastrostomy  Jevity 1.5 Reid (JEVITY1.5RTH)  Total Volume for 24 Hours (mL): 720  Continuous  Starting Tube Feed Rate {mL per Hour}: 10  Increase Tube Feed Rate by (mL): 10     Every 4 hours  Until Goal Tube Feed Rate (mL per Hour): 30  Tube Feed Duration (in Hours): 24  Tube Feed Start Time: 09:00  Entered: Jan 8 2024  8:47AM  
This patient has been assessed with a concern for Malnutrition and has been determined to have a diagnosis/diagnoses of Severe protein-calorie malnutrition.    This patient is being managed with:   Diet NPO with Tube Feed-  Tube Feeding Modality: Gastrostomy  Jevity 1.5 Reid (JEVITY1.5RTH)  Total Volume for 24 Hours (mL): 1200  Continuous  Starting Tube Feed Rate {mL per Hour}: 30  Increase Tube Feed Rate by (mL): 10     Every 4 hours  Until Goal Tube Feed Rate (mL per Hour): 50  Tube Feed Duration (in Hours): 24  Tube Feed Start Time: 08:00  Entered: Jan 20 2024  8:03AM  
This patient has been assessed with a concern for Malnutrition and has been determined to have a diagnosis/diagnoses of Severe protein-calorie malnutrition.    This patient is being managed with:   Diet NPO with Tube Feed-  Tube Feeding Modality: Gastrostomy  Jevity 1.5 Reid (JEVITY1.5RTH)  Total Volume for 24 Hours (mL): 1200  Continuous  Starting Tube Feed Rate {mL per Hour}: 30  Increase Tube Feed Rate by (mL): 10     Every 4 hours  Until Goal Tube Feed Rate (mL per Hour): 50  Tube Feed Duration (in Hours): 24  Tube Feed Start Time: 08:00  Entered: Jan 20 2024  8:03AM  
This patient has been assessed with a concern for Malnutrition and has been determined to have a diagnosis/diagnoses of Severe protein-calorie malnutrition.    This patient is being managed with:   Diet NPO-  Except Medications  Entered: Feb 5 2024  4:37PM  
This patient has been assessed with a concern for Malnutrition and has been determined to have a diagnosis/diagnoses of Severe protein-calorie malnutrition.    This patient is being managed with:   Diet NPO with Tube Feed-  Tube Feeding Modality: Gastrostomy  Jevity 1.5 Reid (JEVITY1.5RTH)  Total Volume for 24 Hours (mL): 1200  Continuous  Starting Tube Feed Rate {mL per Hour}: 30  Increase Tube Feed Rate by (mL): 10     Every 4 hours  Until Goal Tube Feed Rate (mL per Hour): 50  Tube Feed Duration (in Hours): 24  Tube Feed Start Time: 08:00  Entered: Jan 20 2024  8:03AM  
This patient has been assessed with a concern for Malnutrition and has been determined to have a diagnosis/diagnoses of Severe protein-calorie malnutrition.    This patient is being managed with:   Diet NPO with Tube Feed-  Tube Feeding Modality: Gastrostomy  Jevity 1.5 Reid (JEVITY1.5RTH)  Total Volume for 24 Hours (mL): 1200  Continuous  Starting Tube Feed Rate {mL per Hour}: 30  Increase Tube Feed Rate by (mL): 10     Every 4 hours  Until Goal Tube Feed Rate (mL per Hour): 50  Tube Feed Duration (in Hours): 24  Tube Feed Start Time: 08:00  Entered: Jan 20 2024  8:03AM

## 2024-02-06 NOTE — PROGRESS NOTE ADULT - PROBLEM SELECTOR PLAN 7
c/w Doxazosin.
s/p garcia  d/c doxazosin.
c/w Doxazosin.
s/p gacria  d/c doxazosin.
s/p garcia  d/c doxazosin.
s/p garcia  d/c doxazosin.

## 2024-02-06 NOTE — PROGRESS NOTE ADULT - SUBJECTIVE AND OBJECTIVE BOX
[facility] Division of Hospital Medicine  Betsy Garcia MD  Available via MS Teams  Pager: [phone]    SUBJECTIVE / OVERNIGHT EVENTS:    no events, remains unresponsive, lethargic    MEDICATIONS  (STANDING):  acetylcysteine 20%  Inhalation 4 milliLiter(s) Inhalation every 6 hours  artificial  tears Solution 1 Drop(s) Both EYES every 6 hours  chlorhexidine 2% Cloths 1 Application(s) Topical daily  clonazePAM  Tablet 1 milliGRAM(s) Oral every 12 hours  HYDROmorphone  Injectable 0.2 milliGRAM(s) IV Push every 8 hours  polyethylene glycol 3350 17 Gram(s) Oral two times a day  senna 2 Tablet(s) Oral at bedtime  sodium chloride 3%  Inhalation 4 milliLiter(s) Inhalation every 6 hours    MEDICATIONS  (PRN):  acetaminophen   Oral Liquid .. 650 milliGRAM(s) Enteral Tube every 6 hours PRN Temp greater or equal to 38C (100.4F)  glycopyrrolate Injectable 0.4 milliGRAM(s) IV Push every 6 hours PRN Secretions  HYDROmorphone  Injectable 0.2 milliGRAM(s) IV Push every 2 hours PRN pain/dyspnea      I&O's Summary      PHYSICAL EXAM:  Vital Signs Last 24 Hrs  T(C): 38.6 (06 Feb 2024 11:55), Max: 38.8 (06 Feb 2024 05:05)  T(F): 101.4 (06 Feb 2024 11:55), Max: 101.9 (06 Feb 2024 05:05)  HR: 123 (06 Feb 2024 11:55) (60 - 128)  BP: 88/46 (06 Feb 2024 11:55) (88/46 - 99/55)  BP(mean): --  RR: 23 (06 Feb 2024 11:55) (18 - 23)  SpO2: 99% (06 Feb 2024 11:55) (95% - 100%)    Parameters below as of 06 Feb 2024 07:46  Patient On (Oxygen Delivery Method): nasal cannula, high flow  O2 Flow (L/min): 60  O2 Concentration (%): 100  CONSTITUTIONAL: NAD, appears comfortable  RESPIRATORY: Normal respiratory effort; lungs are clear to auscultation bilaterally  CARDIOVASCULAR: Regular rate and rhythm, normal S1 and S2, no murmur/rub/gallop; No lower extremity edema; Peripheral pulses are 2+ bilaterally  ABDOMEN: Nontender to palpation, normoactive bowel sounds, no rebound/guarding  MUSCULOSKELETAL:  no clubbing or cyanosis of digits; no joint swelling or tenderness to palpation  SKIN: No rashes; no palpable lesions    LABS:                    COVID-19 PCR: NotDetec (05 Feb 2024 08:46)  SARS-CoV-2: NotDetec (30 Jan 2024 10:45)  SARS-CoV-2: NotDetec (22 Jan 2024 13:25)  SARS-CoV-2: NotDetec (05 Jan 2024 13:03)  SARS-CoV-2: NotDetec (15 Dec 2023 00:09)

## 2024-02-06 NOTE — PROGRESS NOTE ADULT - PROBLEM SELECTOR PLAN 6
DVT: scd  Diet: TF  Dispo: DNI/DNR/Comfort care.
DVT: scd  Diet: TF  Dispo: DNI/DNR/Comfort care.
DVT: Lovenox   Diet: TF  Dispo: Pending improvement, further GOC discussion.
DVT: scd  Diet: TF  Dispo: DNI/DNR/Comfort care.
DVT: Lovenox   Diet: TF  Dispo: Pending improvement, further GOC discussion.
DVT: scd  Diet: TF  Dispo: DNI/DNR/Comfort care.
DVT: scd  Diet: npo  Dispo: DNI/DNR/Comfort care.
Case reviewed with primary team     Thank you for allowing us to participate in your patient's care.  Patient to be discharged from [facility] team consult service today.   Please re-consult if we can be of further assistance, page [phone].
DVT: scd  Diet: TF  Dispo: DNI/DNR/Comfort care.

## 2024-02-06 NOTE — PROGRESS NOTE ADULT - PROBLEM SELECTOR PROBLEM 7
History of BPH
Adolph Malcolm [facility]  Medical Oncology  [address] [place] [address]  Phone: [phone]  Fax: [phone]  Follow Up Time: 1 week    [facility] Quintanilla  Emergency Medicine  Follow Up Time: 1 week  
History of BPH
Adolph Malcolm [facility]  Medical Oncology  [address] [place] [address]  Phone: [phone]  Fax: [phone]  Follow Up Time: 1 week    Chase Quintanilla  Emergency Medicine  Follow Up Time: 1 week    Tai Witt  Internal Medicine  [address] [place] [id number]  Phone: [phone]  Fax: [phone]  Follow Up Time: 1 week

## 2024-02-06 NOTE — CHART NOTE - NSCHARTNOTEFT_GEN_A_CORE
Chart reviewed.   Patient with intermittent fevers overnight and today. Also with tachypnea today during encounter.  Spoke with team to provide tylenol prn when having fevers and for prn dose of IV Dilaudid for tachypnea.     In the event of newly developing, evolving, or worsening symptoms, please contact the Palliative Medicine team via pager (if the patient is at [facility] #[phone] or if the patient is at [facility] #[phone]) The Geriatric and Palliative Medicine service has coverage 24 hours a day/ 7 days a week to provide medical recommendations regarding symptom management needs via telephone.

## 2024-02-06 NOTE — PROGRESS NOTE ADULT - PROBLEM SELECTOR PROBLEM 5
Functional quadriplegia
Advanced care planning/counseling discussion
Functional quadriplegia
Encounter for palliative care
Functional quadriplegia
Encounter for palliative care

## 2024-02-06 NOTE — PROGRESS NOTE ADULT - PROBLEM SELECTOR PLAN 4
c/w Klonopin q12hrs.
Patient requires total support for all ADL's.   Please assist with ADLs  Skin care as per protocol.
Patient requires total support for all ADL's.   Please assist with ADLs  Skin care as per protocol.
Spoke with patient's brother Frederick who understands patient's declining clinical status and overall poor prognosis. Frederick in agreement with transition to comfort centric approach of care.   Comfort Measures Only  Please see separate GOC note.  16 minutes spent on goals of care with family.

## 2024-02-06 NOTE — PROGRESS NOTE ADULT - PROBLEM SELECTOR PROBLEM 6
Prophylactic measure
Encounter for palliative care
Prophylactic measure

## 2024-02-07 NOTE — PROGRESS NOTE ADULT - ASSESSMENT
69 YO M with PMHx of dementia, agitation, CVA, dysphagia with PEG, and BPH who presented from [facility] for worsening agitation and progressive hypoxia. Patient found with left lung infiltrate and admitted to medicine for AHRF second to CAP with course complicated by mucous plug requiring ETT (-) and ultimately transferred to RCU (). Now on am of  RRT x 2 for hypoxic respiratory failure and septic shock requiring vasopressor and intubation. Further discussions about GOC, patient was transitioned to comfort measures and extubated to [facility]. Comfort measures provided with pain medications and palliative care assistance Patient  .

## 2024-02-07 NOTE — CHART NOTE - NSCHARTNOTESELECT_GEN_ALL_CORE
Event Note
Event Note
Expiration/Event Note
Follow Up/Nutrition Services
MAR Accept Note/Event Note
Nutrition Services
POCUS/Event Note
POCUS/Event Note
Palliative Care/Event Note
Event Note
Event Note
GOC/Event Note
MICU Accept/Transfer Note
MICU Transfer
Medicine ACP/Transfer Note
Palliative Care/Event Note
RCU Acceptance/Transfer Note
Transfer Note
MICU Acceptance Note/Event Note

## 2024-02-07 NOTE — PROGRESS NOTE ADULT - REASON FOR ADMISSION
agitation

## 2024-02-07 NOTE — DISCHARGE NOTE FOR THE EXPIRED PATIENT - HOSPITAL COURSE
· Assessment	  69 YO M with PMHx of dementia, agitation, CVA, dysphagia with PEG, and BPH who presented [facility] for worsening agitation and progressive hypoxia. Patient found with left lung infiltrate and admitted to medicine for AHRF second to CAP with course complicated by mucous plug requiring ETT (1/6-1/12) and ultimately transferred to RCU (1/16). Now on am of 1/22 RRT x 2 for hypoxic respiratory failure and septic shock requiring vasopressor and intubation. Patient is now extubated to [facility] after discussion with HCP. Patient was made DNI/DNR/comfort care.     This patient is being managed with:   Diet NPO-  Except Medications  Entered: Feb 5 2024  4:37PM     Problem/Plan - 1:  ·  Problem: Acute hypoxic respiratory failure.   ·  Plan: s/p 2 intubation this hospitalization.   attributing to sepsis and pneumonia.   now DNI/DNR/comfort care.   c/w HFNC, now maximized, no further escalation of O2.   no further abx.     Problem/Plan - 2:  ·  Problem: Septic shock.   ·  Plan: iso MRSA pneumonia, s/p pressors in MICU  completed course of abx. no further abx.   d/c midodrine, medically futile at this point in his care.     Problem/Plan - 3:  ·  Problem: Pneumonia.   ·  Plan: completed abx.   monitor respiratory status.   care plan as above.     Problem/Plan - 4:  ·  Problem: Agitation.   ·  Plan: c/w Klonopin q12hrs.     Problem/Plan - 5:  ·  Problem: Functional quadriplegia.   ·  Plan: bed bound with PEG due to prior TBI.   c/w tube feeds, nutrition consult.   pressure ulcer precaution.  GI ppx  bowel regimen.     Problem/Plan - 6:  ·  Problem: Prophylactic measure.   ·  Plan: DVT: scd  Diet: npo  Dispo: DNI/DNR/Comfort care.     Problem/Plan - 7:  ·  Problem: History of BPH.   ·  Plan: s/p garcia  d/c doxazosin.     Problem/Plan - 8:  ·  Problem: Advanced care planning/counseling discussion.   ·  Plan: - DNR/DNI  -Multiple discussions held with family  2/4 -  Frederick was updated on patient's progress, patient is now hypotensive. would continue care goal of comfort measure only. Frederick is made aware that patient will likely pass very soon, pt comfort measures only.      69 YO M with PMHx of dementia, agitation, CVA, dysphagia with PEG, and BPH who presented from [facility] for worsening agitation and progressive hypoxia. Patient found with left lung infiltrate and admitted to medicine for AHRF second to CAP with course complicated by mucous plug requiring ETT (-) and ultimately transferred to RCU (). Now on am of  RRT x 2 for hypoxic respiratory failure and septic shock requiring vasopressor and intubation. Further discussions about GOC, patient was transitioned to comfort measures and extubated to [facility]. Comfort measures provided with pain medications and palliative care assistance Patient  .

## 2024-02-07 NOTE — PROGRESS NOTE ADULT - SUBJECTIVE AND OBJECTIVE BOX
[facility] Division of Hospital Medicine  Betsy Garcia MD  Available via MS Teams  Pager: [phone]    SUBJECTIVE / OVERNIGHT EVENTS:  called by PA, pt  this morning     MEDICATIONS  (STANDING):  acetylcysteine 20%  Inhalation 4 milliLiter(s) Inhalation every 6 hours  artificial  tears Solution 1 Drop(s) Both EYES every 6 hours  chlorhexidine 2% Cloths 1 Application(s) Topical daily  clonazePAM  Tablet 1 milliGRAM(s) Oral every 12 hours  HYDROmorphone  Injectable 0.2 milliGRAM(s) IV Push every 8 hours  polyethylene glycol 3350 17 Gram(s) Oral two times a day  senna 2 Tablet(s) Oral at bedtime  sodium chloride 3%  Inhalation 4 milliLiter(s) Inhalation every 6 hours    MEDICATIONS  (PRN):  acetaminophen   Oral Liquid .. 650 milliGRAM(s) Enteral Tube every 6 hours PRN Temp greater or equal to 38C (100.4F)  glycopyrrolate Injectable 0.4 milliGRAM(s) IV Push every 6 hours PRN Secretions  HYDROmorphone  Injectable 0.2 milliGRAM(s) IV Push every 2 hours PRN pain/dyspnea      I&O's Summary    2024 07:01  -  2024 07:00  --------------------------------------------------------  IN: 0 mL / OUT: 400 mL / NET: -400 mL        PHYSICAL EXAM:  Vital Signs Last 24 Hrs  T(C): 38 (2024 04:00), Max: 38 (2024 04:00)  T(F): 100.4 (2024 04:00), Max: 100.4 (2024 04:00)  HR: 101 (2024 07:53) (101 - 122)  BP: 89/42 (2024 04:00) (89/42 - 89/42)  BP(mean): --  RR: 14 (2024 04:31) (14 - 18)  SpO2: 99% (2024 07:53) (98% - 100%)    Parameters below as of 2024 07:53  Patient On (Oxygen Delivery Method): nasal cannula, high flow      CONSTITUTIONAL: unresponsive, pale  RESPIRATORY: No breath sounds, no chest rise  CARDIOVASCULAR: no pulses, absent heart sounds  ABDOMEN: soft  MUSCULOSKELETAL:  cyanosis  NEUROLOGY: unresponsive      LABS:                    COVID-19 PCR: NotDetec (2024 08:46)  SARS-CoV-2: NotDetec (2024 10:45)  SARS-CoV-2: NotDetec (2024 13:25)  SARS-CoV-2: NotDetec (2024 13:03)  SARS-CoV-2: NotDetec (15 Dec 2023 00:09)

## 2024-02-07 NOTE — CHART NOTE - NSCHARTNOTEFT_GEN_A_CORE
Pt ___DNR/DNI____ comfort measures. Patient unresponsive to verbal/noxious stimuli.  Pupils fixed and dilated. No spontaneous breathing. No palpable carotid/radial pulse. No heart sounds. No breath sounds.  Time of Death: 0845  Attending notified.  Family notified.

## 2024-05-08 NOTE — PATIENT PROFILE ADULT - FALL HARM RISK - FACTORS
no bleeding/no bleeding at site/no chills/no drainage/no fever/no inflammation/no pain/no purulent drainage/no rectal pain Other

## 2024-08-14 NOTE — PHYSICAL THERAPY INITIAL EVALUATION ADULT - LIGHT TOUCH SENSATION, LLE, REHAB EVAL
Recommend orthopedic referral.  Okay to provide note for work until the end of this week.   within normal limits